# Patient Record
Sex: FEMALE | Race: BLACK OR AFRICAN AMERICAN | NOT HISPANIC OR LATINO | Employment: OTHER | URBAN - METROPOLITAN AREA
[De-identification: names, ages, dates, MRNs, and addresses within clinical notes are randomized per-mention and may not be internally consistent; named-entity substitution may affect disease eponyms.]

---

## 2017-01-10 ENCOUNTER — OFFICE VISIT (OUTPATIENT)
Dept: PODIATRY | Facility: CLINIC | Age: 72
End: 2017-01-10
Payer: MEDICARE

## 2017-01-10 VITALS
HEIGHT: 66 IN | DIASTOLIC BLOOD PRESSURE: 77 MMHG | HEART RATE: 64 BPM | SYSTOLIC BLOOD PRESSURE: 154 MMHG | WEIGHT: 162.13 LBS | BODY MASS INDEX: 26.06 KG/M2

## 2017-01-10 DIAGNOSIS — L60.1 ONYCHOLYSIS: ICD-10-CM

## 2017-01-10 DIAGNOSIS — B35.1 ONYCHOMYCOSIS DUE TO DERMATOPHYTE: Primary | ICD-10-CM

## 2017-01-10 DIAGNOSIS — M62.469 GASTROCNEMIUS EQUINUS, UNSPECIFIED LATERALITY: ICD-10-CM

## 2017-01-10 DIAGNOSIS — M20.10 HALLUX ABDUCTO VALGUS, UNSPECIFIED LATERALITY: ICD-10-CM

## 2017-01-10 PROCEDURE — 99213 OFFICE O/P EST LOW 20 MIN: CPT | Mod: S$PBB,,, | Performed by: PODIATRIST

## 2017-01-10 PROCEDURE — 99999 PR PBB SHADOW E&M-EST. PATIENT-LVL III: CPT | Mod: PBBFAC,,, | Performed by: PODIATRIST

## 2017-01-10 PROCEDURE — 99213 OFFICE O/P EST LOW 20 MIN: CPT | Mod: PBBFAC | Performed by: PODIATRIST

## 2017-01-10 NOTE — PROGRESS NOTES
Subjective:      Patient ID: Mirna Norton is a 71 y.o. female.    Chief Complaint: Nail Problem (PCP Dr. Tyler 10/13/16) and Follow-up    Mirna is a 71 y.o. female who presents to the clinic complaining of thick and discolored toenails on both feet. Mirna is inquiring about treatment options. She tried oral lamisil and penlac with little success about 6 years ago. Currently using OtC antifungal with no improvement.     Review of Systems   Constitution: Negative for chills, fever, weakness, malaise/fatigue and night sweats.   Cardiovascular: Negative for chest pain, leg swelling, orthopnea and palpitations.   Respiratory: Negative for cough, shortness of breath and wheezing.    Skin: Positive for color change, dry skin and nail changes. Negative for itching, poor wound healing and rash.   Musculoskeletal: Negative for arthritis, gout, joint pain, joint swelling, muscle weakness and myalgias.   Gastrointestinal: Negative for abdominal pain, constipation and nausea.   Neurological: Negative for disturbances in coordination, dizziness, focal weakness, numbness and tremors.           Objective:      Physical Exam   Cardiovascular:   Dorsalis pedis and posterior tibial pulses are intact Bilaterally. Toes are cool to touch. Feet are warm proximally.There is decreased digital hair. Skin is atrophic, slightly hyperpigmented, and mildly edematous       Musculoskeletal:   Musculoskeletal:  Muscle strength is 5/5 in all groups bilaterally.  No pain with ankle, STJ or MTPJ ROM, bilat. Ankle dorsiflexion is limited with knees extended and flexed, bilat.     Neurological: No sensory deficit.          Skin:   Toenails 1-5 bilaterally are elongated by 2-3 mm, thickened by 2-3 mm, discolored/yellowed, dystrophic, brittle with subungual debris. No incurvation. Mild xerosis noted, bilat. No open wounds.                       Assessment:       Encounter Diagnoses   Name Primary?    Onychomycosis due to dermatophyte Yes     Gastrocnemius equinus, unspecified laterality     Hallux abducto valgus, unspecified laterality     Onycholysis          Plan:         I counseled the patient on her conditions, their implications and medical management.      .   Discussed all treatment options such as topical, oral, laser treatments vs surgical removal of nail permanent vs non-permanent in detail pertaining to nail fungus.    Reviewed nails unit biopsy confirming fungus.   She wants to avoid oral antifungals.    Interested in laser treatment but wants to continue topical at this time.        Prescribed prescription compound cream with urea, terbinafine and fluconazole to be applied to feet and toenails twice daily.

## 2017-01-10 NOTE — PROGRESS NOTES
Subjective:      Patient ID: Mirna Norton is a 71 y.o. female.    Chief Complaint: Nail Problem (PCP Dr. Tyler 10/13/16) and Follow-up    Mirna is a 71 y.o. female who presents to the clinic complaining of thick and discolored toenails on both feet. Mirna is inquiring about treatment options. She tried oral lamisil and penlac with little success about 6 years ago. Currently using OtC antifungal with no improvement.     1/10/17: f/u for nail fungus. Notes mild improvement with new topical antifungal. Complains of some cracking at nails. Denies pain.    Review of Systems   Constitution: Negative for chills, fever, weakness, malaise/fatigue and night sweats.   Cardiovascular: Negative for chest pain, leg swelling, orthopnea and palpitations.   Respiratory: Negative for cough, shortness of breath and wheezing.    Skin: Positive for color change, dry skin and nail changes. Negative for itching, poor wound healing and rash.   Musculoskeletal: Negative for arthritis, gout, joint pain, joint swelling, muscle weakness and myalgias.   Gastrointestinal: Negative for abdominal pain, constipation and nausea.   Neurological: Negative for disturbances in coordination, dizziness, focal weakness, numbness and tremors.           Objective:      Physical Exam   Cardiovascular:   Dorsalis pedis and posterior tibial pulses are intact Bilaterally. Toes are cool to touch. Feet are warm proximally.There is decreased digital hair. Skin is atrophic, slightly hyperpigmented, and mildly edematous       Musculoskeletal:   Musculoskeletal:  Muscle strength is 5/5 in all groups bilaterally.  No pain with ankle, STJ or MTPJ ROM, bilat. Ankle dorsiflexion is limited with knees extended and flexed, bilat.     Neurological: No sensory deficit.          Skin:   Toenails 1-5 bilaterally are elongated by 2-3 mm, thickened by 2-3 mm, discolored/yellowed, dystrophic, brittle with subungual debris. No incurvation. Mild xerosis noted, bilat. No open  wounds.                       Assessment:       Encounter Diagnoses   Name Primary?    Onychomycosis due to dermatophyte Yes    Gastrocnemius equinus, unspecified laterality     Hallux abducto valgus, unspecified laterality     Onycholysis          Plan:         I counseled the patient on her conditions, their implications and medical management.      .   Discussed all treatment options such as topical, oral, laser treatments vs surgical removal of nail permanent vs non-permanent in detail pertaining to nail fungus.    Reviewed nails unit biopsy confirming fungus.   She wants to avoid oral antifungals.    Interested in laser treatment but wants to continue topical at this time.        Continue prescription compound cream with urea, terbinafine and fluconazole to be applied to feet and toenails twice daily.  Nu vail nightly. Side effects of medication(s) were discussed in detail and patient voiced understanding. Patient will call back for any issues or complications.

## 2017-02-15 ENCOUNTER — PATIENT MESSAGE (OUTPATIENT)
Dept: INTERNAL MEDICINE | Facility: CLINIC | Age: 72
End: 2017-02-15

## 2017-04-04 RX ORDER — ALLOPURINOL 100 MG/1
200 TABLET ORAL DAILY
Qty: 180 TABLET | Refills: 0 | Status: CANCELLED | OUTPATIENT
Start: 2017-04-04

## 2017-04-04 NOTE — TELEPHONE ENCOUNTER
Cannot renew her allopurinol as we have not seen her since August 2015. Perhaps her primary care MD can refill. Otherwise will need to make an apt.

## 2017-04-05 DIAGNOSIS — I73.9 PVD (PERIPHERAL VASCULAR DISEASE): ICD-10-CM

## 2017-04-05 DIAGNOSIS — M10.9 GOUT, UNSPECIFIED: ICD-10-CM

## 2017-04-05 RX ORDER — ALLOPURINOL 100 MG/1
200 TABLET ORAL DAILY
Qty: 180 TABLET | Refills: 11 | Status: SHIPPED | OUTPATIENT
Start: 2017-04-05 | End: 2018-06-11 | Stop reason: SDUPTHER

## 2017-04-05 NOTE — TELEPHONE ENCOUNTER
----- Message from Maya Paredes sent at 4/4/2017  3:24 PM CDT -----  Contact: Self/[phone]  Pt said that she is calling in regards to needing to get an order for some compression stockings sent to [facility] pt stated that she would like to get the same stockings that the doctor ordered for her before also pt stated that she needs a refill on her allopurinol (ZYLOPRIM) 100 MG tablet. Please call and advise              Thank you

## 2017-05-09 ENCOUNTER — OFFICE VISIT (OUTPATIENT)
Dept: PODIATRY | Facility: CLINIC | Age: 72
End: 2017-05-09
Payer: MEDICARE

## 2017-05-09 VITALS
DIASTOLIC BLOOD PRESSURE: 77 MMHG | HEART RATE: 56 BPM | BODY MASS INDEX: 26.06 KG/M2 | WEIGHT: 162.13 LBS | SYSTOLIC BLOOD PRESSURE: 142 MMHG | HEIGHT: 66 IN

## 2017-05-09 DIAGNOSIS — M20.10 HALLUX ABDUCTO VALGUS, UNSPECIFIED LATERALITY: ICD-10-CM

## 2017-05-09 DIAGNOSIS — L60.1 ONYCHOLYSIS: Primary | ICD-10-CM

## 2017-05-09 PROCEDURE — 99213 OFFICE O/P EST LOW 20 MIN: CPT | Mod: PBBFAC | Performed by: PODIATRIST

## 2017-05-09 PROCEDURE — 99999 PR PBB SHADOW E&M-EST. PATIENT-LVL III: CPT | Mod: PBBFAC,,, | Performed by: PODIATRIST

## 2017-05-09 PROCEDURE — 99213 OFFICE O/P EST LOW 20 MIN: CPT | Mod: S$PBB,,, | Performed by: PODIATRIST

## 2017-05-09 NOTE — PROGRESS NOTES
Subjective:      Patient ID: Mirna Norton is a 72 y.o. female.    Chief Complaint: Nail Problem (fungus/ PCP Dr. Tyler 10/13/16) and Follow-up    Mirna is a 72 y.o. female who presents to the clinic complaining of thick and discolored toenails on both feet. Mirna is inquiring about treatment options. She tried oral lamisil and penlac with little success about 6 years ago. Currently using OtC antifungal with no improvement.     5/9/17: f/u for nail fungus/splitting. Notes improvement with topical antifungal and nuvail with daily use. Denies injury or pain.    Review of Systems   Constitution: Negative for chills, fever, weakness, malaise/fatigue and night sweats.   Cardiovascular: Negative for chest pain, leg swelling, orthopnea and palpitations.   Respiratory: Negative for cough, shortness of breath and wheezing.    Skin: Positive for color change, dry skin and nail changes. Negative for itching, poor wound healing and rash.   Musculoskeletal: Negative for arthritis, gout, joint pain, joint swelling, muscle weakness and myalgias.   Gastrointestinal: Negative for abdominal pain, constipation and nausea.   Neurological: Negative for disturbances in coordination, dizziness, focal weakness, numbness and tremors.           Objective:      Physical Exam   Cardiovascular:   Dorsalis pedis and posterior tibial pulses are intact Bilaterally. Toes are cool to touch. Feet are warm proximally.There is decreased digital hair. Skin is atrophic, slightly hyperpigmented, and mildly edematous       Musculoskeletal:   Musculoskeletal:  Muscle strength is 5/5 in all groups bilaterally.  No pain with ankle, STJ or MTPJ ROM, bilat. Ankle dorsiflexion is limited with knees extended and flexed, bilat.     Neurological: No sensory deficit.          Skin:   Toenails 1-5 bilaterally thickened by 1-2 mm, discolored/yellowed. No incurvation. Clearing noted at proximal half of nails.               Assessment:       Encounter Diagnoses    Name Primary?    Onycholysis Yes    Hallux abducto valgus, unspecified laterality          Plan:         I counseled the patient on her conditions, their implications and medical management.      .   Discussed all treatment options such as topical, oral, laser treatments vs surgical removal of nail permanent vs non-permanent in detail pertaining to nail fungus.    Reviewed nails unit biopsy confirming fungus.   She wants to avoid oral antifungals.        Continue prescription compound cream with urea, terbinafine and fluconazole to be applied to feet and daily.  Nu vail nightly. Side effects of medication(s) were discussed in detail and patient voiced understanding. Patient will call back for any issues or complications.

## 2017-05-09 NOTE — PATIENT INSTRUCTIONS
Antifungal medicine, Vicks vapor rub, tea tree oil or vinegar soaks daily    Nuvail every 3-4 days before bed

## 2017-06-13 ENCOUNTER — OFFICE VISIT (OUTPATIENT)
Dept: INTERNAL MEDICINE | Facility: CLINIC | Age: 72
End: 2017-06-13
Payer: MEDICARE

## 2017-06-13 VITALS
BODY MASS INDEX: 26.47 KG/M2 | HEIGHT: 66 IN | WEIGHT: 164.69 LBS | SYSTOLIC BLOOD PRESSURE: 140 MMHG | HEART RATE: 62 BPM | DIASTOLIC BLOOD PRESSURE: 72 MMHG | OXYGEN SATURATION: 98 %

## 2017-06-13 DIAGNOSIS — M79.671 FOOT PAIN, RIGHT: ICD-10-CM

## 2017-06-13 PROCEDURE — 99213 OFFICE O/P EST LOW 20 MIN: CPT | Mod: S$PBB,GC,, | Performed by: STUDENT IN AN ORGANIZED HEALTH CARE EDUCATION/TRAINING PROGRAM

## 2017-06-13 PROCEDURE — 99214 OFFICE O/P EST MOD 30 MIN: CPT | Mod: PBBFAC | Performed by: STUDENT IN AN ORGANIZED HEALTH CARE EDUCATION/TRAINING PROGRAM

## 2017-06-13 PROCEDURE — 99999 PR PBB SHADOW E&M-EST. PATIENT-LVL IV: CPT | Mod: PBBFAC,GC,, | Performed by: STUDENT IN AN ORGANIZED HEALTH CARE EDUCATION/TRAINING PROGRAM

## 2017-06-13 PROCEDURE — 1125F AMNT PAIN NOTED PAIN PRSNT: CPT | Mod: GC,,, | Performed by: STUDENT IN AN ORGANIZED HEALTH CARE EDUCATION/TRAINING PROGRAM

## 2017-06-13 PROCEDURE — 1159F MED LIST DOCD IN RCRD: CPT | Mod: GC,,, | Performed by: STUDENT IN AN ORGANIZED HEALTH CARE EDUCATION/TRAINING PROGRAM

## 2017-06-13 RX ORDER — MELOXICAM 15 MG/1
15 TABLET ORAL DAILY
Qty: 14 TABLET | Refills: 0 | Status: SHIPPED | OUTPATIENT
Start: 2017-06-13 | End: 2017-10-10

## 2017-06-13 NOTE — PROGRESS NOTES
Subjective:       Patient ID: Mirna Norton is a 72 y.o. female.    Chief Complaint: Pain (x week, left foot)    HPI   Mirna Norton is a 72 y.o. female with PMHx Gout HTN HLD GERD who presents today for C/C of right foot pain for 1/52.   Review of Systems   Constitutional: Negative for chills and fever.   HENT: Negative for congestion and rhinorrhea.    Eyes: Negative for redness and itching.   Respiratory: Negative for cough and shortness of breath.    Cardiovascular: Negative for chest pain and leg swelling.   Gastrointestinal: Negative for abdominal pain, constipation, diarrhea, nausea and vomiting.   Genitourinary: Negative for dysuria and hematuria.   Musculoskeletal: Positive for joint swelling. Negative for myalgias.   Skin: Positive for color change. Negative for rash and wound.   Neurological: Negative for dizziness and headaches.       Objective:      Physical Exam   Constitutional: She is oriented to person, place, and time. She appears well-developed and well-nourished. No distress.   HENT:   Head: Normocephalic and atraumatic.   Eyes: Conjunctivae and EOM are normal. Pupils are equal, round, and reactive to light.   Neck: Normal range of motion. Neck supple. No JVD present. No thyromegaly present.   Cardiovascular: Normal rate, regular rhythm, S1 normal and S2 normal.  Exam reveals no gallop and no friction rub.    No murmur heard.  Pulses:       Dorsalis pedis pulses are 2+ on the right side, and 2+ on the left side.        Posterior tibial pulses are 2+ on the right side, and 2+ on the left side.   Pulmonary/Chest: Effort normal. She has no wheezes. She has no rhonchi. She has no rales.   Abdominal: Soft. Bowel sounds are normal. She exhibits no distension. There is no tenderness.   Musculoskeletal: Normal range of motion. She exhibits no edema or tenderness.        Right foot: There is normal range of motion and no deformity.        Left foot: There is normal range of motion and no deformity.    Right foot with tenderness to deep palpation over II/III MCP. Mild erythema and minimal warmth. No obvious limitations on ROM. No evidence of skin break down.    Onchomycosis noted on LEFT foot.     Shallow arches noted bilaterally.    Feet:   Right Foot:   Protective Sensation: 3 sites tested. 3 sites sensed.   Skin Integrity: Positive for erythema and warmth. Negative for ulcer or skin breakdown.   Left Foot:   Protective Sensation: 3 sites tested. 3 sites sensed.   Skin Integrity: Negative for ulcer, skin breakdown, erythema or warmth.   Neurological: She is alert and oriented to person, place, and time. She has normal reflexes.   Skin: Skin is warm and dry.   Nursing note and vitals reviewed.      Assessment:       1. Foot pain, right        Plan:       1 -   -Recommend 2 week course of mobic  -Unlikely to be cellulitis or gout flare.   -Minimal warmth.   -Tenderness is not comparable to patient's previous gout flares and pain is not out of proportion to examination.   -Compliant with home allopurinol dosage and last urica acid WNL.  -Stress # is thought unlikely given WNL DEXA and no evidence of significant impact stress recently.  -WIll defer Abx for now and trial 15mg meloxicam daily.  -Instruct patient to RTC in one week if no improvement for labs and possible imaging.  -Instructed patient to RTC sooner if symptoms worsen.    Case d/w Dr French  RTC 1 week if not better or prn complaints/concerns.    Otoniel Cho II, MD  Internal Medicine PGY2  [phone]      I have personally seen and examined patient and agree with the A/P as noted above.    Scotty Weldon

## 2017-06-14 ENCOUNTER — PATIENT MESSAGE (OUTPATIENT)
Dept: INTERNAL MEDICINE | Facility: CLINIC | Age: 72
End: 2017-06-14

## 2017-06-14 DIAGNOSIS — E78.5 HYPERLIPIDEMIA, UNSPECIFIED HYPERLIPIDEMIA TYPE: ICD-10-CM

## 2017-06-14 DIAGNOSIS — I65.29 STENOSIS OF CAROTID ARTERY, UNSPECIFIED LATERALITY: ICD-10-CM

## 2017-06-14 DIAGNOSIS — R73.03 PRE-DIABETES: ICD-10-CM

## 2017-06-14 DIAGNOSIS — I73.9 PVD (PERIPHERAL VASCULAR DISEASE): ICD-10-CM

## 2017-06-14 DIAGNOSIS — M10.9 GOUT, UNSPECIFIED: ICD-10-CM

## 2017-06-14 DIAGNOSIS — Z00.00 ROUTINE GENERAL MEDICAL EXAMINATION AT A HEALTH CARE FACILITY: Primary | ICD-10-CM

## 2017-06-14 DIAGNOSIS — I10 ESSENTIAL HYPERTENSION: ICD-10-CM

## 2017-06-14 NOTE — TELEPHONE ENCOUNTER
Hi, please set her up for yearly blood work, orders are in.  Also please schedule a nurse visit for same day as blood work for a BP check.  Thank you, Ramsey Tyler

## 2017-06-15 ENCOUNTER — PATIENT MESSAGE (OUTPATIENT)
Dept: INTERNAL MEDICINE | Facility: CLINIC | Age: 72
End: 2017-06-15

## 2017-06-16 ENCOUNTER — CLINICAL SUPPORT (OUTPATIENT)
Dept: INTERNAL MEDICINE | Facility: CLINIC | Age: 72
End: 2017-06-16
Payer: MEDICARE

## 2017-06-16 ENCOUNTER — LAB VISIT (OUTPATIENT)
Dept: LAB | Facility: HOSPITAL | Age: 72
End: 2017-06-16
Attending: INTERNAL MEDICINE
Payer: MEDICARE

## 2017-06-16 VITALS — DIASTOLIC BLOOD PRESSURE: 80 MMHG | SYSTOLIC BLOOD PRESSURE: 160 MMHG

## 2017-06-16 DIAGNOSIS — I10 ESSENTIAL HYPERTENSION: ICD-10-CM

## 2017-06-16 DIAGNOSIS — R73.03 PRE-DIABETES: ICD-10-CM

## 2017-06-16 DIAGNOSIS — M10.9 GOUT, UNSPECIFIED: ICD-10-CM

## 2017-06-16 DIAGNOSIS — I10 ESSENTIAL HYPERTENSION: Primary | ICD-10-CM

## 2017-06-16 LAB
ALBUMIN SERPL BCP-MCNC: 3.4 G/DL
ALP SERPL-CCNC: 68 U/L
ALT SERPL W/O P-5'-P-CCNC: 12 U/L
ANION GAP SERPL CALC-SCNC: 7 MMOL/L
AST SERPL-CCNC: 17 U/L
BILIRUB SERPL-MCNC: 0.3 MG/DL
BUN SERPL-MCNC: 21 MG/DL
CALCIUM SERPL-MCNC: 9.5 MG/DL
CHLORIDE SERPL-SCNC: 105 MMOL/L
CHOLEST/HDLC SERPL: 2.7 {RATIO}
CO2 SERPL-SCNC: 28 MMOL/L
CREAT SERPL-MCNC: 0.9 MG/DL
EST. GFR  (AFRICAN AMERICAN): >60 ML/MIN/1.73 M^2
EST. GFR  (NON AFRICAN AMERICAN): >60 ML/MIN/1.73 M^2
ESTIMATED AVG GLUCOSE: 128 MG/DL
GLUCOSE SERPL-MCNC: 98 MG/DL
HBA1C MFR BLD HPLC: 6.1 %
HDL/CHOLESTEROL RATIO: 36.9 %
HDLC SERPL-MCNC: 168 MG/DL
HDLC SERPL-MCNC: 62 MG/DL
LDLC SERPL CALC-MCNC: 87.2 MG/DL
NONHDLC SERPL-MCNC: 106 MG/DL
POTASSIUM SERPL-SCNC: 4.3 MMOL/L
PROT SERPL-MCNC: 7 G/DL
SODIUM SERPL-SCNC: 140 MMOL/L
TRIGL SERPL-MCNC: 94 MG/DL
URATE SERPL-MCNC: 5.1 MG/DL

## 2017-06-16 PROCEDURE — 99999 PR PBB SHADOW E&M-EST. PATIENT-LVL I: CPT | Mod: PBBFAC,,,

## 2017-06-16 PROCEDURE — 99211 OFF/OP EST MAY X REQ PHY/QHP: CPT | Mod: PBBFAC

## 2017-06-16 RX ORDER — HYDROCHLOROTHIAZIDE 25 MG/1
25 TABLET ORAL DAILY
Qty: 30 TABLET | Refills: 11 | Status: SHIPPED | OUTPATIENT
Start: 2017-06-16 | End: 2017-11-16

## 2017-06-16 NOTE — PROGRESS NOTES
Pt is here for the b/p check. Pt doesn't have b/p machine at home and hasn't been checking b/p.Pt states that she took b/p medicine today at 7:50am. First b/p reading taken at 8:15 am, b/p at this time 176/89, p 52. Second blood pressure reading taken at 8:40 am 183/90 p 52. Dr Tyler notified and he has personally seen pt and released her form the office. Pt is to come back in 4 wks for the lab work and b/p check. Appts made.

## 2017-07-17 ENCOUNTER — PATIENT MESSAGE (OUTPATIENT)
Dept: TRANSPLANT | Facility: CLINIC | Age: 72
End: 2017-07-17

## 2017-07-18 ENCOUNTER — CLINICAL SUPPORT (OUTPATIENT)
Dept: INTERNAL MEDICINE | Facility: CLINIC | Age: 72
End: 2017-07-18
Payer: MEDICARE

## 2017-07-18 ENCOUNTER — LAB VISIT (OUTPATIENT)
Dept: LAB | Facility: HOSPITAL | Age: 72
End: 2017-07-18
Attending: INTERNAL MEDICINE
Payer: MEDICARE

## 2017-07-18 VITALS — DIASTOLIC BLOOD PRESSURE: 71 MMHG | SYSTOLIC BLOOD PRESSURE: 138 MMHG

## 2017-07-18 DIAGNOSIS — I10 ESSENTIAL HYPERTENSION: ICD-10-CM

## 2017-07-18 DIAGNOSIS — Z01.30 BP CHECK: Primary | ICD-10-CM

## 2017-07-18 LAB
ANION GAP SERPL CALC-SCNC: 10 MMOL/L
BUN SERPL-MCNC: 16 MG/DL
CALCIUM SERPL-MCNC: 9.5 MG/DL
CHLORIDE SERPL-SCNC: 99 MMOL/L
CO2 SERPL-SCNC: 31 MMOL/L
CREAT SERPL-MCNC: 0.9 MG/DL
EST. GFR  (AFRICAN AMERICAN): >60 ML/MIN/1.73 M^2
EST. GFR  (NON AFRICAN AMERICAN): >60 ML/MIN/1.73 M^2
GLUCOSE SERPL-MCNC: 137 MG/DL
POTASSIUM SERPL-SCNC: 3.4 MMOL/L
SODIUM SERPL-SCNC: 140 MMOL/L

## 2017-07-18 PROCEDURE — 99214 OFFICE O/P EST MOD 30 MIN: CPT | Mod: PBBFAC

## 2017-07-18 PROCEDURE — 99999 PR PBB SHADOW E&M-EST. PATIENT-LVL IV: CPT | Mod: PBBFAC,,,

## 2017-07-18 NOTE — PROGRESS NOTES
Pt b/p and medication were check pt reading are documented in pt vitals chart pt advised by Dr. Tyler and also had information given from O Jj.

## 2017-09-06 ENCOUNTER — OFFICE VISIT (OUTPATIENT)
Dept: INTERNAL MEDICINE | Facility: CLINIC | Age: 72
End: 2017-09-06
Payer: MEDICARE

## 2017-09-06 VITALS
SYSTOLIC BLOOD PRESSURE: 122 MMHG | TEMPERATURE: 101 F | WEIGHT: 160.69 LBS | BODY MASS INDEX: 25.83 KG/M2 | DIASTOLIC BLOOD PRESSURE: 64 MMHG | HEART RATE: 76 BPM | HEIGHT: 66 IN

## 2017-09-06 DIAGNOSIS — J06.9 UPPER RESPIRATORY TRACT INFECTION, UNSPECIFIED TYPE: ICD-10-CM

## 2017-09-06 DIAGNOSIS — R50.9 FEVER, UNSPECIFIED FEVER CAUSE: Primary | ICD-10-CM

## 2017-09-06 LAB
CTP QC/QA: YES
CTP QC/QA: YES
FLUAV AG NPH QL: NEGATIVE
FLUBV AG NPH QL: NEGATIVE
S PYO RRNA THROAT QL PROBE: NEGATIVE

## 2017-09-06 PROCEDURE — 99213 OFFICE O/P EST LOW 20 MIN: CPT | Mod: S$PBB,,, | Performed by: INTERNAL MEDICINE

## 2017-09-06 PROCEDURE — 1125F AMNT PAIN NOTED PAIN PRSNT: CPT | Mod: ,,, | Performed by: INTERNAL MEDICINE

## 2017-09-06 PROCEDURE — 99999 PR PBB SHADOW E&M-EST. PATIENT-LVL III: CPT | Mod: PBBFAC,,, | Performed by: INTERNAL MEDICINE

## 2017-09-06 PROCEDURE — 3078F DIAST BP <80 MM HG: CPT | Mod: ,,, | Performed by: INTERNAL MEDICINE

## 2017-09-06 PROCEDURE — 1159F MED LIST DOCD IN RCRD: CPT | Mod: ,,, | Performed by: INTERNAL MEDICINE

## 2017-09-06 PROCEDURE — 87804 INFLUENZA ASSAY W/OPTIC: CPT | Mod: PBBFAC | Performed by: INTERNAL MEDICINE

## 2017-09-06 PROCEDURE — 99213 OFFICE O/P EST LOW 20 MIN: CPT | Mod: PBBFAC | Performed by: INTERNAL MEDICINE

## 2017-09-06 PROCEDURE — 87880 STREP A ASSAY W/OPTIC: CPT | Mod: PBBFAC | Performed by: INTERNAL MEDICINE

## 2017-09-06 PROCEDURE — 3074F SYST BP LT 130 MM HG: CPT | Mod: ,,, | Performed by: INTERNAL MEDICINE

## 2017-09-06 RX ORDER — CODEINE PHOSPHATE AND GUAIFENESIN 10; 100 MG/5ML; MG/5ML
5 SOLUTION ORAL NIGHTLY PRN
Qty: 118 ML | Refills: 0 | Status: SHIPPED | OUTPATIENT
Start: 2017-09-06 | End: 2017-09-16

## 2017-09-06 RX ORDER — ACETAMINOPHEN 325 MG/1
650 TABLET ORAL
Status: COMPLETED | OUTPATIENT
Start: 2017-09-06 | End: 2017-09-06

## 2017-09-06 RX ORDER — AMOXICILLIN AND CLAVULANATE POTASSIUM 875; 125 MG/1; MG/1
1 TABLET, FILM COATED ORAL EVERY 12 HOURS
Qty: 14 TABLET | Refills: 0 | Status: SHIPPED | OUTPATIENT
Start: 2017-09-06 | End: 2017-09-13

## 2017-09-06 RX ADMIN — ACETAMINOPHEN 650 MG: 325 TABLET ORAL at 08:09

## 2017-09-06 NOTE — PROGRESS NOTES
Subjective:       Patient ID: Mirna Norton is a 72 y.o. female.    Chief Complaint: Cough (4Xdays); Sore Throat (3Xdays); and Headache (1Xday)    HPI     Patient is a 72 year old female here today for cough and sore throat.  Sx began 5 days ago with dry cough, sore throat, low grade fever (unknown Tmax), now progressed to increased throat pain, nasal congestion, rhinorrhea, sinus pressure, body aches. No upset stomach, diarrhea, nausea/vomiting. No known sick contacts. No change with OTC medications at home.        Review of Systems   Constitutional: Positive for appetite change, chills, fatigue and fever.   HENT: Positive for congestion, postnasal drip, rhinorrhea, sinus pain, sinus pressure, sneezing and sore throat. Negative for ear pain.    Respiratory: Positive for cough. Negative for shortness of breath and wheezing.    Cardiovascular: Negative for chest pain.   Gastrointestinal: Negative for abdominal pain, constipation, diarrhea, nausea and vomiting.   Neurological: Positive for headaches.       Objective:      Physical Exam   Constitutional: She is oriented to person, place, and time. She appears well-developed and well-nourished. No distress.   HENT:   Head: Normocephalic and atraumatic.   Right Ear: External ear normal.   Left Ear: External ear normal.   Effusion posterior to left TM  +maxillary sinus tenderness on L   Eyes: Conjunctivae and EOM are normal. Pupils are equal, round, and reactive to light.   Neck: Normal range of motion. Neck supple. No thyromegaly present.   Cardiovascular: Normal rate, regular rhythm, normal heart sounds and intact distal pulses.    No murmur heard.  Pulmonary/Chest: Effort normal and breath sounds normal. No respiratory distress. She has no wheezes. She has no rales.   Musculoskeletal: She exhibits no edema.   Lymphadenopathy:     She has no cervical adenopathy.   Neurological: She is alert and oriented to person, place, and time.   Skin: Skin is warm and dry. She is  not diaphoretic.   Nursing note and vitals reviewed.      Assessment:       1. Fever, unspecified fever cause    2. Upper respiratory tract infection, unspecified type        Plan:       Rapid POCT strep negative  Rapid Influenza test negative  She has fever in clinic today, sx worsening and not improving  Will bolus 500 cc NS IV now   Tylenol 650 mg PO X 1 dose now  Start Augmentin BID x 7 days, GI side effects reviewed  Recommend flonase nasal spray daily x 3 weeks  Recommend coricidin BP cold/flu PRN for cold symptoms  Rx for Cheratussin nightly prn cough, sedation side effects reviewed  RTC PRN

## 2017-09-21 ENCOUNTER — TELEPHONE (OUTPATIENT)
Dept: OBSTETRICS AND GYNECOLOGY | Facility: CLINIC | Age: 72
End: 2017-09-21

## 2017-09-21 DIAGNOSIS — Z12.39 BREAST CANCER SCREENING: Primary | ICD-10-CM

## 2017-09-27 ENCOUNTER — TELEPHONE (OUTPATIENT)
Dept: GASTROENTEROLOGY | Facility: CLINIC | Age: 72
End: 2017-09-27

## 2017-09-27 NOTE — TELEPHONE ENCOUNTER
Patient received faxed paperwork.    Patient is not sure if she had a reaction to her Remicade infusion. The night she had her infusion, she experienced a really bad headache. She is also experiencing nausea and diarrhea. She has had 3 episodes of diarrhea this morning.     Message sent to Dr. Luu.

## 2017-09-27 NOTE — TELEPHONE ENCOUNTER
Returned patient's call. Patient only wants to see Dr. Luu. Gastro appointment scheduled and mailed to address on file.

## 2017-09-27 NOTE — TELEPHONE ENCOUNTER
----- Message from Aretha Grant MA sent at 9/26/2017 12:28 PM CDT -----  Contact: Home: [phone]   Jus  Pt received a letter that she is due for a yearly check with Dr sultana but there is no availability.     Home: [phone]

## 2017-09-29 DIAGNOSIS — I73.9 PVD (PERIPHERAL VASCULAR DISEASE): ICD-10-CM

## 2017-09-29 DIAGNOSIS — I77.9 CAROTID ARTERY DISEASE, UNSPECIFIED LATERALITY: ICD-10-CM

## 2017-09-29 RX ORDER — PRAVASTATIN SODIUM 10 MG/1
10 TABLET ORAL DAILY
Qty: 90 TABLET | Refills: 3 | Status: SHIPPED | OUTPATIENT
Start: 2017-09-29 | End: 2017-11-13 | Stop reason: SDUPTHER

## 2017-10-10 ENCOUNTER — HOSPITAL ENCOUNTER (OUTPATIENT)
Dept: RADIOLOGY | Facility: HOSPITAL | Age: 72
Discharge: HOME | End: 2017-10-10
Attending: NURSE PRACTITIONER
Payer: MEDICARE

## 2017-10-10 ENCOUNTER — OFFICE VISIT (OUTPATIENT)
Dept: OBSTETRICS AND GYNECOLOGY | Facility: CLINIC | Age: 72
End: 2017-10-10
Payer: MEDICARE

## 2017-10-10 VITALS
WEIGHT: 162 LBS | BODY MASS INDEX: 26.03 KG/M2 | SYSTOLIC BLOOD PRESSURE: 138 MMHG | HEIGHT: 66 IN | DIASTOLIC BLOOD PRESSURE: 82 MMHG

## 2017-10-10 VITALS — WEIGHT: 160 LBS | BODY MASS INDEX: 25.71 KG/M2 | HEIGHT: 66 IN

## 2017-10-10 DIAGNOSIS — Z78.0 POSTMENOPAUSE: ICD-10-CM

## 2017-10-10 DIAGNOSIS — Z12.39 BREAST CANCER SCREENING: ICD-10-CM

## 2017-10-10 DIAGNOSIS — Z01.419 VISIT FOR GYNECOLOGIC EXAMINATION: Primary | ICD-10-CM

## 2017-10-10 PROCEDURE — 99999 PR PBB SHADOW E&M-EST. PATIENT-LVL III: CPT | Mod: PBBFAC,,, | Performed by: NURSE PRACTITIONER

## 2017-10-10 PROCEDURE — G0101 CA SCREEN;PELVIC/BREAST EXAM: HCPCS | Mod: S$PBB,,, | Performed by: NURSE PRACTITIONER

## 2017-10-10 PROCEDURE — 77067 SCR MAMMO BI INCL CAD: CPT | Mod: 26,GA,, | Performed by: RADIOLOGY

## 2017-10-10 PROCEDURE — 77067 SCR MAMMO BI INCL CAD: CPT | Mod: GA,TC

## 2017-10-10 PROCEDURE — G0101 CA SCREEN;PELVIC/BREAST EXAM: HCPCS | Mod: PBBFAC | Performed by: NURSE PRACTITIONER

## 2017-10-10 PROCEDURE — 77063 BREAST TOMOSYNTHESIS BI: CPT | Mod: 26,GA,, | Performed by: RADIOLOGY

## 2017-10-10 PROCEDURE — 99213 OFFICE O/P EST LOW 20 MIN: CPT | Mod: PBBFAC | Performed by: NURSE PRACTITIONER

## 2017-10-10 NOTE — PROGRESS NOTES
HISTORY OF PRESENT ILLNESS:    Mirna Norton is a 72 y.o. female , presents for a routine exam and has no gyn complaints.      Past Medical History:   Diagnosis Date    Abnormal cervical Papanicolaou smear     Cryo    Carotid artery stenosis     via kinked carotid artery - followed by Dr. Ye Anderson    Carotid stenosis 5/15/2014    GERD (gastroesophageal reflux disease)     Gout due to renal impairment     Gout, arthritis 2013    Gout, unspecified     Herniated lumbar intervertebral disc     Hypertension     Insomnia     Irritable bowel syndrome without diarrhea 2015    Liver lesion 3/13/2013    Mitral valve prolapse     MVP (mitral valve prolapse)     Pancreatic cyst 2013    PVD (peripheral vascular disease) 2013    Venous insufficiency        Past Surgical History:   Procedure Laterality Date    BARTHOLIN GLAND CYST EXCISION       SECTION      x3    Diagnostic laproscopic       laser surgery on vein          MEDICATIONS AND ALLERGIES:      Current Outpatient Prescriptions:     allopurinol (ZYLOPRIM) 100 MG tablet, Take 2 tablets (200 mg total) by mouth once daily., Disp: 180 tablet, Rfl: 11    aspirin (ECOTRIN) 81 MG EC tablet, Take 81 mg by mouth once daily., Disp: , Rfl:     BIFIDOBACTERIUM INFANTIS (ALIGN ORAL), Take by mouth once daily., Disp: , Rfl:     hydrochlorothiazide (HYDRODIURIL) 25 MG tablet, Take 1 tablet (25 mg total) by mouth once daily., Disp: 30 tablet, Rfl: 11    losartan (COZAAR) 100 MG tablet, Take 1 tablet (100 mg total) by mouth once daily., Disp: 90 tablet, Rfl: 11    metoprolol succinate (TOPROL-XL) 50 MG 24 hr tablet, Take 1 tablet (50 mg total) by mouth once daily., Disp: 90 tablet, Rfl: 11    multivitamin-minerals-lutein (CENTRUM SILVER) Tab, Take 1 tablet by mouth Twice daily., Disp: , Rfl:     omeprazole (PRILOSEC) 40 MG capsule, Take 1 capsule (40 mg total) by mouth every morning. 1 Capsule, Delayed Release(E.C.)  Oral Every day, Disp: 90 capsule, Rfl: 3    poly-ureaurethane 16 % NFSo, Apply 1 application topically every evening., Disp: 15 mL, Rfl: 3    pravastatin (PRAVACHOL) 10 MG tablet, Take 1 tablet (10 mg total) by mouth once daily., Disp: 90 tablet, Rfl: 3    vitamin E 400 UNIT capsule, Take 400 Units by mouth once daily. , Disp: , Rfl:     Review of patient's allergies indicates:   Allergen Reactions    No known allergies        Family History   Problem Relation Age of Onset    Diabetes Brother     Eclampsia Brother     Hypertension Brother     Diabetes Sister     Eclampsia Sister     Hypertension Sister     Heart disease Sister     Breast cancer Other     Diabetes Sister     Eclampsia Sister     Hypertension Sister     Miscarriages / Stillbirths Sister     Diabetes Sister     Eclampsia Sister     Hypertension Sister     Heart disease Mother     Colon cancer Neg Hx     Ovarian cancer Neg Hx        Social History     Social History    Marital status:      Spouse name: N/A    Number of children: N/A    Years of education: N/A     Occupational History    Retired      Social History Main Topics    Smoking status: Never Smoker    Smokeless tobacco: Never Used    Alcohol use No    Drug use: No    Sexual activity: No     Other Topics Concern    Not on file     Social History Narrative    3 kids, previous . , has eye issues. biw exercise.       OBSTETRIC HISTORY: Number of cesareans: 3    COMPREHENSIVE GYN HISTORY:  PAP History: Reports abnormal Paps. Date:1980's. Treatment: Cryo. LAST PAP 8-15-13 NORMAL.  Infection History: Denies STDs. Denies PID.  Benign History: Denies uterine fibroids. Denies ovarian cysts. Denies endometriosis. Denies other conditions.  Cancer History: Denies cervical cancer. Denies uterine cancer or hyperplasia. Denies ovarian cancer. Denies vulvar cancer or pre-cancer. Denies vaginal cancer or pre-cancer. Denies breast cancer. Denies  "colon cancer.  Sexual Activity History: Denies currently being sexually active.  Menstrual History: Denies menses. Patient is postmenopausal: Not on HRT/ERT.    ROS:  GENERAL: No weight changes. No swelling. No fatigue. No fever.  CARDIOVASCULAR: No chest pain. No shortness of breath. No leg cramps.   NEUROLOGICAL: No headaches. No vision changes.  BREASTS: No pain. No lumps. No discharge.  ABDOMEN: No pain. No nausea. No vomiting. No diarrhea. No constipation.  REPRODUCTIVE: No abnormal bleeding.   VULVA: No pain. No lesions. No itching.  VAGINA: No relaxation. No itching. No odor. No discharge. No lesions.  URINARY: No incontinence. No nocturia. No frequency. No dysuria.    /82   Ht 5' 6" (1.676 m)   Wt 73.5 kg (162 lb)   BMI 26.15 kg/m²     PE:  APPEARANCE: Well nourished, well developed, in no acute distress.  AFFECT: WNL, alert and oriented x 3.  SKIN: No hirsutism or acne.  NECK: Neck symmetric without masses or thyromegaly.  NODES: No inguinal, cervical, axillary or femoral lymph node enlargement.  CHEST: Good respiratory effort.   ABDOMEN: Soft. No tenderness or masses. No hepatosplenomegaly. No hernias.  BREASTS: Symmetrical, no skin changes or visible lesions. No palpable masses, nipple discharge bilaterally.  PELVIC: ATROPHIC EXTERNAL FEMALE GENITALIA without lesions. Normal hair distribution. Adequate perineal body, normal urethral meatus. VAGINA DRY without lesions or discharge. CERVIX STENOTIC without lesions, discharge or tenderness. No significant cystocele or rectocele. Bimanual exam shows uterus to be normal size, regular, mobile and nontender. Adnexa without masses or tenderness.  RECTAL: Rectovaginal exam confirms above with normal sphincter tone, no masses.  EXTREMITIES: No edema.    DIAGNOSIS:  1. Visit for gynecologic examination    2. Postmenopause        PLAN:    LABS AND TESTS ORDERED:  Up to date on Mammogram, BMD and Colonoscopy    MEDICATIONS " PRESCRIBED:  None    COUNSELING:  The patient was counseled today on:  -questions about fibroglandular densities on last year's mammogram and will see what this year's mammogram report shows as well as the results of the screening questionnaire she answered;  -osteoporosis prevention, calcium supplementation, regular weight bearing exercise;  -A.C.S. Pap and pelvic exam guidelines (no pap after age 65) and recommendations for yearly mammogram;  -to see her PCP for other health maintenance.    FOLLOW-UP with me in two years.

## 2017-10-17 ENCOUNTER — TELEPHONE (OUTPATIENT)
Dept: CARDIOLOGY | Facility: HOSPITAL | Age: 72
End: 2017-10-17

## 2017-10-17 ENCOUNTER — TELEPHONE (OUTPATIENT)
Dept: CARDIOLOGY | Facility: CLINIC | Age: 72
End: 2017-10-17

## 2017-10-17 DIAGNOSIS — R09.89 CAROTID BRUIT, UNSPECIFIED LATERALITY: ICD-10-CM

## 2017-10-17 DIAGNOSIS — I87.2 VENOUS INSUFFICIENCY OF BOTH LOWER EXTREMITIES: Primary | ICD-10-CM

## 2017-10-17 NOTE — TELEPHONE ENCOUNTER
----- Message from Sivan Molina sent at 10/17/2017 10:58 AM CDT -----  Contact: [phone] self  Patient would like to be seen sooner than the next available appointment. Patient advised her leg is injured and she previously blew a vain. Please advise.

## 2017-10-27 ENCOUNTER — HOSPITAL ENCOUNTER (OUTPATIENT)
Dept: RADIOLOGY | Facility: HOSPITAL | Age: 72
Discharge: HOME | End: 2017-10-27
Attending: INTERNAL MEDICINE
Payer: MEDICARE

## 2017-10-27 DIAGNOSIS — R09.89 CAROTID BRUIT, UNSPECIFIED LATERALITY: ICD-10-CM

## 2017-10-27 DIAGNOSIS — I87.2 VENOUS INSUFFICIENCY OF BOTH LOWER EXTREMITIES: ICD-10-CM

## 2017-10-27 PROCEDURE — 93970 EXTREMITY STUDY: CPT | Mod: TC

## 2017-10-27 PROCEDURE — 93880 EXTRACRANIAL BILAT STUDY: CPT | Mod: TC

## 2017-10-27 PROCEDURE — 93970 EXTREMITY STUDY: CPT | Mod: 26,,, | Performed by: RADIOLOGY

## 2017-10-27 PROCEDURE — 93880 EXTRACRANIAL BILAT STUDY: CPT | Mod: 26,,, | Performed by: RADIOLOGY

## 2017-11-13 ENCOUNTER — OFFICE VISIT (OUTPATIENT)
Dept: CARDIOLOGY | Facility: CLINIC | Age: 72
End: 2017-11-13
Payer: MEDICARE

## 2017-11-13 VITALS
DIASTOLIC BLOOD PRESSURE: 70 MMHG | HEIGHT: 66 IN | SYSTOLIC BLOOD PRESSURE: 110 MMHG | WEIGHT: 159 LBS | HEART RATE: 68 BPM | BODY MASS INDEX: 25.55 KG/M2

## 2017-11-13 DIAGNOSIS — I77.9 CAROTID ARTERY DISEASE, UNSPECIFIED LATERALITY: ICD-10-CM

## 2017-11-13 DIAGNOSIS — R09.89 BILATERAL CAROTID BRUITS: ICD-10-CM

## 2017-11-13 DIAGNOSIS — E78.2 MIXED HYPERLIPIDEMIA: ICD-10-CM

## 2017-11-13 DIAGNOSIS — I65.29 STENOSIS OF CAROTID ARTERY, UNSPECIFIED LATERALITY: Primary | ICD-10-CM

## 2017-11-13 DIAGNOSIS — I87.2 VENOUS INSUFFICIENCY OF RIGHT LOWER EXTREMITY: ICD-10-CM

## 2017-11-13 DIAGNOSIS — I73.9 PVD (PERIPHERAL VASCULAR DISEASE): ICD-10-CM

## 2017-11-13 DIAGNOSIS — I10 ESSENTIAL HYPERTENSION: ICD-10-CM

## 2017-11-13 PROCEDURE — 99999 PR PBB SHADOW E&M-EST. PATIENT-LVL III: CPT | Mod: PBBFAC,,, | Performed by: INTERNAL MEDICINE

## 2017-11-13 PROCEDURE — 99213 OFFICE O/P EST LOW 20 MIN: CPT | Mod: PBBFAC,PO | Performed by: INTERNAL MEDICINE

## 2017-11-13 PROCEDURE — 99214 OFFICE O/P EST MOD 30 MIN: CPT | Mod: S$PBB,,, | Performed by: INTERNAL MEDICINE

## 2017-11-13 RX ORDER — PRAVASTATIN SODIUM 40 MG/1
40 TABLET ORAL NIGHTLY
Qty: 90 TABLET | Refills: 3 | Status: SHIPPED | OUTPATIENT
Start: 2017-11-13 | End: 2018-11-05 | Stop reason: SDUPTHER

## 2017-11-13 NOTE — PROGRESS NOTES
Subjective:   Patient ID:  Mirna Norton is a 72 y.o. female who presents for follow up of Carotid Artery Disease; Hyperlipidemia; Hypertension; and s/p R GSV EVLT      HPI:     Mirna Norton 72 y.o. female is here follow up and feeling well without any new complaints. She is s/p R GSVL EVLT  In 2013 with subsequent injections in  without recurrent issues. She has asymptomatic mild bilateral carotid disease. She takes aspirin and losartan. Her BP ans lipid profile are well controlled. LDL 87 on pravastatin 10 mg nightly. Last venous ultrasound-no DVT or reflux disease. Carotid ultrasound-PSV < 130 cm/sec bilaterally      R ICA 93 cm/sec  L  cm/sec              Patient Active Problem List    Diagnosis Date Noted    Venous insufficiency of right lower extremity 2017         R GSV EVLT 2013 with subsequent injections in           Bilateral carotid bruits 2017    Foot pain, right 2017    Hyperlipidemia 2015    Irritable bowel syndrome without diarrhea 2015    Carotid stenosis 05/15/2014         Asymptomatic        R ICA 93 cm/sec   L  cm/sec            Pancreatic cyst 2013    PVD (peripheral vascular disease) 2013    Change in bowel habits 2013    Liver lesion 2013    Gout, arthritis 2013    Lumbosacral spondylosis 2013    Gout, unspecified 2012     Gout w. hx of podagra and right wrist pain and swelling associated w. mild hyperuricemia.  Some diarrhea w. colcrys, especially 2 daily.           GERD (gastroesophageal reflux disease)      Gastroesophageal reflux with nonsteroidal antiinflammatory drugs, but can tolerate short term;   and fear of Celebrex        Hypertension            Right Arm BP - Sittin/70  Left Arm BP - Sittin/70        LABS    LAST HbA1c  Lab Results   Component Value Date    HGBA1C 6.1 (H) 2017       Lipid panel  Lab Results   Component Value Date    CHOL 168  06/16/2017    CHOL 143 06/07/2016    CHOL 152 09/28/2015     Lab Results   Component Value Date    HDL 62 06/16/2017    HDL 59 06/07/2016    HDL 61 09/28/2015     Lab Results   Component Value Date    LDLCALC 87.2 06/16/2017    LDLCALC 59.6 (L) 06/07/2016    LDLCALC 70.8 09/28/2015     Lab Results   Component Value Date    TRIG 94 06/16/2017    TRIG 122 06/07/2016    TRIG 101 09/28/2015     Lab Results   Component Value Date    CHOLHDL 36.9 06/16/2017    CHOLHDL 41.3 06/07/2016    CHOLHDL 40.1 09/28/2015            Review of Systems   Constitution: Negative for diaphoresis, weakness, night sweats, weight gain and weight loss.   HENT: Negative for congestion.    Eyes: Negative for blurred vision, discharge and double vision.   Cardiovascular: Negative for chest pain, claudication, cyanosis, dyspnea on exertion, irregular heartbeat, leg swelling, near-syncope, orthopnea, palpitations, paroxysmal nocturnal dyspnea and syncope.   Respiratory: Negative for cough, shortness of breath and wheezing.    Endocrine: Negative for cold intolerance, heat intolerance and polyphagia.   Hematologic/Lymphatic: Negative for adenopathy and bleeding problem. Does not bruise/bleed easily.   Skin: Negative for dry skin and nail changes.   Musculoskeletal: Negative for arthritis, back pain, falls, joint pain, myalgias and neck pain.   Gastrointestinal: Negative for bloating, abdominal pain, change in bowel habit and constipation.   Genitourinary: Negative for bladder incontinence, dysuria, flank pain, genital sores and missed menses.   Neurological: Negative for aphonia, brief paralysis, difficulty with concentration and dizziness.   Psychiatric/Behavioral: Negative for altered mental status and memory loss. The patient does not have insomnia.    Allergic/Immunologic: Negative for environmental allergies.       Objective:   Physical Exam   Constitutional: She is oriented to person, place, and time. She appears well-developed and  well-nourished. She is not intubated.   HENT:   Head: Normocephalic and atraumatic.   Right Ear: External ear normal.   Left Ear: External ear normal.   Mouth/Throat: Oropharynx is clear and moist.   Eyes: Conjunctivae and EOM are normal. Pupils are equal, round, and reactive to light. Right eye exhibits no discharge. Left eye exhibits no discharge. No scleral icterus.   Neck: Normal range of motion. Neck supple. Normal carotid pulses, no hepatojugular reflux and no JVD present. Carotid bruit is not present. No tracheal deviation present. No thyromegaly present.   Cardiovascular: Normal rate, regular rhythm, S1 normal and S2 normal.   No extrasystoles are present. PMI is not displaced.  Exam reveals no gallop, no S3, no distant heart sounds, no friction rub and no midsystolic click.    No murmur heard.  Pulses:       Carotid pulses are 2+ on the right side, and 2+ on the left side.       Radial pulses are 2+ on the right side, and 2+ on the left side.        Femoral pulses are 2+ on the right side, and 2+ on the left side.       Popliteal pulses are 2+ on the right side, and 2+ on the left side.        Dorsalis pedis pulses are 2+ on the right side, and 2+ on the left side.        Posterior tibial pulses are 2+ on the right side, and 2+ on the left side.   Pulmonary/Chest: Effort normal and breath sounds normal. No accessory muscle usage or stridor. No apnea, no tachypnea and no bradypnea. She is not intubated. No respiratory distress. She has no decreased breath sounds. She has no wheezes. She has no rales. She exhibits no tenderness and no bony tenderness.   Abdominal: She exhibits no distension, no pulsatile liver, no abdominal bruit, no ascites, no pulsatile midline mass and no mass. There is no tenderness. There is no rebound and no guarding.   Musculoskeletal: Normal range of motion. She exhibits no edema or tenderness.   Lymphadenopathy:     She has no cervical adenopathy.   Neurological: She is alert and  oriented to person, place, and time. She has normal reflexes. No cranial nerve deficit. Coordination normal.   Skin: Skin is warm. No rash noted. No erythema. No pallor.   Psychiatric: She has a normal mood and affect. Her behavior is normal. Judgment and thought content normal.       Assessment:     1. Stenosis of carotid artery, unspecified laterality    2. Essential hypertension    3. PVD (peripheral vascular disease)    4. Mixed hyperlipidemia    5. Venous insufficiency of right lower extremity    6. Carotid artery disease, unspecified laterality    7. Bilateral carotid bruits        Plan:         She will titrate pravastatin to 40 mg nightly    Target LDL <70        Continue with current medical plan and lifestyle changes.  Return sooner for concerns or questions. If symptoms persist go to the ED  I have reviewed all pertinent data on this patient       I have reviewed the patient's medical history in detail and updated the computerized patient record.    Orders Placed This Encounter   Procedures    US Carotid Bilateral     Standing Status:   Future     Standing Expiration Date:   11/13/2018    EKG 12-lead       Follow up as scheduled. Return sooner for concerns or questions  Follow up yearly           She expressed verbal understanding and agreed with the plan      Greater than 50% of the visit of 45 minutes was spent counseling, educating, and coordinating the care of the patient.  -In today's visit, at least 4 established conditions that pose a risk to life or bodily function have been addressed and the conditions are severe.    -In today's visit, monitoring for drug toxicity was accomplished.            Patient's Medications   New Prescriptions    No medications on file   Previous Medications    ALLOPURINOL (ZYLOPRIM) 100 MG TABLET    Take 2 tablets (200 mg total) by mouth once daily.    ASPIRIN (ECOTRIN) 81 MG EC TABLET    Take 81 mg by mouth once daily.    BIFIDOBACTERIUM INFANTIS (ALIGN ORAL)    Take  by mouth once daily.    HYDROCHLOROTHIAZIDE (HYDRODIURIL) 25 MG TABLET    Take 1 tablet (25 mg total) by mouth once daily.    LOSARTAN (COZAAR) 100 MG TABLET    Take 1 tablet (100 mg total) by mouth once daily.    METOPROLOL SUCCINATE (TOPROL-XL) 50 MG 24 HR TABLET    Take 1 tablet (50 mg total) by mouth once daily.    MULTIVITAMIN-MINERALS-LUTEIN (CENTRUM SILVER) TAB    Take 1 tablet by mouth Twice daily.    OMEPRAZOLE (PRILOSEC) 40 MG CAPSULE    Take 1 capsule (40 mg total) by mouth every morning. 1 Capsule, Delayed Release(E.C.) Oral Every day    POLY-UREAURETHANE 16 % NFSO    Apply 1 application topically every evening.    VITAMIN E 400 UNIT CAPSULE    Take 400 Units by mouth once daily.    Modified Medications    Modified Medication Previous Medication    PRAVASTATIN (PRAVACHOL) 40 MG TABLET pravastatin (PRAVACHOL) 10 MG tablet       Take 1 tablet (40 mg total) by mouth every evening.    Take 1 tablet (10 mg total) by mouth once daily.   Discontinued Medications    No medications on file

## 2017-11-13 NOTE — PATIENT INSTRUCTIONS
Heart Disease Education    The heart beats 60 to 100 times per minute, 24 hours a day. This equals almost 1000,000 times a day. It pumps blood with oxygen and nutrients to the tissues and organs of the body. But the heart is a muscle and needs its own supply of blood. Blood flow to the heart is supplied by the coronary arteries. Coronary artery disease (atherosclerosis) is a result of cholesterol, saturated fat, and calcium deposits (plaques) that build up inside the walls. This causes inflammation within the coronary arteries. These plaques narrow the artery and reduce blood flow to the heart muscle. The reduction in blood flow to the heart muscle decreases oxygen supply to the heart. If the narrowing is significant enough, the oxygen supply to one or more regions of the heart can be temporarily or permanently shut down. This can cause chest pain, and possibly death of heart tissue (heart attack).  Types of chest pain  Angina is the name for pain in the heart muscle. Angina is a warning sign of serious heart disease. When untreated it can lead to a heart attack, also known as acute myocardial infarction, or AMI. Angina occurs when there is not enough blood and oxygen flowing to the heart for the amount of work it is doing. This most often happens during physical exertion, when the heart is working hardest. It is usually relieved by rest or nitroglycerin. Angina may also occur after a large meal when extra blood is sent to the digestive organs and less goes to the heart. In the case of advanced or unstable heart disease, angina can occur at rest or awaken you from sleep. Angina usually lasts from a few minutes up to 20 minutes or more. When treated early, the effects of angina can be reversed without permanent damage to the heart. Angina is a serious condition and needs to be evaluated by a medical professional immediately.  There are two types of angina -- stable and unstable:  · Stable angina usually occurs  with a predictable level of activity. Being stable, its character, severity, and occurrence do not change much over time. It usually starts with activity, and resolves with rest or taking your medicine as instructed by your doctor. The symptoms usually do not last long.  · Unstable angina changes or gets worse over time. It is different from whatever you are used to. It may feel different or worse, begin without cause, occur with exercise or exertion, wake you up from sleep, and last longer. It may not respond in the same way as it does when you take your usual medicines for an attack. This type of angina can be a warning sign of an impending heart attack.     A heart attack is usually the result of a blood clot that suddenly forms in a coronary artery that has been narrowed with plaque. When this occurs, blood flow may be cut off to a part of the heart muscle, causing the cells to die. This weakens the pumping action of the heart, which affects the delivery of blood to all the other organs in the body including the brain. This damage is not reversible. However, early treatment can limit the amount of damage.  The pain you feel with angina and a heart attack may have a similar quality. However, it is usually different in intensity and duration. Here are some typical descriptions of a heart attack:  · It is most often experienced as a squeezing, crushing, pressure-like sensation in the center of the chest.  · It is sometimes described as something heavy sitting on my chest.  · It may feel more like a bad case of indigestion.  · The pain may spread from the chest to the arm, shoulder, throat or jaw.  · Sometimes the pain is not felt in the chest at all, but only in the arm, shoulder, throat or jaw.  · There may also be nausea, vomiting, dizziness or light-headedness, sweating and trouble breathing.  · Palpitations, or your heart beating rapidly  · A new, irregular heart beat  · Unexplained weakness  You may not be  "able to tell the difference between "bad" angina and a heart attack at home. Seek help if your symptoms are different than usual. Do not be in denial or just try to "tough it out."  Call 911  This is the fastest and safest way to get to the emergency department. The paramedics can also start treatment on the way to the hospital, saving valuable time for your heart.  · If the angina gets worse, if it continues, or if it stops and returns, call 911 immediately. Do not delay. You may be having a heart attack.  · After you call 911, take a second tablet or spray unless instructed otherwise. When repeating doses, sit down if possible, because it can make you feel lightheaded or dizzy. Wait another 5 minutes. If the angina still does not go away, take a third tablet or spray. Do not take more than 3 tablets or sprays within 15 minutes. Stay on the phone with 911 for further instruction.  · Your healthcare provider may give you slightly different instructions than those above. If so, follow them carefully.  Do not wait until symptoms become severe to call 911.  Other reasons to call 911 include:  · Trouble breathing  · Feeling lightheaded, faint, or dizzy  · Rapid heart beat  · Slower than usual heart rate compared to your normal  · Angina with weakness, dizziness, fainting, heavy sweating, nausea, or vomiting  · Extreme drowsiness, confusion  · Weakness of an arm or leg or one side of the face  · Difficulty with speech or vision  When to seek medical care  Remember, the signs and symptoms of a heart attack are not always like they are on TV. Sometimes they are not so obvious. You may only feel weak, or just not right. If it is not clear or if you have any doubt, call for advice.  · Seek help if there is a change in the type of pain, if it feels different, or if your symptoms are mild.  · Do not drive yourself. Have someone else drive you. If no one can drive, call 911.  · Do not delay. Fast diagnosis and treatment can " "prevent or limit the amount of heart damage during a heart attack.  · Do not go to your doctor's office or a clinic as they may not be able to provide all the testing and treatment required for this condition.  · If your doctor has given you medicine to take when symptoms occur, take them but don't delay getting help trying to locate medicines.  What happens in the emergency department  The emergency department is connected to your local emergency medical system (EMS) through 911. That's why during a cardiac emergency, calling 911 is the fastest way to get help. The goal of the emergency department is to rapidly screen, evaluate, and treat people.  Once you are there, an electrocardiogram (ECG or heart tracing) will be done. Blood samples may be taken to look for the presence of heart enzymes that leak from damaged heart cells and show if a heart attack is occurring. You will often be evaluated by a heart specialist (cardiologist) who decides the best course of action. In the case of severe angina or early heart attack, and depending on the circumstances, powerful "clot busting" medicines can be used to dissolve blood clots in the coronary artery. In other cases, you may be taken to a cardiac catheterization lab. Here, a tiny balloon-tipped catheter is advanced through blood vessels to the heart. There the balloon is inflated pushing open the blood vessel restoring blood flow.  Risk factors for heart disease  Risk factors for heart disease are a combination of genetic and lifestyle. Many risk factors work by either directly or indirectly damaging the blood vessels of the heart, or by increasing the risk of forming blood or cholesterol clots, which then clog up and block the arteries.     Examples of physical lifestyle risk factors:  · Cigarette smoking  · High blood pressure  · High blood cholesterol  · Use of stimulant drugs such as cocaine, crack, and amphetamines  · Eating a high-fat, high-cholesterol " meal  · Diabetes   · Obesity which increases risk for diabetes and high blood pressure  · Lack of regular physical activity     Examples of emotional lifestyle factors:  · Chronic high stress levels release stress hormones. These raise blood pressure and cholesterol level and makes blood clot more easily.  · Held-in anger, hostile or cynical attitude  · Social and emotional isolation, lack of intimacy  · Loss of relationship  · Depression  Other factors that increase the risk of heart attack that you cannot control :  · Age. The older you get beyond 40, the greater is your risk of significant coronary artery disease.  · Gender. More men than women get heart disease; but once past menopause, women who are not taking estrogen replacement have the same risk as men for a heart attack.  · Family history. If your mother, father, brother or sister has coronary artery disease, your risk of having it is higher than a person your age without this family history.  What can you do to decrease your risk  To reduce your risk of heart disease:  · Get regular checkups with your doctor.  · Take your medicines for blood pressure, cholesterol or diabetes as directed.  · Watch your diet. Eat a heart healthy diet choosing fresh foods, less salt, cholesterol, and fat  · Stop smoking. Get help if needed.  · Get regular exercise.  · Manage stress.  · Carry a list of medicines and doses in your wallet.  Date Last Reviewed: 12/30/2015  © 0385-4330 [employer]. [address] [place] [address], [place] [address]. All rights reserved. This information is not intended as a substitute for professional medical care. Always follow your healthcare professional's instructions.

## 2017-11-16 ENCOUNTER — OFFICE VISIT (OUTPATIENT)
Dept: INTERNAL MEDICINE | Facility: CLINIC | Age: 72
End: 2017-11-16
Payer: MEDICARE

## 2017-11-16 VITALS
HEIGHT: 66 IN | DIASTOLIC BLOOD PRESSURE: 82 MMHG | HEART RATE: 56 BPM | WEIGHT: 158.75 LBS | BODY MASS INDEX: 25.51 KG/M2 | OXYGEN SATURATION: 98 % | SYSTOLIC BLOOD PRESSURE: 128 MMHG | TEMPERATURE: 98 F

## 2017-11-16 DIAGNOSIS — M75.81 ROTATOR CUFF TENDONITIS, RIGHT: ICD-10-CM

## 2017-11-16 DIAGNOSIS — I10 ESSENTIAL HYPERTENSION: ICD-10-CM

## 2017-11-16 DIAGNOSIS — I65.29 STENOSIS OF CAROTID ARTERY, UNSPECIFIED LATERALITY: ICD-10-CM

## 2017-11-16 DIAGNOSIS — Z00.00 ROUTINE GENERAL MEDICAL EXAMINATION AT A HEALTH CARE FACILITY: Primary | ICD-10-CM

## 2017-11-16 DIAGNOSIS — I87.2 VENOUS INSUFFICIENCY OF RIGHT LOWER EXTREMITY: ICD-10-CM

## 2017-11-16 DIAGNOSIS — M10.9 GOUT, ARTHRITIS: ICD-10-CM

## 2017-11-16 DIAGNOSIS — Z23 NEED FOR VACCINATION FOR STREP PNEUMONIAE: ICD-10-CM

## 2017-11-16 DIAGNOSIS — E78.2 MIXED HYPERLIPIDEMIA: ICD-10-CM

## 2017-11-16 DIAGNOSIS — R00.2 PALPITATIONS: ICD-10-CM

## 2017-11-16 PROCEDURE — 99213 OFFICE O/P EST LOW 20 MIN: CPT | Mod: PBBFAC | Performed by: INTERNAL MEDICINE

## 2017-11-16 PROCEDURE — 99397 PER PM REEVAL EST PAT 65+ YR: CPT | Mod: S$PBB,,, | Performed by: INTERNAL MEDICINE

## 2017-11-16 PROCEDURE — 99999 PR PBB SHADOW E&M-EST. PATIENT-LVL III: CPT | Mod: PBBFAC,,, | Performed by: INTERNAL MEDICINE

## 2017-11-16 RX ORDER — LOSARTAN POTASSIUM AND HYDROCHLOROTHIAZIDE 25; 100 MG/1; MG/1
1 TABLET ORAL DAILY
Qty: 90 TABLET | Refills: 11 | Status: SHIPPED | OUTPATIENT
Start: 2017-11-16 | End: 2018-02-15

## 2017-11-16 RX ORDER — METOPROLOL SUCCINATE 50 MG/1
50 TABLET, EXTENDED RELEASE ORAL DAILY
Qty: 90 TABLET | Refills: 11 | Status: SHIPPED | OUTPATIENT
Start: 2017-11-16 | End: 2019-02-07 | Stop reason: SDUPTHER

## 2017-11-16 NOTE — PROGRESS NOTES
Subjective:       Patient ID: Mirna Norton is a 72 y.o. female.    Chief Complaint: Annual Exam    R shoulder pains, like a body slam. Some recent repetitive motions. No clear neck pains. Even combing hair bothers her.    Mild trauma to medial RLE 4 weeks ago, caused a bruise/spot which is resolving.    In July had symptoms close to 1minute, felt like heart was not beating right no recurrence.     Wonders about whether US makes sense of FCD of breasts.      Review of Systems   Constitutional: Negative for activity change and unexpected weight change.   HENT: Negative for hearing loss, rhinorrhea and trouble swallowing.    Eyes: Negative for discharge and visual disturbance.   Respiratory: Negative for chest tightness and wheezing.    Cardiovascular: Negative for chest pain and palpitations.   Gastrointestinal: Negative for blood in stool, constipation, diarrhea and vomiting.   Endocrine: Negative for polydipsia and polyuria.   Genitourinary: Negative for difficulty urinating, dysuria, hematuria and menstrual problem.        No breast lumps/masses/pain/nipple d/c in either breast     Musculoskeletal: Positive for arthralgias (R shoulder). Negative for joint swelling and neck pain.   Neurological: Negative for weakness and headaches.   Psychiatric/Behavioral: Negative for confusion and dysphoric mood.       Objective:      Physical Exam   Constitutional: She is oriented to person, place, and time. She appears well-developed and well-nourished. No distress.   HENT:   Head: Normocephalic and atraumatic.   Eyes: EOM are normal. Pupils are equal, round, and reactive to light. No scleral icterus.   Neck: Normal range of motion. No thyromegaly present.   Cardiovascular: Regular rhythm and normal heart sounds.  Exam reveals no gallop and no friction rub.    No murmur heard.  Mild ede   Pulmonary/Chest: Effort normal and breath sounds normal. No respiratory distress. She has no wheezes. She has no rales.   Abdominal:  Soft. Bowel sounds are normal. She exhibits no distension and no mass. There is no tenderness. There is no rebound and no guarding.   Musculoskeletal: Normal range of motion. She exhibits no edema or tenderness.   R shoulder normal strength.  + impingement signs and tenderness.  Neck normal rom    Small cyst medial R lower pretibial area at site of recent blunt trauma, non tender   Lymphadenopathy:     She has no cervical adenopathy.   Neurological: She is alert and oriented to person, place, and time.   Skin: She is not diaphoretic.   Psychiatric: She has a normal mood and affect. Her speech is normal and behavior is normal. Cognition and memory are normal.       Assessment:       1. Rotator cuff tendonitis, right    2. Essential hypertension    3. Gout, arthritis    4. Stenosis of carotid artery, unspecified laterality    5. Mixed hyperlipidemia    6. Venous insufficiency of right lower extremity    7. Need for vaccination for Strep pneumoniae    8. Palpitations        Plan:       Mirna was seen today for annual exam.    Diagnoses and all orders for this visit:    Rotator cuff tendonitis, right  Pt given handouts.  Explained that if not better in 1-2 weeks, pt should rtc/call PCP then consider PT    Essential hypertension  -     metoprolol succinate (TOPROL-XL) 50 MG 24 hr tablet; Take 1 tablet (50 mg total) by mouth once daily.  controlled    Gout, arthritis  Not recent    Stenosis of carotid artery, unspecified laterality  Has good BP control, on statin.    Mixed hyperlipidemia    Venous insufficiency of right lower extremity  Controlled with comp stockings    Need for vaccination for Strep pneumoniae    Palpitations  -     metoprolol succinate (TOPROL-XL) 50 MG 24 hr tablet; Take 1 tablet (50 mg total) by mouth once daily.  She will call if any recurrent symptoms like she had in July 4 mos ago.  Other orders  -     losartan-hydrochlorothiazide 100-25 mg (HYZAAR) 100-25 mg per tablet; Take 1 tablet by mouth  once daily.        Health Maintenance       Date Due Completion Date    TETANUS VACCINE [birth year]/1964 ---    Pneumococcal (65+) (2 of 2 - PCV13) 08/12/2014 8/12/2013    Influenza Vaccine 08/01/2017 ---    DEXA SCAN 08/05/2018 8/5/2013    Override on 8/2/2008: Done    Mammogram 10/10/2019 10/10/2017    Lipid Panel 06/16/2022 6/16/2017    Colonoscopy 11/11/2023 11/11/2013    Override on 3/6/2006: Done      Consider repeat BMD    I think mammo is sufficient screening, I offered breast ctr referral to discuss whether US necessary, she will think about it.    Return in about 1 year (around 11/16/2018).

## 2017-11-16 NOTE — PATIENT INSTRUCTIONS
You are due for the Prevnar and flu vaccines      Exercises for Shoulder Flexibility: Internal Rotation    This stretch can help restore shoulder flexibility and relieve pain over time. When stretching, be sure to breathe deeply. And follow any special instructions from your doctor or physical therapist.  · While seated, move the arm on the side you want to stretch toward the middle of your back. The palm of your hand should face out.  · Cup your other hand under the hand thats behind your back. Gently push your cupped hand upward until you feel the stretch in the shoulder. Try to hold the stretch for 5 seconds.  · Work up to doing 3 sets of this stretch, 3 times a day. Work up to holding the stretch for 30-60 seconds.  Note: Keep your back straight. Its okay if your hand cant reach the middle of your back. Instead, start the stretch with your hand as close as you can get it to the middle of your back.     Frozen Shoulder  Frozen shoulder is another name for adhesive capsulitis, which causes restricted movement in the shoulder. If you have frozen shoulder, this stretch may cause discomfort, especially when you first get started. A few months may pass before you achieve the results you want. But once your shoulder heals, it almost never becomes frozen again. So stick to your stretching program. If you have any questions, be sure to ask your doctor.   © 7345-3922 [employer]. [address] [place] [address], [place] [address]. All rights reserved. This information is not intended as a substitute for professional medical care. Always follow your healthcare professional's instructions.        Exercises for Shoulder Flexibility: Wall Walk  Improving your flexibility can reduce pain. Stretching exercises also can help increase your range of pain-free motion. Breathe normally when you exercise. And try to use smooth, fluid movements.  Note: Follow any special instructions you are given. If you feel pain, stop the  exercise. If the pain continues after stopping, call your healthcare provider.  · Stand with your shoulder about 2 feet from the wall.  · Raise your arm to shoulder level and gently walk your fingers up the wall as high as you can.  · Hold for a few seconds. Then walk your fingers back down.  · Repeat 3 times. Move closer to the wall as you repeat.  · Build up to holding each stretch for 30 seconds.  CAUTION: Do this stretch only if your healthcare provider recommends it. Dont do it when you are first injured.          © 6164-9263 [employer]. [address] [place] [address]. All rights reserved. This information is not intended as a substitute for professional medical care. Always follow your healthcare professional's instructions.        Exercises for Shoulder Flexibility: Pendulum Exercise   Improving your flexibility can reduce pain. Stretching exercises also can help increase your range of pain-free motion. Breathe normally when you exercise. And try to use smooth, fluid movements.  Follow any special instructions you are given. If you feel pain, stop the exercise. If the pain continues after stopping, call your health care provider.  · Lean over with your good arm supported on a table or chair.  · Relax the arm on the painful side, letting it hang straight down.  · Slowly begin to swing the relaxed arm. Move it in a small [place], gradually making it bigger if you can. Then reverse the direction. Next, move it backward and forward. Finally, move it side to side.     Note: Spend about 5 minutes doing the exercise, 3 times a day. Change direction after 1 minute of motion.   © 3242-4122 [employer]. [address] [place] [address]. All rights reserved. This information is not intended as a substitute for professional medical care. Always follow your healthcare professional's instructions.        Exercises for Shoulder Flexibility: Broom Stretch    Improving your  flexibility can reduce pain. Stretching exercises also can help increase your range of pain-free motion. Breathe normally when you exercise. Try to use smooth, fluid movements. Never force a stretch.  · Stand up or lie on the floor. Place the palm of your hand over the end of a broomstick or cane. Grasp the stick farther down with the other hand, palm facing down.  · Push the end of the stick up on the side of your injured shoulder as high as is comfortable.  · Hold for a few seconds. Return to the starting position.  · Repeat 3-5 times. Build up to holding each stretch for 10-30  seconds.  Note: Follow any special instructions you are given. If you feel pain, stop the exercise. If the pain continues after stopping, call your healthcare provider.   © 9967-2014 [employer]. [address] [place] [address], [place] [address]. All rights reserved. This information is not intended as a substitute for professional medical care. Always follow your healthcare professional's instructions.

## 2017-11-17 ENCOUNTER — PATIENT MESSAGE (OUTPATIENT)
Dept: INTERNAL MEDICINE | Facility: CLINIC | Age: 72
End: 2017-11-17

## 2017-11-22 ENCOUNTER — PATIENT MESSAGE (OUTPATIENT)
Dept: INTERNAL MEDICINE | Facility: CLINIC | Age: 72
End: 2017-11-22

## 2017-11-22 DIAGNOSIS — Z78.0 MENOPAUSE: Primary | ICD-10-CM

## 2017-11-24 ENCOUNTER — PATIENT MESSAGE (OUTPATIENT)
Dept: INTERNAL MEDICINE | Facility: CLINIC | Age: 72
End: 2017-11-24

## 2017-12-01 ENCOUNTER — HOSPITAL ENCOUNTER (OUTPATIENT)
Dept: RADIOLOGY | Facility: CLINIC | Age: 72
Discharge: HOME | End: 2017-12-01
Attending: INTERNAL MEDICINE
Payer: MEDICARE

## 2017-12-01 DIAGNOSIS — Z78.0 MENOPAUSE: ICD-10-CM

## 2017-12-01 PROCEDURE — 77080 DXA BONE DENSITY AXIAL: CPT | Mod: 26,,, | Performed by: INTERNAL MEDICINE

## 2017-12-01 PROCEDURE — 77080 DXA BONE DENSITY AXIAL: CPT | Mod: TC

## 2017-12-14 ENCOUNTER — OFFICE VISIT (OUTPATIENT)
Dept: GASTROENTEROLOGY | Facility: CLINIC | Age: 72
End: 2017-12-14
Payer: MEDICARE

## 2017-12-14 VITALS
HEART RATE: 63 BPM | SYSTOLIC BLOOD PRESSURE: 146 MMHG | WEIGHT: 162.5 LBS | DIASTOLIC BLOOD PRESSURE: 75 MMHG | HEIGHT: 66 IN | BODY MASS INDEX: 26.12 KG/M2

## 2017-12-14 DIAGNOSIS — Z86.010 HISTORY OF COLON POLYPS: ICD-10-CM

## 2017-12-14 DIAGNOSIS — K58.1 IRRITABLE BOWEL SYNDROME WITH CONSTIPATION: Primary | ICD-10-CM

## 2017-12-14 DIAGNOSIS — K21.9 GASTROESOPHAGEAL REFLUX DISEASE WITHOUT ESOPHAGITIS: ICD-10-CM

## 2017-12-14 PROCEDURE — 99213 OFFICE O/P EST LOW 20 MIN: CPT | Mod: S$PBB,,, | Performed by: INTERNAL MEDICINE

## 2017-12-14 PROCEDURE — 99213 OFFICE O/P EST LOW 20 MIN: CPT | Mod: PBBFAC | Performed by: INTERNAL MEDICINE

## 2017-12-14 PROCEDURE — 99999 PR PBB SHADOW E&M-EST. PATIENT-LVL III: CPT | Mod: PBBFAC,,, | Performed by: INTERNAL MEDICINE

## 2017-12-14 NOTE — PROGRESS NOTES
[facility] Established Patient Visit    Reason for Visit:    Chief Complaint   Patient presents with    Follow-up     1 yr. f/u       PCP: Ramsey Tyler    HPI:  Mirna Norton is a 72 y.o. female here for follow-up of IBS.  I last saw her in clinic in October of last year for the same.  She is still on align and feels it works; however, she is curious if there is a less costly option.  She has constipation about once a week with small, pellet-like stools.  She still has occasional diarrhea, but she has noticed an improvement with changes to her diet.  Diarrhea only occurs about once every other month.  Her bowel movements and stools are normal the rest of the time.  She is not on fiber supplements.  She denies significant abdominal pain.  She has a history of GERD, but has very few symptoms.  She uses omeprazole about once monthly for heartburn.  She uses Zantac for indigestion about 4 times per month.            ROS:  Constitutional: No fevers, chills, No weight loss, normal appetite  GI: see HPI        PMHX:  has a past medical history of Abnormal cervical Papanicolaou smear (); Carotid artery stenosis; Carotid stenosis (5/15/2014); GERD (gastroesophageal reflux disease); Gout due to renal impairment; Gout, arthritis (2013); Gout, unspecified; Herniated lumbar intervertebral disc; Hypertension; Insomnia; Irritable bowel syndrome without diarrhea (2015); Liver lesion (3/13/2013); Mitral valve prolapse; MVP (mitral valve prolapse); Pancreatic cyst (2013); PVD (peripheral vascular disease) (2013); and Venous insufficiency.    PSHX:  has a past surgical history that includes Bartholin gland cyst excision; Diagnostic laproscopic ;  section; and laser surgery on vein.    The patient's social and family histories were reviewed by me and updated in the appropriate section of the electronic medical record.    Review of patient's allergies indicates:   Allergen  "Reactions    No known allergies        Prior to Admission medications    Medication Sig Start Date End Date Taking? Authorizing Provider   allopurinol (ZYLOPRIM) 100 MG tablet Take 2 tablets (200 mg total) by mouth once daily. 4/5/17  Yes Ramsey Tyler MD   aspirin (ECOTRIN) 81 MG EC tablet Take 81 mg by mouth once daily.   Yes Historical Provider, MD   BIFIDOBACTERIUM INFANTIS (ALIGN ORAL) Take by mouth once daily.   Yes Historical Provider, MD   losartan-hydrochlorothiazide 100-25 mg (HYZAAR) 100-25 mg per tablet Take 1 tablet by mouth once daily. 11/16/17  Yes Ramsey Tyler MD   metoprolol succinate (TOPROL-XL) 50 MG 24 hr tablet Take 1 tablet (50 mg total) by mouth once daily. 11/16/17  Yes Ramsey Tyler MD   multivitamin-minerals-lutein (CENTRUM SILVER) Tab Take 1 tablet by mouth Twice daily. 6/15/12  Yes Historical Provider, MD   omeprazole (PRILOSEC) 40 MG capsule Take 1 capsule (40 mg total) by mouth every morning. 1 Capsule, Delayed Release(E.C.) Oral Every day 9/28/15  Yes Annmarie Rodríguez MD   pravastatin (PRAVACHOL) 40 MG tablet Take 1 tablet (40 mg total) by mouth every evening. 11/13/17 11/13/18 Yes Karlos Gonzalez MD   vitamin E 400 UNIT capsule Take 400 Units by mouth once daily.    Yes Historical Provider, MD   poly-ureaurethane 16 % NFSo Apply 1 application topically every evening. 5/9/17   Deshaun Nuñez DPM         Objective Findings:  Vital Signs:  BP (!) 146/75 Comment: pt. did take b/p  Pulse 63   Ht 5' 6" (1.676 m)   Wt 73.7 kg (162 lb 7.7 oz)   BMI 26.22 kg/m²  Body mass index is 26.22 kg/m².    Physical Exam:  General Appearance:  Well appearing in no acute distress, appears stated age  Head:  Normocephalic, atraumatic  Eyes:  No scleral icterus or pallor, EOMI        Labs:  Lab Results   Component Value Date    WBC 7.84 06/07/2016    HGB 12.8 06/07/2016    HCT 39.4 06/07/2016    MCV 89 06/07/2016    RDW 13.3 06/07/2016     06/07/2016    GRAN 4.0 06/07/2016    GRAN " 51.0 06/07/2016    LYMPH 2.7 06/07/2016    LYMPH 33.8 06/07/2016    MONO 0.7 06/07/2016    MONO 9.4 06/07/2016    EOS 0.4 06/07/2016    BASO 0.01 06/07/2016     Lab Results   Component Value Date     07/18/2017    K 3.4 (L) 07/18/2017    CL 99 07/18/2017    CO2 31 (H) 07/18/2017     (H) 07/18/2017    BUN 16 07/18/2017    CREATININE 0.9 07/18/2017    CALCIUM 9.5 07/18/2017    PROT 7.0 06/16/2017    ALBUMIN 3.4 (L) 06/16/2017    BILITOT 0.3 06/16/2017    ALKPHOS 68 06/16/2017    AST 17 06/16/2017    ALT 12 06/16/2017                 Assessment:  Mirna Norton is a 72 y.o. female here with:  1. IBS with constipation - symptoms controlled with diet and probiotic  2. GERD - resolved  3. History of colon polyps - colonoscopy due in November 2018.      Recommendations:  1. Can try Novogy as an alternative to the Align  2. Add fiber to the diet    Follow-up in 1 year        Haider Luu MD

## 2017-12-27 ENCOUNTER — PATIENT MESSAGE (OUTPATIENT)
Dept: INTERNAL MEDICINE | Facility: CLINIC | Age: 72
End: 2017-12-27

## 2017-12-27 DIAGNOSIS — I73.9 PVD (PERIPHERAL VASCULAR DISEASE): ICD-10-CM

## 2017-12-27 DIAGNOSIS — I87.2 VENOUS INSUFFICIENCY OF RIGHT LOWER EXTREMITY: ICD-10-CM

## 2017-12-29 NOTE — TELEPHONE ENCOUNTER
pt is requesting pressure stockings 20-30 knee high and  I informed her you were out and we will contact her when it is done.

## 2018-01-03 NOTE — TELEPHONE ENCOUNTER
Hi, here is the order, please fax to [facility] and let the patient know.  Thank you, Ramsey Tyler

## 2018-01-20 ENCOUNTER — PATIENT MESSAGE (OUTPATIENT)
Dept: INTERNAL MEDICINE | Facility: CLINIC | Age: 73
End: 2018-01-20

## 2018-02-15 ENCOUNTER — PATIENT MESSAGE (OUTPATIENT)
Dept: INTERNAL MEDICINE | Facility: CLINIC | Age: 73
End: 2018-02-15

## 2018-02-15 DIAGNOSIS — I10 ESSENTIAL HYPERTENSION: Primary | ICD-10-CM

## 2018-02-15 RX ORDER — LOSARTAN POTASSIUM 100 MG/1
100 TABLET ORAL DAILY
Qty: 90 TABLET | Refills: 11 | Status: SHIPPED | OUTPATIENT
Start: 2018-02-15 | End: 2019-05-07 | Stop reason: SDUPTHER

## 2018-02-15 RX ORDER — HYDROCHLOROTHIAZIDE 25 MG/1
25 TABLET ORAL DAILY
Qty: 90 TABLET | Refills: 11 | Status: SHIPPED | OUTPATIENT
Start: 2018-02-15 | End: 2019-05-07 | Stop reason: SDUPTHER

## 2018-04-16 ENCOUNTER — PATIENT MESSAGE (OUTPATIENT)
Dept: INTERNAL MEDICINE | Facility: CLINIC | Age: 73
End: 2018-04-16

## 2018-04-16 RX ORDER — SCOLOPAMINE TRANSDERMAL SYSTEM 1 MG/1
1 PATCH, EXTENDED RELEASE TRANSDERMAL
Qty: 3 PATCH | Refills: 1 | Status: SHIPPED | OUTPATIENT
Start: 2018-04-16 | End: 2018-10-11

## 2018-06-11 ENCOUNTER — PATIENT MESSAGE (OUTPATIENT)
Dept: INTERNAL MEDICINE | Facility: CLINIC | Age: 73
End: 2018-06-11

## 2018-06-11 DIAGNOSIS — M10.9 GOUT, UNSPECIFIED CAUSE, UNSPECIFIED CHRONICITY, UNSPECIFIED SITE: ICD-10-CM

## 2018-06-11 RX ORDER — ALLOPURINOL 100 MG/1
100 TABLET ORAL DAILY
Qty: 30 TABLET | Refills: 0 | Status: SHIPPED | OUTPATIENT
Start: 2018-06-11 | End: 2018-07-25 | Stop reason: SDUPTHER

## 2018-07-25 DIAGNOSIS — M10.9 GOUT, UNSPECIFIED CAUSE, UNSPECIFIED CHRONICITY, UNSPECIFIED SITE: ICD-10-CM

## 2018-07-26 RX ORDER — ALLOPURINOL 100 MG/1
TABLET ORAL
Qty: 30 TABLET | Refills: 11 | Status: SHIPPED | OUTPATIENT
Start: 2018-07-26 | End: 2019-06-20 | Stop reason: SDUPTHER

## 2018-09-14 ENCOUNTER — TELEPHONE (OUTPATIENT)
Dept: OBSTETRICS AND GYNECOLOGY | Facility: CLINIC | Age: 73
End: 2018-09-14

## 2018-09-14 NOTE — TELEPHONE ENCOUNTER
----- Message from Luisa Andrade sent at 9/14/2018  9:06 AM CDT -----  Contact: self   Pt is requesting mmg orders. The pt can be reached at [phone].

## 2018-09-17 ENCOUNTER — TELEPHONE (OUTPATIENT)
Dept: OBSTETRICS AND GYNECOLOGY | Facility: CLINIC | Age: 73
End: 2018-09-17

## 2018-09-17 DIAGNOSIS — Z12.39 BREAST CANCER SCREENING: Primary | ICD-10-CM

## 2018-09-17 NOTE — TELEPHONE ENCOUNTER
----- Message from Marco Antonio Mcknight MA sent at 9/14/2018  9:27 AM CDT -----  Contact: self       ----- Message -----  From: Luisa Andrade  Sent: 9/14/2018   9:06 AM  To: Jeffery Corral Staff    Pt is requesting mmg orders. The pt can be reached at [phone].    Done. GH

## 2018-09-27 ENCOUNTER — TELEPHONE (OUTPATIENT)
Dept: INTERNAL MEDICINE | Facility: CLINIC | Age: 73
End: 2018-09-27

## 2018-09-27 DIAGNOSIS — I10 ESSENTIAL HYPERTENSION: Primary | ICD-10-CM

## 2018-09-27 DIAGNOSIS — M10.9 GOUT, ARTHRITIS: ICD-10-CM

## 2018-09-27 DIAGNOSIS — R73.09 ELEVATED HEMOGLOBIN A1C: ICD-10-CM

## 2018-09-27 NOTE — TELEPHONE ENCOUNTER
----- Message from Dee Dee Welch sent at 9/27/2018  3:30 PM CDT -----  Contact: [phone]  Patient is requesting lab orders for annual appt. 11-.  Please call patient when lab order are in the computer for her to schedule.    Thank you

## 2018-10-04 ENCOUNTER — OFFICE VISIT (OUTPATIENT)
Dept: URGENT CARE | Facility: CLINIC | Age: 73
End: 2018-10-04
Payer: MEDICARE

## 2018-10-04 ENCOUNTER — HOSPITAL ENCOUNTER (OUTPATIENT)
Dept: RADIOLOGY | Facility: HOSPITAL | Age: 73
Discharge: HOME | End: 2018-10-04
Attending: NURSE PRACTITIONER
Payer: MEDICARE

## 2018-10-04 VITALS
OXYGEN SATURATION: 99 % | RESPIRATION RATE: 16 BRPM | TEMPERATURE: 98 F | SYSTOLIC BLOOD PRESSURE: 130 MMHG | BODY MASS INDEX: 26.28 KG/M2 | HEART RATE: 70 BPM | DIASTOLIC BLOOD PRESSURE: 66 MMHG | WEIGHT: 163.5 LBS | HEIGHT: 66 IN

## 2018-10-04 DIAGNOSIS — I10 HYPERTENSION, UNSPECIFIED TYPE: ICD-10-CM

## 2018-10-04 DIAGNOSIS — M25.562 ACUTE PAIN OF LEFT KNEE: ICD-10-CM

## 2018-10-04 DIAGNOSIS — M25.562 ACUTE PAIN OF LEFT KNEE: Primary | ICD-10-CM

## 2018-10-04 PROCEDURE — 99215 OFFICE O/P EST HI 40 MIN: CPT | Mod: PBBFAC,25,PO | Performed by: NURSE PRACTITIONER

## 2018-10-04 PROCEDURE — 73560 X-RAY EXAM OF KNEE 1 OR 2: CPT | Mod: 26,59,RT, | Performed by: RADIOLOGY

## 2018-10-04 PROCEDURE — 99214 OFFICE O/P EST MOD 30 MIN: CPT | Mod: S$PBB,,, | Performed by: NURSE PRACTITIONER

## 2018-10-04 PROCEDURE — 99999 PR PBB SHADOW E&M-EST. PATIENT-LVL V: CPT | Mod: PBBFAC,,, | Performed by: NURSE PRACTITIONER

## 2018-10-04 PROCEDURE — 73562 X-RAY EXAM OF KNEE 3: CPT | Mod: TC,PO,LT

## 2018-10-04 PROCEDURE — 73562 X-RAY EXAM OF KNEE 3: CPT | Mod: 26,LT,, | Performed by: RADIOLOGY

## 2018-10-04 PROCEDURE — 73560 X-RAY EXAM OF KNEE 1 OR 2: CPT | Mod: TC,PO,RT

## 2018-10-04 NOTE — PROGRESS NOTES
CHIEF COMPLAINT/REASON FOR VISIT:  Left knee pain    HISTORY OF PRESENT ILLNESS:  73-year-old female complains of left knee pain onset 3 weeks ago. Complains of  left knee weakness at intervals.  Patient denies any specific injury or trauma.  Denies seek emergency room treatment.  Patient admits tried over-the-counter knee immobilizer with slight relief.  Discussed further evaluation with x-ray.  Patient agrees with plan of therapy. Patient denies shortness of breath, congestion, fever, cough, back pain and urinary discomfort.  Patient discuss having gout history, does not feel like gout.  Patient states has not had a gout symptoms in several years.      Past Medical History:   Diagnosis Date    Abnormal cervical Papanicolaou smear 1980's    Cryo    Carotid artery stenosis     via kinked carotid artery - followed by Dr. Ye Anderson    Carotid stenosis 5/15/2014    GERD (gastroesophageal reflux disease)     Gout due to renal impairment     Gout, arthritis 2/20/2013    Gout, unspecified     Herniated lumbar intervertebral disc     Hypertension     Insomnia     Irritable bowel syndrome without diarrhea 9/20/2015    Liver lesion 3/13/2013    Mitral valve prolapse     MVP (mitral valve prolapse)     Pancreatic cyst 12/30/2013    PVD (peripheral vascular disease) 12/23/2013    Venous insufficiency           Social History     Socioeconomic History    Marital status:      Spouse name: Not on file    Number of children: Not on file    Years of education: Not on file    Highest education level: Not on file   Social Needs    Financial resource strain: Not on file    Food insecurity - worry: Not on file    Food insecurity - inability: Not on file    Transportation needs - medical: Not on file    Transportation needs - non-medical: Not on file   Occupational History    Occupation: Retired   Tobacco Use    Smoking status: Never Smoker    Smokeless tobacco: Never Used   Substance and Sexual  Activity    Alcohol use: No    Drug use: No    Sexual activity: No     Birth control/protection: Post-menopausal   Other Topics Concern    Are you pregnant or think you may be? Not Asked    Breast-feeding Not Asked   Social History Narrative    3 kids, previous . , has eye issues. biw exercise.          Family History   Problem Relation Age of Onset    Diabetes Brother     Eclampsia Brother     Hypertension Brother     Diabetes Sister     Eclampsia Sister     Hypertension Sister     Breast cancer Other     Diabetes Sister     Eclampsia Sister     Hypertension Sister     Miscarriages / Stillbirths Sister     Heart disease Sister         mi, cad    Diabetes Sister     Eclampsia Sister     Hypertension Sister     Heart disease Mother     Colon cancer Neg Hx     Ovarian cancer Neg Hx        ROS:  GENERAL: No fever, chills, fatigability or weight loss.  SKIN: No rashes, itching or changes in color or texture of skin.  HEENT: No headaches or recent head trauma.  Denies ear pain, discharge or vertigo. No loss of smell, no epistaxis or postnasal drip. No hoarseness or change in voice.  CHEST: Denies cyanosis, wheezing, cough and sputum production.  CARDIOVASCULAR: Denies chest pain, PND, orthopnea or reduced exercise tolerance.  ABDOMEN: Appetite fine. No weight loss. Denies diarrhea, abdominal pain. .  MUSCULOSKELETAL:  Left knee pain and weakness with  Swelling.   NEUROLOGIC: No history of seizures, paralysis, alteration of gait or coordination.  PSYCHIATRIC: Denies mood swings, depression or suicidal thoughts.    PE:   APPEARANCE: Well nourished, well developed, in mild distress.   V/S: Reviewed.  SKIN: Normal skin turgor, no lesions.  CHEST:  No respiratory symptoms  CARDIOVASCULAR: Regular rate and rhythm   MUSCULOSKELETAL:  Bilateral lower extremities with full range of motion, crepitus palpated on movement of bilateral knees, right knee with a increased crepitus, left  knee with minimal swelling and tenderness  NEUROLOGIC: No sensory deficits. Gait & Posture:  Ambulates slowly. No cerebellar signs.  MENTAL STATUS: Patient alert, oriented x 3 & conversant.    PLAN:  Consult Orthopedics/ patient requesting [place]  Advise increase p.o. fluids- water/juice & rest  Advise use of splint  Tylenol or Ibuprofen for fever, headache and body aches.  Advised no heavy lifting  Advise follow up with PCP  Advise go to ER if nausea, vomiting, fever, increased pain, or fail to improve with treatment.  AVS provided and reviewed with patient including supportive care, follow up, and red flag symptoms.   Patient verbalizes understanding and agrees with treatment plan. Discharged from Urgent Care in stable condition.      DIAGNOSIS:   Left knee pain  Hypertension

## 2018-10-10 ENCOUNTER — PATIENT MESSAGE (OUTPATIENT)
Dept: INTERNAL MEDICINE | Facility: CLINIC | Age: 73
End: 2018-10-10

## 2018-10-11 ENCOUNTER — OFFICE VISIT (OUTPATIENT)
Dept: ORTHOPEDICS | Facility: CLINIC | Age: 73
End: 2018-10-11
Payer: MEDICARE

## 2018-10-11 ENCOUNTER — TELEPHONE (OUTPATIENT)
Dept: OBSTETRICS AND GYNECOLOGY | Facility: CLINIC | Age: 73
End: 2018-10-11

## 2018-10-11 ENCOUNTER — OFFICE VISIT (OUTPATIENT)
Dept: OBSTETRICS AND GYNECOLOGY | Facility: CLINIC | Age: 73
End: 2018-10-11
Payer: MEDICARE

## 2018-10-11 ENCOUNTER — HOSPITAL ENCOUNTER (OUTPATIENT)
Dept: RADIOLOGY | Facility: HOSPITAL | Age: 73
Discharge: HOME | End: 2018-10-11
Attending: NURSE PRACTITIONER
Payer: MEDICARE

## 2018-10-11 VITALS
DIASTOLIC BLOOD PRESSURE: 86 MMHG | WEIGHT: 160.69 LBS | SYSTOLIC BLOOD PRESSURE: 132 MMHG | HEIGHT: 66 IN | BODY MASS INDEX: 25.83 KG/M2

## 2018-10-11 VITALS
WEIGHT: 160.69 LBS | BODY MASS INDEX: 25.83 KG/M2 | HEART RATE: 68 BPM | SYSTOLIC BLOOD PRESSURE: 138 MMHG | HEIGHT: 66 IN | DIASTOLIC BLOOD PRESSURE: 87 MMHG

## 2018-10-11 DIAGNOSIS — Z78.0 POSTMENOPAUSE: ICD-10-CM

## 2018-10-11 DIAGNOSIS — Z01.419 WELL WOMAN EXAM WITH ROUTINE GYNECOLOGICAL EXAM: Primary | ICD-10-CM

## 2018-10-11 DIAGNOSIS — M25.562 ACUTE PAIN OF LEFT KNEE: Primary | ICD-10-CM

## 2018-10-11 DIAGNOSIS — Z12.39 BREAST CANCER SCREENING: ICD-10-CM

## 2018-10-11 PROCEDURE — 20610 DRAIN/INJ JOINT/BURSA W/O US: CPT | Mod: S$PBB,LT,, | Performed by: PHYSICIAN ASSISTANT

## 2018-10-11 PROCEDURE — 77063 BREAST TOMOSYNTHESIS BI: CPT | Mod: 26,,, | Performed by: RADIOLOGY

## 2018-10-11 PROCEDURE — 99213 OFFICE O/P EST LOW 20 MIN: CPT | Mod: 25,S$PBB,, | Performed by: PHYSICIAN ASSISTANT

## 2018-10-11 PROCEDURE — 20610 DRAIN/INJ JOINT/BURSA W/O US: CPT | Mod: PBBFAC | Performed by: PHYSICIAN ASSISTANT

## 2018-10-11 PROCEDURE — 77063 BREAST TOMOSYNTHESIS BI: CPT | Mod: TC

## 2018-10-11 PROCEDURE — G0101 CA SCREEN;PELVIC/BREAST EXAM: HCPCS | Mod: PBBFAC | Performed by: NURSE PRACTITIONER

## 2018-10-11 PROCEDURE — 99214 OFFICE O/P EST MOD 30 MIN: CPT | Mod: PBBFAC,27,25 | Performed by: PHYSICIAN ASSISTANT

## 2018-10-11 PROCEDURE — 99999 PR PBB SHADOW E&M-EST. PATIENT-LVL III: CPT | Mod: PBBFAC,,, | Performed by: NURSE PRACTITIONER

## 2018-10-11 PROCEDURE — 99999 PR PBB SHADOW E&M-EST. PATIENT-LVL IV: CPT | Mod: PBBFAC,,, | Performed by: PHYSICIAN ASSISTANT

## 2018-10-11 PROCEDURE — 99213 OFFICE O/P EST LOW 20 MIN: CPT | Mod: PBBFAC | Performed by: NURSE PRACTITIONER

## 2018-10-11 PROCEDURE — 77067 SCR MAMMO BI INCL CAD: CPT | Mod: 26,,, | Performed by: RADIOLOGY

## 2018-10-11 PROCEDURE — G0101 CA SCREEN;PELVIC/BREAST EXAM: HCPCS | Mod: S$PBB,,, | Performed by: NURSE PRACTITIONER

## 2018-10-11 RX ORDER — TRIAMCINOLONE ACETONIDE 40 MG/ML
60 INJECTION, SUSPENSION INTRA-ARTICULAR; INTRAMUSCULAR
Status: COMPLETED | OUTPATIENT
Start: 2018-10-11 | End: 2018-10-11

## 2018-10-11 RX ADMIN — TRIAMCINOLONE ACETONIDE 60 MG: 40 INJECTION, SUSPENSION INTRA-ARTICULAR; INTRAMUSCULAR at 05:10

## 2018-10-11 NOTE — PROGRESS NOTES
HISTORY OF PRESENT ILLNESS:    Mirna Norton is a 73 y.o. female , presents for a routine exam and has no gyn complaints.    -Main c/o today is left knee pain and has an appointment.     Past Medical History:   Diagnosis Date    Abnormal cervical Papanicolaou smear     Cryo    Carotid artery stenosis     via kinked carotid artery - followed by Dr. Ye Anderson    Carotid stenosis 5/15/2014    GERD (gastroesophageal reflux disease)     Gout due to renal impairment     Gout, arthritis 2013    Gout, unspecified     Herniated lumbar intervertebral disc     Hypertension     Insomnia     Irritable bowel syndrome without diarrhea 2015    Liver lesion 3/13/2013    Mitral valve prolapse     MVP (mitral valve prolapse)     Pancreatic cyst 2013    PVD (peripheral vascular disease) 2013    Venous insufficiency        Past Surgical History:   Procedure Laterality Date    BARTHOLIN GLAND CYST EXCISION       SECTION      x3    COLONOSCOPY N/A 2013    Performed by [facility] at [facility] ([facility])    Diagnostic laproscopic       EGD (ESOPHAGOGASTRODUODENOSCOPY) N/A 2013    [facility] ([facility]    laser surgery on vein      ULTRASOUND, UPPER GI TRACT, ENDOSCOPIC N/A 2014    Performed by Troy Hoyos MD at [facility] (2ND FLR)        MEDICATIONS AND ALLERGIES:      Current Outpatient Medications:     allopurinol (ZYLOPRIM) 100 MG tablet, TAKE 1 TABLET BY MOUTH EVERY DAY, Disp: 30 tablet, Rfl: 11    aspirin (ECOTRIN) 81 MG EC tablet, Take 81 mg by mouth once daily., Disp: , Rfl:     BIFIDOBACTERIUM INFANTIS (ALIGN ORAL), Take by mouth once daily., Disp: , Rfl:     hydroCHLOROthiazide (HYDRODIURIL) 25 MG tablet, Take 1 tablet (25 mg total) by mouth once daily., Disp: 90 tablet, Rfl: 11    losartan (COZAAR) 100 MG tablet, Take 1 tablet (100 mg total) by mouth once daily., Disp: 90 tablet, Rfl: 11     metoprolol succinate (TOPROL-XL) 50 MG 24 hr tablet, Take 1 tablet (50 mg total) by mouth once daily., Disp: 90 tablet, Rfl: 11    multivitamin-minerals-lutein (CENTRUM SILVER) Tab, Take 1 tablet by mouth Twice daily., Disp: , Rfl:     omeprazole (PRILOSEC) 40 MG capsule, Take 1 capsule (40 mg total) by mouth every morning. 1 Capsule, Delayed Release(E.C.) Oral Every day, Disp: 90 capsule, Rfl: 3    poly-ureaurethane 16 % NFSo, Apply 1 application topically every evening., Disp: 15 mL, Rfl: 3    pravastatin (PRAVACHOL) 40 MG tablet, Take 1 tablet (40 mg total) by mouth every evening., Disp: 90 tablet, Rfl: 3    vitamin E 400 UNIT capsule, Take 400 Units by mouth once daily. , Disp: , Rfl:     Review of patient's allergies indicates:   Allergen Reactions    No known allergies        Family History   Problem Relation Age of Onset    Diabetes Brother     Eclampsia Brother     Hypertension Brother     Diabetes Sister     Eclampsia Sister     Hypertension Sister     Breast cancer Other     Diabetes Sister     Eclampsia Sister     Hypertension Sister     Miscarriages / Stillbirths Sister     Heart disease Sister         mi, cad    Diabetes Sister     Eclampsia Sister     Hypertension Sister     Heart disease Mother     Colon cancer Neg Hx     Ovarian cancer Neg Hx        Social History     Socioeconomic History    Marital status:      Spouse name: Not on file    Number of children: Not on file    Years of education: Not on file    Highest education level: Not on file   Social Needs    Financial resource strain: Not on file    Food insecurity - worry: Not on file    Food insecurity - inability: Not on file    Transportation needs - medical: Not on file    Transportation needs - non-medical: Not on file   Occupational History    Occupation: Retired   Tobacco Use    Smoking status: Never Smoker    Smokeless tobacco: Never Used   Substance and Sexual Activity    Alcohol use: No     "Drug use: No    Sexual activity: No     Birth control/protection: Post-menopausal   Other Topics Concern    Are you pregnant or think you may be? Not Asked    Breast-feeding Not Asked   Social History Narrative    3 kids, previous . , has eye issues. biw exercise.       OBSTETRIC HISTORY: Number of cesareans: 3    COMPREHENSIVE GYN HISTORY:  PAP History: Reports abnormal Paps. Date:1980's. Treatment: Cryo. LAST PAP 8-15-13 NORMAL.  Infection History: Denies STDs. Denies PID.  Benign History: Denies uterine fibroids. Denies ovarian cysts. Denies endometriosis. Denies other conditions.  Cancer History: Denies cervical cancer. Denies uterine cancer or hyperplasia. Denies ovarian cancer. Denies vulvar cancer or pre-cancer. Denies vaginal cancer or pre-cancer. Denies breast cancer. Denies colon cancer.  Sexual Activity History: Denies currently being sexually active.  Menstrual History: Denies menses. Patient is postmenopausal: Not on HRT/ERT.     ROS:  GENERAL: No weight changes. No swelling. No fatigue. No fever.  CARDIOVASCULAR: No chest pain. No shortness of breath. No leg cramps.   NEUROLOGICAL: No headaches. No vision changes.  MSK: + LEFT KNEE PAIN.  BREASTS: No pain. No lumps. No discharge.  ABDOMEN: No pain. No nausea. No vomiting. No diarrhea. + CONSTIPATION.  REPRODUCTIVE: No abnormal bleeding.   VULVA: No pain. No lesions. No itching.  VAGINA: No relaxation. No itching. No odor. No discharge. No lesions.  URINARY: No incontinence. No nocturia. No frequency. No dysuria.    /86   Ht 5' 6" (1.676 m)   Wt 72.9 kg (160 lb 11.5 oz)   BMI 25.94 kg/m²     PE:  APPEARANCE: Well nourished, well developed, in no acute distress.  AFFECT: WNL, alert and oriented x 3.  SKIN: No hirsutism or acne.  NECK: Neck symmetric without masses or thyromegaly.  NODES: No inguinal, cervical, axillary or femoral lymph node enlargement.  CHEST: Good respiratory effort.   ABDOMEN: Soft. No tenderness " or masses.   BREASTS: Symmetrical, no skin changes or visible lesions. No palpable masses, nipple discharge bilaterally.  PELVIC: ATROPHIC EXTERNAL FEMALE GENITALIA without lesions. Normal hair distribution. Adequate perineal body, normal urethral meatus. VAGINA DRY / ATROPHIC without lesions or discharge. CERVIX STENOTIC without lesions, discharge or tenderness. No significant cystocele or rectocele. Bimanual exam shows uterus to be normal size, regular, mobile and nontender. Adnexa without masses or tenderness.  RECTAL: Rectovaginal exam confirms above with normal sphincter tone, no masses.  EXTREMITIES: No edema.    DIAGNOSIS:  1. Well woman exam with routine gynecological exam    2. Postmenopause        PLAN:    LABS AND TESTS ORDERED:  None  Has mammogram scheduled  Up to date on BMD and Colonoscopy    MEDICATIONS PRESCRIBED:  None    COUNSELING:  The patient was counseled today on:  -osteoporosis prevention, calcium supplementation, regular weight bearing exercise;  -A.C.S. Pap and pelvic exam guidelines (pap every 3 years, no pap after age 65) and recommendations for yearly mammogram;  -to see her PCP for other health maintenance.    FOLLOW-UP in two years with KATARINA Lima NP

## 2018-10-11 NOTE — PROGRESS NOTES
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 Symptoms were initially intermittent but have been more constant for the past week. Pain is medial. She denies injury, change in activity, swelling, catching, locking. No previous knee pain.     Past Medical History:   Diagnosis Date    Abnormal cervical Papanicolaou smear 1980's    Cryo    Carotid artery stenosis     via kinked carotid artery - followed by Dr. Ye Anderson    Carotid stenosis 5/15/2014    GERD (gastroesophageal reflux disease)     Gout due to renal impairment     Gout, arthritis 2/20/2013    Gout, unspecified     Herniated lumbar intervertebral disc     Hypertension     Insomnia     Irritable bowel syndrome without diarrhea 9/20/2015    Liver lesion 3/13/2013    Mitral valve prolapse     MVP (mitral valve prolapse)     Pancreatic cyst 12/30/2013    PVD (peripheral vascular disease) 12/23/2013    Venous insufficiency        Review of Systems:  Constitutional: no fever or chills  ENT: no nasal congestion or sore throat  Respiratory: no cough or shortness of breath  Cardiovascular: no chest pain or palpitations  Gastrointestinal: no nausea or vomiting, tolerating diet  Genitourinary: no hematuria or dysuria  Integument/Breast: no rash or pruritis  Hematologic/Lymphatic: no easy bruising or lymphadenopathy  Musculoskeletal: see HPI  Neurological: no seizures or tremors  Behavioral/Psych: no auditory or visual hallucinations    OBJECTIVE:     PHYSICAL EXAM:  Blood pressure 138/87, pulse 68, height 5' 6" (1.676 m), weight 72.9 kg (160 lb 11.5 oz).   General Appearance: WDWN, NAD  Gait: Normal  Neuro/Psych: Mood & affect appropriate  Lungs: Respirations equal and unlabored.   CV: 2+ bilateral upper and lower extremity pulses.   Skin: Intact throughout LE  Extremities: No LE " edema    Right Knee Exam  Range of Motion:0-130 active   Effusion:none  Condition of skin:intact  Location of tenderness:None   Strength:4 of 5 quadriceps strength and 5 of 5 hamstring strength  Stability:stable to testing    Left Knee Exam  Range of Motion:0-130   Effusion: none   Condition of skin:intact  Location of tenderness:medial joint line   Strength:4/5  Stability: stable to testing     Left Hip Examination: No pain with PROM     RADIOGRAPHS: AP, lateral and merchant left knee x-rays ordered and images reviewed today by me reveal mild degenerative changes    ASSESSMENT/PLAN:   Left knee pain  OA left knee  - CSI today  - F/u if pain does not improve over the next few weeks    Knee Injection Procedure Note  Diagnosis:  Left  knee degenerative arthritis  Indications: Left knee pain  Procedure Details: Verbal consent was obtained for the procedure. The injection site was identified and the skin was prepared with alcohol. The left knee was injected from an anterolateral approach with 1.5 ml of Kenalog and 2 ml Lidocaine under sterile technique using a 22 gauge needle. The needle was removed and the area cleansed and dressed.  Complications:  Patient tolerated the procedure well.    she was advised to rest the knee today, using ice and elevation as needed for comfort and swelling.Immediate relief of the knee pain may be short lived and secondary to the lidocaine. she may have an increase in discomfort tonight followed by steady improvement over the next several days. It may take 1-2 weeks following the injection to get the full benefit of the medication.

## 2018-10-11 NOTE — LETTER
October 11, 2018      Sosa Mohan, NP  [address] [place] [address]           [facility]  [address]  [facility] [address]  Phone: [phone]          Patient: Mirna Norton   MR Number: [id number]   YOB: 1945   Date of Visit: 10/11/2018       Dear Sosa Mohan:    Thank you for referring Mirna Norton to me for evaluation. Attached you will find relevant portions of my assessment and plan of care.    If you have questions, please do not hesitate to call me. I look forward to following Mirna Norton along with you.    Sincerely,    Nena Tejada PA-C    Enclosure  CC:  No Recipients    If you would like to receive this communication electronically, please contact [email or web address] or [phone] to request more information on [facility] access.    For providers and/or their staff who would like to refer a patient to [facility], please contact us through our one-stop-shop provider referral line, Juanjo Melissa, at [phone].    If you feel you have received this communication in error or would no longer like to receive these types of communications, please e-mail [email or web address]

## 2018-10-18 ENCOUNTER — PATIENT MESSAGE (OUTPATIENT)
Dept: GASTROENTEROLOGY | Facility: CLINIC | Age: 73
End: 2018-10-18

## 2018-10-18 ENCOUNTER — PATIENT MESSAGE (OUTPATIENT)
Dept: CARDIOLOGY | Facility: CLINIC | Age: 73
End: 2018-10-18

## 2018-10-18 DIAGNOSIS — Z86.010 HISTORY OF COLON POLYPS: Primary | ICD-10-CM

## 2018-10-24 NOTE — TELEPHONE ENCOUNTER
Patient called to schedule follow-up colonoscopy.  She is due in November for her 5-yr follow-up.  Last colonoscopy was done with Dr. Hernandez.  I had seen her in clinic last year.  Order placed.

## 2018-11-05 DIAGNOSIS — I77.9 CAROTID ARTERY DISEASE, UNSPECIFIED LATERALITY: ICD-10-CM

## 2018-11-05 DIAGNOSIS — I73.9 PVD (PERIPHERAL VASCULAR DISEASE): ICD-10-CM

## 2018-11-05 RX ORDER — PRAVASTATIN SODIUM 40 MG/1
TABLET ORAL
Qty: 90 TABLET | Refills: 3 | Status: SHIPPED | OUTPATIENT
Start: 2018-11-05 | End: 2019-10-31 | Stop reason: SDUPTHER

## 2018-11-07 ENCOUNTER — HOSPITAL ENCOUNTER (OUTPATIENT)
Dept: RADIOLOGY | Facility: HOSPITAL | Age: 73
Discharge: HOME | End: 2018-11-07
Attending: INTERNAL MEDICINE
Payer: MEDICARE

## 2018-11-07 ENCOUNTER — CLINICAL SUPPORT (OUTPATIENT)
Dept: LAB | Facility: HOSPITAL | Age: 73
End: 2018-11-07
Attending: INTERNAL MEDICINE
Payer: MEDICARE

## 2018-11-07 DIAGNOSIS — I65.29 STENOSIS OF CAROTID ARTERY, UNSPECIFIED LATERALITY: ICD-10-CM

## 2018-11-07 DIAGNOSIS — I10 ESSENTIAL HYPERTENSION: Primary | ICD-10-CM

## 2018-11-07 DIAGNOSIS — R09.89 BILATERAL CAROTID BRUITS: ICD-10-CM

## 2018-11-07 PROCEDURE — 93010 ELECTROCARDIOGRAM REPORT: CPT | Mod: ,,, | Performed by: INTERNAL MEDICINE

## 2018-11-07 PROCEDURE — 93880 EXTRACRANIAL BILAT STUDY: CPT | Mod: 26,,, | Performed by: RADIOLOGY

## 2018-11-07 PROCEDURE — 93010 EKG 12-LEAD: ICD-10-PCS | Mod: ,,, | Performed by: INTERNAL MEDICINE

## 2018-11-07 PROCEDURE — 93005 ELECTROCARDIOGRAM TRACING: CPT

## 2018-11-07 PROCEDURE — 93880 EXTRACRANIAL BILAT STUDY: CPT | Mod: TC

## 2018-11-13 ENCOUNTER — TELEPHONE (OUTPATIENT)
Dept: OBSTETRICS AND GYNECOLOGY | Facility: CLINIC | Age: 73
End: 2018-11-13

## 2018-11-13 ENCOUNTER — PATIENT MESSAGE (OUTPATIENT)
Dept: GASTROENTEROLOGY | Facility: CLINIC | Age: 73
End: 2018-11-13

## 2018-11-13 ENCOUNTER — PATIENT MESSAGE (OUTPATIENT)
Dept: OBSTETRICS AND GYNECOLOGY | Facility: CLINIC | Age: 73
End: 2018-11-13

## 2018-11-13 ENCOUNTER — PATIENT MESSAGE (OUTPATIENT)
Dept: ORTHOPEDICS | Facility: CLINIC | Age: 73
End: 2018-11-13

## 2018-11-13 DIAGNOSIS — N95.0 POST-MENOPAUSAL BLEEDING: Primary | ICD-10-CM

## 2018-11-17 ENCOUNTER — OFFICE VISIT (OUTPATIENT)
Dept: INTERNAL MEDICINE | Facility: CLINIC | Age: 73
End: 2018-11-17
Payer: MEDICARE

## 2018-11-17 VITALS
BODY MASS INDEX: 25.66 KG/M2 | SYSTOLIC BLOOD PRESSURE: 130 MMHG | OXYGEN SATURATION: 98 % | WEIGHT: 159.63 LBS | HEART RATE: 72 BPM | DIASTOLIC BLOOD PRESSURE: 80 MMHG | HEIGHT: 66 IN

## 2018-11-17 DIAGNOSIS — I65.23 BILATERAL CAROTID ARTERY STENOSIS: ICD-10-CM

## 2018-11-17 DIAGNOSIS — Z12.11 COLON CANCER SCREENING: Primary | ICD-10-CM

## 2018-11-17 PROCEDURE — 99214 OFFICE O/P EST MOD 30 MIN: CPT | Mod: PBBFAC | Performed by: INTERNAL MEDICINE

## 2018-11-17 PROCEDURE — 99214 OFFICE O/P EST MOD 30 MIN: CPT | Mod: S$PBB,,, | Performed by: INTERNAL MEDICINE

## 2018-11-17 PROCEDURE — 99999 PR PBB SHADOW E&M-EST. PATIENT-LVL IV: CPT | Mod: PBBFAC,,, | Performed by: INTERNAL MEDICINE

## 2018-11-17 NOTE — PROGRESS NOTES
Subjective:       Patient ID: Mirna Norton is a 73 y.o. female.    Chief Complaint: Annual Exam    Patient is here for followup for chronic conditions.    10 days of spotting, will be seeing gyn and have US.    L knee pains, has been a few mos, has seen ortho and xray with mild OA. No inj and pains mostly posterior. Inj helped a few days.    Achy L arm as well, no recall inj. Not an actual shoulder pain, deltoid instead.      Review of Systems   Constitutional: Negative for activity change and unexpected weight change.   HENT: Negative for hearing loss, rhinorrhea and trouble swallowing.    Eyes: Negative for discharge and visual disturbance.   Respiratory: Negative for chest tightness and wheezing.    Cardiovascular: Negative for chest pain and palpitations.   Gastrointestinal: Negative for blood in stool, constipation, diarrhea and vomiting.   Endocrine: Negative for polydipsia and polyuria.   Genitourinary: Positive for vaginal bleeding. Negative for difficulty urinating, dysuria, hematuria and menstrual problem.   Musculoskeletal: Positive for arthralgias. Negative for joint swelling and neck pain.   Neurological: Negative for weakness and headaches.   Psychiatric/Behavioral: Negative for confusion and dysphoric mood.       Objective:      Physical Exam   Constitutional: She is oriented to person, place, and time. She appears well-developed and well-nourished. No distress.   HENT:   Head: Normocephalic and atraumatic.   Eyes: EOM are normal. Pupils are equal, round, and reactive to light. No scleral icterus.   Neck: Normal range of motion. No thyromegaly present.   Cardiovascular: Normal rate, regular rhythm and normal heart sounds. Exam reveals no gallop and no friction rub.   No murmur heard.  Pulmonary/Chest: Effort normal and breath sounds normal. No respiratory distress. She has no wheezes. She has no rales.   Abdominal: Soft. Bowel sounds are normal. She exhibits no distension and no mass. There is no  tenderness. There is no rebound and no guarding.   Musculoskeletal: Normal range of motion. She exhibits no edema or tenderness.   L shoulder normal rom and no impingement signs.  Normal L elbow, no focal abnormality or weakness or jt swelling L upper arm/arm    L knee normal rom, but tender with full flexion, not warm, neg cem testing and no JLT   Lymphadenopathy:     She has no cervical adenopathy.   Neurological: She is alert and oriented to person, place, and time.   Skin: She is not diaphoretic.   Psychiatric: She has a normal mood and affect. Her speech is normal and behavior is normal. Cognition and memory are normal.       Assessment:       1. Colon cancer screening        Plan:       Mirna was seen today for annual exam.    Diagnoses and all orders for this visit:    HTN btr on recheck.  Patient Instructions   Check your pressure 2 times per week and please notify us if it is over 140/90    You are due for the Prevnar 13 pneumonia vaccine        Colon cancer screening  -     Case request GI: COLONOSCOPY    Other orders  -     Hypertension Digital Medicine (HDMP) Enrollment Order      L knee pains, will try PT and if not improved then see ortho back.    I am not sure re L upper arm pains, normal exam. She will call if worsened symptoms.    Vag spotting will see GYN and have US    Carotid blockage -- Lengthy discussion re importance of good lipid control (which she has, BP control as well)      Health Maintenance       Date Due Completion Date    TETANUS VACCINE 01/12/1964 ---    Pneumococcal (65+) (2 of 2 - PCV13) 08/12/2014 8/12/2013    High Dose Statin 11/17/2019 11/17/2018    Mammogram 10/11/2020 10/11/2018    DEXA SCAN 12/01/2022 12/1/2017    Override on 8/2/2008: Done    Lipid Panel 10/11/2023 10/11/2018    Colonoscopy 11/11/2023 11/11/2013    Override on 3/6/2006: Done          Follow-up in about 1 year (around 11/17/2019).    Future Appointments   Date Time Provider Department Center    11/27/2018  1:15 PM [facility] OIC-US1 MASTER [facility] ULTR [facility]   11/27/2018  2:20 PM CHAD Gil NP [facility]   1/7/2019 10:20 AM Karlos Gonzalez MD [facility] CARDIO [facility]

## 2018-11-17 NOTE — PATIENT INSTRUCTIONS
Check your pressure 2 times per week and please notify us if it is over 140/90    You are due for the Prevnar 13 pneumonia vaccine

## 2018-11-27 ENCOUNTER — HOSPITAL ENCOUNTER (OUTPATIENT)
Dept: RADIOLOGY | Facility: HOSPITAL | Age: 73
Discharge: HOME | End: 2018-11-27
Attending: NURSE PRACTITIONER
Payer: MEDICARE

## 2018-11-27 ENCOUNTER — OFFICE VISIT (OUTPATIENT)
Dept: OBSTETRICS AND GYNECOLOGY | Facility: CLINIC | Age: 73
End: 2018-11-27
Payer: MEDICARE

## 2018-11-27 VITALS
HEIGHT: 66 IN | WEIGHT: 158.06 LBS | SYSTOLIC BLOOD PRESSURE: 124 MMHG | DIASTOLIC BLOOD PRESSURE: 90 MMHG | BODY MASS INDEX: 25.4 KG/M2

## 2018-11-27 DIAGNOSIS — D25.9 UTERINE LEIOMYOMA, UNSPECIFIED LOCATION: ICD-10-CM

## 2018-11-27 DIAGNOSIS — N95.0 POST-MENOPAUSAL BLEEDING: ICD-10-CM

## 2018-11-27 DIAGNOSIS — N95.0 POSTMENOPAUSAL BLEEDING: Primary | ICD-10-CM

## 2018-11-27 PROCEDURE — 58100 BIOPSY OF UTERUS LINING: CPT | Mod: PBBFAC | Performed by: NURSE PRACTITIONER

## 2018-11-27 PROCEDURE — 76856 US EXAM PELVIC COMPLETE: CPT | Mod: 26,,, | Performed by: RADIOLOGY

## 2018-11-27 PROCEDURE — 99213 OFFICE O/P EST LOW 20 MIN: CPT | Mod: PBBFAC,25 | Performed by: NURSE PRACTITIONER

## 2018-11-27 PROCEDURE — 88305 TISSUE EXAM BY PATHOLOGIST: CPT | Mod: 26,,, | Performed by: PATHOLOGY

## 2018-11-27 PROCEDURE — 76830 TRANSVAGINAL US NON-OB: CPT | Mod: 26,,, | Performed by: RADIOLOGY

## 2018-11-27 PROCEDURE — 58100 BIOPSY OF UTERUS LINING: CPT | Mod: S$PBB,,, | Performed by: NURSE PRACTITIONER

## 2018-11-27 PROCEDURE — 99499 UNLISTED E&M SERVICE: CPT | Mod: S$PBB,,, | Performed by: NURSE PRACTITIONER

## 2018-11-27 PROCEDURE — 76856 US EXAM PELVIC COMPLETE: CPT | Mod: TC

## 2018-11-27 PROCEDURE — 88305 TISSUE EXAM BY PATHOLOGIST: CPT | Performed by: PATHOLOGY

## 2018-11-27 PROCEDURE — 99999 PR PBB SHADOW E&M-EST. PATIENT-LVL III: CPT | Mod: PBBFAC,,, | Performed by: NURSE PRACTITIONER

## 2018-11-27 NOTE — PROGRESS NOTES
HISTORY OF PRESENT ILLNESS:    Mirna Norton is a 73 y.o. female  No LMP recorded. Patient is postmenopausal. presents today complaining of  spotting.   -States spotting has stopped.     PHONE CALL: 18:  I have been spotting for about ten days. It has subsided now. Do I need to come in?   I will have Ms Keyes (our ) call you and make an appointment for a pelvic ultrasound and then to see me afterwards. Venus Gil NP     DATA REVIEWED:  PELVIC US TODAY:  Uterus: Size: 6.7 x 3.1 x 3.9 cm  Masses: Heterogeneous intramural mass identified in the uterus near the fundus measuring 1.8 x 1.7 x 2.1 cm, likely a uterine leiomyoma.  Endometrium: In this postmenopausal patient, endometrium appears thickened and heterogeneous measuring 9 mm.  Right ovary: Not definitively seen.  Left ovary: Not definitively seen.  There is prominent left adnexal vasculature.  Free Fluid: None.  Impression:  Heterogeneous and thickened endometrium.  Findings are nonspecific with endometrial hyperplasia and carcinoma not excluded.  Recommend further evaluation.  Probable uterine fibroid.  Bilateral ovaries are not definitively seen.  This is likely secondary to their small size and/or bowel gas artifact.  This report was flagged in Epic as abnormal.      Past Medical History:   Diagnosis Date    Abnormal cervical Papanicolaou smear     Cryo    Carotid artery stenosis     via kinked carotid artery - followed by Dr. Ye Anderson    Carotid stenosis 5/15/2014    GERD (gastroesophageal reflux disease)     Gout due to renal impairment     Gout, arthritis 2013    Gout, unspecified     Herniated lumbar intervertebral disc     Hypertension     Insomnia     Irritable bowel syndrome without diarrhea 2015    Liver lesion 3/13/2013    Mitral valve prolapse     MVP (mitral valve prolapse)     Pancreatic cyst 2013    PVD (peripheral vascular disease) 2013    PVD (peripheral vascular  disease) 2013    Venous insufficiency        Past Surgical History:   Procedure Laterality Date    BARTHOLIN GLAND CYST EXCISION       SECTION      x3    COLONOSCOPY N/A 2013    [facility]    Diagnostic laproscopic       EGD (ESOPHAGOGASTRODUODENOSCOPY) N/A 2013    [facility]    laser surgery on vein      ULTRASOUND, UPPER GI TRACT, ENDOSCOPIC N/A 2014    [facility] at [facility] ([facility] F[facility])       MEDICATIONS AND ALLERGIES:      Current Outpatient Medications:     allopurinol (ZYLOPRIM) 100 MG tablet, TAKE 1 TABLET BY MOUTH EVERY DAY, Disp: 30 tablet, Rfl: 11    aspirin (ECOTRIN) 81 MG EC tablet, Take 81 mg by mouth once daily., Disp: , Rfl:     BIFIDOBACTERIUM INFANTIS (ALIGN ORAL), Take by mouth once daily., Disp: , Rfl:     hydroCHLOROthiazide (HYDRODIURIL) 25 MG tablet, Take 1 tablet (25 mg total) by mouth once daily., Disp: 90 tablet, Rfl: 11    losartan (COZAAR) 100 MG tablet, Take 1 tablet (100 mg total) by mouth once daily., Disp: 90 tablet, Rfl: 11    metoprolol succinate (TOPROL-XL) 50 MG 24 hr tablet, Take 1 tablet (50 mg total) by mouth once daily., Disp: 90 tablet, Rfl: 11    multivitamin-minerals-lutein (CENTRUM SILVER) Tab, Take 1 tablet by mouth Twice daily., Disp: , Rfl:     omeprazole (PRILOSEC) 40 MG capsule, Take 1 capsule (40 mg total) by mouth every morning. 1 Capsule, Delayed Release(E.C.) Oral Every day, Disp: 90 capsule, Rfl: 3    pravastatin (PRAVACHOL) 40 MG tablet, TAKE 1 TABLET BY MOUTH IN THE EVENING, Disp: 90 tablet, Rfl: 3    vitamin E 400 UNIT capsule, Take 400 Units by mouth once daily. , Disp: , Rfl:     poly-ureaurethane 16 % NFSo, Apply 1 application topically every evening., Disp: 15 mL, Rfl: 3    Review of patient's allergies indicates:   Allergen Reactions    No known allergies        OBSTETRIC HISTORY: Number of  cesareans: 3    COMPREHENSIVE GYN HISTORY:  PAP History: Reports abnormal Paps. Date:1980's. Treatment: Cryo. LAST PAP 8-15-13 NORMAL.  Infection History: Denies STDs. Denies PID.  Benign History: Reports uterine fibroids. Denies ovarian cysts. Denies endometriosis. Denies other conditions.  Cancer History: Denies cervical cancer. Denies uterine cancer or hyperplasia. Denies ovarian cancer. Denies vulvar cancer or pre-cancer. Denies vaginal cancer or pre-cancer. Denies breast cancer. Denies colon cancer.  Sexual Activity History: Denies currently being sexually active.  Menstrual History: Denies menses. Patient is postmenopausal: Not on HRT/ERT.       ROS:  GENERAL: No fever or chills.  BREASTS: No pain. No lumps. No discharge.  ABDOMEN: No pain. No nausea. No vomiting. No diarrhea. No constipation.  REPRODUCTIVE: + AUB.   VULVA: No pain. No lesions. No itching.  VAGINA: No relaxation. No itching. No odor. No discharge. No lesions.  URINARY: No incontinence. No nocturia. No frequency. No dysuria.    PE:  APPEARANCE: Well nourished, well developed, in no acute distress.  AFFECT: WNL, alert and oriented x 3.  PELVIC: ATROPHIC external female genitalia without lesions. Normal hair distribution. Adequate perineal body, normal urethral meatus. VAGINA ATROPHIC  without lesions or discharge. NO BLOOD SEEN. CERVIX STENOTIC without lesions, discharge or tenderness. No significant cystocele or rectocele. Bimanual exam shows uterus to be 6 weeks size, regular, mobile and nontender. Adnexa without masses or tenderness.    PROCEDURES:  See Procedure Note for Embx.    DIAGNOSIS:  1. Postmenopausal bleeding    2. Uterine leiomyoma, unspecified location        PLAN:    Orders Placed This Encounter    Endometrial biopsy    Tissue Specimen To Pathology, Obstetrics/Gynecology       COUNSELING:  The patient was counseled today on:  -met Dr Rodríguez in the hallway and will await biopsy results;  -if no further bleeding will follow  yearly, but if biopsy abn or bleeding recurs, will see Dr Rodríguez for D&C Hysteroscope.    FOLLOW-UP with me pending test results.

## 2018-11-27 NOTE — PROCEDURES
Procedures     ENDOMETRIAL BIOPSY:     Mirna Norton is a 73 y.o. female  No LMP recorded. Patient is postmenopausal. presents for an endometrial biopsy due to  bleeding and thickened endometrial stripe on ultrasound.    PRE ENDOMETRIAL BIOPSY COUNSELING:  The patient was informed of the risk of bleeding, infection, uterine perforation and pain and that the test will rule-out endometrial cancer with accuracy greater than 95%. She was counseled on the alternatives to endometrial biopsy and agrees to proceed.    PROCEDURE:  UPT negative  A time out was performed  The cervix was visualized with a speculum.  No additional instrumentation was required.  A sterile endometrial pipelle was passed without difficulty to a depth of 6 cm.  Scant endometrial tissue was obtained.  The specimen was placed in formalyn and sent to Pathology for histology evaluation.    The patient tolerated the procedure well.    ASSESSMENT:    bleeding.  Thickened endometrial stripe on ultrasound.    POST ENDOMETRIAL BIOPSY COUNSELING:  Manage post biopsy cramping with NSAIDs or Tylenol.  Expect spotting or light bleeding for a few days.  Report bleeding heavier than a period, fever > 101.0 F, worsening pain or a foul smelling vaginal discharge.    FOLLOW-UP: Pending biopsy results.

## 2018-11-30 ENCOUNTER — PES CALL (OUTPATIENT)
Dept: ADMINISTRATIVE | Facility: CLINIC | Age: 73
End: 2018-11-30

## 2018-12-03 ENCOUNTER — TELEPHONE (OUTPATIENT)
Dept: OBSTETRICS AND GYNECOLOGY | Facility: CLINIC | Age: 73
End: 2018-12-03

## 2018-12-03 NOTE — TELEPHONE ENCOUNTER
PHONE CALL: LM at Home# and Cell# re: to call for test results. Venus Gil NP    MY CHART MESSAGE: I was unable to reach you by phone. Your endometrial biopsy found a small benign polyp. I discussed this with Dr Rodríguez and if you start bleeding again, she wants to see you to discuss a D&C to scrape it out under anesthesia. However, if you do not have any further bleeding, nothing needs to be done  Please contact me at #[phone] to discuss this further.  Venus Gil NP

## 2018-12-04 ENCOUNTER — TELEPHONE (OUTPATIENT)
Dept: OBSTETRICS AND GYNECOLOGY | Facility: CLINIC | Age: 73
End: 2018-12-04

## 2018-12-04 ENCOUNTER — PATIENT MESSAGE (OUTPATIENT)
Dept: INTERNAL MEDICINE | Facility: CLINIC | Age: 73
End: 2018-12-04

## 2018-12-04 ENCOUNTER — PATIENT MESSAGE (OUTPATIENT)
Dept: ORTHOPEDICS | Facility: CLINIC | Age: 73
End: 2018-12-04

## 2018-12-04 DIAGNOSIS — M25.562 ACUTE PAIN OF LEFT KNEE: Primary | ICD-10-CM

## 2018-12-04 DIAGNOSIS — I87.2 VENOUS INSUFFICIENCY: Primary | ICD-10-CM

## 2018-12-04 NOTE — TELEPHONE ENCOUNTER
Hi, I just printed out a prescription. She can try at [facility] or a [facility] medical supply store. Please offer to fax to [facility] or it to her.  Let me know if patient has any questions.  Thank you, Ramsey Tyler

## 2018-12-04 NOTE — TELEPHONE ENCOUNTER
----- Message from Wali Sagastume sent at 12/4/2018  3:46 PM CST -----  Pt returning your call [phone]    PHONE CALL: Discussed finding of benign endometrial polyp and that Dr Rodríguez recommended that if she bleeds again to call and she will see her for a D&C , Hysteroscope. Not bleeding at this time. Venus Gil NP

## 2018-12-05 ENCOUNTER — TELEPHONE (OUTPATIENT)
Dept: ORTHOPEDICS | Facility: CLINIC | Age: 73
End: 2018-12-05

## 2018-12-05 ENCOUNTER — PATIENT MESSAGE (OUTPATIENT)
Dept: ORTHOPEDICS | Facility: CLINIC | Age: 73
End: 2018-12-05

## 2018-12-05 NOTE — TELEPHONE ENCOUNTER
Patient with continued knee pain despite conservative treatment including activity modification, medication and steroid injection. An MRI is indicated.

## 2018-12-05 NOTE — TELEPHONE ENCOUNTER
----- Message from Nena Tejada PA-C sent at 12/5/2018  7:54 AM CST -----  Please call her to schedule MRI of knee.

## 2018-12-06 ENCOUNTER — TELEPHONE (OUTPATIENT)
Dept: INTERNAL MEDICINE | Facility: CLINIC | Age: 73
End: 2018-12-06

## 2018-12-10 ENCOUNTER — PATIENT MESSAGE (OUTPATIENT)
Dept: INTERNAL MEDICINE | Facility: CLINIC | Age: 73
End: 2018-12-10

## 2018-12-10 DIAGNOSIS — I87.2 VENOUS INSUFFICIENCY OF RIGHT LOWER EXTREMITY: Primary | ICD-10-CM

## 2018-12-10 NOTE — TELEPHONE ENCOUNTER
"Please see previous note, thank you. I already faxed the referral on 12/6. Not sure if you stated in the first order that they are to be "thigh high stockings".  "

## 2018-12-13 ENCOUNTER — PATIENT MESSAGE (OUTPATIENT)
Dept: ORTHOPEDICS | Facility: CLINIC | Age: 73
End: 2018-12-13

## 2018-12-13 ENCOUNTER — HOSPITAL ENCOUNTER (OUTPATIENT)
Dept: RADIOLOGY | Facility: HOSPITAL | Age: 73
Discharge: HOME | End: 2018-12-13
Attending: PHYSICIAN ASSISTANT
Payer: MEDICARE

## 2018-12-13 DIAGNOSIS — M25.562 ACUTE PAIN OF LEFT KNEE: ICD-10-CM

## 2018-12-13 PROCEDURE — 73721 MRI JNT OF LWR EXTRE W/O DYE: CPT | Mod: 26,LT,, | Performed by: RADIOLOGY

## 2018-12-13 PROCEDURE — 73721 MRI JNT OF LWR EXTRE W/O DYE: CPT | Mod: TC,LT

## 2018-12-15 ENCOUNTER — PATIENT MESSAGE (OUTPATIENT)
Dept: ORTHOPEDICS | Facility: CLINIC | Age: 73
End: 2018-12-15

## 2018-12-19 ENCOUNTER — PES CALL (OUTPATIENT)
Dept: ADMINISTRATIVE | Facility: CLINIC | Age: 73
End: 2018-12-19

## 2019-01-07 ENCOUNTER — OFFICE VISIT (OUTPATIENT)
Dept: CARDIOLOGY | Facility: CLINIC | Age: 74
End: 2019-01-07
Payer: MEDICARE

## 2019-01-07 VITALS
SYSTOLIC BLOOD PRESSURE: 140 MMHG | DIASTOLIC BLOOD PRESSURE: 80 MMHG | HEART RATE: 70 BPM | WEIGHT: 159 LBS | BODY MASS INDEX: 25.55 KG/M2 | HEIGHT: 66 IN

## 2019-01-07 DIAGNOSIS — R09.89 BILATERAL CAROTID BRUITS: ICD-10-CM

## 2019-01-07 DIAGNOSIS — I65.23 BILATERAL CAROTID ARTERY STENOSIS: ICD-10-CM

## 2019-01-07 DIAGNOSIS — I87.2 VENOUS INSUFFICIENCY OF RIGHT LOWER EXTREMITY: ICD-10-CM

## 2019-01-07 DIAGNOSIS — E78.2 MIXED HYPERLIPIDEMIA: ICD-10-CM

## 2019-01-07 DIAGNOSIS — I10 ESSENTIAL HYPERTENSION: Primary | ICD-10-CM

## 2019-01-07 PROCEDURE — 99213 OFFICE O/P EST LOW 20 MIN: CPT | Mod: PBBFAC,PO | Performed by: INTERNAL MEDICINE

## 2019-01-07 PROCEDURE — 99215 OFFICE O/P EST HI 40 MIN: CPT | Mod: S$PBB,,, | Performed by: INTERNAL MEDICINE

## 2019-01-07 PROCEDURE — 99999 PR PBB SHADOW E&M-EST. PATIENT-LVL III: ICD-10-PCS | Mod: PBBFAC,,, | Performed by: INTERNAL MEDICINE

## 2019-01-07 PROCEDURE — 99215 PR OFFICE/OUTPT VISIT, EST, LEVL V, 40-54 MIN: ICD-10-PCS | Mod: S$PBB,,, | Performed by: INTERNAL MEDICINE

## 2019-01-07 PROCEDURE — 99999 PR PBB SHADOW E&M-EST. PATIENT-LVL III: CPT | Mod: PBBFAC,,, | Performed by: INTERNAL MEDICINE

## 2019-01-07 NOTE — PROGRESS NOTES
Subjective:   Patient ID:  Mirna Norton is a 73 y.o. female who presents for follow up of Hypertension; Hyperlipidemia; and Carotid Artery Disease      HPI:     Mirna Norton 73 y.o. female is here follow up and feeling well without any new complaints. She is s/p R GSVL EVLT  In 2013 with subsequent injections in  without recurrent issues. She has asymptomatic mild bilateral carotid disease. She takes aspirin and losartan. Her BP ans lipid profile are well controlled. LDL 87 on pravastatin 40 mg nightly. Last venous ultrasound-no DVT or reflux disease. Carotid ultrasound-PSV < 130 cm/sec bilaterally        US 2018      R ICA 92 cm/sec   L  cm/sec              Patient Active Problem List    Diagnosis Date Noted    Venous insufficiency of right lower extremity 2017         R GSV EVLT 2013 with subsequent injections in           Bilateral carotid bruits 2017    Foot pain, right 2017    Hyperlipidemia 2015    Irritable bowel syndrome without diarrhea 2015    Carotid stenosis 05/15/2014         Asymptomatic        R ICA 93 cm/sec   L  cm/sec      US 2018      R ICA 92 cm/sec   L  cm/sec              Pancreatic cyst 2013    Gout, arthritis 2013    Lumbosacral spondylosis 2013    Gout, unspecified 2012     Gout w. hx of podagra and right wrist pain and swelling associated w. mild hyperuricemia.  Some diarrhea w. colcrys, especially 2 daily.           GERD (gastroesophageal reflux disease)      Gastroesophageal reflux with nonsteroidal antiinflammatory drugs, but can tolerate short term;   and fear of Celebrex        Hypertension            Right Arm BP - Sittin/80  Left Arm BP - Sittin/80        LABS    LAST HbA1c  Lab Results   Component Value Date    HGBA1C 6.1 (H) 10/11/2018       Lipid panel  Lab Results   Component Value Date    CHOL 136 10/11/2018    CHOL 168 2017    CHOL 143 2016      Lab Results   Component Value Date    HDL 60 10/11/2018    HDL 62 06/16/2017    HDL 59 06/07/2016     Lab Results   Component Value Date    LDLCALC 55.6 (L) 10/11/2018    LDLCALC 87.2 06/16/2017    LDLCALC 59.6 (L) 06/07/2016     Lab Results   Component Value Date    TRIG 102 10/11/2018    TRIG 94 06/16/2017    TRIG 122 06/07/2016     Lab Results   Component Value Date    CHOLHDL 44.1 10/11/2018    CHOLHDL 36.9 06/16/2017    CHOLHDL 41.3 06/07/2016            Review of Systems   Constitution: Negative for diaphoresis, weakness, night sweats, weight gain and weight loss.   HENT: Negative for congestion.    Eyes: Negative for blurred vision, discharge and double vision.   Cardiovascular: Negative for chest pain, claudication, cyanosis, dyspnea on exertion, irregular heartbeat, leg swelling, near-syncope, orthopnea, palpitations, paroxysmal nocturnal dyspnea and syncope.   Respiratory: Negative for cough, shortness of breath and wheezing.    Endocrine: Negative for cold intolerance, heat intolerance and polyphagia.   Hematologic/Lymphatic: Negative for adenopathy and bleeding problem. Does not bruise/bleed easily.   Skin: Negative for dry skin and nail changes.   Musculoskeletal: Negative for arthritis, back pain, falls, joint pain, myalgias and neck pain.   Gastrointestinal: Negative for bloating, abdominal pain, change in bowel habit and constipation.   Genitourinary: Negative for bladder incontinence, dysuria, flank pain, genital sores and missed menses.   Neurological: Negative for aphonia, brief paralysis, difficulty with concentration and dizziness.   Psychiatric/Behavioral: Negative for altered mental status and memory loss. The patient does not have insomnia.    Allergic/Immunologic: Negative for environmental allergies.       Objective:   Physical Exam   Constitutional: She is oriented to person, place, and time. She appears well-developed and well-nourished. She is not intubated.   HENT:   Head:  Normocephalic and atraumatic.   Right Ear: External ear normal.   Left Ear: External ear normal.   Mouth/Throat: Oropharynx is clear and moist.   Eyes: Conjunctivae and EOM are normal. Pupils are equal, round, and reactive to light. Right eye exhibits no discharge. Left eye exhibits no discharge. No scleral icterus.   Neck: Normal range of motion. Neck supple. Normal carotid pulses, no hepatojugular reflux and no JVD present. Carotid bruit is not present. No tracheal deviation present. No thyromegaly present.   Cardiovascular: Normal rate, regular rhythm, S1 normal and S2 normal.  No extrasystoles are present. PMI is not displaced. Exam reveals no gallop, no S3, no distant heart sounds, no friction rub and no midsystolic click.   No murmur heard.  Pulses:       Carotid pulses are 2+ on the right side, and 2+ on the left side.       Radial pulses are 2+ on the right side, and 2+ on the left side.        Femoral pulses are 2+ on the right side, and 2+ on the left side.       Popliteal pulses are 2+ on the right side, and 2+ on the left side.        Dorsalis pedis pulses are 2+ on the right side, and 2+ on the left side.        Posterior tibial pulses are 2+ on the right side, and 2+ on the left side.   Pulmonary/Chest: Effort normal and breath sounds normal. No accessory muscle usage or stridor. No apnea, no tachypnea and no bradypnea. She is not intubated. No respiratory distress. She has no decreased breath sounds. She has no wheezes. She has no rales. She exhibits no tenderness and no bony tenderness.   Abdominal: She exhibits no distension, no pulsatile liver, no abdominal bruit, no ascites, no pulsatile midline mass and no mass. There is no tenderness. There is no rebound and no guarding.   Musculoskeletal: Normal range of motion. She exhibits no edema or tenderness.   Lymphadenopathy:     She has no cervical adenopathy.   Neurological: She is alert and oriented to person, place, and time. She has normal  reflexes. No cranial nerve deficit. Coordination normal.   Skin: Skin is warm. No rash noted. No erythema. No pallor.   Psychiatric: She has a normal mood and affect. Her behavior is normal. Judgment and thought content normal.       Assessment:     1. Essential hypertension    2. Bilateral carotid artery stenosis    3. Mixed hyperlipidemia    4. Venous insufficiency of right lower extremity    5. Bilateral carotid bruits        Plan:         Enroll in digital HTN clinic  Target BP < 130/80 mmHg  If BP is not well controlled with lifestyle changes she will add norvasc 5 mg daily then titrate      Low salt diet  Exercise          Continue with current medical plan and lifestyle changes.  Return sooner for concerns or questions. If symptoms persist go to the ED  I have reviewed all pertinent data on this patient       I have reviewed the patient's medical history in detail and updated the computerized patient record.    Orders Placed This Encounter   Procedures    US Carotid Bilateral     Standing Status:   Future     Standing Expiration Date:   1/7/2020    Hypertension Digital Medicine (HDMP) Enrollment Order     I. PURPOSE  To provide [facility] patients with innovative, specialized blood pressure monitoring and optimal dosing of antihypertensive therapy to improve health outcomes and decrease microvascular and macrovascular complications    II. GOALS  To maintain a systematic, coordinated and cost-effective process to monitor blood pressure in patients with hypertension  To attain and maintain optimal antihypertensive therapy while ensuring patient safety using the most recent evidence-based guidelines for the management of hypertension as reported by AHA/ACC in 2017.   Provide consultative services to providers, patients and caregivers regarding optimal antihypertensive therapy  To improve patient/caregiver understanding and compliance related to antihypertensive therapies by providing continuous  patient and caregiver education about their prescribed medications and associated disease state  To provide education, guidance and reinforcement regarding lifestyle modifications including weight loss, adopting and maintaining the Dietary Approaches to Stop Hypertension or DASH diet, sodium restriction, physical activity, and moderation of alcohol consumption to patients and caregivers  Allow providers increased availability of clinic time for direct patient care   To provide patient care and education within an interdisciplinary framework and enhance partnering with other members of health care team  Improve continuity of care for high blood pressure patients and improve patient engagement  Collect and utilize pharmacy related outcomes to improve quality of patient care    III. COLLABORATIVE PRACTICE AGREEMENT    A.  Under this collaborative practice agreement, an [facility] pharmacist according to and in compliance with [place] Board of Pharmacy Title 46, Part LIII, section 523 and [place] Board of Medical Examiners definition of the Collaborative Drug Therapy Management (CDTM) may initiate, implement, alter and monitor a therapeutic drug plan intended to manage antihypertensive therapy.  Services offered by the hypertension pharmacy specialist may include education on disease state and lifestyle modification, in addition to the drug therapy services listed above. Written and audio/visual educational materials and patient specific information may be provided to improve quality of care.    B. Primary Collaborating Physician:    Primary Physician: Ludwig Domínguez M.D., [facility], [place]  , Cardiology  License number: [id number]  CDTM number:  [id number]  Telephone number: [phone]   E-mail address: [email or web address]  Emergency Contact Information: [phone]    Back-Up Physician:  Dillon Vital M.D.  Cardiology  License number:  [id number]  CDTM number:  [id number]  Telephone number:  [phone]  E-mail address: [email or web address]  Emergency Contact Information: [phone]    C.  Pharmacists:   Each of the following pharmacists will serve as the primary pharmacist on CDTM and any pharmacist may serve as the secondary pharmacist for another pharmacists agreement.  Each of the pharmacists listed below has a Pharm.D degree, with completion of an accredited pharmacy practice residency and as well as experience with managing patients along with a physician.  The outpatient monitoring service will be provided under the Cardiology Department at [facility] [place] location in [place] at [address] [place] [address]. The pharmacist will contact patients via telephone from a remote location.    Pharmacist: Isabel Boss PharmD  This pharmacist has completed a pharmacy practice residency and has practiced clinical pharmacy for 7 years. She has experience in the areas of cardiology, acute care, and managed care. She started a pharmacist run hypertension clinic at [facility] during her residency and was published in The American Journal of Health-System Pharmacists.     [place] Pharmacist License Number: [id number]  CDTM number:  [id number]  Contact Number:  [phone]  Contact E-mail: [email or web address]  Emergency Contact Number:  [phone]    Pharmacist:  Roxane Hutchison PharmD  This pharmacist has completed a pharmacy practice residency and has practiced clinical pharmacy for 17 years in the areas of cardiology, geriatric medicine, anticoagulation management, retail and ambulatory care.  She served as a pharmacy practice clinical preceptor and lead pharmacist in anticoagulation clinic for 10 years.  [place] Pharmacist License # [id number]  CDTM number:  [id number]  Contact Number:  [phone]  Contact E-mail:  [email or web address]   Emergency Contact Number:  [phone]    Pharmacist: Hortencia Ledesma PharmD, BCACP, CDE  This  pharmacist has completed a pharmacy practice residency with emphasis in ambulatory care and has practiced clinical pharmacy for 8 years in the areas of dyslipidemia, hypertension, diabetes, and anticoagulation. She is also a Certified Diabetes Educator.      [place] Pharmacist License # [id number]  CDTM number: [id number]  Contact number: [phone]  Contact E-mail:  [email or web address]  Emergency Contact Number: [phone]    Pharmacist: More Reynoso PharmD  This pharmacist started practicing at [facility] in 2011 following her one year residency at [facility]. She serves as an Adjunct Clinical  in the College of Pharmacy at [facility]. She served as the lead pharmacist for the Coumadin Clinic team for 4 years.   [place] Pharmacist License: [id number]  CDTM number: [id number]  Contact number: [phone]  Contact E-mail: [email or web address]   Emergency Contact Number: [phone]    Pharmacist:  Haley Myers PharmD  This pharmacist has completed a pharmacy practice residency (PGY-1) and has practiced clinical pharmacy for 16  years in the areas of heart and abdominal transplant, retail and ambulatory care.  She was directly involved in the care of congestive heart failure, left ventricular assist device and heart transplant patients from 1120-2723.  She is currently practicing as a digital medicine clinical specialist in heart failure.    [place] Pharmacist License # [id number]  CDTM number:  [id number]  Contact Number:  [phone]  Contact E-mail:  [email or web address]   Emergency Contact Number:  [phone]    Pharmacist: More Valera PharmD  This pharmacist obtained her Pharm.D. from [employer], and has completed an Ambulatory Care Pharmacy Practice residency at [employer] [facility]. She has practiced clinical pharmacy for 9  years and has experience in the areas of Hypertension and Diabetes.       [place] Pharmacist License: [id number]  CDTM Number: [id number]  Telephone number: [phone]  E-mail: [email or web address]  Emergency Contact Number: [phone]      Pharmacist: Rachel Baez PharmD  This pharmacist has completed a pharmacy practice residency with an emphasis in ambulatory care and has practiced clinical pharmacy for one year. She has experience in the areas of diabetes, hypertension and dyslipidemia.   [place] Pharmacist License: [id number]  CDTM Number: [id number]  Contact Number: [phone]  Contact Email: [email or web address]   Emergency Contact: [phone]    Pharmacist: Martina Ward PharmD, BCPS  This pharmacist has completed a pharmacy practice residency with an emphasis in ambulatory care and is a Board Certified Pharmacotherapy Specialist (BCPS). She has practiced clinical pharmacy for  years in areas of ambulatory care, internal medicine, cardiology and specialty pharmacy.    [place] Pharmacist License Number: [id number]  CDTM number:  [id number]  Contact Number:  [phone]  Contact E-mail:  [email or web address]   Emergency Contact Number:  [phone]    D.  Eligible Patients  Patients whose antihypertensive therapy is managed under this agreement must have a diagnosis of hypertension as documented in the patient record, and have established care with a provider within [facility]. All aspects of the patients hypertension medication management will be followed in collaboration with the physician treating the patients hypertension.  The patient will be seen by his or her physician per their discretion or by the recommendation by the hypertension pharmacist.  The patients case may be reviewed with clinic medical personnel as needed, but will be reported at least every 30 days to the collaborating physician regarding the patients drug therapy management. All decisions made by the pharmacist will be recorded in [facility] and  are readily available for physician review. All issues outside of the scope of antihypertensive therapy shall be reported to the collaborating physician.     E.  Medications in CDTM  This collaborative practice proposal involves the management of patients who are receiving antihypertensive therapy, specifically, thiazide-type diuretics, angiotensin-converting enzyme inhibtors (ACE-I), angiotensin receptor blockers (ARB), calcium channel blockers (CCB), beta-blockers, loop diuretics, potassium-sparing diuretics, potassium supplementation, aldosterone antagonists, direct renin inhibitors, alpha-1 receptor blockers, central alpha-2 agonists, direct arterial vasodilators and peripheral adrenergic antoagonists, or those patients in which hypertension is controlled by lifestyle modifcation alone.      F.  Clinical Procedure  All patients will be notified that a hypertension CDTM exists.  A signed physician order will be required for each patient to be enrolled into the hypertension CDTM.  Informed consent and an electronic signature will be obtained from each patient and documented in the patients record.  This patient signature will be valid for 1 year, requiring a new physician order and patient consent yearly.  The patient must also be enrolled in the electornic communication program ([facility]) to be elegible for the monitoring program.  The following management plan will be utilized for CDTM:    Patients must submit blood pressures at a minimum of once weekly from the patient- purchased wireless blood pressure cuff. Patients who fail to comply with this requirement are subject to removal from [facility].   Evaluate the change in blood pressure, if any, from previous measurements performed on the same cuff and arm.  Determine and document contributing factors for blood pressure change.    Review initial drug therapy made by clinic physician upon program enrollment with patient to ensure compliance.    Identify target  blood pressure based on age and comorbidities. Therapeutic changes will be made in collaboration with PharmD and collaborating physician based on the AHA/ACC 2017 guidelines for the treatment of hypertension.  Guide drug optimization based on patient response at 2-4 week intervals until control is achieved.  The PharmD will continue to monitor blood pressure and make adjustments to hypertension medications in order to achieve optimal blood pressure.   Order follow up labs when changing or adding ACE inhibitors and diuretics.    Refer to hypertension specialist if  blood pressure goals cannot be achieved using the noted algorithm.  Notify physician with specific problems or request clinic visit if deemed necessary.  Document antihypertensive therapy changes, interventions, and outcomes in EMR.   Provide patient/caregiver continuous education or reinforcement regarding hypertension management,  medications and lifestyle modifications.    Laboratory Tests  Pharmacists will be authorized to order and evaluate laboratory tests directly related to the disease specific drug therapy being managed. Pharmacists will also be authorized to order additional specific labs based on patient clinical presentation or description. Pharmacists may also review other lab data available in the patient record which may be necessary for the evaluation and assessment of the impact on antihypertensive therapy (i.e. drug interaction, disease interaction, etc.).     b.  Documentation   Documentation of patient encounters and lab results will be permanently placed in the [facility].  Laboratory results will automatically populate prompting review and assessment of each patient.  Laboratory results obtained from outside laboratories will be entered into EMR manually.  This documentation will include lab values, medication changes, identification and assessment of adverse events related to therapy, antihypertensive medication dose adjustments,  therapeutic management plan and follow up as well as any other information given to the patient during the telemedicine visit.    c.     1.   will be carried out through quarterly reports tracking following statistics:   a. Percent of patients requiring a change to antihypertensive medications   b. Number of emergency visits due to hypertensive urgency or emergency, hypotension or medication side effects for participating patients  Percent of patients who are controlled (BP <130/80 mmHg)  and uncontrolled (BP>130 mmHg)     2.  Patient specific quality indicators will be identified through quarterly review of the following data:   a.  Average change in blood pressure after a dose adjument by the hypertension pharmacist    3.  A random sample of patient records shall be reviewed by the primary physician quarterly to ensure adherence by the hypertension clinical specialists to the collaborative practice agreement.     4.   measures will be reported to Pharmacy & Therapeutics Committee yearly.     References:    OCNNIE Milan, et al. 2017. 2017. Guidelines for the Prevention, Detection, Evaluation and Management of High Blood Pressure in Adults.      Order Specific Question:   BP Control Goal     Answer:   Current (2017) AHA Guidelines       Follow up as scheduled. Return sooner for concerns or questions  Follow up yearly           She expressed verbal understanding and agreed with the plan      Greater than 50% of the visit of 45 minutes was spent counseling, educating, and coordinating the care of the patient.  -In today's visit, at least 4 established conditions that pose a risk to life or bodily function have been addressed and the conditions are severe.    -In today's visit, monitoring for drug toxicity was accomplished.               Medication List           Accurate as of 1/7/19 11:11 AM. If you have any questions, ask your nurse or doctor.               CONTINUE  taking these medications    ALIGN ORAL     allopurinol 100 MG tablet  Commonly known as:  ZYLOPRIM  TAKE 1 TABLET BY MOUTH EVERY DAY     aspirin 81 MG EC tablet  Commonly known as:  ECOTRIN     CENTRUM SILVER Tab  Generic drug:  multivitamin-minerals-lutein     hydroCHLOROthiazide 25 MG tablet  Commonly known as:  HYDRODIURIL  Take 1 tablet (25 mg total) by mouth once daily.     losartan 100 MG tablet  Commonly known as:  COZAAR  Take 1 tablet (100 mg total) by mouth once daily.     metoprolol succinate 50 MG 24 hr tablet  Commonly known as:  TOPROL-XL  Take 1 tablet (50 mg total) by mouth once daily.     omeprazole 40 MG capsule  Commonly known as:  PRILOSEC  Take 1 capsule (40 mg total) by mouth every morning. 1 Capsule, Delayed Release(E.C.) Oral Every day     poly-ureaurethane 16 % Nfso  Apply 1 application topically every evening.     pravastatin 40 MG tablet  Commonly known as:  PRAVACHOL  TAKE 1 TABLET BY MOUTH IN THE EVENING     vitamin E 400 UNIT capsule

## 2019-01-07 NOTE — PATIENT INSTRUCTIONS
Carotid Artery Problems: Blockage     A healthy carotid artery lets blood flow easily to the brain.   The blood carries oxygen and nutrients throughout the body. The carotid arteries are large blood vessels that carry blood to the brain. Certain health problems can make the inside of the carotid arteries narrow and rough. Over time, this damage increases the chances of having a stroke. A stroke is a sudden loss of brain function.   Open carotid arteries  In a healthy carotid artery, the inside of the artery is open. The lining of the artery wall is also smooth. This lets blood flow freely from the heart to the brain. The brain gets all the oxygen and nutrients it needs to function well.  Narrowed carotid arteries  Health factors, such as high blood pressure, high cholesterol, smoking, and diabetes, can damage artery walls and make them rough. This allows cholesterol and other particles in the blood to stick to the artery walls and form plaque or fatty deposits. As the plaque builds up, it can narrow the artery. Blood may also collect on the plaque and form blood clots.  How a stroke happens     Emboli can enter the bloodstream and travel to the brain. Brain tissue is damaged when emboli block arteries in the brain.   A stroke can happen when plaque in the carotid artery ruptures. This can allow small pieces of plaque to break off into the bloodstream. At the same time, rupture can make more blood clots. The pieces of plaque and tiny blood clots or emboli can flow in the blood until they get stuck in a small blood vessel in the brain. This blocks blood flow to part of the brain and causes a stroke.  Date Last Reviewed: 6/8/2015  © 6461-2205 [employer]. [address] [place] [address], [place] [address]. All rights reserved. This information is not intended as a substitute for professional medical care. Always follow your healthcare professional's instructions.

## 2019-01-08 ENCOUNTER — PATIENT MESSAGE (OUTPATIENT)
Dept: ADMINISTRATIVE | Facility: OTHER | Age: 74
End: 2019-01-08

## 2019-01-09 ENCOUNTER — PATIENT OUTREACH (OUTPATIENT)
Dept: OTHER | Facility: OTHER | Age: 74
End: 2019-01-09

## 2019-01-09 NOTE — LETTER
Isabel Boss, Patrick  [address] [place] [address]     Dear Mirna Norton,    Welcome to the [facility]!           My name is Isabel Boss PharmD and I am your dedicated Digital Medicine clinician.  As an expert in medication management, I will help ensure that the medications you are taking continue to provide you with the intended benefits.        I am Saurabh Merida and I will be your health  for the duration of the program.  My  job is to help you identify lifestyle changes to improve your blood pressure control.  We will talk about nutrition, exercise, and other ways that you may be able to adjust your current habits to better your health. Together, we will work to improve your overall health and encourage you to meet your goals for a healthier lifestyle.    What we expect from YOU:    You will need to take blood pressure readings multiple times a week and no less than one reading per week.   It is important that you take your measurements at different times during the day, when possible.     What you should expect from your Digital Medicine Care Team:   We will provide you with education about high blood pressure, including lifestyle changes that could help you to control your blood pressure.   We will review your weekly readings and provide you with monthly blood pressure progress reports after you have been in the program for more than 30 days.   We will send monthly progress reports on your blood pressure control to your physician so they can follow along with your progress as well.    You will be able to reach me by phone at [phone] or through your [facility] account by clicking my name under Care Team on the right side of the home screen.    I look forward to working with you to achieve your blood pressure goals!    Sincerely,  Isabel Boss PharmD  Your personal clinician    Please visit [email or web address]  to learn more about high blood pressure and what you can do lower your blood pressure.                                                                                           Mirna Norton  [address] [facility] [address]

## 2019-01-09 NOTE — PROGRESS NOTES
Digital Medicine Enrollment Call    Introduced Mrs. Mirna Norton to Digital Medicine.     Discussed program expectations and requirements.    Introduced digital medicine care team.     Reviewed the importance of self-monitoring for digital medicine participation.     Reviewed that the Digital Medicine team is not available for emergencies and instructed the patient to call 911 or [facility] ([phone] or [phone]) if one arises.              1st attempt for enrollment call. Left voicemail.         Last 5 Patient Entered Readings                                      Current 30 Day Average: 142/86     Recent Readings 1/8/2019    SBP (mmHg) 142    DBP (mmHg) 86    Pulse 62

## 2019-01-15 ENCOUNTER — PATIENT OUTREACH (OUTPATIENT)
Dept: OTHER | Facility: OTHER | Age: 74
End: 2019-01-15

## 2019-02-07 ENCOUNTER — PATIENT OUTREACH (OUTPATIENT)
Dept: OTHER | Facility: OTHER | Age: 74
End: 2019-02-07

## 2019-02-07 DIAGNOSIS — I10 ESSENTIAL HYPERTENSION: ICD-10-CM

## 2019-02-07 DIAGNOSIS — R00.2 PALPITATIONS: ICD-10-CM

## 2019-02-07 NOTE — PROGRESS NOTES
"Last 5 Patient Entered Readings                                      Current 30 Day Average: 141/77     Recent Readings 2/7/2019 2/3/2019 1/30/2019 1/30/2019 1/29/2019    SBP (mmHg) 125 147 150 159 159    DBP (mmHg) 72 78 81 87 87    Pulse 56 67 62 60 63        Called patient to introduce her to the Hypertension Digital Medicine Program.     Ms. Norton BP average is above goal. She had a controlled BP reading today and states this "motivated me to want to do more." She states she will work on walking more and she will discuss diet with her health , Saurabh. Discussed starting low dose amlodipine, but she asked to have more time to work on lifestyle modifications. Will follow up in 4-6 weeks.      Reviewed patient's medications and verified allergies on file.   Hypertension Medications             hydroCHLOROthiazide (HYDRODIURIL) 25 MG tablet Take 1 tablet (25 mg total) by mouth once daily.    losartan (COZAAR) 100 MG tablet Take 1 tablet (100 mg total) by mouth once daily.    metoprolol succinate (TOPROL-XL) 50 MG 24 hr tablet Take 1 tablet (50 mg total) by mouth once daily.        Explained that we expect her to obtain several blood pressures/week at random times of day. Also asked that the BP be taken at least 1 hour after taking BP medications.     Explained that our goal is to get her BP to consistently below 130/80mmHg.     Patient and I agreed that the patient will take her BP daily to every other day at varying times of the day.     Emailed patient link to [facility] webpage as well as my direct phone number in case she has any questions.       "

## 2019-02-08 RX ORDER — METOPROLOL SUCCINATE 50 MG/1
TABLET, EXTENDED RELEASE ORAL
Qty: 90 TABLET | Refills: 11 | Status: SHIPPED | OUTPATIENT
Start: 2019-02-08 | End: 2020-08-12

## 2019-02-13 ENCOUNTER — TELEPHONE (OUTPATIENT)
Dept: ORTHOPEDICS | Facility: CLINIC | Age: 74
End: 2019-02-13

## 2019-02-17 ENCOUNTER — PATIENT MESSAGE (OUTPATIENT)
Dept: ADMINISTRATIVE | Facility: OTHER | Age: 74
End: 2019-02-17

## 2019-02-17 DIAGNOSIS — I10 ESSENTIAL HYPERTENSION: Primary | ICD-10-CM

## 2019-02-18 ENCOUNTER — OFFICE VISIT (OUTPATIENT)
Dept: ORTHOPEDICS | Facility: CLINIC | Age: 74
End: 2019-02-18
Payer: MEDICARE

## 2019-02-18 VITALS — WEIGHT: 159.94 LBS | HEIGHT: 66 IN | BODY MASS INDEX: 25.71 KG/M2

## 2019-02-18 DIAGNOSIS — M17.12 PRIMARY OSTEOARTHRITIS OF LEFT KNEE: Primary | ICD-10-CM

## 2019-02-18 PROCEDURE — 99213 PR OFFICE/OUTPT VISIT, EST, LEVL III, 20-29 MIN: ICD-10-PCS | Mod: S$PBB,,, | Performed by: PHYSICIAN ASSISTANT

## 2019-02-18 PROCEDURE — 99213 OFFICE O/P EST LOW 20 MIN: CPT | Mod: PBBFAC | Performed by: PHYSICIAN ASSISTANT

## 2019-02-18 PROCEDURE — 99999 PR PBB SHADOW E&M-EST. PATIENT-LVL III: ICD-10-PCS | Mod: PBBFAC,,, | Performed by: PHYSICIAN ASSISTANT

## 2019-02-18 PROCEDURE — 99213 OFFICE O/P EST LOW 20 MIN: CPT | Mod: S$PBB,,, | Performed by: PHYSICIAN ASSISTANT

## 2019-02-18 PROCEDURE — 99999 PR PBB SHADOW E&M-EST. PATIENT-LVL III: CPT | Mod: PBBFAC,,, | Performed by: PHYSICIAN ASSISTANT

## 2019-02-18 RX ORDER — AMLODIPINE BESYLATE 2.5 MG/1
2.5 TABLET ORAL DAILY
Qty: 30 TABLET | Refills: 0 | Status: SHIPPED | OUTPATIENT
Start: 2019-02-18 | End: 2019-05-12 | Stop reason: SDUPTHER

## 2019-02-18 RX ORDER — AMLODIPINE BESYLATE 2.5 MG/1
2.5 TABLET ORAL DAILY
Qty: 30 TABLET | Refills: 11 | Status: SHIPPED | OUTPATIENT
Start: 2019-02-18 | End: 2019-02-18

## 2019-02-18 NOTE — TELEPHONE ENCOUNTER
Last 5 Patient Entered Readings                                      Current 30 Day Average: 142/76     Recent Readings 2/11/2019 2/8/2019 2/8/2019 2/8/2019 2/7/2019    SBP (mmHg) 159 127 141 151 129    DBP (mmHg) 79 69 73 80 65    Pulse 62 56 55 57 70        Ms. Norton is requesting an additional medication for better BP control. Will start low dose amlodipine.     Will continue to monitor regularly. Will follow up in 2-3 weeks, sooner if BP begins to trend upward or downward.    Patient has my contact information and knows to call with any concerns or clinical changes.

## 2019-02-19 NOTE — PROGRESS NOTES
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 She states that she feels like the left knee is hyperextending occasionally when she walks. Symptoms were initially intermittent but have been more constant for the past week. Pain is medial. She admits to some slight swelling. She denies injury, change in activity, swelling, catching, locking. She had CSI 10/11/18 that helped when she came in initially with knee pain. Her pain was relieved and continues to be. She only has pain now when kneeling on a hard surface. She denies hip/groin or back pain.     Past Medical History:   Diagnosis Date    Abnormal cervical Papanicolaou smear 1980's    [facility]    Carotid artery stenosis     via kinked carotid artery - followed by Dr. Ye Anderson    Carotid stenosis 5/15/2014    GERD (gastroesophageal reflux disease)     Gout due to renal impairment     Gout, arthritis 2/20/2013    Gout, unspecified     Herniated lumbar intervertebral disc     Hypertension     Insomnia     Irritable bowel syndrome without diarrhea 9/20/2015    Liver lesion 3/13/2013    Mitral valve prolapse     MVP (mitral valve prolapse)     Pancreatic cyst 12/30/2013    PVD (peripheral vascular disease) 12/23/2013    PVD (peripheral vascular disease) 12/23/2013    Venous insufficiency        Review of Systems:  Constitutional: no fever or chills  ENT: no nasal congestion or sore throat  Respiratory: no cough or shortness of breath  Cardiovascular: no chest pain or palpitations  Gastrointestinal: no nausea or vomiting, tolerating diet  Genitourinary: no hematuria or dysuria  Integument/Breast: no rash or pruritis  Hematologic/Lymphatic: no easy bruising or lymphadenopathy  Musculoskeletal: see HPI  Neurological: no seizures or tremors  Behavioral/Psych: no auditory or  "visual hallucinations    OBJECTIVE:     PHYSICAL EXAM:  Height 5' 6" (1.676 m), weight 72.6 kg (159 lb 15.1 oz).   General Appearance: WDWN, NAD  Gait: Normal  Neuro/Psych: Mood & affect appropriate  Lungs: Respirations equal and unlabored.   CV: 2+ bilateral upper and lower extremity pulses.   Skin: Intact throughout LE  Extremities: No LE edema    Right Knee Exam  Range of Motion:0-130 active   Effusion:none  Condition of skin:intact  Location of tenderness:None   Strength:4 of 5 quadriceps strength and 5 of 5 hamstring strength  Stability:stable to testing    Left Knee Exam  Range of Motion:0-130   Effusion: small effusion  Condition of skin:intact  Location of tenderness:medial joint line   Strength:4/5 quadriceps  Stability: stable to testing     Left Hip Examination: No pain with PROM     RADIOGRAPHS: AP, lateral and merchant left knee x-rays ordered and images reviewed today by me reveal mild degenerative changes  MRI reviewed revealing: Undersurface fraying of the posterior horn medial meniscus.  Partially truncated lateral meniscus body segment.  Focal, high-grade chondral loss of the posterior weight-bearing medial femoral condyle with full-thickness fissuring at the trochlear groove.    ASSESSMENT/PLAN:   Left knee pain  OA left knee  - Reviewed MRI findings with patient. Pt education about arthritis and swelling.   - Avoid squatting, kneeling  - PT referral to [facility]   - Pt will f/u prn.   "

## 2019-02-21 ENCOUNTER — TELEPHONE (OUTPATIENT)
Dept: ORTHOPEDICS | Facility: CLINIC | Age: 74
End: 2019-02-21

## 2019-02-21 NOTE — TELEPHONE ENCOUNTER
----- Message from Nena Tejada PA-C sent at 2/21/2019  9:43 AM CST -----  Contact: [facility]  Please call them and tell them that PT can accept orders from PAs. I have done this with [facility] for lots of patients.     ----- Message -----  From: Juan Alberto Otto MA  Sent: 2/21/2019   8:39 AM  To: Nena Tejaad PA-C        ----- Message -----  From: eRbeka Rudolph  Sent: 2/21/2019   8:33 AM  To: Mallory Barrett Staff    Requesting a call with the providing doctor. Says they can't accept PA's signatures.  [phone]

## 2019-02-21 NOTE — TELEPHONE ENCOUNTER
[facility]. Suki also got on phone with Jennifer and explained this also. Suki gave NPI# for EL but this was already on PT order.

## 2019-02-22 ENCOUNTER — PATIENT OUTREACH (OUTPATIENT)
Dept: OTHER | Facility: OTHER | Age: 74
End: 2019-02-22

## 2019-02-22 NOTE — PROGRESS NOTES
"Last 5 Patient Entered Readings                                      Current 30 Day Average: 137/75     Recent Readings 2/22/2019 2/22/2019 2/21/2019 2/21/2019 2/21/2019    SBP (mmHg) 116 139 127 134 122    DBP (mmHg) 75 74 68 70 72    Pulse 60 60 65 65 70          Digital Medicine: Health  Follow Up    Lifestyle Modifications:    1.Dietary Modifications (Sodium intake <2,000mg/day, food labels, dining out): Patient state she is using less zatarans.  States she is avoiding fried foods and salt.  Patient inquired about rice.  Encouraged patient to eat more brown rice and whole grain options.    2.Physical Activity: States she went to PT yesterday for an assessment.  States she walked 30min the day before and both of her knees were hurting.  Encouraged patient to start walking for 15-20min then increase time to prevent injuries.    3.Medication Therapy: Patient has been compliant with the medication regimen.    4.Patient has the following medication side effects/concerns: States she could not describe how she was feeling yesterday but felt "tireder."  Encouraged patient to contact care team if she feels hypotensive symptoms.  (Frequency/Alleviating factors/Precipitating factors, etc.)     Follow up with Mrs. Mirna BROWN Errol completed. No further questions or concerns. Will continue to follow up to achieve health goals.    "

## 2019-03-06 ENCOUNTER — PATIENT MESSAGE (OUTPATIENT)
Dept: ADMINISTRATIVE | Facility: OTHER | Age: 74
End: 2019-03-06

## 2019-03-19 ENCOUNTER — NURSE TRIAGE (OUTPATIENT)
Dept: ADMINISTRATIVE | Facility: CLINIC | Age: 74
End: 2019-03-19

## 2019-03-20 NOTE — TELEPHONE ENCOUNTER
Spoke with pt, she's no longer having pain in her thigh. Pt would like to be seen by dr Tyler, appt made for tomorrow.

## 2019-03-20 NOTE — TELEPHONE ENCOUNTER
Hi,   I can add her on for tomorrow at 1120am, please offer an appt with me or urgent care with someone else for today.  Thank you, Ramsey Tyler

## 2019-03-20 NOTE — TELEPHONE ENCOUNTER
Patient called to report the following:     -spasm in left upper thigh for the past hour  -tightening up and aching  -can walk, not excruciating  -moving around helps the pain   -both thighs had the pain earlier   -denies chest pain, difficulty breathing, fever,   -advised on home care, f/u with provider within 4 hours and when to call back    Reason for Disposition   [1] Thigh or calf pain AND [2] only 1 side AND [3] present > 1 hour    Protocols used: LEG PAIN-A-AH

## 2019-03-21 ENCOUNTER — LAB VISIT (OUTPATIENT)
Dept: LAB | Facility: HOSPITAL | Age: 74
End: 2019-03-21
Attending: INTERNAL MEDICINE
Payer: MEDICARE

## 2019-03-21 ENCOUNTER — OFFICE VISIT (OUTPATIENT)
Dept: INTERNAL MEDICINE | Facility: CLINIC | Age: 74
End: 2019-03-21
Payer: MEDICARE

## 2019-03-21 VITALS
BODY MASS INDEX: 25.47 KG/M2 | WEIGHT: 158.5 LBS | DIASTOLIC BLOOD PRESSURE: 62 MMHG | OXYGEN SATURATION: 84 % | SYSTOLIC BLOOD PRESSURE: 122 MMHG | HEIGHT: 66 IN | HEART RATE: 54 BPM

## 2019-03-21 DIAGNOSIS — M79.10 MYALGIA: Primary | ICD-10-CM

## 2019-03-21 DIAGNOSIS — M79.10 MYALGIA: ICD-10-CM

## 2019-03-21 DIAGNOSIS — M65.30 TRIGGER FINGER OF RIGHT HAND, UNSPECIFIED FINGER: ICD-10-CM

## 2019-03-21 LAB
ANION GAP SERPL CALC-SCNC: 11 MMOL/L
BUN SERPL-MCNC: 13 MG/DL
CALCIUM SERPL-MCNC: 10.4 MG/DL
CHLORIDE SERPL-SCNC: 97 MMOL/L
CK SERPL-CCNC: 202 U/L
CO2 SERPL-SCNC: 29 MMOL/L
CREAT SERPL-MCNC: 0.8 MG/DL
EST. GFR  (AFRICAN AMERICAN): >60 ML/MIN/1.73 M^2
EST. GFR  (NON AFRICAN AMERICAN): >60 ML/MIN/1.73 M^2
GLUCOSE SERPL-MCNC: 107 MG/DL
POTASSIUM SERPL-SCNC: 3.4 MMOL/L
SODIUM SERPL-SCNC: 137 MMOL/L

## 2019-03-21 PROCEDURE — 99999 PR PBB SHADOW E&M-EST. PATIENT-LVL IV: ICD-10-PCS | Mod: PBBFAC,,, | Performed by: INTERNAL MEDICINE

## 2019-03-21 PROCEDURE — 80048 BASIC METABOLIC PNL TOTAL CA: CPT

## 2019-03-21 PROCEDURE — 99214 OFFICE O/P EST MOD 30 MIN: CPT | Mod: S$PBB,,, | Performed by: INTERNAL MEDICINE

## 2019-03-21 PROCEDURE — 99214 PR OFFICE/OUTPT VISIT, EST, LEVL IV, 30-39 MIN: ICD-10-PCS | Mod: S$PBB,,, | Performed by: INTERNAL MEDICINE

## 2019-03-21 PROCEDURE — 82550 ASSAY OF CK (CPK): CPT

## 2019-03-21 PROCEDURE — 99999 PR PBB SHADOW E&M-EST. PATIENT-LVL IV: CPT | Mod: PBBFAC,,, | Performed by: INTERNAL MEDICINE

## 2019-03-21 PROCEDURE — 36415 COLL VENOUS BLD VENIPUNCTURE: CPT

## 2019-03-21 PROCEDURE — 99214 OFFICE O/P EST MOD 30 MIN: CPT | Mod: PBBFAC | Performed by: INTERNAL MEDICINE

## 2019-03-21 RX ORDER — HYDROCODONE BITARTRATE AND ACETAMINOPHEN 7.5; 325 MG/1; MG/1
1 TABLET ORAL EVERY 6 HOURS PRN
Refills: 0 | COMMUNITY
Start: 2019-01-16 | End: 2019-07-05

## 2019-03-21 NOTE — PROGRESS NOTES
Subjective:       Patient ID: Mirna Norton is a 74 y.o. female.    Chief Complaint: Leg Pain (thigh pain in both legs and lower right back pain which started 2 days ago )    developed acute thigh pain 2 days ago, muscle constricting on both sides. Subsided with hot tab/warm bath.  Had not eaten that day and not much water as well.    Also low back pains for 1 month. Not radicular, no inj. May have had back probs prior as well since she has used a back brace intermittently.          Review of Systems   Constitutional: Negative for activity change, fatigue and fever.   HENT: Negative for congestion.    Respiratory: Negative for choking and shortness of breath.    Cardiovascular: Negative for chest pain, palpitations and leg swelling.   Gastrointestinal: Negative for abdominal distention and abdominal pain.   Genitourinary: Negative for decreased urine volume, dysuria, flank pain and hematuria.   Musculoskeletal: Positive for back pain and myalgias. Negative for gait problem, joint swelling, neck pain and neck stiffness.   Skin: Negative for rash.       Objective:      Physical Exam   Constitutional: She appears well-developed and well-nourished. No distress.   Cardiovascular: Normal rate, regular rhythm and normal heart sounds.   Pulmonary/Chest: Effort normal and breath sounds normal.   Abdominal: Soft. Bowel sounds are normal. She exhibits no distension. There is no tenderness. There is no guarding.   No CVAT     Musculoskeletal: She exhibits no edema.   No midline spine tenderness to deep palpation    nl lower extrem strength/sense/DTRs    R hand no obvious tendon nodule or triggering at time of exam   Neurological: She displays normal reflexes. No sensory deficit. She exhibits normal muscle tone.   Skin: No rash noted. She is not diaphoretic.   Psychiatric: She has a normal mood and affect. Her behavior is normal.       Assessment:       1. Myalgia    2. Trigger finger of right hand, unspecified finger         Plan:       Mirna was seen today for leg pain.    Diagnoses and all orders for this visit:    Myalgia  -     Basic metabolic panel; Future  -     CK; Future  Could be statin or diuretic related and also dehydration.  Discussed more hydration and potassium in diet.  Call if symptoms recur in which case d/c statin    Back pains, not sure cause. No red flafs exam or hx.  Pt given handouts with HEP.  Explained that if not better in 1-2 mos, pt should rtc/call PCP          Trigger finger of right hand, unspecified finger  -     Ambulatory referral to Hand Surgery        Health Maintenance       Date Due Completion Date    TETANUS VACCINE 01/12/1964 ---    Pneumococcal Vaccine (65+ Low/Medium Risk) (2 of 2 - PCV13) 08/12/2014 8/12/2013    Aspirin/Antiplatelet Therapy 03/21/2020 3/21/2019    Mammogram 10/11/2020 10/11/2018    DEXA SCAN 12/01/2022 12/1/2017    Override on 8/2/2008: Done    Lipid Panel 10/11/2023 10/11/2018    Colonoscopy 11/11/2023 11/11/2013    Override on 3/6/2006: Done          Return to clinic previously agreed (at last visit) next due visit      Future Appointments   Date Time Provider Department Center   4/4/2019 10:45 AM Belkis Johns MD [facility]

## 2019-03-21 NOTE — PATIENT INSTRUCTIONS
Back Exercises: Leg Reach        Do this exercise on your hands and knees. Keep your knees under your hips and your hands under your shoulders. Keep your spine in a neutral position (not arched or sagging). Be sure to maintain your necks natural curve.  · Extend one leg straight back. Dont arch your back or let your head or body sag.  · Hold for 5 seconds. Return to starting position.  · Repeat 5 times.  · Switch legs.   © 2907-5421 [employer]. [address] [place] [address]. All rights reserved. This information is not intended as a substitute for professional medical care. Always follow your healthcare professional's instructions.        Back Exercises: Lower Back Stretch                        To start, sit in a chair with your feet flat on the floor. Shift your weight slightly forward to avoid rounding your back. Relax, and keep your ears, shoulders, and hips aligned.  · Sit with your feet well apart.  · Bend forward and touch the floor with the backs of your hands. Relax and let your body drop.  · Hold for 20 seconds. Return to starting position.  · Repeat 2 times.   © 3818-1495 [employer]. [address] [place] [address]. All rights reserved. This information is not intended as a substitute for professional medical care. Always follow your healthcare professional's instructions.        Leg and Knee Exercises: Leg Raise    This exercise is designed to stretch and strengthen your knee. Before beginning, read through all the instructions. While exercising, breathe normally and use smooth movements. If you feel any pain, stop the exercise. If pain persists, call your health care provider.  CAUTION  · Dont arch your back.  · Dont hunch your shoulders.   · Sit on the floor with your _________ leg straight, the other bent.  · Tighten the thigh muscles on the top of your straight leg. You should feel the muscles contract. Raise that leg 6-8 inches. Then lower it  slowly and steadily to the floor. Relax.  · Repeat ______ times.  Do ______ sets a day.  © 0791-6284 [employer]. [address] [place] [address], [place] [address]. All rights reserved. This information is not intended as a substitute for professional medical care. Always follow your healthcare professional's instructions.

## 2019-03-22 ENCOUNTER — TELEPHONE (OUTPATIENT)
Dept: INTERNAL MEDICINE | Facility: CLINIC | Age: 74
End: 2019-03-22

## 2019-03-22 DIAGNOSIS — I10 ESSENTIAL HYPERTENSION: Primary | ICD-10-CM

## 2019-03-22 NOTE — TELEPHONE ENCOUNTER
Hi, please call her -- the blood work shows that her potassium is a bit low. This may have caused the legs to cramp.  I recommend either she start a daily potassium pill or she try to increase the potassium in her diet --  By increasing daily intake of potassium in food, for example with bananas, raisins, apricots, bran flakes, and tomatoes.    Let me know her preference.    Her muscle enzyme test level was close to normal.    Let me know if patient has any more questions.  Thank you, Ramsey Tyler

## 2019-03-25 RX ORDER — POTASSIUM CHLORIDE 750 MG/1
10 TABLET, EXTENDED RELEASE ORAL DAILY
Qty: 90 TABLET | Refills: 11 | Status: SHIPPED | OUTPATIENT
Start: 2019-03-25 | End: 2019-05-07

## 2019-03-25 NOTE — TELEPHONE ENCOUNTER
Hi,  Orders Placed This Encounter    potassium chloride (KLOR-CON) 10 MEQ TbSR     Sent in,.  Thank you, Ramsey Tyler

## 2019-03-25 NOTE — TELEPHONE ENCOUNTER
Spoke with pt, notified. Pt verbalized understanding. Pt would like to start taking Potassium pills.

## 2019-03-26 ENCOUNTER — PATIENT MESSAGE (OUTPATIENT)
Dept: ADMINISTRATIVE | Facility: OTHER | Age: 74
End: 2019-03-26

## 2019-03-26 ENCOUNTER — PATIENT MESSAGE (OUTPATIENT)
Dept: INTERNAL MEDICINE | Facility: CLINIC | Age: 74
End: 2019-03-26

## 2019-03-29 ENCOUNTER — PATIENT MESSAGE (OUTPATIENT)
Dept: INTERNAL MEDICINE | Facility: CLINIC | Age: 74
End: 2019-03-29

## 2019-03-29 DIAGNOSIS — I10 ESSENTIAL HYPERTENSION: Primary | ICD-10-CM

## 2019-03-29 NOTE — TELEPHONE ENCOUNTER
Hi, please contact the patient to assist in scheduling for   2 weeks from now --    Orders Placed This Encounter    Basic metabolic panel       Thank you, Ramsey Tyler

## 2019-04-01 ENCOUNTER — PATIENT OUTREACH (OUTPATIENT)
Dept: OTHER | Facility: OTHER | Age: 74
End: 2019-04-01

## 2019-04-01 DIAGNOSIS — M79.641 RIGHT HAND PAIN: Primary | ICD-10-CM

## 2019-04-01 NOTE — PROGRESS NOTES
Last 5 Patient Entered Readings                                      Current 30 Day Average: 134/76     Recent Readings 3/29/2019 3/24/2019 3/24/2019 3/21/2019 3/15/2019    SBP (mmHg) 128 149 149 124 134    DBP (mmHg) 68 82 75 70 74    Pulse 65 63 64 62 65          Digital Medicine: Health  Follow Up    Lifestyle Modifications:    1.Dietary Modifications (Sodium intake <2,000mg/day, food labels, dining out): Patient states she always eats less salt.    2.Physical Activity: Patient reports she is still going to PT for her knee, so she has not been able to do much walking.  Encouraged patient to continue with PT exercises.    3.Medication Therapy: Patient has been compliant with the medication regimen.    4.Patient has the following medication side effects/concerns: none  (Frequency/Alleviating factors/Precipitating factors, etc.)     Follow up with Munir Mirna BROWN Errol completed. No further questions or concerns. Will continue to follow up to achieve health goals.

## 2019-04-01 NOTE — PROGRESS NOTES
Last 5 Patient Entered Readings                                      Current 30 Day Average: 134/76     Recent Readings 3/29/2019 3/24/2019 3/24/2019 3/21/2019 3/15/2019    SBP (mmHg) 128 149 149 124 134    DBP (mmHg) 68 82 75 70 74    Pulse 65 63 64 62 65        4/1: LVM.  Will call in 2 weeks.

## 2019-04-02 ENCOUNTER — TELEPHONE (OUTPATIENT)
Dept: ORTHOPEDICS | Facility: CLINIC | Age: 74
End: 2019-04-02

## 2019-04-03 NOTE — TELEPHONE ENCOUNTER
Mirna Norton reminded of appointment on 4/4/19 with Dr. ROVERTO Johns w/time and location. Notified of need for xray before OV w/date, time, and location of appts.

## 2019-04-04 ENCOUNTER — HOSPITAL ENCOUNTER (OUTPATIENT)
Dept: RADIOLOGY | Facility: OTHER | Age: 74
Discharge: HOME | End: 2019-04-04
Attending: ORTHOPAEDIC SURGERY
Payer: MEDICARE

## 2019-04-04 ENCOUNTER — OFFICE VISIT (OUTPATIENT)
Dept: ORTHOPEDICS | Facility: CLINIC | Age: 74
End: 2019-04-04
Payer: MEDICARE

## 2019-04-04 VITALS
HEIGHT: 66 IN | HEART RATE: 56 BPM | BODY MASS INDEX: 25.47 KG/M2 | DIASTOLIC BLOOD PRESSURE: 83 MMHG | SYSTOLIC BLOOD PRESSURE: 156 MMHG | WEIGHT: 158.5 LBS

## 2019-04-04 DIAGNOSIS — R25.2 CRAMPING OF HANDS: Primary | ICD-10-CM

## 2019-04-04 DIAGNOSIS — M79.641 RIGHT HAND PAIN: ICD-10-CM

## 2019-04-04 PROCEDURE — 99999 PR PBB SHADOW E&M-EST. PATIENT-LVL IV: ICD-10-PCS | Mod: PBBFAC,,, | Performed by: ORTHOPAEDIC SURGERY

## 2019-04-04 PROCEDURE — 73130 X-RAY EXAM OF HAND: CPT | Mod: 26,RT,, | Performed by: RADIOLOGY

## 2019-04-04 PROCEDURE — 99999 PR PBB SHADOW E&M-EST. PATIENT-LVL IV: CPT | Mod: PBBFAC,,, | Performed by: ORTHOPAEDIC SURGERY

## 2019-04-04 PROCEDURE — 99204 OFFICE O/P NEW MOD 45 MIN: CPT | Mod: S$PBB,,, | Performed by: ORTHOPAEDIC SURGERY

## 2019-04-04 PROCEDURE — 73130 XR HAND COMPLETE 3 VIEW RIGHT: ICD-10-PCS | Mod: 26,RT,, | Performed by: RADIOLOGY

## 2019-04-04 PROCEDURE — 99204 PR OFFICE/OUTPT VISIT, NEW, LEVL IV, 45-59 MIN: ICD-10-PCS | Mod: S$PBB,,, | Performed by: ORTHOPAEDIC SURGERY

## 2019-04-04 PROCEDURE — 73130 X-RAY EXAM OF HAND: CPT | Mod: TC,FY,RT

## 2019-04-04 PROCEDURE — 99214 OFFICE O/P EST MOD 30 MIN: CPT | Mod: PBBFAC,25 | Performed by: ORTHOPAEDIC SURGERY

## 2019-04-04 NOTE — LETTER
April 5, 2019      Ramsey Tyler MD  [address]  [facility] [address]           [facility] [address] [facility] [address]  [facility], [address]  [facility] [address]  Phone: [phone]          Patient: Mirna Norton   MR Number: [id number]   YOB: 1945   Date of Visit: 4/4/2019       Dear Dr. Ramsey Tyler:    Thank you for referring Mirna Norton to me for evaluation. Attached you will find relevant portions of my assessment and plan of care.    If you have questions, please do not hesitate to call me. I look forward to following Mirna Norton along with you.    Sincerely,    Belkis Johns MD    Enclosure  CC:  No Recipients    If you would like to receive this communication electronically, please contact [email or web address] or [phone] to request more information on [employer] access.    For providers and/or their staff who would like to refer a patient to [facility], please contact us through our one-stop-shop provider referral line, [facility], at [phone].    If you feel you have received this communication in error or would no longer like to receive these types of communications, please e-mail [email or web address]

## 2019-04-04 NOTE — PROGRESS NOTES
"Pt developing right hand cramping/spasm for 3-4 years. Has gotten worse. Pt has had gout in wrist. Pt states wehen she is putting rollers in hair it can spasm or "lock up". She states it is the whole hand and very painful- not just one finger  "

## 2019-04-05 NOTE — PROGRESS NOTES
CHIEF COMPLAINT:  Trigger finger, right hand.    HISTORY OF PRESENT ILLNESS:  Ms. Norton is a 74-year-old female.  She is   right-handed.  She states she gets clawing and cramping in her entire right   hand.  It is like it is going into a spasm.  She states when she is using it, it   goes into a spasm.  She does not have any nighttime problems.  She states this   is mostly when she is putting rollers in her hair or she is driving, when she is   clutching the steering wheel.  She said it is very painful when it happens.    She has to kind of press her fingers on the table or other hard surface to get   it worked out.  No numbness or tingling.  Again, this is more of a spasm type   situation.  It is not necessarily locking and popping, similar to a trigger   finger, states it is not one finger, it is the whole hand just cause that.  She   states this also happened with her thigh muscle and it was so painful, she   called [facility] on-call because she was having spasms in her thigh muscles.  On   questioning about her diet and new medications, she states she is on new   hypertensive medication, but she has had this clawing sensation for three to   four years, where she sporadically had this muscle spasm and again it is not a   locking or popping and she states it is just a spasm on this and again, it has   been going on for 3-4 years.  She is taking potassium supplement because her   potassium was not adequate.    PAST MEDICAL HISTORY, SOCIAL HISTORY, SURGICAL HISTORY AND REVIEW OF SYSTEMS:    Per electronic medical record.    PHYSICAL EXAMINATION:  VITAL SIGNS:  Please see computer entry.  GENERAL:  Alert and oriented x3, well developed, well nourished, no apparent   distress, friendly individual, well groomed, appears stated age.  Gait is normal   and nonantalgic.  EXTREMITIES:  On examination of bilateral upper extremity, median, radial,   ulnar, anterior interosseous, posterior interosseous, motor and sensation to    light touch is intact.  Brisk capillary refill.  I assessed all the A1 pulleys   of her right hand.  There is no Notta's nodule.  No tenderness to palpation   anywhere in her palm.  We cannot make any of the fingers trigger or lock or pop.    PLAN:  For this patient, I do think she is just getting what I considered   charley horses of the upper extremity.  We discussed treatment options.  At this   time, we will do therapy.  She will continue her potassium supplements.  We   will do an EMG/nerve conduction study for further evaluation just to confirm   that it is not nerve compression that is causing this.      LES/HN  dd: 04/04/2019 15:02:32 (CDT)  td: 04/04/2019 23:00:12 (CDT)  Doc ID   #[id number]  Job ID #[id number]    CC:

## 2019-04-14 ENCOUNTER — PATIENT MESSAGE (OUTPATIENT)
Dept: INTERNAL MEDICINE | Facility: CLINIC | Age: 74
End: 2019-04-14

## 2019-04-14 DIAGNOSIS — I10 ESSENTIAL HYPERTENSION: Primary | ICD-10-CM

## 2019-04-16 ENCOUNTER — PATIENT OUTREACH (OUTPATIENT)
Dept: OTHER | Facility: OTHER | Age: 74
End: 2019-04-16

## 2019-04-16 LAB
BUN SERPL-MCNC: 14 MG/DL (ref 7–25)
BUN/CREAT SERPL: ABNORMAL (CALC) (ref 6–22)
CALCIUM SERPL-MCNC: 9.5 MG/DL (ref 8.6–10.4)
CHLORIDE SERPL-SCNC: 101 MMOL/L (ref 98–110)
CO2 SERPL-SCNC: 32 MMOL/L (ref 20–32)
CREAT SERPL-MCNC: 0.71 MG/DL (ref 0.6–0.93)
GFRSERPLBLD MDRD-ARVRAT: 84 ML/MIN/1.73M2
GLUCOSE SERPL-MCNC: 106 MG/DL (ref 65–99)
POTASSIUM SERPL-SCNC: 3.9 MMOL/L (ref 3.5–5.3)
SODIUM SERPL-SCNC: 143 MMOL/L (ref 135–146)

## 2019-04-16 NOTE — PROGRESS NOTES
"Last 5 Patient Entered Readings                                      Current 30 Day Average: 141/77     Recent Readings 4/24/2019 4/18/2019 4/18/2019 4/12/2019 4/8/2019    SBP (mmHg) 154 147 155 127 129    DBP (mmHg) 77 77 83 75 79    Pulse 60 66 62 62 66        Per 30 day average, 141/77 mmHg, patient's BP is not at goal.     Ms. Chen BP has been higher recently. She informed me that she checked her BP on another monitor and it was 130 so she then decided "it's a good time to check for [facility] and it was higher." Asked that she only uses the HDMP monitor to ensure we are getting all BP readings. Explained that if her BP is elevated at times, we need to be able to address it. She verbalized understanding. She will start sending in readings only using the HDMP monitor.     Will continue to monitor regularly. Will follow up in 2-3 weeks, sooner if BP begins to trend upward or downward.    Asked patient to call or message with questions or concerns.     Current HTN regimen:  Hypertension Medications             amLODIPine (NORVASC) 2.5 MG tablet Take 1 tablet (2.5 mg total) by mouth once daily.    hydroCHLOROthiazide (HYDRODIURIL) 25 MG tablet Take 1 tablet (25 mg total) by mouth once daily.    losartan (COZAAR) 100 MG tablet Take 1 tablet (100 mg total) by mouth once daily.    metoprolol succinate (TOPROL-XL) 50 MG 24 hr tablet TAKE 1 TABLET BY MOUTH EVERY DAY                            "

## 2019-04-22 ENCOUNTER — PATIENT MESSAGE (OUTPATIENT)
Dept: INTERNAL MEDICINE | Facility: CLINIC | Age: 74
End: 2019-04-22

## 2019-04-22 ENCOUNTER — TELEPHONE (OUTPATIENT)
Dept: ORTHOPEDICS | Facility: CLINIC | Age: 74
End: 2019-04-22

## 2019-04-22 ENCOUNTER — PATIENT MESSAGE (OUTPATIENT)
Dept: ORTHOPEDICS | Facility: CLINIC | Age: 74
End: 2019-04-22

## 2019-04-22 NOTE — TELEPHONE ENCOUNTER
Spoke with pt advised her that EL will be back in office on tomorrow . I also spoke with Yvonne from [facility] advised her the same thing. We will get back to patient and Insurance about this request on tomorrow.

## 2019-04-22 NOTE — TELEPHONE ENCOUNTER
----- Message from Margaret Tinoco sent at 4/22/2019  2:04 PM CDT -----  Contact: Yvonne from [facility]  Calling for a diagnosis. Also faxed paperwork over.     Contact Yvonne @ [phone]

## 2019-04-22 NOTE — TELEPHONE ENCOUNTER
Hi, please call her pharmacy and ask about what her insurance will cover for the potassium that she needs -- she is on 10 meq daily.  Thank you, Ramsey Tyler

## 2019-04-23 ENCOUNTER — PATIENT MESSAGE (OUTPATIENT)
Dept: INTERNAL MEDICINE | Facility: CLINIC | Age: 74
End: 2019-04-23

## 2019-04-24 DIAGNOSIS — M17.12 PRIMARY OSTEOARTHRITIS OF LEFT KNEE: Primary | ICD-10-CM

## 2019-04-24 DIAGNOSIS — M25.562 ACUTE PAIN OF LEFT KNEE: ICD-10-CM

## 2019-04-30 ENCOUNTER — PATIENT OUTREACH (OUTPATIENT)
Dept: OTHER | Facility: OTHER | Age: 74
End: 2019-04-30

## 2019-04-30 NOTE — PROGRESS NOTES
Last 5 Patient Entered Readings                                      Current 30 Day Average: 143/78     Recent Readings 4/24/2019 4/18/2019 4/18/2019 4/12/2019 4/8/2019    SBP (mmHg) 154 147 155 127 129    DBP (mmHg) 77 77 83 75 79    Pulse 60 66 62 62 66        4/30: LVM.  Will call in 1 week.

## 2019-05-06 ENCOUNTER — PATIENT MESSAGE (OUTPATIENT)
Dept: INTERNAL MEDICINE | Facility: CLINIC | Age: 74
End: 2019-05-06

## 2019-05-07 ENCOUNTER — TELEPHONE (OUTPATIENT)
Dept: INTERNAL MEDICINE | Facility: CLINIC | Age: 74
End: 2019-05-07

## 2019-05-07 DIAGNOSIS — I10 ESSENTIAL HYPERTENSION: ICD-10-CM

## 2019-05-07 RX ORDER — HYDROCHLOROTHIAZIDE 25 MG/1
TABLET ORAL
Qty: 90 TABLET | Refills: 3 | Status: SHIPPED | OUTPATIENT
Start: 2019-05-07 | End: 2020-04-27

## 2019-05-07 RX ORDER — POTASSIUM CHLORIDE 750 MG/1
10 CAPSULE, EXTENDED RELEASE ORAL DAILY
Qty: 90 CAPSULE | Refills: 11 | Status: SHIPPED | OUTPATIENT
Start: 2019-05-07 | End: 2020-05-17 | Stop reason: SDUPTHER

## 2019-05-07 RX ORDER — LOSARTAN POTASSIUM 100 MG/1
TABLET ORAL
Qty: 90 TABLET | Refills: 3 | Status: SHIPPED | OUTPATIENT
Start: 2019-05-07 | End: 2019-05-30 | Stop reason: ALTCHOICE

## 2019-05-07 NOTE — TELEPHONE ENCOUNTER
Hi, please call the patient and let lucero know that her insurance prefers that she take a potassium capsule instead of tablet. I have sent in the capsule to [facility].    It should work just as well.  Let me know if patient has any questions.  Thank you, Ramsey Tyler

## 2019-05-07 NOTE — TELEPHONE ENCOUNTER
----- Message from Zuleyka Anglin sent at 5/7/2019  2:40 PM CDT -----  Contact: [facility] Eli Lopez [phone]  Need additional information for appeal filed by patient.    Re: potassium chloride (KLOR-CON) 10 MEQ TbSR    Has the patient tried to formulate alternative  Potassium chloride ER or capsules/tablets. If so would they have adverse effects or be less effective.    Please call and advise  Thank you

## 2019-05-07 NOTE — PROGRESS NOTES
Last 5 Patient Entered Readings                                      Current 30 Day Average: 140/77     Recent Readings 5/3/2019 5/3/2019 5/3/2019 5/1/2019 4/24/2019    SBP (mmHg) 135 141 140 147 154    DBP (mmHg) 72 71 74 81 77    Pulse 60 62 66 76 60          5/7: LVM.  Pipe.  Will call in 1 week.

## 2019-05-12 DIAGNOSIS — I10 ESSENTIAL HYPERTENSION: ICD-10-CM

## 2019-05-14 RX ORDER — AMLODIPINE BESYLATE 2.5 MG/1
TABLET ORAL
Qty: 90 TABLET | Refills: 3 | Status: SHIPPED | OUTPATIENT
Start: 2019-05-14 | End: 2020-04-29

## 2019-05-14 NOTE — PROGRESS NOTES
Last 5 Patient Entered Readings                                      Current 30 Day Average: 141/75     Recent Readings 5/10/2019 5/10/2019 5/3/2019 5/3/2019 5/3/2019    SBP (mmHg) 122 141 135 141 140    DBP (mmHg) 67 76 72 71 74    Pulse 64 66 60 62 66          5/14: Left message with .  Will call back in 2 weeks.

## 2019-05-23 ENCOUNTER — PATIENT OUTREACH (OUTPATIENT)
Dept: OTHER | Facility: OTHER | Age: 74
End: 2019-05-23

## 2019-05-23 DIAGNOSIS — I10 ESSENTIAL HYPERTENSION: Primary | ICD-10-CM

## 2019-05-23 NOTE — PROGRESS NOTES
Last 5 Patient Entered Readings                                      Current 30 Day Average: 135/74     Recent Readings 5/25/2019 5/25/2019 5/23/2019 5/23/2019 5/17/2019    SBP (mmHg) 139 132 120 146 147    DBP (mmHg) 73 76 71 71 77    Pulse 64 65 57 66 67        Per 30 day average, 135/74 mmHg, patient's BP is not at goal.     Ms. Chen BP average is close to goal. Will change losartan to valsartan. If BP remains above goal on valsartan 160 mg QD, will increase to 320 mg QD.     Will continue to monitor regularly. Will follow up in 2-3 weeks, sooner if BP begins to trend upward or downward.    Asked patient to call or message with questions or concerns.     Current HTN regimen:  Hypertension Medications             amLODIPine (NORVASC) 2.5 MG tablet TAKE 1 TABLET BY MOUTH EVERY DAY    hydroCHLOROthiazide (HYDRODIURIL) 25 MG tablet TAKE 1 TABLET BY MOUTH EVERY DAY    metoprolol succinate (TOPROL-XL) 50 MG 24 hr tablet TAKE 1 TABLET BY MOUTH EVERY DAY    valsartan (DIOVAN) 160 MG tablet Take 1 tablet (160 mg total) by mouth once daily.

## 2019-05-28 DIAGNOSIS — Z86.010 ENCOUNTER FOR COLONOSCOPY DUE TO HISTORY OF COLONIC POLYP: Primary | ICD-10-CM

## 2019-05-28 DIAGNOSIS — Z12.11 ENCOUNTER FOR COLONOSCOPY DUE TO HISTORY OF COLONIC POLYP: Primary | ICD-10-CM

## 2019-05-28 RX ORDER — SODIUM, POTASSIUM,MAG SULFATES 17.5-3.13G
1 SOLUTION, RECONSTITUTED, ORAL ORAL DAILY
Qty: 1 KIT | Refills: 0 | Status: SHIPPED | OUTPATIENT
Start: 2019-05-28 | End: 2019-05-30

## 2019-05-28 NOTE — PROGRESS NOTES
Last 5 Patient Entered Readings                                      Current 30 Day Average: 135/74     Recent Readings 5/25/2019 5/25/2019 5/23/2019 5/23/2019 5/17/2019    SBP (mmHg) 139 132 120 146 147    DBP (mmHg) 73 76 71 71 77    Pulse 64 65 57 66 67          5/28: LVM.  Deferred Non compliance due to protocol.  Will call in 2 weeks.

## 2019-05-30 RX ORDER — VALSARTAN 160 MG/1
160 TABLET ORAL DAILY
Qty: 90 TABLET | Refills: 1 | Status: SHIPPED | OUTPATIENT
Start: 2019-05-30 | End: 2019-11-27 | Stop reason: SDUPTHER

## 2019-06-13 ENCOUNTER — PATIENT OUTREACH (OUTPATIENT)
Dept: OTHER | Facility: OTHER | Age: 74
End: 2019-06-13

## 2019-06-13 NOTE — PROGRESS NOTES
Last 5 Patient Entered Readings                                      Current 30 Day Average: 134/76     Recent Readings 6/10/2019 6/10/2019 6/3/2019 6/3/2019 5/25/2019    SBP (mmHg) 136 148 128 145 139    DBP (mmHg) 77 85 69 81 73    Pulse 66 65 59 61 64        Unable to LVM to follow up on recent medication change.

## 2019-06-18 NOTE — PROGRESS NOTES
Last 5 Patient Entered Readings                                      Current 30 Day Average: 130/75     Recent Readings 6/14/2019 6/10/2019 6/10/2019 6/3/2019 6/3/2019    SBP (mmHg) 128 136 148 128 145    DBP (mmHg) 69 77 85 69 81    Pulse 75 66 65 59 61          6/18: Unable to LVM.  Trying cell phone. Deferred NC protocol until further notice.

## 2019-06-19 NOTE — PROGRESS NOTES
Last 5 Patient Entered Readings                                      Current 30 Day Average: 130/75     Recent Readings 6/14/2019 6/10/2019 6/10/2019 6/3/2019 6/3/2019    SBP (mmHg) 128 136 148 128 145    DBP (mmHg) 69 77 85 69 81    Pulse 75 66 65 59 61        Asked patient if cell phone is best way to contact her.  Patient states she is switching carriers for home phone.    Digital Medicine: Health  Follow Up    Lifestyle Modifications:    1.Dietary Modifications (Sodium intake <2,000mg/day, food labels, dining out): Reports she does not use salt.  Reports she has been working on portion control by using smaller plates and bowls.    2.Physical Activity: Reports she continues to go to PT and works in her garden.  Encouraged patient to continue with PT and staying hydrated.     3.Medication Therapy: Patient has been compliant with the medication regimen.    4.Patient has the following medication side effects/concerns: States she would like to get off some of her medications.  Will defer to pharmacist.  (Frequency/Alleviating factors/Precipitating factors, etc.)     Follow up with Munir Mirna BROWN Errol completed. No further questions or concerns. Will continue to follow up to achieve health goals.

## 2019-06-20 DIAGNOSIS — M10.9 GOUT, UNSPECIFIED CAUSE, UNSPECIFIED CHRONICITY, UNSPECIFIED SITE: ICD-10-CM

## 2019-06-20 RX ORDER — ALLOPURINOL 100 MG/1
TABLET ORAL
Qty: 30 TABLET | Refills: 11 | Status: SHIPPED | OUTPATIENT
Start: 2019-06-20 | End: 2020-06-26

## 2019-06-20 NOTE — PROGRESS NOTES
Last 5 Patient Entered Readings                                      Current 30 Day Average: 130/75     Recent Readings 6/14/2019 6/10/2019 6/10/2019 6/3/2019 6/3/2019    SBP (mmHg) 128 136 148 128 145    DBP (mmHg) 69 77 85 69 81    Pulse 75 66 65 59 61        Unable to LVM to follow up on recent medication change.

## 2019-06-27 NOTE — PROGRESS NOTES
Last 5 Patient Entered Readings                                      Current 30 Day Average: 130/77     Recent Readings 6/22/2019 6/14/2019 6/10/2019 6/10/2019 6/3/2019    SBP (mmHg) 126 128 136 148 128    DBP (mmHg) 78 69 77 85 69    Pulse 66 75 66 65 59        Ms. Chen BP is trending down since starting valsartan in place of losartan. She is feeling more tired on valsartan. Instructed her to take half her dose in the morning and the other half in the evening to see if this helps with her fatigue. She states that it is affecting how much she feels like doing day to day.     Per 30 day average, 130/77 mmHg, patient's BP is approaching goal.    Will continue to monitor regularly. Will follow up in 2-3 weeks, sooner if BP begins to trend upward or downward.    Asked patient to call or message with questions or concerns.     Current HTN regimen:  Hypertension Medications             amLODIPine (NORVASC) 2.5 MG tablet TAKE 1 TABLET BY MOUTH EVERY DAY    hydroCHLOROthiazide (HYDRODIURIL) 25 MG tablet TAKE 1 TABLET BY MOUTH EVERY DAY    metoprolol succinate (TOPROL-XL) 50 MG 24 hr tablet TAKE 1 TABLET BY MOUTH EVERY DAY    valsartan (DIOVAN) 160 MG tablet Take 1 tablet (160 mg total) by mouth once daily.

## 2019-07-05 ENCOUNTER — OFFICE VISIT (OUTPATIENT)
Dept: INTERNAL MEDICINE | Facility: CLINIC | Age: 74
End: 2019-07-05
Payer: MEDICARE

## 2019-07-05 VITALS
HEIGHT: 66 IN | OXYGEN SATURATION: 98 % | DIASTOLIC BLOOD PRESSURE: 70 MMHG | SYSTOLIC BLOOD PRESSURE: 120 MMHG | WEIGHT: 158.06 LBS | BODY MASS INDEX: 25.4 KG/M2 | HEART RATE: 55 BPM

## 2019-07-05 DIAGNOSIS — I65.23 BILATERAL CAROTID ARTERY STENOSIS: ICD-10-CM

## 2019-07-05 DIAGNOSIS — K86.2 PANCREATIC CYST: ICD-10-CM

## 2019-07-05 DIAGNOSIS — R09.89 BILATERAL CAROTID BRUITS: ICD-10-CM

## 2019-07-05 DIAGNOSIS — I87.2 VENOUS INSUFFICIENCY OF RIGHT LOWER EXTREMITY: ICD-10-CM

## 2019-07-05 DIAGNOSIS — E78.2 MIXED HYPERLIPIDEMIA: ICD-10-CM

## 2019-07-05 DIAGNOSIS — Z00.00 ENCOUNTER FOR PREVENTIVE HEALTH EXAMINATION: Primary | ICD-10-CM

## 2019-07-05 DIAGNOSIS — K58.9 IRRITABLE BOWEL SYNDROME WITHOUT DIARRHEA: ICD-10-CM

## 2019-07-05 DIAGNOSIS — M10.9 GOUT, ARTHRITIS: ICD-10-CM

## 2019-07-05 DIAGNOSIS — I10 ESSENTIAL HYPERTENSION: ICD-10-CM

## 2019-07-05 DIAGNOSIS — K21.9 GASTROESOPHAGEAL REFLUX DISEASE WITHOUT ESOPHAGITIS: ICD-10-CM

## 2019-07-05 PROCEDURE — G0439 PR MEDICARE ANNUAL WELLNESS SUBSEQUENT VISIT: ICD-10-PCS | Mod: S$GLB,,, | Performed by: NURSE PRACTITIONER

## 2019-07-05 PROCEDURE — 99999 PR PBB SHADOW E&M-EST. PATIENT-LVL IV: CPT | Mod: PBBFAC,,, | Performed by: NURSE PRACTITIONER

## 2019-07-05 PROCEDURE — 99214 OFFICE O/P EST MOD 30 MIN: CPT | Mod: PBBFAC | Performed by: NURSE PRACTITIONER

## 2019-07-05 PROCEDURE — 99999 PR PBB SHADOW E&M-EST. PATIENT-LVL IV: ICD-10-PCS | Mod: PBBFAC,,, | Performed by: NURSE PRACTITIONER

## 2019-07-05 PROCEDURE — G0439 PPPS, SUBSEQ VISIT: HCPCS | Mod: S$GLB,,, | Performed by: NURSE PRACTITIONER

## 2019-07-05 NOTE — PROGRESS NOTES
"Mirna Norton presented for a  Medicare AWV and comprehensive Health Risk Assessment today. The following components were reviewed and updated:    · Medical history  · Family History  · Social history  · Allergies and Current Medications  · Health Risk Assessment  · Health Maintenance  · Care Team     ** See Completed Assessments for Annual Wellness Visit within the encounter summary.**       The following assessments were completed:  · Living Situation  · CAGE  · Depression Screening  · Timed Get Up and Go  · Whisper Test  · Cognitive Function Screening  ·   ·   · Nutrition Screening  · ADL Screening  · PAQ Screening    Vitals:    07/05/19 0838   BP: 120/70   Pulse: (!) 55   SpO2: 98%   Weight: 71.7 kg (158 lb 1.1 oz)   Height: 5' 6" (1.676 m)     Body mass index is 25.51 kg/m².  Physical Exam   Constitutional: She is oriented to person, place, and time. She appears well-developed and well-nourished. No distress.   HENT:   Head: Normocephalic and atraumatic.   Eyes: Pupils are equal, round, and reactive to light. Conjunctivae and EOM are normal. No scleral icterus.   Cardiovascular: Normal rate, regular rhythm, normal heart sounds and intact distal pulses.   Pulmonary/Chest: Effort normal and breath sounds normal.   Musculoskeletal: Normal range of motion.   Neurological: She is alert and oriented to person, place, and time.   Skin: Skin is warm and dry. She is not diaphoretic.   Psychiatric: She has a normal mood and affect. Her behavior is normal. Judgment and thought content normal.   Nursing note and vitals reviewed.        Diagnoses and health risks identified today and associated recommendations/orders:    1. Encounter for preventive health examination  Assessment performed. Health maintenance updated. Chart review completed.  Discussed vaccines due. Patient will price these at pharmacy.    2. Bilateral carotid bruits  Chronic. Continue regimen as instructed. Followed by Cardiology.    3. Bilateral carotid " artery stenosis  Chronic. Continue regimen as instructed. Followed by Cardiology.    4. Gastroesophageal reflux disease without esophagitis  Chronic. Stable. Followed by Gastroenterology.    5. Irritable bowel syndrome without diarrhea  Chronic. Stable. Followed by PCP.    6. Mixed hyperlipidemia  Chronic. Stable. Followed by PCP.    7. Essential hypertension  Chronic. Stable. Followed by PCP.    8. Venous insufficiency of right lower extremity  Chronic. Continue regimen as instructed. Followed by Cardiology.    9. Gout, arthritis  Chronic. Stable. Followed by PCP.    10. Pancreatic cyst  Noted.   Followed by PCP.      Provided Mirna with a 5-10 year written screening schedule and personal prevention plan. Recommendations were developed using the USPSTF age appropriate recommendations. Education, counseling, and referrals were provided as needed. After Visit Summary printed and given to patient which includes a list of additional screenings\tests needed.    Follow up for Annual Wellness Visit in 1 year, follow up with PCP as instructed, ;sooner if problems.    JEFFREY Morales       I offered to discuss end of life issues, including information on how to make advance directives that the patient could use to name someone who would make medical decisions on their behalf if they became too ill to make themselves.    ___Patient declined  _X_Patient is interested, I provided paper work and offered to discuss.

## 2019-07-08 ENCOUNTER — PROCEDURE VISIT (OUTPATIENT)
Dept: NEUROLOGY | Facility: CLINIC | Age: 74
End: 2019-07-08
Payer: MEDICARE

## 2019-07-08 DIAGNOSIS — R25.2 CRAMPING OF HANDS: ICD-10-CM

## 2019-07-08 PROCEDURE — 95886 MUSC TEST DONE W/N TEST COMP: CPT | Mod: PBBFAC | Performed by: PSYCHIATRY & NEUROLOGY

## 2019-07-08 PROCEDURE — 95886 MUSC TEST DONE W/N TEST COMP: CPT | Mod: 26,S$PBB,, | Performed by: PSYCHIATRY & NEUROLOGY

## 2019-07-08 PROCEDURE — 95913 NRV CNDJ TEST 13/> STUDIES: CPT | Mod: PBBFAC | Performed by: PSYCHIATRY & NEUROLOGY

## 2019-07-08 PROCEDURE — 95912 NRV CNDJ TEST 11-12 STUDIES: CPT | Mod: 26,S$PBB,, | Performed by: PSYCHIATRY & NEUROLOGY

## 2019-07-08 PROCEDURE — 95886 PR EMG COMPLETE, W/ NERVE CONDUCTION STUDIES, 5+ MUSCLES: ICD-10-PCS | Mod: 26,S$PBB,, | Performed by: PSYCHIATRY & NEUROLOGY

## 2019-07-08 PROCEDURE — 95912 NRV CNDJ TEST 11-12 STUDIES: CPT | Mod: PBBFAC | Performed by: PSYCHIATRY & NEUROLOGY

## 2019-07-08 PROCEDURE — 95912 PR NERVE CONDUCTION STUDY; 11 -12 STUDIES: ICD-10-PCS | Mod: 26,S$PBB,, | Performed by: PSYCHIATRY & NEUROLOGY

## 2019-07-10 ENCOUNTER — PATIENT OUTREACH (OUTPATIENT)
Dept: ADMINISTRATIVE | Facility: OTHER | Age: 74
End: 2019-07-10

## 2019-07-11 ENCOUNTER — OFFICE VISIT (OUTPATIENT)
Dept: ORTHOPEDICS | Facility: CLINIC | Age: 74
End: 2019-07-11
Payer: MEDICARE

## 2019-07-11 ENCOUNTER — PATIENT OUTREACH (OUTPATIENT)
Dept: OTHER | Facility: OTHER | Age: 74
End: 2019-07-11

## 2019-07-11 VITALS
HEART RATE: 62 BPM | HEIGHT: 66 IN | SYSTOLIC BLOOD PRESSURE: 146 MMHG | WEIGHT: 158 LBS | BODY MASS INDEX: 25.39 KG/M2 | DIASTOLIC BLOOD PRESSURE: 74 MMHG

## 2019-07-11 DIAGNOSIS — M79.641 PAIN OF RIGHT HAND: Primary | ICD-10-CM

## 2019-07-11 PROCEDURE — 99214 PR OFFICE/OUTPT VISIT, EST, LEVL IV, 30-39 MIN: ICD-10-PCS | Mod: S$PBB,,, | Performed by: ORTHOPAEDIC SURGERY

## 2019-07-11 PROCEDURE — 99214 OFFICE O/P EST MOD 30 MIN: CPT | Mod: S$PBB,,, | Performed by: ORTHOPAEDIC SURGERY

## 2019-07-11 PROCEDURE — 99999 PR PBB SHADOW E&M-EST. PATIENT-LVL IV: ICD-10-PCS | Mod: PBBFAC,,, | Performed by: ORTHOPAEDIC SURGERY

## 2019-07-11 PROCEDURE — 99999 PR PBB SHADOW E&M-EST. PATIENT-LVL IV: CPT | Mod: PBBFAC,,, | Performed by: ORTHOPAEDIC SURGERY

## 2019-07-11 PROCEDURE — 99214 OFFICE O/P EST MOD 30 MIN: CPT | Mod: PBBFAC | Performed by: ORTHOPAEDIC SURGERY

## 2019-07-11 NOTE — PROGRESS NOTES
Last 5 Patient Entered Readings                                      Current 30 Day Average: 140/76     Recent Readings 7/10/2019 7/3/2019 7/3/2019 7/1/2019 6/29/2019    SBP (mmHg) 141 143 138 154 147    DBP (mmHg) 76 73 72 83 83    Pulse 69 63 69 61 58        Ms. Chen BP has been higher recently. She denies any changes to her medications. She continues to take valsartan 160 mg QD and has not tried 80mg BID. She denies taking any OTC medications. She has never charged her BP monitor, so she will charge it fully prior to checking BP again.     Per 30 day average, 140/76 mmHg, patient's BP is not at goal.     Will continue to monitor regularly. Will follow up in 2-3 weeks, sooner if BP begins to trend upward or downward.    Asked patient to call or message with questions or concerns.     Current HTN regimen:  Hypertension Medications             amLODIPine (NORVASC) 2.5 MG tablet TAKE 1 TABLET BY MOUTH EVERY DAY    hydroCHLOROthiazide (HYDRODIURIL) 25 MG tablet TAKE 1 TABLET BY MOUTH EVERY DAY    metoprolol succinate (TOPROL-XL) 50 MG 24 hr tablet TAKE 1 TABLET BY MOUTH EVERY DAY    valsartan (DIOVAN) 160 MG tablet Take 1 tablet (160 mg total) by mouth once daily.

## 2019-07-11 NOTE — PROGRESS NOTES
CHIEF COMPLAINT:  Trigger finger, right hand.    HISTORY OF PRESENT ILLNESS:  Ms. Norton is a 74-year-old female.  She is   right-handed.  She states she gets clawing and cramping in her entire right   hand.  It is like it is going into a spasm.  She states when she is using it, it   goes into a spasm.  She does not have any nighttime problems.  She states this   is mostly when she is putting rollers in her hair or she is driving, when she is   clutching the steering wheel.  She said it is very painful when it happens.    She has to kind of press her fingers on the table or other hard surface to get   it worked out.  No numbness or tingling.  Again, this is more of a spasm type   situation.  It is not necessarily locking and popping, similar to a trigger   finger, states it is not one finger, it is the whole hand just cause that.  She   states this also happened with her thigh muscle and it was so painful, she   called [facility] on-call because she was having spasms in her thigh muscles.  On   questioning about her diet and new medications, she states she is on new   hypertensive medication, but she has had this clawing sensation for three to   four years, where she sporadically had this muscle spasm and again it is not a   locking or popping and she states it is just a spasm on this and again, it has   been going on for 3-4 years.  She is taking potassium supplement because her   potassium was not adequate.      Interval update July 11, 2019:  Patient returns today for follow-up of EMG results.  Patient still states that she gets some spasms in her hand occasionally mainly when she is doing her hair.  Her EMG was normal.  No new symptoms and she does state that her symptoms have lessened since she has began her potassium supplementation.      PAST MEDICAL HISTORY, SOCIAL HISTORY, SURGICAL HISTORY AND REVIEW OF SYSTEMS:    Per electronic medical record.    PHYSICAL EXAMINATION:  VITAL SIGNS:  Please see computer  entry.  GENERAL:  Alert and oriented x3, well developed, well nourished, no apparent   distress, friendly individual, well groomed, appears stated age.  Gait is normal   and nonantalgic.  EXTREMITIES:  On examination of bilateral upper extremity, median, radial,   ulnar, anterior interosseous, posterior interosseous, motor and sensation to   light touch is intact.  Brisk capillary refill.  I assessed all the A1 pulleys   of her right hand.  There is no Notta's nodule.  No tenderness to palpation   anywhere in her palm.  We cannot make any of the fingers trigger or lock or pop.    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  We will have her begin occupational therapy and we did provide her with a wrist brace today.  Will see her back in 6 weeks.

## 2019-07-11 NOTE — PROGRESS NOTES
I have personally taken the history and examined this patient. I agree with the resident's note as stated above. Pt's nerve testing was normal. PLan for OT for her hand cramping and fatigue. Pt also given brace for nighttime symptoms of probable early CTS

## 2019-07-29 ENCOUNTER — ANESTHESIA EVENT (OUTPATIENT)
Dept: ENDOSCOPY | Facility: HOSPITAL | Age: 74
End: 2019-07-29
Payer: MEDICARE

## 2019-07-29 ENCOUNTER — ANESTHESIA (OUTPATIENT)
Dept: ENDOSCOPY | Facility: HOSPITAL | Age: 74
End: 2019-07-29
Payer: MEDICARE

## 2019-07-29 ENCOUNTER — HOSPITAL ENCOUNTER (OUTPATIENT)
Facility: HOSPITAL | Age: 74
Discharge: HOME | End: 2019-07-29
Attending: INTERNAL MEDICINE | Admitting: INTERNAL MEDICINE
Payer: MEDICARE

## 2019-07-29 VITALS
HEIGHT: 66 IN | SYSTOLIC BLOOD PRESSURE: 134 MMHG | BODY MASS INDEX: 25.39 KG/M2 | TEMPERATURE: 98 F | DIASTOLIC BLOOD PRESSURE: 67 MMHG | OXYGEN SATURATION: 100 % | RESPIRATION RATE: 17 BRPM | WEIGHT: 158 LBS | HEART RATE: 65 BPM

## 2019-07-29 DIAGNOSIS — Z86.010 HISTORY OF COLON POLYPS: Primary | ICD-10-CM

## 2019-07-29 PROBLEM — Z86.0100 HISTORY OF COLON POLYPS: Status: ACTIVE | Noted: 2019-07-29

## 2019-07-29 PROCEDURE — 45385 PR COLONOSCOPY,REMV LESN,SNARE: ICD-10-PCS | Mod: PT,,, | Performed by: INTERNAL MEDICINE

## 2019-07-29 PROCEDURE — 88305 TISSUE EXAM BY PATHOLOGIST: CPT | Mod: 26,,, | Performed by: PATHOLOGY

## 2019-07-29 PROCEDURE — 27201089 HC SNARE, DISP (ANY): Performed by: INTERNAL MEDICINE

## 2019-07-29 PROCEDURE — 45385 COLONOSCOPY W/LESION REMOVAL: CPT | Performed by: INTERNAL MEDICINE

## 2019-07-29 PROCEDURE — 45385 COLONOSCOPY W/LESION REMOVAL: CPT | Mod: PT,,, | Performed by: INTERNAL MEDICINE

## 2019-07-29 PROCEDURE — 63600175 PHARM REV CODE 636 W HCPCS: Performed by: INTERNAL MEDICINE

## 2019-07-29 PROCEDURE — E9220 PRA ENDO ANESTHESIA: HCPCS | Mod: ,,, | Performed by: NURSE ANESTHETIST, CERTIFIED REGISTERED

## 2019-07-29 PROCEDURE — E9220 PRA ENDO ANESTHESIA: ICD-10-PCS | Mod: ,,, | Performed by: NURSE ANESTHETIST, CERTIFIED REGISTERED

## 2019-07-29 PROCEDURE — 37000009 HC ANESTHESIA EA ADD 15 MINS: Performed by: INTERNAL MEDICINE

## 2019-07-29 PROCEDURE — 88305 TISSUE EXAM BY PATHOLOGIST: CPT | Performed by: PATHOLOGY

## 2019-07-29 PROCEDURE — 37000008 HC ANESTHESIA 1ST 15 MINUTES: Performed by: INTERNAL MEDICINE

## 2019-07-29 PROCEDURE — 88305 TISSUE SPECIMEN TO PATHOLOGY - SURGERY: ICD-10-PCS | Mod: 26,,, | Performed by: PATHOLOGY

## 2019-07-29 PROCEDURE — 63600175 PHARM REV CODE 636 W HCPCS: Performed by: NURSE ANESTHETIST, CERTIFIED REGISTERED

## 2019-07-29 RX ORDER — PROPOFOL 10 MG/ML
VIAL (ML) INTRAVENOUS CONTINUOUS PRN
Status: DISCONTINUED | OUTPATIENT
Start: 2019-07-29 | End: 2019-07-29

## 2019-07-29 RX ORDER — SODIUM CHLORIDE 9 MG/ML
INJECTION, SOLUTION INTRAVENOUS CONTINUOUS
Status: DISCONTINUED | OUTPATIENT
Start: 2019-07-29 | End: 2019-07-29 | Stop reason: HOSPADM

## 2019-07-29 RX ORDER — LIDOCAINE HCL/PF 100 MG/5ML
SYRINGE (ML) INTRAVENOUS
Status: DISCONTINUED | OUTPATIENT
Start: 2019-07-29 | End: 2019-07-29

## 2019-07-29 RX ORDER — PROPOFOL 10 MG/ML
VIAL (ML) INTRAVENOUS
Status: DISCONTINUED | OUTPATIENT
Start: 2019-07-29 | End: 2019-07-29

## 2019-07-29 RX ADMIN — PROPOFOL 50 MCG/KG/MIN: 10 INJECTION, EMULSION INTRAVENOUS at 08:07

## 2019-07-29 RX ADMIN — SODIUM CHLORIDE: 0.9 INJECTION, SOLUTION INTRAVENOUS at 08:07

## 2019-07-29 RX ADMIN — PROPOFOL 50 MG: 10 INJECTION, EMULSION INTRAVENOUS at 08:07

## 2019-07-29 RX ADMIN — LIDOCAINE HYDROCHLORIDE 100 MG: 20 INJECTION, SOLUTION INTRAVENOUS at 08:07

## 2019-07-29 RX ADMIN — PROPOFOL 80 MG: 10 INJECTION, EMULSION INTRAVENOUS at 08:07

## 2019-07-29 RX ADMIN — SODIUM CHLORIDE: 0.9 INJECTION, SOLUTION INTRAVENOUS at 07:07

## 2019-07-29 NOTE — ANESTHESIA POSTPROCEDURE EVALUATION
Anesthesia Post Evaluation    Patient: Mirna Norton    Procedure(s) Performed: Procedure(s) (LRB):  COLONOSCOPY (N/A)    Final Anesthesia Type: general  Patient location during evaluation: GI PACU  Patient participation: Yes- Able to Participate  Level of consciousness: awake and alert  Post-procedure vital signs: reviewed and stable  Pain management: adequate  Airway patency: patent  PONV status at discharge: No PONV  Anesthetic complications: no      Cardiovascular status: blood pressure returned to baseline and hemodynamically stable  Respiratory status: spontaneous ventilation, unassisted and room air  Follow-up not needed.          Vitals Value Taken Time   /67 7/29/2019  9:25 AM   Temp 36.9 °C (98.4 °F) 7/29/2019  9:05 AM   Pulse 65 7/29/2019  9:25 AM   Resp 17 7/29/2019  9:25 AM   SpO2 100 % 7/29/2019  9:25 AM         Event Time     Out of Recovery 09:40:37          Pain/Fiona Score: Fiona Score: 10 (7/29/2019  9:25 AM)

## 2019-07-29 NOTE — ANESTHESIA PREPROCEDURE EVALUATION
2019  Mirna Norton is a 74 y.o., female.    Past Medical History:   Diagnosis Date    Abnormal cervical Papanicolaou smear     Cryo    Carotid artery stenosis     via kinked carotid artery - followed by Dr. Ye Anderson    Carotid stenosis 5/15/2014    GERD (gastroesophageal reflux disease)     Gout due to renal impairment     Gout, arthritis 2013    Gout, unspecified     Herniated lumbar intervertebral disc     Hypertension     Insomnia     Irritable bowel syndrome without diarrhea 2015    Liver lesion 3/13/2013    Mitral valve prolapse     MVP (mitral valve prolapse)     Pancreatic cyst 2013    PVD (peripheral vascular disease) 2013    PVD (peripheral vascular disease) 2013    Venous insufficiency      Past Surgical History:   Procedure Laterality Date    BARTHOLIN GLAND CYST EXCISION       SECTION      x3    COLONOSCOPY N/A 2013    Performed by Kyrie [facility] at [facility] (4TH FLR)    Diagnostic laproscopic       EGD (ESOPHAGOGASTRODUODENOSCOPY) N/A 2013    [facility] by [facility] at [facility] ([facility]    laser surgery on vein      ULTRASOUND, UPPER GI TRACT, ENDOSCOPIC N/A 2014    Performed by Troy Hoyos MD at [facility] ENDO (2ND FLR)       Anesthesia Evaluation    I have reviewed the Patient Summary Reports.     I have reviewed the Medications.     Review of Systems  Anesthesia Hx:  No problems with previous Anesthesia    Social:  Non-Smoker, No Alcohol Use    Hematology/Oncology:  Hematology Normal   Oncology Normal     EENT/Dental:EENT/Dental Normal   Cardiovascular:   Exercise tolerance: good Hypertension    Pulmonary:  Pulmonary Normal    Renal/:  Renal/ Normal     Hepatic/GI:   GERD, well controlled    Musculoskeletal:   Arthritis     Neurological:  Neurology Normal     Endocrine:  Endocrine Normal    Dermatological:  Skin Normal    Psych:  Psychiatric Normal           Physical Exam  General:  Well nourished    Airway/Jaw/Neck:  Airway Findings: Mouth Opening: Normal Tongue: Normal  General Airway Assessment: Adult  Mallampati: II  Improves to II with phonation.  TM Distance: Normal, at least 6 cm      Dental:  Dental Findings: In tact   Chest/Lungs:  Chest/Lungs Findings: Clear to auscultation, Normal Respiratory Rate     Heart/Vascular:  Heart Findings: Rate: Normal  Rhythm: Regular Rhythm        Mental Status:  Mental Status Findings:  Cooperative, Alert and Oriented         Anesthesia Plan  Type of Anesthesia, risks & benefits discussed:  Anesthesia Type:  general  Patient's Preference: general  Intra-op Monitoring Plan: standard ASA monitors  Intra-op Monitoring Plan Comments:   Post Op Pain Control Plan: IV/PO Opioids PRN  Post Op Pain Control Plan Comments:   Induction:   IV  Beta Blocker:  Patient is on a Beta-Blocker and has received one dose within the past 24 hours (No further documentation required).       Informed Consent: Patient understands risks and agrees with Anesthesia plan.  Questions answered. Anesthesia consent signed with patient.  ASA Score: 2     Day of Surgery Review of History & Physical:  There are no significant changes.  H&P update referred to the surgeon.         Ready For Surgery From Anesthesia Perspective.

## 2019-07-29 NOTE — PLAN OF CARE
POC reviewed with pt. Pt verbalized understanding of discharge instructions including diet, s/s to notify md,  and follow up. pt refused wheelchair and will ambulate with family

## 2019-07-29 NOTE — DISCHARGE INSTRUCTIONS
Colonoscopy     A camera attached to a flexible tube with a viewing lens is used to take video pictures.     Colonoscopy is a test to view the inside of your lower digestive tract (colon and rectum). Sometimes it can show the last part of the small intestine (ileum). During the test, small pieces of tissue may be removed for testing. This is called a biopsy. Small growths, such as polyps, may also be removed.   Why is colonoscopy done?  The test is done to help look for colon cancer. And it can help find the source of abdominal pain, bleeding, and changes in bowel habits. It may be needed once a year, depending on factors such as your:  · Age  · Health history  · Family health history  · Symptoms  · Results from any prior colonoscopy  Risks and possible complications  These include:  · Bleeding               · A puncture or tear in the colon   · Risks of anesthesia  · A cancer lesion not being seen  Getting ready   To prepare for the test:  · Talk with your healthcare provider about the risks of the test (see below). Also ask your healthcare provider about alternatives to the test.  · Tell your healthcare provider about any medicines you take. Also tell him or her about any health conditions you may have.  · Make sure your rectum and colon are empty for the test. Follow the diet and bowel prep instructions exactly. If you dont, the test may need to be rescheduled.  · Plan for a friend or family member to drive you home after the test.     Colonoscopy provides an inside view of the entire colon.     You may discuss the results with your doctor right away or at a future visit.  During the test   The test is usually done in the hospital on an outpatient basis. This means you go home the same day. The procedure takes about 30 minutes. During that time:  · You are given relaxing (sedating) medicine through an IV line. You may be drowsy, or fully asleep.  · The healthcare provider will first give you a physical exam to  check for anal and rectal problems.  · Then the anus is lubricated and the scope inserted.  · If you are awake, you may have a feeling similar to needing to have a bowel movement. You may also feel pressure as air is pumped into the colon. Its OK to pass gas during the procedure.  · Biopsy, polyp removal, or other treatments may be done during the test.  After the test   You may have gas right after the test. It can help to try to pass it to help prevent later bloating. Your healthcare provider may discuss the results with you right away. Or you may need to schedule a follow-up visit to talk about the results. After the test, you can go back to your normal eating and other activities. You may be tired from the sedation and need to rest for a few hours.  Date Last Reviewed: 11/1/2016 © 2000-2017 [employer]. [address] [place] [address], [place] [address]. All rights reserved. This information is not intended as a substitute for professional medical care. Always follow your healthcare professional's instructions.

## 2019-07-29 NOTE — TRANSFER OF CARE
"Anesthesia Transfer of Care Note    Patient: Mirna Norton    Procedure(s) Performed: Procedure(s) (LRB):  COLONOSCOPY (N/A)    Patient location: PACU    Anesthesia Type: general    Transport from OR: Transported from OR on room air with adequate spontaneous ventilation    Post pain: adequate analgesia    Post assessment: no apparent anesthetic complications    Post vital signs: stable    Level of consciousness: awake    Nausea/Vomiting: no nausea/vomiting    Complications: none    Transfer of care protocol was followed      Last vitals:   Visit Vitals  BP (!) 106/59 (BP Location: Left arm, Patient Position: Lying)   Pulse 64   Temp 36.9 °C (98.4 °F) (Temporal)   Resp 16   Ht 5' 6" (1.676 m)   Wt 71.7 kg (158 lb)   SpO2 99%   Breastfeeding? No   BMI 25.50 kg/m²     "

## 2019-07-29 NOTE — [employer] PATIENT INSTRUCTIONS
Discharge Summary/Instructions after an Endoscopic Procedure  Patient Name: Mirna Norton  Patient MRN: [id number]  Patient YOB: 1945 Monday, July 29, 2019  Haider Luu MD  RESTRICTIONS:  During your procedure today, you received medications for sedation.  These   medications may affect your judgment, balance and coordination.  Therefore,   for 24 hours, you have the following restrictions:   - DO NOT drive a car, operate machinery, make legal/financial decisions,   sign important papers or drink alcohol.    ACTIVITY:  Today: no heavy lifting, straining or running due to procedural   sedation/anesthesia.  The following day: return to full activity including work.  DIET:  Eat and drink normally unless instructed otherwise.     TREATMENT FOR COMMON SIDE EFFECTS:  - Mild abdominal pain, nausea, belching, bloating or excessive gas:  rest,   eat lightly and use a heating pad.  - Sore Throat: treat with throat lozenges and/or gargle with warm salt   water.  - Because air was used during the procedure, expelling large amounts of air   from your rectum or belching is normal.  - If a bowel prep was taken, you may not have a bowel movement for 1-3 days.    This is normal.  SYMPTOMS TO WATCH FOR AND REPORT TO YOUR PHYSICIAN:  1. Abdominal pain or bloating, other than gas cramps.  2. Chest pain.  3. Back pain.  4. Signs of infection such as: chills or fever occurring within 24 hours   after the procedure.  5. Rectal bleeding, which would show as bright red, maroon, or black stools.   (A tablespoon of blood from the rectum is not serious, especially if   hemorrhoids are present.)  6. Vomiting.  7. Weakness or dizziness.  GO DIRECTLY TO THE NEAREST EMERGENCY ROOM IF YOU HAVE ANY OF THE FOLLOWING:      Difficulty breathing              Chills and/or fever over 101 F   Persistent vomiting and/or vomiting blood   Severe abdominal pain   Severe chest pain   Black, tarry stools   Bleeding- more than one  tablespoon   Any other symptom or condition that you feel may need urgent attention  Your doctor recommends these additional instructions:  If any biopsies were taken, your doctors clinic will contact you in 1 to 2   weeks with any results.  - Discharge patient to home.   - Patient has a contact number available for emergencies.  The signs and   symptoms of potential delayed complications were discussed with the   patient.  Return to normal activities tomorrow.  Written discharge   instructions were provided to the patient.   - Resume previous diet.   - Continue present medications.   - Await pathology results.   - Telephone GI clinic for pathology results in 1 week.   - Repeat colonoscopy in 5 years for surveillance.  For questions, problems or results please call your physician - Haider Luu MD at Work:  [phone].  [facility] [place], EMERGENCY ROOM PHONE NUMBER: [phone]  IF A COMPLICATION OR EMERGENCY SITUATION ARISES AND YOU ARE UNABLE TO REACH   YOUR PHYSICIAN - GO DIRECTLY TO THE EMERGENCY ROOM.  Haider Luu MD  7/29/2019 9:01:54 AM  This report has been verified and signed electronically.  [employer]

## 2019-07-29 NOTE — H&P
Short Stay Endoscopy History and Physical      Procedure - Colonoscopy  ASA - per anesthesia  Mallampati - per anesthesia  History of Anesthesia problems - no  Family history Anesthesia problems - no   Plan of anesthesia - MAC    HPI:  This is a 74 y.o. female here for surveillance of colon polyps.      ROS:  Constitutional: No fevers, chills, No weight loss  CV: No chest pain  Pulm: No cough, No shortness of breath  GI: see HPI    Medical History:  has a past medical history of Abnormal cervical Papanicolaou smear (), Carotid artery stenosis, Carotid stenosis (5/15/2014), GERD (gastroesophageal reflux disease), Gout due to renal impairment, Gout, arthritis (2013), Gout, unspecified, Herniated lumbar intervertebral disc, Hypertension, Insomnia, Irritable bowel syndrome without diarrhea (2015), Liver lesion (3/13/2013), Mitral valve prolapse, MVP (mitral valve prolapse), Pancreatic cyst (2013), PVD (peripheral vascular disease) (2013), PVD (peripheral vascular disease) (2013), and Venous insufficiency.    Surgical History:  has a past surgical history that includes Bartholin gland cyst excision; Diagnostic laproscopic ;  section; and laser surgery on vein.    Family History: family history includes Breast cancer in her other; Diabetes in her brother, sister, sister, sister, and sister; Eclampsia in her sister, sister, and sister; Endometriosis in her daughter; Heart disease in her mother and sister; Hypertension in her brother, sister, sister, and sister; Miscarriages / Stillbirths in her sister; No Known Problems in her daughter and daughter.    Social History:  reports that she has never smoked. She has never used smokeless tobacco. She reports that she does not drink alcohol or use drugs.    Review of patient's allergies indicates:   Allergen Reactions    No known allergies        Medications:   Medications Prior to Admission   Medication Sig Dispense Refill Last Dose     allopurinol (ZYLOPRIM) 100 MG tablet TAKE 1 TABLET BY MOUTH EVERY DAY 30 tablet 11 7/29/2019 at Unknown time    amLODIPine (NORVASC) 2.5 MG tablet TAKE 1 TABLET BY MOUTH EVERY DAY 90 tablet 3 7/29/2019 at Unknown time    aspirin (ECOTRIN) 81 MG EC tablet Take 81 mg by mouth once daily.   Past Week at Unknown time    hydroCHLOROthiazide (HYDRODIURIL) 25 MG tablet TAKE 1 TABLET BY MOUTH EVERY DAY 90 tablet 3 Past Week at Unknown time    metoprolol succinate (TOPROL-XL) 50 MG 24 hr tablet TAKE 1 TABLET BY MOUTH EVERY DAY 90 tablet 11 7/29/2019 at Unknown time    potassium chloride (MICRO-K) 10 MEQ CpSR Take 1 capsule (10 mEq total) by mouth once daily. 90 capsule 11 Past Week at Unknown time    pravastatin (PRAVACHOL) 40 MG tablet TAKE 1 TABLET BY MOUTH IN THE EVENING 90 tablet 3 Past Week at Unknown time    valsartan (DIOVAN) 160 MG tablet Take 1 tablet (160 mg total) by mouth once daily. 90 tablet 1 7/29/2019 at Unknown time    BIFIDOBACTERIUM INFANTIS (ALIGN ORAL) Take by mouth once daily.   Taking    multivitamin-minerals-lutein (CENTRUM SILVER) Tab Take 1 tablet by mouth once daily.    Taking    omeprazole (PRILOSEC) 40 MG capsule Take 1 capsule (40 mg total) by mouth every morning. 1 Capsule, Delayed Release(E.C.) Oral Every day 90 capsule 3 More than a month at Unknown time    poly-ureaurethane 16 % NFSo Apply 1 application topically every evening. 15 mL 3 Taking         Physical Exam:    Vital Signs:   Vitals:    07/29/19 0725   BP: (!) 143/83   Pulse: 80   Resp: 16   Temp: 97.9 °F (36.6 °C)       General Appearance: Well appearing in no acute distress  Eyes:    No scleral icterus  ENT: Neck supple, Lips, mucosa, and tongue normal; teeth and gums normal  Lungs: CTA bilaterally  Heart:  S1, S2 normal, no murmurs heard  Abdomen: Soft, non tender, non distended with positive bowel sounds. No hepatosplenomegaly, ascites, or mass.      Labs:  Lab Results   Component Value Date    WBC 7.50 10/11/2018     HGB 12.6 10/11/2018    HCT 38.3 10/11/2018     10/11/2018    CHOL 136 10/11/2018    TRIG 102 10/11/2018    HDL 60 10/11/2018    ALT 13 10/11/2018    AST 18 10/11/2018     04/15/2019    K 3.9 04/15/2019     04/15/2019    CREATININE 0.71 04/15/2019    BUN 14 04/15/2019    CO2 32 04/15/2019    TSH 1.184 10/13/2016    INR 1.0 04/08/2011    GLUF 103 02/09/2007    HGBA1C 6.1 (H) 10/11/2018         Assessment:  74 y.o. female with history of colon polyps.    Plan:  Proceed with colonoscopy today.  I have explained the risks and benefits of endoscopy procedures to the patient including but not limited to bleeding, perforation, infection, and death.  All questions and answered.        Haider Luu MD

## 2019-08-01 ENCOUNTER — PATIENT OUTREACH (OUTPATIENT)
Dept: OTHER | Facility: OTHER | Age: 74
End: 2019-08-01

## 2019-08-01 NOTE — PROGRESS NOTES
"Last 5 Patient Entered Readings                                      Current 30 Day Average: 133/72     Recent Readings 7/31/2019 7/31/2019 7/22/2019 7/22/2019 7/22/2019    SBP (mmHg) 164 157 114 118 118    DBP (mmHg) 81 83 68 71 68    Pulse 57 61 67 67 67        Following up about higher readings.  States she did not feel bad, but she had a procedure on Monday and was waiting on test results.  Encouraged patient to take BP again later this week.    Digital Medicine: Health  Follow Up    Lifestyle Modifications:    1.Dietary Modifications (Sodium intake <2,000mg/day, food labels, dining out): Patient reports she is eating more fruits, like apples, oranges, and bananas.    2.Physical Activity: Reports she goes to PT "in the neighborhood" 2x/week.  States she would like to start walking again and work in her yard.  Encouraged patient to wake up early on days she does not have PT in the morning.    3.Medication Therapy: Patient has been compliant with the medication regimen.    4.Patient has the following medication side effects/concerns: none  (Frequency/Alleviating factors/Precipitating factors, etc.)     Follow up with Mrs. Mirna BROWN Errol completed. No further questions or concerns. Will continue to follow up to achieve health goals.    "

## 2019-08-02 ENCOUNTER — TELEPHONE (OUTPATIENT)
Dept: GASTROENTEROLOGY | Facility: CLINIC | Age: 74
End: 2019-08-02

## 2019-08-02 NOTE — TELEPHONE ENCOUNTER
----- Message from Haider Luu MD sent at 7/31/2019  3:05 PM CDT -----  The polyps removed from the colon were benign (no cancerous changes), but considered a risk factor for cancer.  Follow-up as previously recommended with colonoscopy in 5 years.    MA to call patient with results.

## 2019-08-02 NOTE — TELEPHONE ENCOUNTER
Ma spoke to pt informed pt of Dr. Luu message - The polyps removed from the colon were benign (no cancerous changes), but considered a risk factor for cancer.  Follow-up as previously recommended with colonoscopy in 5 years.     Pt verbalize understanding and thank Ma

## 2019-08-05 ENCOUNTER — TELEPHONE (OUTPATIENT)
Dept: ENDOSCOPY | Facility: HOSPITAL | Age: 74
End: 2019-08-05

## 2019-08-20 ENCOUNTER — PATIENT OUTREACH (OUTPATIENT)
Dept: OTHER | Facility: OTHER | Age: 74
End: 2019-08-20

## 2019-08-20 NOTE — PROGRESS NOTES
Last 5 Patient Entered Readings                                      Current 30 Day Average: 132/73     Recent Readings 8/20/2019 8/19/2019 8/9/2019 8/9/2019 8/9/2019    SBP (mmHg) 120 120 145 129 130    DBP (mmHg) 80 80 75 67 66    Pulse 80 80 56 59 62          Patient called and LVM stating her BP cuff is not working.  Returned call and tried to determine if bluetooth on phone is connected to BP cuff.  Patient unable to find it.  Transferred call to tech team.

## 2019-08-26 ENCOUNTER — PATIENT MESSAGE (OUTPATIENT)
Dept: INTERNAL MEDICINE | Facility: CLINIC | Age: 74
End: 2019-08-26

## 2019-08-26 DIAGNOSIS — M10.9 GOUT, ARTHRITIS: Primary | ICD-10-CM

## 2019-08-26 DIAGNOSIS — R73.09 ELEVATED HEMOGLOBIN A1C: ICD-10-CM

## 2019-08-26 DIAGNOSIS — I10 ESSENTIAL HYPERTENSION: ICD-10-CM

## 2019-08-26 DIAGNOSIS — E78.2 MIXED HYPERLIPIDEMIA: ICD-10-CM

## 2019-08-26 NOTE — TELEPHONE ENCOUNTER
Hi, please contact the patient to assist in scheduling    Orders Placed This Encounter    CBC auto differential    Comprehensive metabolic panel    Lipid panel    Hemoglobin A1c    Uric acid   for 1 week prior to her upcoming appt with me please.    Thank you, Ramsey Tyler

## 2019-08-26 NOTE — TELEPHONE ENCOUNTER
Patient would like to do labs that will check her liver and kidney functions.  She would like to do these soon.  Please advise.

## 2019-08-26 NOTE — TELEPHONE ENCOUNTER
Hi, please schedule her for the labs that I have ordered which include liver and kidney function tests. We can do them when she wants them instead of 3 months from now.  Let me know if patient has any questions.  Thank you, Ramsey Tyler

## 2019-08-29 ENCOUNTER — PATIENT OUTREACH (OUTPATIENT)
Dept: OTHER | Facility: OTHER | Age: 74
End: 2019-08-29

## 2019-08-29 NOTE — PROGRESS NOTES
Last 5 Patient Entered Readings                                      Current 30 Day Average: 139/76     Recent Readings 8/27/2019 8/21/2019 8/21/2019 8/21/2019 8/20/2019    SBP (mmHg) 120 128 118 134 154    DBP (mmHg) 80 72 67 76 86    Pulse 80 62 61 62 62        LVM to discuss BP readings and medications.

## 2019-08-30 ENCOUNTER — PATIENT MESSAGE (OUTPATIENT)
Dept: INTERNAL MEDICINE | Facility: CLINIC | Age: 74
End: 2019-08-30

## 2019-08-30 DIAGNOSIS — I10 ESSENTIAL HYPERTENSION: ICD-10-CM

## 2019-08-30 DIAGNOSIS — R73.09 ELEVATED HEMOGLOBIN A1C: ICD-10-CM

## 2019-08-30 DIAGNOSIS — M10.9 GOUT, ARTHRITIS: Primary | ICD-10-CM

## 2019-08-30 DIAGNOSIS — E78.2 MIXED HYPERLIPIDEMIA: ICD-10-CM

## 2019-09-03 NOTE — PROGRESS NOTES
Last 5 Patient Entered Readings                                      Current 30 Day Average: 136/75     Recent Readings 9/2/2019 9/1/2019 9/1/2019 8/29/2019 8/27/2019    SBP (mmHg) 139 137 135 143 120    DBP (mmHg) 77 74 76 72 80    Pulse 52 55 56 68 80        Ms. Norton' BP average is above goal. Her BP was lower and closer to goal in July, but has been higher over the past month. She cannot identify a reason for higher BP recently. Encouraged her to continue to check her BP 3-4 times/week so we can determine if her current medication regimen is effective. She verbalized understanding.     Per 30 day average, 136/75 mmHg, patient's BP is not at goal.     Will continue to monitor regularly. Will follow up in 4-6 weeks, sooner if BP begins to trend upward or downward.    Asked patient to call or message with questions or concerns.     Current HTN regimen:  Hypertension Medications             amLODIPine (NORVASC) 2.5 MG tablet TAKE 1 TABLET BY MOUTH EVERY DAY    hydroCHLOROthiazide (HYDRODIURIL) 25 MG tablet TAKE 1 TABLET BY MOUTH EVERY DAY    metoprolol succinate (TOPROL-XL) 50 MG 24 hr tablet TAKE 1 TABLET BY MOUTH EVERY DAY    valsartan (DIOVAN) 160 MG tablet Take 1 tablet (160 mg total) by mouth once daily.

## 2019-09-05 LAB
ALBUMIN SERPL-MCNC: 4.2 G/DL (ref 3.6–5.1)
ALBUMIN/GLOB SERPL: 1.3 (CALC) (ref 1–2.5)
ALP SERPL-CCNC: 69 U/L (ref 33–130)
ALT SERPL-CCNC: 11 U/L (ref 6–29)
AST SERPL-CCNC: 17 U/L (ref 10–35)
BASOPHILS # BLD AUTO: 37 CELLS/UL (ref 0–200)
BASOPHILS NFR BLD AUTO: 0.5 %
BILIRUB SERPL-MCNC: 0.4 MG/DL (ref 0.2–1.2)
BUN SERPL-MCNC: 16 MG/DL (ref 7–25)
BUN/CREAT SERPL: ABNORMAL (CALC) (ref 6–22)
CALCIUM SERPL-MCNC: 9.8 MG/DL (ref 8.6–10.4)
CHLORIDE SERPL-SCNC: 99 MMOL/L (ref 98–110)
CHOLEST SERPL-MCNC: 148 MG/DL
CHOLEST/HDLC SERPL: 2.3 (CALC)
CO2 SERPL-SCNC: 30 MMOL/L (ref 20–32)
CREAT SERPL-MCNC: 0.87 MG/DL (ref 0.6–0.93)
EOSINOPHIL # BLD AUTO: 237 CELLS/UL (ref 15–500)
EOSINOPHIL NFR BLD AUTO: 3.2 %
ERYTHROCYTE [DISTWIDTH] IN BLOOD BY AUTOMATED COUNT: 13.6 % (ref 11–15)
GFRSERPLBLD MDRD-ARVRAT: 66 ML/MIN/1.73M2
GLOBULIN SER CALC-MCNC: 3.2 G/DL (CALC) (ref 1.9–3.7)
GLUCOSE SERPL-MCNC: 101 MG/DL (ref 65–99)
HBA1C MFR BLD: 6.2 % OF TOTAL HGB
HCT VFR BLD AUTO: 40.1 % (ref 35–45)
HDLC SERPL-MCNC: 63 MG/DL
HGB BLD-MCNC: 13.2 G/DL (ref 11.7–15.5)
LDLC SERPL CALC-MCNC: 64 MG/DL (CALC)
LYMPHOCYTES # BLD AUTO: 2457 CELLS/UL (ref 850–3900)
LYMPHOCYTES NFR BLD AUTO: 33.2 %
MCH RBC QN AUTO: 29 PG (ref 27–33)
MCHC RBC AUTO-ENTMCNC: 32.9 G/DL (ref 32–36)
MCV RBC AUTO: 88.1 FL (ref 80–100)
MONOCYTES # BLD AUTO: 607 CELLS/UL (ref 200–950)
MONOCYTES NFR BLD AUTO: 8.2 %
NEUTROPHILS # BLD AUTO: 4063 CELLS/UL (ref 1500–7800)
NEUTROPHILS NFR BLD AUTO: 54.9 %
NONHDLC SERPL-MCNC: 85 MG/DL (CALC)
PLATELET # BLD AUTO: 324 THOUSAND/UL (ref 140–400)
PMV BLD REES-ECKER: 9.3 FL (ref 7.5–12.5)
POTASSIUM SERPL-SCNC: 4.2 MMOL/L (ref 3.5–5.3)
PROT SERPL-MCNC: 7.4 G/DL (ref 6.1–8.1)
RBC # BLD AUTO: 4.55 MILLION/UL (ref 3.8–5.1)
SODIUM SERPL-SCNC: 139 MMOL/L (ref 135–146)
TRIGL SERPL-MCNC: 128 MG/DL
URATE SERPL-MCNC: 6.2 MG/DL (ref 2.5–7)
WBC # BLD AUTO: 7.4 THOUSAND/UL (ref 3.8–10.8)

## 2019-10-01 ENCOUNTER — PATIENT OUTREACH (OUTPATIENT)
Dept: OTHER | Facility: OTHER | Age: 74
End: 2019-10-01

## 2019-10-01 NOTE — PROGRESS NOTES
Digital Medicine: Health  Follow-Up    Patient thought if she was moving around her BP would be lower.  Reviewed technique and resting 5 min before taking BP readings.    The history is provided by the patient.     Follow Up  Follow-up reason(s): routine education      Routine Education Topics: physical activity            Physical Activity:   When asked if exercising, patient responded: yes2 day(s) a week.      Patient participates in the following activities: group classes    States she started back with senior exercise classes last week.      SDOH    INTERVENTION(S)  recommend physical activity, reviewed monitoring technique and encouragement/support    PLAN  patient verbalizes understanding    States she will work on resting for 5min before taking BP reading.      There are no preventive care reminders to display for this patient.    Last 5 Patient Entered Readings                                      Current 30 Day Average: 131/73     Recent Readings 9/30/2019 9/30/2019 9/30/2019 9/30/2019 9/29/2019    SBP (mmHg) 127 113 117 143 123    DBP (mmHg) 72 67 71 76 71    Pulse 62 57 60 59 61

## 2019-10-10 ENCOUNTER — TELEPHONE (OUTPATIENT)
Dept: OBSTETRICS AND GYNECOLOGY | Facility: CLINIC | Age: 74
End: 2019-10-10

## 2019-10-10 DIAGNOSIS — Z12.31 VISIT FOR SCREENING MAMMOGRAM: Primary | ICD-10-CM

## 2019-10-10 NOTE — TELEPHONE ENCOUNTER
----- Message from Kathy Michael sent at 10/7/2019  4:46 PM CDT -----  Contact: MAYLIN VEE [[id number]]  Name of Who is Calling: MAYLIN VEE [[id number]]       What is the request in detail: Pt states she needs an order for a mammogram placed in the system. Please contact to further discuss and advise.        Can the clinic reply by [facility]: N       [facility]: [phone] or [phone]

## 2019-10-13 ENCOUNTER — PATIENT OUTREACH (OUTPATIENT)
Dept: ADMINISTRATIVE | Facility: OTHER | Age: 74
End: 2019-10-13

## 2019-10-15 ENCOUNTER — HOSPITAL ENCOUNTER (OUTPATIENT)
Dept: RADIOLOGY | Facility: OTHER | Age: 74
Discharge: HOME | End: 2019-10-15
Attending: NURSE PRACTITIONER
Payer: MEDICARE

## 2019-10-15 ENCOUNTER — OFFICE VISIT (OUTPATIENT)
Dept: OBSTETRICS AND GYNECOLOGY | Facility: CLINIC | Age: 74
End: 2019-10-15
Attending: OBSTETRICS & GYNECOLOGY
Payer: MEDICARE

## 2019-10-15 VITALS
SYSTOLIC BLOOD PRESSURE: 116 MMHG | HEIGHT: 66 IN | BODY MASS INDEX: 24.94 KG/M2 | DIASTOLIC BLOOD PRESSURE: 74 MMHG | WEIGHT: 155.19 LBS

## 2019-10-15 DIAGNOSIS — Z01.419 WELL WOMAN EXAM WITH ROUTINE GYNECOLOGICAL EXAM: Primary | ICD-10-CM

## 2019-10-15 DIAGNOSIS — Z12.31 VISIT FOR SCREENING MAMMOGRAM: ICD-10-CM

## 2019-10-15 DIAGNOSIS — N95.0 POSTMENOPAUSAL BLEEDING: ICD-10-CM

## 2019-10-15 PROCEDURE — 77063 MAMMO DIGITAL SCREENING BILAT WITH TOMOSYNTHESIS_CAD: ICD-10-PCS | Mod: 26,,, | Performed by: RADIOLOGY

## 2019-10-15 PROCEDURE — 99213 OFFICE O/P EST LOW 20 MIN: CPT | Mod: PBBFAC | Performed by: OBSTETRICS & GYNECOLOGY

## 2019-10-15 PROCEDURE — 77063 BREAST TOMOSYNTHESIS BI: CPT | Mod: 26,,, | Performed by: RADIOLOGY

## 2019-10-15 PROCEDURE — 77067 MAMMO DIGITAL SCREENING BILAT WITH TOMOSYNTHESIS_CAD: ICD-10-PCS | Mod: 26,,, | Performed by: RADIOLOGY

## 2019-10-15 PROCEDURE — 77067 SCR MAMMO BI INCL CAD: CPT | Mod: 26,,, | Performed by: RADIOLOGY

## 2019-10-15 PROCEDURE — 99999 PR PBB SHADOW E&M-EST. PATIENT-LVL III: CPT | Mod: PBBFAC,,, | Performed by: OBSTETRICS & GYNECOLOGY

## 2019-10-15 PROCEDURE — G0101 CA SCREEN;PELVIC/BREAST EXAM: HCPCS | Mod: PBBFAC | Performed by: OBSTETRICS & GYNECOLOGY

## 2019-10-15 PROCEDURE — G0101 PR CA SCREEN;PELVIC/BREAST EXAM: ICD-10-PCS | Mod: S$PBB,,, | Performed by: OBSTETRICS & GYNECOLOGY

## 2019-10-15 PROCEDURE — 77063 BREAST TOMOSYNTHESIS BI: CPT | Mod: TC

## 2019-10-15 PROCEDURE — 99999 PR PBB SHADOW E&M-EST. PATIENT-LVL III: ICD-10-PCS | Mod: PBBFAC,,, | Performed by: OBSTETRICS & GYNECOLOGY

## 2019-10-15 PROCEDURE — G0101 CA SCREEN;PELVIC/BREAST EXAM: HCPCS | Mod: S$PBB,,, | Performed by: OBSTETRICS & GYNECOLOGY

## 2019-10-15 RX ORDER — HYDROCODONE BITARTRATE AND ACETAMINOPHEN 7.5; 325 MG/1; MG/1
1 TABLET ORAL
Refills: 0 | COMMUNITY
Start: 2019-08-20 | End: 2019-11-21

## 2019-10-15 RX ORDER — AMOXICILLIN 500 MG/1
CAPSULE ORAL
Refills: 0 | COMMUNITY
Start: 2019-09-03 | End: 2020-01-27

## 2019-10-15 RX ORDER — CHLORHEXIDINE GLUCONATE ORAL RINSE 1.2 MG/ML
SOLUTION DENTAL
Refills: 0 | COMMUNITY
Start: 2019-08-20 | End: 2020-01-27

## 2019-10-15 RX ORDER — AMOXICILLIN AND CLAVULANATE POTASSIUM 875; 125 MG/1; MG/1
TABLET, FILM COATED ORAL
Refills: 0 | COMMUNITY
Start: 2019-08-20 | End: 2019-11-21

## 2019-10-15 NOTE — PROGRESS NOTES
Mirna Norton is a 74 y.o. female  who presents for annual exam.  She Is postmenopausal, not on HRT.  Last year, she saw Venus Gil for an episode of postmenopausal bleeding.  Evaluation included a pelvic ultrasound the showed a thickened endometrium.  Endometrial biopsy had benign findings including a polyp.  She has had no additional bleeding since then.  Denies recent changes in her medical or surgical history.  No GYN complaints.  She is scheduled for mammogram today.  No LMP recorded. Patient is postmenopausal.     BMD 17:  Normal (Spine T+2.6, Hip T+1.9, T+1.1)    EMBX 18:  FINAL PATHOLOGIC DIAGNOSIS  ENDOMETRIAL BIOPSY SHOWING A SOFT TISSUE FRAGMENT HAVING CHANGES CONSISTENT WITH A  BENIGN POLYP.    Pap 13: Negative      Past Medical History:   Diagnosis Date    Abnormal cervical Papanicolaou smear     Cryo    Carotid artery stenosis     via kinked carotid artery - followed by Dr. Ye Anderson    Carotid stenosis 5/15/2014    GERD (gastroesophageal reflux disease)     Gout due to renal impairment     Gout, arthritis 2013    Gout, unspecified     Herniated lumbar intervertebral disc     Hypertension     Insomnia     Irritable bowel syndrome without diarrhea 2015    Liver lesion 3/13/2013    Mitral valve prolapse     MVP (mitral valve prolapse)     Pancreatic cyst 2013    PVD (peripheral vascular disease) 2013    PVD (peripheral vascular disease) 2013    Venous insufficiency        Past Surgical History:   Procedure Laterality Date    BARTHOLIN GLAND CYST EXCISION       SECTION      x3    COLONOSCOPY N/A 2019    Procedure: COLONOSCOPY;  Surgeon: Haider Luu MD;  Location: [facility] ([address] [place] [address]);  Service: Endoscopy;  Laterality: N/A;    Diagnostic laproscopic       laser surgery on vein         OB History        3    Para   3    Term   3            AB        Living   3       SAB        TAB         Ectopic        Multiple        Live Births   3                 ROS:  GENERAL: Feeling well overall.   SKIN: Denies rash or lesions.   HEAD: Denies head injury or headache.   NODES: Denies enlarged lymph nodes.   CHEST: Denies chest pain or shortness of breath.   CARDIOVASCULAR: Denies palpitations or left sided chest pain.   ABDOMEN: No abdominal pain, nausea, vomiting or rectal bleeding.   URINARY: No dysuria or hematuria.  REPRODUCTIVE: See HPI.   BREASTS: Denies pain, lumps, or nipple discharge.   HEMATOLOGIC: No easy bruisability or excessive bleeding.   MUSCULOSKELETAL: Denies joint pain or swelling.   NEUROLOGIC: Denies syncope or weakness.   PSYCHIATRIC: Denies depression.    PE:   (chaperone present during entire exam)  APPEARANCE: Well nourished, well developed, in no acute distress.  BREASTS: Symmetrical, no skin changes or visible lesions. No palpable masses, nipple discharge or adenopathy bilaterally.  ABDOMEN: Soft. No tenderness or masses.  No CVA tenderness.  VULVA: Atrophic. No lesions. Normal female genitalia.  URETHRAL MEATUS: Normal size and location, no lesions, no prolapse.  URETHRA: No masses, tenderness, prolapse or scarring.  VAGINA: Atrophic, no abnormal discharge, no significant cystocele or rectocele.  CERVIX: No lesions and discharge.   UTERUS: Normal size, regular shape, mobile, non-tender, bladder base nontender.  ADNEXA: No masses, tenderness or CDS nodularity.  ANUS PERINEUM: Normal.      Diagnosis:  1. Well woman exam with routine gynecological exam    2. Postmenopausal bleeding    3. Visit for screening mammogram          PLAN:         Patient was counseled today on the need for gyn exams.  We reviewed her prior ultrasound and endometrial biopsy report.  She understands the need to contact us for any additional bleeding. If so, she may need evaluation with D&C, hysteroscopy.    Follow-up in 1 year.

## 2019-10-31 DIAGNOSIS — I73.9 PVD (PERIPHERAL VASCULAR DISEASE): ICD-10-CM

## 2019-10-31 DIAGNOSIS — I77.9 CAROTID ARTERY DISEASE, UNSPECIFIED LATERALITY: ICD-10-CM

## 2019-10-31 RX ORDER — PRAVASTATIN SODIUM 40 MG/1
TABLET ORAL
Qty: 90 TABLET | Refills: 3 | Status: SHIPPED | OUTPATIENT
Start: 2019-10-31 | End: 2020-12-17

## 2019-11-21 ENCOUNTER — OFFICE VISIT (OUTPATIENT)
Dept: INTERNAL MEDICINE | Facility: CLINIC | Age: 74
End: 2019-11-21
Payer: MEDICARE

## 2019-11-21 ENCOUNTER — PATIENT OUTREACH (OUTPATIENT)
Dept: OTHER | Facility: OTHER | Age: 74
End: 2019-11-21

## 2019-11-21 VITALS
OXYGEN SATURATION: 98 % | SYSTOLIC BLOOD PRESSURE: 122 MMHG | DIASTOLIC BLOOD PRESSURE: 60 MMHG | BODY MASS INDEX: 25.61 KG/M2 | HEART RATE: 60 BPM | HEIGHT: 66 IN | WEIGHT: 159.38 LBS

## 2019-11-21 DIAGNOSIS — I65.23 BILATERAL CAROTID ARTERY STENOSIS: Primary | ICD-10-CM

## 2019-11-21 DIAGNOSIS — I10 ESSENTIAL HYPERTENSION: ICD-10-CM

## 2019-11-21 PROCEDURE — 1126F PR PAIN SEVERITY QUANTIFIED, NO PAIN PRESENT: ICD-10-PCS | Mod: ,,, | Performed by: INTERNAL MEDICINE

## 2019-11-21 PROCEDURE — 99999 PR PBB SHADOW E&M-EST. PATIENT-LVL IV: CPT | Mod: PBBFAC,,, | Performed by: INTERNAL MEDICINE

## 2019-11-21 PROCEDURE — 1159F PR MEDICATION LIST DOCUMENTED IN MEDICAL RECORD: ICD-10-PCS | Mod: ,,, | Performed by: INTERNAL MEDICINE

## 2019-11-21 PROCEDURE — 1159F MED LIST DOCD IN RCRD: CPT | Mod: ,,, | Performed by: INTERNAL MEDICINE

## 2019-11-21 PROCEDURE — 99214 OFFICE O/P EST MOD 30 MIN: CPT | Mod: PBBFAC | Performed by: INTERNAL MEDICINE

## 2019-11-21 PROCEDURE — 99999 PR PBB SHADOW E&M-EST. PATIENT-LVL IV: ICD-10-PCS | Mod: PBBFAC,,, | Performed by: INTERNAL MEDICINE

## 2019-11-21 PROCEDURE — 1126F AMNT PAIN NOTED NONE PRSNT: CPT | Mod: ,,, | Performed by: INTERNAL MEDICINE

## 2019-11-21 PROCEDURE — 99214 OFFICE O/P EST MOD 30 MIN: CPT | Mod: S$PBB,,, | Performed by: INTERNAL MEDICINE

## 2019-11-21 PROCEDURE — 99214 PR OFFICE/OUTPT VISIT, EST, LEVL IV, 30-39 MIN: ICD-10-PCS | Mod: S$PBB,,, | Performed by: INTERNAL MEDICINE

## 2019-11-21 RX ORDER — ASPIRIN 81 MG/1
81 TABLET ORAL DAILY
Start: 2019-11-21

## 2019-11-21 NOTE — PROGRESS NOTES
11/21: Patient has office visit today.  Pushing call back 3 weeks.  Patient close to goal at 133/78

## 2019-11-21 NOTE — PATIENT INSTRUCTIONS
Benign Paroxysmal Positional Vertigo     Your health care provider may move your head in certain ways to treat your BPPV.     Benign paroxysmal positional vertigo (BPPV) is a problem with the inner ear. The inner ear contains the vestibular system. This system is what helps you keep your balance. BPPV causes a feeling of spinning. It is a common problem of the vestibular system.  Understanding the vestibular system  The vestibular system of the ear is made up of very tiny parts. They include the utricle, saccule, and semicircular canals. The utricle is a tiny organ that contains calcium crystals. In some people, the crystals can move into the semicircular canals. When this happens, the system no longer works as it should. This causes BPPV. Benign means it is not life-threatening. Paroxysmal means it happens suddenly. Positional means that it happens when you move your head. Vertigo is a feeling of spinning.  What causes BPPV?  Causes include injury to your head or neck. Other problems with the vestibular system may cause BPPV. In many people, the cause of BPPV is not known.  Symptoms of BPPV  You many have repeated feelings of spinning (vertigo). The vertigo usually lasts less than 1 minute. Some movements, suchas rolling over in bed, can bring on vertigo.  Diagnosing BPPV  Your primary health care provider may diagnose and treat your BPPV. Or you may see an ear, nose, and throat doctor (otolaryngologist). In some cases, you may see a nervous system doctor (neurologist).  The health care provider will ask about your symptoms and your medical history. He or she will examine you. You may have hearing and balance tests. As part of the exam, your health care provider may have you move your head and body in certain ways. If you have BPPV, the movements can bring on vertigo. Your provider will also look for abnormal movements of your eyes. You may have other tests to check your vestibular or nervous systems.  Treatment  for BPPV  Your health care provider may try to move the calcium crystals. This is done by having you move your head and neck in certain ways. This treatment is safe and often works well. You may also be told to do these movements at home. You may still have vertigo for a few weeks. Your health care provider will recheck your symptoms, usually in about a month. Special physical therapy may also be part of treatment. In rare cases surgery may be needed for BPPV that does not go away.     When to call the health care provider  Call your health care provider right away if you have any of these:  · Symptoms that do not go away with treatment  · Symptoms that get worse  · New symptoms   Date Last Reviewed: 3/19/2015  © 3966-1652 [employer]. [address] [place] [address], [place] [address]. All rights reserved. This information is not intended as a substitute for professional medical care. Always follow your healthcare professional's instructions.        Benign Paroxysmal Positional Vertigo    Benign paroxysmal positional vertigo is a common condition. You feel as if the room is spinning after changing position, moving your head quickly, or even just rolling over in bed.  Vertigo is a false feeling of motion plus disorientation that makes it seem as though the room is spinning. A vertigo attack may cause sudden nausea, vomiting, and heavy sweating. Severe vertigo causes a loss of balance. You may even fall down.  Vertigo is caused by a problem with the inner ear. The inner ear is located behind the middle ear. It is a part of the balance center of the body. It contains small calcium particles within fluid-filled canals (semi-circular canals). These particles can move out of position as a result of aging, head trauma, or disease of the inner ear. Once that happens, moving your head in certain ways may cause the particles to stimulate the inner ear. This creates the feeling of vertigo.  An episode of vertigo may last  seconds, minutes, or hours. Once you are over the first episode of vertigo, it may never return. Sometimes symptoms return off and on over several weeks or longer.  Home care  Follow these guidelines when caring for yourself at home:  · Rest quietly in bed if your symptoms are severe. Change position slowly. There is usually one position that will feel best. This might be lying on one side or lying on your back with your head slightly raised on pillows.  · Do not drive or work with dangerous machinery for one week after symptoms disappear. This is in case symptoms return suddenly.  · Take medicine as prescribed to relieve your symptoms. Unless another medicine was prescribed for nausea, vomiting, and vertigo, you may use over-the-counter motion sickness medicine, such as meclizine or dimenhydrinate.  Follow-up care  Follow up with your healthcare provider, or as directed. Tell your provider about any ringing in the ear or hearing loss.  If you had a CT or MRI scan, a specialist will review it. You will be told of any new findings that may affect your care.  When to seek medical advice  Call your healthcare provider right away if any of these occur:  · Vertigo gets worse even after taking prescribed medicine  · Repeated vomiting even after taking prescribed medicine  · Increased weakness or fainting  · Severe headache or unusual drowsiness or confusion  · Weakness of an arm or leg or one side of the face  · Difficulty walking  · Difficulty with speech or vision  · Seizure  · Trouble hearing  · Fever of 100.4ºF (38ºC) or higher, or as directed by your healthcare provider  · Fast heart rate  · Chest pain   Date Last Reviewed: 7/10/2015  © 0040-7743 [employer]. [address] [place] [address], [place] [address]. All rights reserved. This information is not intended as a substitute for professional medical care. Always follow your healthcare professional's instructions.        General Vestibular exercises  Perform  these exercises at least twice per day until your dizziness symptoms resolve.  Eye exercises  1. Look up and down 20 times, start slowly at first, then speed up.  2. Look from one side to the other 20 times, start slowly at first, then speed up.  3. Hold one finger at arms length and focus on it. Move it slowly in towards you and then out again 20 times.    Head exercises  1. With your eyes open, bend your head forwards, then backwards 20 times. Start slowly at first, then speed up.  2. With your eyes open, turn your head from side to side 20 times. Start slowly at first, then speed up.  As your dizziness improves, repeat these head exercises with your eyes closed.    If after 2 weeks you are continuing to have dizziness problems, call communicate with our clinic by calling [phone], or emailing on [email or web address].

## 2019-11-21 NOTE — PROGRESS NOTES
Subjective:       Patient ID: Mirna Norton is a 74 y.o. female.    Chief Complaint: Annual Exam    Patient is here for followup for chronic conditions.    Neck and shoulder pains a few mos ago, resolved.    Had acute imbalance/vertigo 1 month ago, came on just after having dental implant. Symptoms lasted 1 week. Had imbalance with the vertigo.  Denied having other neurologic deficit -- no weakness, no change in vision, no slurred speech or probs swallowing          Review of Systems   Constitutional: Positive for activity change. Negative for unexpected weight change.   HENT: Positive for rhinorrhea. Negative for hearing loss and trouble swallowing.    Eyes: Negative for discharge and visual disturbance.   Respiratory: Negative for chest tightness and wheezing.    Cardiovascular: Negative for chest pain and palpitations.   Gastrointestinal: Negative for blood in stool, constipation, diarrhea and vomiting.   Endocrine: Negative for polydipsia and polyuria.   Genitourinary: Negative for difficulty urinating, dysuria, hematuria and menstrual problem.   Musculoskeletal: Positive for joint swelling and neck pain. Negative for arthralgias.   Neurological: Negative for dizziness (resolved), weakness and headaches.   Psychiatric/Behavioral: Negative for confusion and dysphoric mood.       Objective:      Physical Exam   Constitutional: She is oriented to person, place, and time. She appears well-developed and well-nourished. No distress.   HENT:   Head: Normocephalic and atraumatic.   Eyes: Pupils are equal, round, and reactive to light. EOM are normal. No scleral icterus.   Neck: Normal range of motion. No thyromegaly present.   Cardiovascular: Normal rate, regular rhythm and normal heart sounds. Exam reveals no gallop and no friction rub.   No murmur heard.  Pulmonary/Chest: Effort normal and breath sounds normal. No respiratory distress. She has no wheezes. She has no rales.   Abdominal: Soft. Bowel sounds are normal.  She exhibits no distension and no mass. There is no tenderness. There is no rebound and no guarding.   Musculoskeletal: Normal range of motion. She exhibits no edema or tenderness.   bilat shoulders normal rom.  L knee normal rom.     Lymphadenopathy:     She has no cervical adenopathy.   Neurological: She is alert and oriented to person, place, and time.   Neg romberg.  Normal LE DTRs/strength/sensation grossly   Skin: She is not diaphoretic.   Psychiatric: She has a normal mood and affect. Her speech is normal and behavior is normal. Cognition and memory are normal.       Assessment:       1. Bilateral carotid artery stenosis    2. Essential hypertension        Plan:       Mirna was seen today for annual exam.    Diagnoses and all orders for this visit:    Bilateral carotid artery stenosis  -     aspirin (ECOTRIN) 81 MG EC tablet; Take 1 tablet (81 mg total) by mouth once daily.  Stay on.  Next time consider repeat US    Dizzy episode -- sounds like she was having BPPV. Pt given handouts.    HTN well controlled.    Doing with with statin, 1-2 episodes of myalgias per week at most      Health Maintenance       Date Due Completion Date    Shingles Vaccine (1 of 2) 01/12/1996 ---    TETANUS VACCINE 01/16/2018 1/16/2008    Aspirin/Antiplatelet Therapy 11/21/2020 11/21/2019    Mammogram 10/15/2021 10/15/2019    DEXA SCAN 12/01/2022 12/1/2017    Override on 8/2/2008: Done    Colonoscopy 07/29/2024 7/29/2019    Override on 3/6/2006: Done    Lipid Panel 09/04/2024 9/4/2019      She thinks that she had first shingles vacc    Follow up in about 6 months (around 5/21/2020).    Future Appointments   Date Time Provider Department Center   5/19/2020  8:00 AM Ramsey Tyler MD [facility] [place]

## 2019-11-22 ENCOUNTER — PATIENT MESSAGE (OUTPATIENT)
Dept: INTERNAL MEDICINE | Facility: CLINIC | Age: 74
End: 2019-11-22

## 2019-11-25 ENCOUNTER — PATIENT MESSAGE (OUTPATIENT)
Dept: INTERNAL MEDICINE | Facility: CLINIC | Age: 74
End: 2019-11-25

## 2019-11-27 DIAGNOSIS — I10 ESSENTIAL HYPERTENSION: ICD-10-CM

## 2019-11-27 RX ORDER — VALSARTAN 160 MG/1
160 TABLET ORAL DAILY
Qty: 90 TABLET | Refills: 3 | Status: SHIPPED | OUTPATIENT
Start: 2019-11-27 | End: 2020-12-22

## 2019-12-03 NOTE — PROGRESS NOTES
Patient's BP is at goal. Will continue to monitor.     Last 5 Patient Entered Readings                                      Current 30 Day Average: 129/79     Recent Readings 11/27/2019 11/27/2019 11/26/2019 11/26/2019 11/12/2019    SBP (mmHg) 124 143 137 139 126    DBP (mmHg) 81 77 79 82 81    Pulse 53 54 60 66 68          Hypertension Medications             amLODIPine (NORVASC) 2.5 MG tablet TAKE 1 TABLET BY MOUTH EVERY DAY    hydroCHLOROthiazide (HYDRODIURIL) 25 MG tablet TAKE 1 TABLET BY MOUTH EVERY DAY    metoprolol succinate (TOPROL-XL) 50 MG 24 hr tablet TAKE 1 TABLET BY MOUTH EVERY DAY    valsartan (DIOVAN) 160 MG tablet TAKE 1 TABLET (160 MG TOTAL) BY MOUTH ONCE DAILY.

## 2019-12-13 ENCOUNTER — HOSPITAL ENCOUNTER (OUTPATIENT)
Dept: RADIOLOGY | Facility: HOSPITAL | Age: 74
Discharge: HOME | End: 2019-12-13
Attending: INTERNAL MEDICINE
Payer: MEDICARE

## 2019-12-13 DIAGNOSIS — R09.89 BILATERAL CAROTID BRUITS: ICD-10-CM

## 2019-12-13 PROCEDURE — 93880 EXTRACRANIAL BILAT STUDY: CPT | Mod: TC

## 2019-12-13 PROCEDURE — 93880 EXTRACRANIAL BILAT STUDY: CPT | Mod: 26,,, | Performed by: RADIOLOGY

## 2019-12-13 PROCEDURE — 93880 US CAROTID BILATERAL: ICD-10-PCS | Mod: 26,,, | Performed by: RADIOLOGY

## 2020-01-02 ENCOUNTER — PATIENT MESSAGE (OUTPATIENT)
Dept: ADMINISTRATIVE | Facility: OTHER | Age: 75
End: 2020-01-02

## 2020-01-02 ENCOUNTER — PATIENT MESSAGE (OUTPATIENT)
Dept: CARDIOLOGY | Facility: CLINIC | Age: 75
End: 2020-01-02

## 2020-01-22 ENCOUNTER — PATIENT OUTREACH (OUTPATIENT)
Dept: ADMINISTRATIVE | Facility: OTHER | Age: 75
End: 2020-01-22

## 2020-01-27 ENCOUNTER — OFFICE VISIT (OUTPATIENT)
Dept: CARDIOLOGY | Facility: CLINIC | Age: 75
End: 2020-01-27
Payer: MEDICARE

## 2020-01-27 VITALS
HEART RATE: 62 BPM | BODY MASS INDEX: 25.39 KG/M2 | WEIGHT: 158 LBS | HEIGHT: 66 IN | DIASTOLIC BLOOD PRESSURE: 69 MMHG | SYSTOLIC BLOOD PRESSURE: 125 MMHG

## 2020-01-27 DIAGNOSIS — E78.2 MIXED HYPERLIPIDEMIA: ICD-10-CM

## 2020-01-27 DIAGNOSIS — I10 ESSENTIAL HYPERTENSION: ICD-10-CM

## 2020-01-27 DIAGNOSIS — I87.2 VENOUS INSUFFICIENCY OF RIGHT LOWER EXTREMITY: ICD-10-CM

## 2020-01-27 DIAGNOSIS — R09.89 BILATERAL CAROTID BRUITS: Primary | ICD-10-CM

## 2020-01-27 PROCEDURE — 1126F PR PAIN SEVERITY QUANTIFIED, NO PAIN PRESENT: ICD-10-PCS | Mod: ,,, | Performed by: INTERNAL MEDICINE

## 2020-01-27 PROCEDURE — 93010 EKG 12-LEAD: ICD-10-PCS | Mod: S$PBB,,, | Performed by: INTERNAL MEDICINE

## 2020-01-27 PROCEDURE — 1159F MED LIST DOCD IN RCRD: CPT | Mod: ,,, | Performed by: INTERNAL MEDICINE

## 2020-01-27 PROCEDURE — 99215 PR OFFICE/OUTPT VISIT, EST, LEVL V, 40-54 MIN: ICD-10-PCS | Mod: S$PBB,,, | Performed by: INTERNAL MEDICINE

## 2020-01-27 PROCEDURE — 93010 ELECTROCARDIOGRAM REPORT: CPT | Mod: S$PBB,,, | Performed by: INTERNAL MEDICINE

## 2020-01-27 PROCEDURE — 99999 PR PBB SHADOW E&M-EST. PATIENT-LVL III: CPT | Mod: PBBFAC,,, | Performed by: INTERNAL MEDICINE

## 2020-01-27 PROCEDURE — 99213 OFFICE O/P EST LOW 20 MIN: CPT | Mod: PBBFAC,PO,25 | Performed by: INTERNAL MEDICINE

## 2020-01-27 PROCEDURE — 99215 OFFICE O/P EST HI 40 MIN: CPT | Mod: S$PBB,,, | Performed by: INTERNAL MEDICINE

## 2020-01-27 PROCEDURE — 99999 PR PBB SHADOW E&M-EST. PATIENT-LVL III: ICD-10-PCS | Mod: PBBFAC,,, | Performed by: INTERNAL MEDICINE

## 2020-01-27 PROCEDURE — 1159F PR MEDICATION LIST DOCUMENTED IN MEDICAL RECORD: ICD-10-PCS | Mod: ,,, | Performed by: INTERNAL MEDICINE

## 2020-01-27 PROCEDURE — 93005 ELECTROCARDIOGRAM TRACING: CPT | Mod: PBBFAC,PO | Performed by: INTERNAL MEDICINE

## 2020-01-27 PROCEDURE — 1126F AMNT PAIN NOTED NONE PRSNT: CPT | Mod: ,,, | Performed by: INTERNAL MEDICINE

## 2020-01-27 NOTE — PROGRESS NOTES
Subjective:   Patient ID:  Mirna Norton is a 75 y.o. female who presents for follow up of Carotid Artery Disease; Hyperlipidemia; and Hypertension      HPI:     Mirna Norton 75 y.o. female is here follow up and feeling well without any new complaints. She is s/p R GSVL EVLT  In 2013 with subsequent injections in  without recurrent issues. She has asymptomatic mild bilateral carotid disease. She takes aspirin and losartan. Her BP ans lipid profile are well controlled. LDL 87 on pravastatin 40 mg nightly. Last venous ultrasound-no DVT or reflux disease. Carotid ultrasound-PSV < 130 cm/sec bilaterally.         US 2018      R ICA 92 cm/sec   L  cm/sec      US 2019     R ICA 72 with ratio <1   L ICA 83 with ratio 108      ECG 2020     Sinus bradycardia        Patient Active Problem List    Diagnosis Date Noted    History of colon polyps 2019    Venous insufficiency of right lower extremity 2017         R GSV EVLT 2013 with subsequent injections in           Bilateral carotid bruits 2017    Foot pain, right 2017    Hyperlipidemia 2015    Irritable bowel syndrome without diarrhea 2015    Carotid stenosis 05/15/2014         Asymptomatic        R ICA 93 cm/sec   L  cm/sec      US 2018      R ICA 92 cm/sec   L  cm/sec              Pancreatic cyst 2013    Gout, arthritis 2013    Lumbosacral spondylosis 2013    GERD (gastroesophageal reflux disease)      Gastroesophageal reflux with nonsteroidal antiinflammatory drugs, but can tolerate short term;   and fear of Celebrex        Hypertension            Right Arm BP - Sittin/68  Left Arm BP - Sittin/69        LABS    LAST HbA1c  Lab Results   Component Value Date    HGBA1C 6.2 (H) 2019       Lipid panel  Lab Results   Component Value Date    CHOL 148 2019    CHOL 136 10/11/2018    CHOL 168 2017     Lab Results   Component Value Date     HDL 63 09/04/2019    HDL 60 10/11/2018    HDL 62 06/16/2017     Lab Results   Component Value Date    LDLCALC 64 09/04/2019    LDLCALC 55.6 (L) 10/11/2018    LDLCALC 87.2 06/16/2017     Lab Results   Component Value Date    TRIG 128 09/04/2019    TRIG 102 10/11/2018    TRIG 94 06/16/2017     Lab Results   Component Value Date    CHOLHDL 2.3 09/04/2019    CHOLHDL 44.1 10/11/2018    CHOLHDL 36.9 06/16/2017            Review of Systems   Constitution: Negative for diaphoresis, night sweats, weight gain and weight loss.   HENT: Negative for congestion.    Eyes: Negative for blurred vision, discharge and double vision.   Cardiovascular: Negative for chest pain, claudication, cyanosis, dyspnea on exertion, irregular heartbeat, leg swelling, near-syncope, orthopnea, palpitations, paroxysmal nocturnal dyspnea and syncope.   Respiratory: Negative for cough, shortness of breath and wheezing.    Endocrine: Negative for cold intolerance, heat intolerance and polyphagia.   Hematologic/Lymphatic: Negative for adenopathy and bleeding problem. Does not bruise/bleed easily.   Skin: Negative for dry skin and nail changes.   Musculoskeletal: Negative for arthritis, back pain, falls, joint pain, myalgias and neck pain.   Gastrointestinal: Negative for bloating, abdominal pain, change in bowel habit and constipation.   Genitourinary: Negative for bladder incontinence, dysuria, flank pain, genital sores and missed menses.   Neurological: Negative for aphonia, brief paralysis, difficulty with concentration, dizziness and weakness.   Psychiatric/Behavioral: Negative for altered mental status and memory loss. The patient does not have insomnia.    Allergic/Immunologic: Negative for environmental allergies.       Objective:   Physical Exam   Constitutional: She is oriented to person, place, and time. She appears well-developed and well-nourished. She is not intubated.   HENT:   Head: Normocephalic and atraumatic.   Right Ear: External ear  normal.   Left Ear: External ear normal.   Mouth/Throat: Oropharynx is clear and moist.   Eyes: Pupils are equal, round, and reactive to light. Conjunctivae and EOM are normal. Right eye exhibits no discharge. Left eye exhibits no discharge. No scleral icterus.   Neck: Normal range of motion. Neck supple. Normal carotid pulses, no hepatojugular reflux and no JVD present. Carotid bruit is not present. No tracheal deviation present. No thyromegaly present.   Cardiovascular: Normal rate, regular rhythm, S1 normal and S2 normal.  No extrasystoles are present. PMI is not displaced. Exam reveals no gallop, no S3, no distant heart sounds, no friction rub and no midsystolic click.   No murmur heard.  Pulses:       Carotid pulses are 2+ on the right side, and 2+ on the left side.       Radial pulses are 2+ on the right side, and 2+ on the left side.        Femoral pulses are 2+ on the right side, and 2+ on the left side.       Popliteal pulses are 2+ on the right side, and 2+ on the left side.        Dorsalis pedis pulses are 2+ on the right side, and 2+ on the left side.        Posterior tibial pulses are 2+ on the right side, and 2+ on the left side.   Pulmonary/Chest: Effort normal and breath sounds normal. No accessory muscle usage or stridor. No apnea, no tachypnea and no bradypnea. She is not intubated. No respiratory distress. She has no decreased breath sounds. She has no wheezes. She has no rales. She exhibits no tenderness and no bony tenderness.   Abdominal: She exhibits no distension, no pulsatile liver, no abdominal bruit, no ascites, no pulsatile midline mass and no mass. There is no tenderness. There is no rebound and no guarding.   Musculoskeletal: Normal range of motion. She exhibits no edema or tenderness.   Lymphadenopathy:     She has no cervical adenopathy.   Neurological: She is alert and oriented to person, place, and time. She has normal reflexes. No cranial nerve deficit. Coordination normal.   Skin:  Skin is warm. No rash noted. No erythema. No pallor.   Psychiatric: She has a normal mood and affect. Her behavior is normal. Judgment and thought content normal.       Assessment:     1. Bilateral carotid bruits    2. Essential hypertension    3. Mixed hyperlipidemia    4. Venous insufficiency of right lower extremity        Plan:           Low salt diet  Exercise  Weight loss  Follow up yearly         Continue with current medical plan and lifestyle changes.  Return sooner for concerns or questions. If symptoms persist go to the ED  I have reviewed all pertinent data on this patient       I have reviewed the patient's medical history in detail and updated the computerized patient record.    Orders Placed This Encounter   Procedures    US Carotid Bilateral     Standing Status:   Future     Standing Expiration Date:   1/27/2021    IN OFFICE EKG 12-LEAD (to Muse)     Order Specific Question:   Diagnosis     Answer:   HTN (hypertension) [014007]       Follow up as scheduled. Return sooner for concerns or questions  Follow up yearly           She expressed verbal understanding and agreed with the plan      Greater than 50% of the visit of 45 minutes was spent counseling, educating, and coordinating the care of the patient.  -In today's visit, at least 4 established conditions that pose a risk to life or bodily function have been addressed and the conditions are severe.    -In today's visit, monitoring for drug toxicity was accomplished.            Patient's Medications   New Prescriptions    No medications on file   Previous Medications    ALLOPURINOL (ZYLOPRIM) 100 MG TABLET    TAKE 1 TABLET BY MOUTH EVERY DAY    AMLODIPINE (NORVASC) 2.5 MG TABLET    TAKE 1 TABLET BY MOUTH EVERY DAY    ASPIRIN (ECOTRIN) 81 MG EC TABLET    Take 1 tablet (81 mg total) by mouth once daily.    BIFIDOBACTERIUM INFANTIS (ALIGN ORAL)    Take by mouth once daily.    HYDROCHLOROTHIAZIDE (HYDRODIURIL) 25 MG TABLET    TAKE 1 TABLET BY MOUTH  EVERY DAY    METOPROLOL SUCCINATE (TOPROL-XL) 50 MG 24 HR TABLET    TAKE 1 TABLET BY MOUTH EVERY DAY    MULTIVITAMIN-MINERALS-LUTEIN (CENTRUM SILVER) TAB    Take 1 tablet by mouth once daily.     OMEPRAZOLE (PRILOSEC) 40 MG CAPSULE    Take 1 capsule (40 mg total) by mouth every morning. 1 Capsule, Delayed Release(E.C.) Oral Every day    POTASSIUM CHLORIDE (MICRO-K) 10 MEQ CPSR    Take 1 capsule (10 mEq total) by mouth once daily.    PRAVASTATIN (PRAVACHOL) 40 MG TABLET    TAKE 1 TABLET BY MOUTH IN THE EVENING    VALSARTAN (DIOVAN) 160 MG TABLET    TAKE 1 TABLET (160 MG TOTAL) BY MOUTH ONCE DAILY.   Modified Medications    No medications on file   Discontinued Medications

## 2020-02-02 ENCOUNTER — PATIENT MESSAGE (OUTPATIENT)
Dept: ORTHOPEDICS | Facility: CLINIC | Age: 75
End: 2020-02-02

## 2020-02-03 ENCOUNTER — TELEPHONE (OUTPATIENT)
Dept: ORTHOPEDICS | Facility: CLINIC | Age: 75
End: 2020-02-03

## 2020-02-03 DIAGNOSIS — M51.36 DDD (DEGENERATIVE DISC DISEASE), LUMBAR: Primary | ICD-10-CM

## 2020-02-05 ENCOUNTER — PATIENT OUTREACH (OUTPATIENT)
Dept: ADMINISTRATIVE | Facility: OTHER | Age: 75
End: 2020-02-05

## 2020-02-05 NOTE — PROGRESS NOTES
Chart reviewed.   Requested updates from Care Everywhere.  Immunizations reconciled.   HM updated.

## 2020-02-10 ENCOUNTER — OFFICE VISIT (OUTPATIENT)
Dept: ORTHOPEDICS | Facility: CLINIC | Age: 75
End: 2020-02-10
Payer: MEDICARE

## 2020-02-10 ENCOUNTER — HOSPITAL ENCOUNTER (OUTPATIENT)
Dept: RADIOLOGY | Facility: HOSPITAL | Age: 75
Discharge: HOME | End: 2020-02-10
Attending: PHYSICIAN ASSISTANT
Payer: MEDICARE

## 2020-02-10 VITALS — WEIGHT: 158 LBS | BODY MASS INDEX: 25.39 KG/M2 | HEIGHT: 66 IN

## 2020-02-10 DIAGNOSIS — M51.36 DDD (DEGENERATIVE DISC DISEASE), LUMBAR: Primary | ICD-10-CM

## 2020-02-10 DIAGNOSIS — M51.36 DDD (DEGENERATIVE DISC DISEASE), LUMBAR: ICD-10-CM

## 2020-02-10 PROCEDURE — 72120 X-RAY BEND ONLY L-S SPINE: CPT | Mod: 26,,, | Performed by: RADIOLOGY

## 2020-02-10 PROCEDURE — 99213 PR OFFICE/OUTPT VISIT, EST, LEVL III, 20-29 MIN: ICD-10-PCS | Mod: S$PBB,,, | Performed by: PHYSICIAN ASSISTANT

## 2020-02-10 PROCEDURE — 72100 X-RAY EXAM L-S SPINE 2/3 VWS: CPT | Mod: 26,,, | Performed by: RADIOLOGY

## 2020-02-10 PROCEDURE — 72100 XR LUMBAR SPINE AP AND LAT WITH FLEX/EXT: ICD-10-PCS | Mod: 26,,, | Performed by: RADIOLOGY

## 2020-02-10 PROCEDURE — 72120 X-RAY BEND ONLY L-S SPINE: CPT | Mod: TC

## 2020-02-10 PROCEDURE — 99999 PR PBB SHADOW E&M-EST. PATIENT-LVL III: ICD-10-PCS | Mod: PBBFAC,,, | Performed by: PHYSICIAN ASSISTANT

## 2020-02-10 PROCEDURE — 72120 XR LUMBAR SPINE AP AND LAT WITH FLEX/EXT: ICD-10-PCS | Mod: 26,,, | Performed by: RADIOLOGY

## 2020-02-10 PROCEDURE — 99999 PR PBB SHADOW E&M-EST. PATIENT-LVL III: CPT | Mod: PBBFAC,,, | Performed by: PHYSICIAN ASSISTANT

## 2020-02-10 PROCEDURE — 99213 OFFICE O/P EST LOW 20 MIN: CPT | Mod: S$PBB,,, | Performed by: PHYSICIAN ASSISTANT

## 2020-02-10 PROCEDURE — 99213 OFFICE O/P EST LOW 20 MIN: CPT | Mod: PBBFAC,25 | Performed by: PHYSICIAN ASSISTANT

## 2020-02-10 NOTE — PROGRESS NOTES
DATE: 2/10/2020  PATIENT: Mirna Norton    Supervising Physician: Guillermo Cunningham M.D.    CHIEF COMPLAINT: back pain    HISTORY:  Mirna Norton is a 75 y.o. female here for initial evaluation of low back pain (Back - 2). The pain has been present for about a month.  The pain has progressively improved since onset.  The patient describes the pain as throbbing and aching.  The pain is worse with bending and improved by nothing in particular. There is no associated numbness and tingling. There is no subjective weakness. Prior treatments have included ibuprofen and heat, but no physical therapy, ESIs or surgery.    The patient denies myelopathic symptoms such as handwriting changes or difficulty with buttons/coins/keys. Denies perineal paresthesias, bowel/bladder dysfunction.    PAST MEDICAL/SURGICAL HISTORY:  Past Medical History:   Diagnosis Date    Abnormal cervical Papanicolaou smear     Cryo    Carotid artery stenosis     via kinked carotid artery - followed by Dr. Ye Anderson    Carotid stenosis 5/15/2014    GERD (gastroesophageal reflux disease)     Gout due to renal impairment     Gout, arthritis 2013    Gout, unspecified     Herniated lumbar intervertebral disc     Hypertension     Insomnia     Irritable bowel syndrome without diarrhea 2015    Liver lesion 3/13/2013    Mitral valve prolapse     MVP (mitral valve prolapse)     Pancreatic cyst 2013    PVD (peripheral vascular disease) 2013    PVD (peripheral vascular disease) 2013    Venous insufficiency      Past Surgical History:   Procedure Laterality Date    BARTHOLIN GLAND CYST EXCISION       SECTION      x3    COLONOSCOPY N/A 2019    Procedure: COLONOSCOPY;  Surgeon: Haider Luu MD;  Location: [address];  Service: Endoscopy;  Laterality: N/A;    Diagnostic laproscopic       laser surgery on vein         Current Medications:   Current Outpatient Medications:      allopurinol (ZYLOPRIM) 100 MG tablet, TAKE 1 TABLET BY MOUTH EVERY DAY, Disp: 30 tablet, Rfl: 11    amLODIPine (NORVASC) 2.5 MG tablet, TAKE 1 TABLET BY MOUTH EVERY DAY, Disp: 90 tablet, Rfl: 3    aspirin (ECOTRIN) 81 MG EC tablet, Take 1 tablet (81 mg total) by mouth once daily., Disp: , Rfl:     BIFIDOBACTERIUM INFANTIS (ALIGN ORAL), Take by mouth once daily., Disp: , Rfl:     hydroCHLOROthiazide (HYDRODIURIL) 25 MG tablet, TAKE 1 TABLET BY MOUTH EVERY DAY, Disp: 90 tablet, Rfl: 3    metoprolol succinate (TOPROL-XL) 50 MG 24 hr tablet, TAKE 1 TABLET BY MOUTH EVERY DAY, Disp: 90 tablet, Rfl: 11    multivitamin-minerals-lutein (CENTRUM SILVER) Tab, Take 1 tablet by mouth once daily. , Disp: , Rfl:     omeprazole (PRILOSEC) 40 MG capsule, Take 1 capsule (40 mg total) by mouth every morning. 1 Capsule, Delayed Release(E.C.) Oral Every day, Disp: 90 capsule, Rfl: 3    potassium chloride (MICRO-K) 10 MEQ CpSR, Take 1 capsule (10 mEq total) by mouth once daily., Disp: 90 capsule, Rfl: 11    pravastatin (PRAVACHOL) 40 MG tablet, TAKE 1 TABLET BY MOUTH IN THE EVENING, Disp: 90 tablet, Rfl: 3    valsartan (DIOVAN) 160 MG tablet, TAKE 1 TABLET (160 MG TOTAL) BY MOUTH ONCE DAILY., Disp: 90 tablet, Rfl: 3    Social History:   Social History     Socioeconomic History    Marital status:      Spouse name: Not on file    Number of children: Not on file    Years of education: Not on file    Highest education level: Not on file   Occupational History    Occupation: Retired   Social Needs    Financial resource strain: Not hard at all    Food insecurity:     Worry: Never true     Inability: Never true    Transportation needs:     Medical: No     Non-medical: No   Tobacco Use    Smoking status: Never Smoker    Smokeless tobacco: Never Used   Substance and Sexual Activity    Alcohol use: No     Frequency: Never     Drinks per session: Patient refused     Binge frequency: Never    Drug use: No    Sexual  "activity: Never     Birth control/protection: Post-menopausal   Lifestyle    Physical activity:     Days per week: 1 day     Minutes per session: 10 min    Stress: Not at all   Relationships    Social connections:     Talks on phone: More than three times a week     Gets together: Three times a week     Attends Synagogue service: Not on file     Active member of club or organization: Yes     Attends meetings of clubs or organizations: More than 4 times per year     Relationship status:    Other Topics Concern    Are you pregnant or think you may be? Not Asked    Breast-feeding Not Asked   Social History Narrative    3 kids (healthy), previous . , has eye issues. biw exercise.       REVIEW OF SYSTEMS:  Constitution: Negative. Negative for chills, fever and night sweats.   Cardiovascular: Negative for chest pain and syncope.   Respiratory: Negative for cough and shortness of breath.   Gastrointestinal: See HPI. Negative for nausea/vomiting. Negative for abdominal pain.  Genitourinary: See HPI. Negative for discoloration or dysuria.  Skin: Negative for dry skin, itching and rash.   Hematologic/Lymphatic: Negative for bleeding problem. Does not bruise/bleed easily.   Musculoskeletal: Negative for falls and muscle weakness.   Neurological: See HPI. No seizures.   Endocrine: Negative for polydipsia, polyphagia and polyuria.   Allergic/Immunologic: Negative for hives and persistent infections.    PHYSICAL EXAMINATION:    Ht 5' 6" (1.676 m)   Wt 71.7 kg (158 lb)   BMI 25.50 kg/m²     General: The patient is a very pleasant 75 y.o. female in no apparent distress, the patient is oriented to person, place and time.   Psych: Normal mood and affect  HEENT: Vision grossly intact, hearing intact to the spoken word.  Lungs: Respirations unlabored.  Gait: Normal station and gait, no difficulty with toe or heel walk.   Skin: Dorsal lumbar skin negative for rashes, lesions, hairy patches and " surgical scars.  Range of motion: Lumbar range of motion is acceptable. There is minimal lumbar tenderness to palpation.  Spinal Balance: Global saggital and coronal spinal balance acceptable, no significant for scoliosis and kyphosis.  Musculoskeletal: No pain with the range of motion of the bilateral hips. No trochanteric tenderness to palpation.  Vascular: Bilateral lower extremities warm and well perfused, Dorsalis pedis pulses 2+ bilaterally.  Neurological: Normal strength and tone in all major motor groups in the bilateral lower extremities. Normal sensation to light touch in the L2-S1 dermatomes bilaterally.  Deep tendon reflexes symmetric 2+ in the bilateral lower extremities.  Negative Babinski bilaterally.  Straight leg raise negative bilaterally.     IMAGING:   Today I personally reviewed AP, Lat and Flex/Ex upright L-spine films that demonstrate mild degenerative changes and mild degenerative scoliosis.        Body mass index is 25.5 kg/m².    Hemoglobin A1C   Date Value Ref Range Status   09/04/2019 6.2 (H) <5.7 % of total Hgb Final     Comment:     For someone without known diabetes, a hemoglobin   A1c value between 5.7% and 6.4% is consistent with  prediabetes and should be confirmed with a   follow-up test.     For someone with known diabetes, a value <7%  indicates that their diabetes is well controlled. A1c  targets should be individualized based on duration of  diabetes, age, comorbid conditions, and other  considerations.     This assay result is consistent with an increased risk  of diabetes.     Currently, no consensus exists regarding use of  hemoglobin A1c for diagnosis of diabetes for children.        10/11/2018 6.1 (H) 4.0 - 5.6 % Final     Comment:     ADA Screening Guidelines:  5.7-6.4%  Consistent with prediabetes  >or=6.5%  Consistent with diabetes  High levels of fetal hemoglobin interfere with the HbA1C  assay. Heterozygous hemoglobin variants (HbS, HgC, etc)do  not significantly  interfere with this assay.   However, presence of multiple variants may affect accuracy.     06/16/2017 6.1 (H) 4.0 - 5.6 % Final     Comment:     According to ADA guidelines, hemoglobin A1c <7.0% represents  optimal control in non-pregnant diabetic patients. Different  metrics may apply to specific patient populations.   Standards of Medical Care in Diabetes-2016.  For the purpose of screening for the presence of diabetes:  <5.7%     Consistent with the absence of diabetes  5.7-6.4%  Consistent with increasing risk for diabetes   (prediabetes)  >or=6.5%  Consistent with diabetes  Currently, no consensus exists for use of hemoglobin A1c  for diagnosis of diabetes for children.  This Hemoglobin A1c assay has significant interference with fetal   hemoglobin   (HbF). The results are invalid for patients with abnormal amounts of   HbF,   including those with known Hereditary Persistence   of Fetal Hemoglobin. Heterozygous hemoglobin variants (HbAS, HbAC,   HbAD, HbAE, HbA2) do not significantly interfere with this assay;   however, presence of multiple variants in a sample may impact the %   interference.           ASSESSMENT/PLAN:    Mirna was seen today for low-back pain.    Diagnoses and all orders for this visit:    DDD (degenerative disc disease), lumbar  -     Ambulatory referral/consult to Physical/Occupational Therapy; Future        Today we discussed at length all of the different treatment options including anti-inflammatories, acetaminophen, rest, ice, heat, physical therapy including strengthening and stretching exercises, home exercises, ROM, aerobic conditioning, aqua therapy, other modalities including ultrasound, massage, and dry needling, epidural steroid injections and finally surgical intervention.      Referral for PT placed today.  Follow up after therapy in about 6 weeks if symptoms persist.      Follow up if symptoms worsen or fail to improve.

## 2020-02-10 NOTE — LETTER
February 10, 2020      Nena Tejada PA-C  [address] [facility] [address]           [facility]  [address]  [place] [address]  Phone: [phone]          Patient: Mirna Norton   MR Number: [id number]   YOB: 1945   Date of Visit: 2/10/2020       Dear Nena Tejada:    Thank you for referring Mirna Norton to me for evaluation. Attached you will find relevant portions of my assessment and plan of care.    If you have questions, please do not hesitate to call me. I look forward to following Mirna Norton along with you.    Sincerely,    Sheeba Calabrese PA-C    Enclosure  CC:  No Recipients    If you would like to receive this communication electronically, please contact [email or web address] or [phone] to request more information on [employer] Link access.    For providers and/or their staff who would like to refer a patient to [facility], please contact us through our one-stop-shop provider referral line, [facility] , at [phone].    If you feel you have received this communication in error or would no longer like to receive these types of communications, please e-mail [email or web address]

## 2020-02-24 ENCOUNTER — PATIENT OUTREACH (OUTPATIENT)
Dept: OTHER | Facility: OTHER | Age: 75
End: 2020-02-24

## 2020-04-01 NOTE — PROGRESS NOTES
Digital Medicine: Health  Follow-Up    Patient denies hypotensive symptoms.    The history is provided by the patient.     Follow Up  Follow-up reason(s): reading review      Readings are trending down Patient states she will take more BP readings.      INTERVENTION(S)  reviewed monitoring technique, encouragement/support and denied questions    PLAN  patient verbalizes understanding and continue monitoring      There are no preventive care reminders to display for this patient.    Last 5 Patient Entered Readings                                      Current 30 Day Average: 117/77     Recent Readings 3/24/2020 3/24/2020 3/24/2020 3/24/2020 3/13/2020    SBP (mmHg) 110 103 107 134 126    DBP (mmHg) 76 76 73 78 76    Pulse 51 54 52 55 57                      Diet Screening   No change to diet.      Physical Activity Screening   No change to exercise routine.          SDOH

## 2020-04-02 ENCOUNTER — PATIENT MESSAGE (OUTPATIENT)
Dept: INTERNAL MEDICINE | Facility: CLINIC | Age: 75
End: 2020-04-02

## 2020-04-27 DIAGNOSIS — I10 ESSENTIAL HYPERTENSION: ICD-10-CM

## 2020-04-27 RX ORDER — HYDROCHLOROTHIAZIDE 25 MG/1
TABLET ORAL
Qty: 90 TABLET | Refills: 0 | Status: SHIPPED | OUTPATIENT
Start: 2020-04-27 | End: 2021-03-22 | Stop reason: SDUPTHER

## 2020-04-28 ENCOUNTER — LAB VISIT (OUTPATIENT)
Dept: LAB | Facility: HOSPITAL | Age: 75
End: 2020-04-28
Attending: INTERNAL MEDICINE
Payer: MEDICARE

## 2020-04-28 ENCOUNTER — OFFICE VISIT (OUTPATIENT)
Dept: INTERNAL MEDICINE | Facility: CLINIC | Age: 75
End: 2020-04-28
Payer: MEDICARE

## 2020-04-28 ENCOUNTER — NURSE TRIAGE (OUTPATIENT)
Dept: ADMINISTRATIVE | Facility: CLINIC | Age: 75
End: 2020-04-28

## 2020-04-28 VITALS
SYSTOLIC BLOOD PRESSURE: 138 MMHG | WEIGHT: 156 LBS | DIASTOLIC BLOOD PRESSURE: 72 MMHG | OXYGEN SATURATION: 99 % | TEMPERATURE: 98 F | BODY MASS INDEX: 25.07 KG/M2 | HEART RATE: 62 BPM | HEIGHT: 66 IN

## 2020-04-28 DIAGNOSIS — I65.23 BILATERAL CAROTID ARTERY STENOSIS: ICD-10-CM

## 2020-04-28 DIAGNOSIS — M10.9 GOUT, ARTHRITIS: ICD-10-CM

## 2020-04-28 DIAGNOSIS — R73.09 ELEVATED HEMOGLOBIN A1C: ICD-10-CM

## 2020-04-28 DIAGNOSIS — R07.9 CHEST PAIN, UNSPECIFIED TYPE: Primary | ICD-10-CM

## 2020-04-28 DIAGNOSIS — I10 ESSENTIAL HYPERTENSION: ICD-10-CM

## 2020-04-28 LAB
ANION GAP SERPL CALC-SCNC: 8 MMOL/L (ref 8–16)
BUN SERPL-MCNC: 12 MG/DL (ref 8–23)
CALCIUM SERPL-MCNC: 10 MG/DL (ref 8.7–10.5)
CHLORIDE SERPL-SCNC: 100 MMOL/L (ref 95–110)
CHOLEST SERPL-MCNC: 151 MG/DL (ref 120–199)
CHOLEST/HDLC SERPL: 2.5 {RATIO} (ref 2–5)
CO2 SERPL-SCNC: 32 MMOL/L (ref 23–29)
CREAT SERPL-MCNC: 0.8 MG/DL (ref 0.5–1.4)
EST. GFR  (AFRICAN AMERICAN): >60 ML/MIN/1.73 M^2
EST. GFR  (NON AFRICAN AMERICAN): >60 ML/MIN/1.73 M^2
ESTIMATED AVG GLUCOSE: 131 MG/DL (ref 68–131)
GLUCOSE SERPL-MCNC: 96 MG/DL (ref 70–110)
HBA1C MFR BLD HPLC: 6.2 % (ref 4–5.6)
HDLC SERPL-MCNC: 60 MG/DL (ref 40–75)
HDLC SERPL: 39.7 % (ref 20–50)
LDLC SERPL CALC-MCNC: 67.6 MG/DL (ref 63–159)
NONHDLC SERPL-MCNC: 91 MG/DL
POTASSIUM SERPL-SCNC: 3.9 MMOL/L (ref 3.5–5.1)
SODIUM SERPL-SCNC: 140 MMOL/L (ref 136–145)
TRIGL SERPL-MCNC: 117 MG/DL (ref 30–150)
URATE SERPL-MCNC: 5.6 MG/DL (ref 2.4–5.7)

## 2020-04-28 PROCEDURE — 84550 ASSAY OF BLOOD/URIC ACID: CPT

## 2020-04-28 PROCEDURE — 93010 EKG 12-LEAD: ICD-10-PCS | Mod: S$PBB,,, | Performed by: INTERNAL MEDICINE

## 2020-04-28 PROCEDURE — 99214 PR OFFICE/OUTPT VISIT, EST, LEVL IV, 30-39 MIN: ICD-10-PCS | Mod: S$PBB,,, | Performed by: INTERNAL MEDICINE

## 2020-04-28 PROCEDURE — 99999 PR PBB SHADOW E&M-EST. PATIENT-LVL IV: CPT | Mod: PBBFAC,,, | Performed by: INTERNAL MEDICINE

## 2020-04-28 PROCEDURE — 83036 HEMOGLOBIN GLYCOSYLATED A1C: CPT

## 2020-04-28 PROCEDURE — 80048 BASIC METABOLIC PNL TOTAL CA: CPT

## 2020-04-28 PROCEDURE — 93010 ELECTROCARDIOGRAM REPORT: CPT | Mod: S$PBB,,, | Performed by: INTERNAL MEDICINE

## 2020-04-28 PROCEDURE — 93005 ELECTROCARDIOGRAM TRACING: CPT | Mod: PBBFAC | Performed by: INTERNAL MEDICINE

## 2020-04-28 PROCEDURE — 80061 LIPID PANEL: CPT

## 2020-04-28 PROCEDURE — 99214 OFFICE O/P EST MOD 30 MIN: CPT | Mod: S$PBB,,, | Performed by: INTERNAL MEDICINE

## 2020-04-28 PROCEDURE — 99214 OFFICE O/P EST MOD 30 MIN: CPT | Mod: PBBFAC,25 | Performed by: INTERNAL MEDICINE

## 2020-04-28 PROCEDURE — 99999 PR PBB SHADOW E&M-EST. PATIENT-LVL IV: ICD-10-PCS | Mod: PBBFAC,,, | Performed by: INTERNAL MEDICINE

## 2020-04-28 PROCEDURE — 36415 COLL VENOUS BLD VENIPUNCTURE: CPT

## 2020-04-28 NOTE — TELEPHONE ENCOUNTER
About 2 weeks ago patient had chest pain. Pt stated she has been having restless/sleepless nights. Pt had chest tightness but denies any present time. Pt stated she also had some lightheadedness but denies now. Pt is requesting an ekg. Care advice recommend pt see Md today. Pt offered a virtual visit and declined. Pt stated she [place] [facility]   Reason for Disposition   Patient wants to be seen    Additional Information   Negative: Severe difficulty breathing (e.g., struggling for each breath, speaks in single words)   Negative: Passed out (i.e., fainted, collapsed and was not responding)   Negative: Chest pain lasting longer than 5 minutes and ANY of the following:* Over 50 years old* Over 30 years old and at least one cardiac risk factor (i.e., high blood pressure, diabetes, high cholesterol, obesity, smoker or strong family history of heart disease)* Pain is crushing, pressure-like, or heavy * Took nitroglycerin and chest pain was not relieved* History of heart disease (i.e., angina, heart attack, bypass surgery, angioplasty, CHF)   Negative: Visible sweat on face or sweat dripping down face   Negative: Sounds like a life-threatening emergency to the triager   Negative: SEVERE chest pain   Negative: Pain also present in shoulder(s) or arm(s) or jaw   Negative: Difficulty breathing   Negative: Cocaine use within last 3 days   Negative: History of prior 'blood clot' in leg or lungs (i.e., deep vein thrombosis, pulmonary embolism)   Negative: Recent illness requiring prolonged bed rest (i.e., immobilization)   Negative: Hip or leg fracture in past 2 months (e.g, or had cast on leg or ankle)   Negative: Major surgery in the past month   Negative: Recent long-distance travel with prolonged time in car, bus, plane, or train (i.e., within past 2 weeks; 6 or more hours duration)   Negative: Heart beating irregularly or very rapidly   Negative: Chest pain lasting longer than 5 minutes    Negative: Intermittent chest pain and pain has been increasing in severity or frequency   Negative: Dizziness or lightheadedness   Negative: Coughing up blood   Negative: Patient sounds very sick or weak to the triager   Negative: Fever > 100.5 F (38.1 C)   Negative: Intermittent chest pains persist > 3 days   Negative: All other patients with chest pain    Protocols used: CHEST PAIN-A-OH

## 2020-04-28 NOTE — PROGRESS NOTES
Subjective:       Patient ID: Mirna Norton is a 75 y.o. female.    Chief Complaint: Follow-up    Here for urgent care --  2 wks ago chest pain came on, felt like TARUN symptoms. Felt chest constrictions and was burping. Lasted 5-7 min and went away.  She was not sweating at time.  before symptoms was eating cream potatoes, and bbq ribs.  She has had off and off GERD for long time and stopped zantac 2/2 recall.    Since 2 wks ago she has had no recurrence.    Denies any exertional chest symptoms. No syncope, no tachy symptoms.    Review of Systems   Constitutional: Negative for activity change and unexpected weight change.   HENT: Negative for hearing loss, rhinorrhea and trouble swallowing.    Eyes: Negative for discharge and visual disturbance.   Respiratory: Negative for chest tightness and wheezing.    Cardiovascular: Negative for chest pain and palpitations.   Gastrointestinal: Negative for blood in stool, constipation, diarrhea and vomiting.        Denies warning signs for GERD -- no dysphagia, no odynophagia, no wt loss, no blood in stool or melena   Endocrine: Negative for polydipsia and polyuria.   Genitourinary: Negative for difficulty urinating, dysuria, hematuria and menstrual problem.   Musculoskeletal: Negative for arthralgias.   Neurological: Negative for dizziness, weakness and headaches.   Psychiatric/Behavioral: Negative for confusion and dysphoric mood.       Objective:      Physical Exam   Constitutional: She is oriented to person, place, and time. She appears well-developed and well-nourished. No distress.   HENT:   Head: Normocephalic and atraumatic.   Eyes: Pupils are equal, round, and reactive to light. EOM are normal. No scleral icterus.   Neck: Normal range of motion. No thyromegaly present.   No car bruits and nml car pulses   Cardiovascular: Normal rate, regular rhythm and normal heart sounds. Exam reveals no gallop and no friction rub.   No murmur heard.  Pulmonary/Chest: Effort normal  and breath sounds normal. No respiratory distress. She has no wheezes. She has no rales.   Abdominal: Soft. Bowel sounds are normal. She exhibits no distension and no mass. There is no tenderness. There is no rebound and no guarding.   Musculoskeletal: Normal range of motion. She exhibits no edema or tenderness.   bilat shoulders normal rom.  L knee normal rom.     Lymphadenopathy:     She has no cervical adenopathy.   Neurological: She is alert and oriented to person, place, and time.   Skin: She is not diaphoretic.   Psychiatric: She has a normal mood and affect. Her speech is normal and behavior is normal. Cognition and memory are normal.       Assessment:       1. Chest pain, unspecified type    2. Bilateral carotid artery stenosis    3. Essential hypertension    4. Elevated hemoglobin A1c    5. Gout, arthritis        Plan:       Mirna was seen today for follow-up.    Diagnoses and all orders for this visit:    Chest pain, unspecified type  -     IN OFFICE EKG 12-LEAD (to Muse)  Symptoms from 2 wks ago do not sound cardiac in nature and ekg is stable.  She would like further testing to evaluate for CAD. Cor calcium not likely helpful since she has known ASCVD, will check ETT when she is ready due to covid19 concerns --  Patient Instructions   Call or email when you are ready to schedule a stress test, we will try for the [place] office        Bilateral carotid artery stenosis  -     Lipid panel; Future    Essential hypertension  controlled    Elevated hemoglobin A1c  -     Hemoglobin A1c; Future    Gout, arthritis  -     Basic metabolic panel; Future  -     Uric acid; Future        Health Maintenance       Date Due Completion Date    Shingles Vaccine (1 of 2) 01/12/1996 ---    TETANUS VACCINE 01/16/2018 1/16/2008    High Dose Statin 01/27/2021 1/27/2020    Aspirin/Antiplatelet Therapy 01/27/2021 1/27/2020    Mammogram 10/15/2021 10/15/2019    DEXA SCAN 12/01/2022 12/1/2017    Override on 8/2/2008: Done     Colonoscopy 07/29/2024 7/29/2019    Override on 3/6/2006: Done    Lipid Panel 09/04/2024 9/4/2019          Follow up in about 6 months (around 10/28/2020).    Future Appointments   Date Time Provider Department Center   10/16/2020 11:40 AM Ramsey Tyler MD [facility]

## 2020-04-28 NOTE — TELEPHONE ENCOUNTER
Spoke with pt, states her only complaint is chest pain, denies temp, shortness of breath, pain to arm, jaw, shoulders or back.     Spoke with dr. Hernández, he approved her to come in, scheduled for 1100 but he stated she can come at any time.     Spoke with pt, she verbalized understanding.

## 2020-04-29 DIAGNOSIS — I10 ESSENTIAL HYPERTENSION: ICD-10-CM

## 2020-04-29 RX ORDER — AMLODIPINE BESYLATE 2.5 MG/1
TABLET ORAL
Qty: 90 TABLET | Refills: 3 | Status: SHIPPED | OUTPATIENT
Start: 2020-04-29 | End: 2020-05-13

## 2020-05-06 ENCOUNTER — PATIENT MESSAGE (OUTPATIENT)
Dept: INTERNAL MEDICINE | Facility: CLINIC | Age: 75
End: 2020-05-06

## 2020-05-06 ENCOUNTER — PATIENT MESSAGE (OUTPATIENT)
Dept: CARDIOLOGY | Facility: CLINIC | Age: 75
End: 2020-05-06

## 2020-05-11 ENCOUNTER — TELEPHONE (OUTPATIENT)
Dept: CARDIOLOGY | Facility: CLINIC | Age: 75
End: 2020-05-11

## 2020-05-11 ENCOUNTER — PATIENT OUTREACH (OUTPATIENT)
Dept: ADMINISTRATIVE | Facility: OTHER | Age: 75
End: 2020-05-11

## 2020-05-11 NOTE — TELEPHONE ENCOUNTER
I called patient back today.    Patient stated has had in the last 2 weeks Off & On     Chest. Has also had B/P  Issues.    Wants to be seen as soon as possible.        Lia (tom) Adrian

## 2020-05-13 ENCOUNTER — OFFICE VISIT (OUTPATIENT)
Dept: CARDIOLOGY | Facility: CLINIC | Age: 75
End: 2020-05-13
Payer: MEDICARE

## 2020-05-13 VITALS
WEIGHT: 157.31 LBS | HEART RATE: 72 BPM | BODY MASS INDEX: 25.39 KG/M2 | DIASTOLIC BLOOD PRESSURE: 83 MMHG | SYSTOLIC BLOOD PRESSURE: 149 MMHG

## 2020-05-13 DIAGNOSIS — R07.9 CHEST PAIN, UNSPECIFIED TYPE: Primary | ICD-10-CM

## 2020-05-13 DIAGNOSIS — E78.2 MIXED HYPERLIPIDEMIA: ICD-10-CM

## 2020-05-13 DIAGNOSIS — I87.2 VENOUS INSUFFICIENCY OF RIGHT LOWER EXTREMITY: ICD-10-CM

## 2020-05-13 DIAGNOSIS — I10 ESSENTIAL HYPERTENSION: ICD-10-CM

## 2020-05-13 DIAGNOSIS — I65.23 BILATERAL CAROTID ARTERY STENOSIS: ICD-10-CM

## 2020-05-13 PROCEDURE — 93005 ELECTROCARDIOGRAM TRACING: CPT | Mod: PBBFAC,PO | Performed by: INTERNAL MEDICINE

## 2020-05-13 PROCEDURE — 93010 EKG 12-LEAD: ICD-10-PCS | Mod: S$PBB,,, | Performed by: INTERNAL MEDICINE

## 2020-05-13 PROCEDURE — 99213 OFFICE O/P EST LOW 20 MIN: CPT | Mod: PBBFAC,PO,25 | Performed by: INTERNAL MEDICINE

## 2020-05-13 PROCEDURE — 93010 ELECTROCARDIOGRAM REPORT: CPT | Mod: S$PBB,,, | Performed by: INTERNAL MEDICINE

## 2020-05-13 PROCEDURE — 99214 OFFICE O/P EST MOD 30 MIN: CPT | Mod: S$PBB,,, | Performed by: INTERNAL MEDICINE

## 2020-05-13 PROCEDURE — 99999 PR PBB SHADOW E&M-EST. PATIENT-LVL III: ICD-10-PCS | Mod: PBBFAC,,, | Performed by: INTERNAL MEDICINE

## 2020-05-13 PROCEDURE — 99214 PR OFFICE/OUTPT VISIT, EST, LEVL IV, 30-39 MIN: ICD-10-PCS | Mod: S$PBB,,, | Performed by: INTERNAL MEDICINE

## 2020-05-13 PROCEDURE — 99999 PR PBB SHADOW E&M-EST. PATIENT-LVL III: CPT | Mod: PBBFAC,,, | Performed by: INTERNAL MEDICINE

## 2020-05-13 RX ORDER — NITROGLYCERIN 0.4 MG/1
0.4 TABLET SUBLINGUAL EVERY 5 MIN PRN
Qty: 20 TABLET | Refills: 12 | Status: SHIPPED | OUTPATIENT
Start: 2020-05-13 | End: 2022-02-07 | Stop reason: SDUPTHER

## 2020-05-13 RX ORDER — AMLODIPINE BESYLATE 2.5 MG/1
5 TABLET ORAL DAILY
Qty: 90 TABLET | Refills: 3 | Status: SHIPPED | OUTPATIENT
Start: 2020-05-13 | End: 2020-11-25

## 2020-05-13 NOTE — PATIENT INSTRUCTIONS
Aerobic Exercise for a Healthy Heart  Exercise is a lot more than an energy booster and a stress reliever. It also strengthens your heart muscle, lowers your blood pressure and cholesterol, and burns calories. It can also improve your resting muscle tone, and your mood.     Remember, some activity is better than none.    Choose an aerobic activity  Choose an activity that makes your heart and lungs work harder than they do when you rest or walk normally. This aerobic exercise can improve the way your heart and other muscles use oxygen. Make it fun by exercising with a friend and choosing an activity you enjoy. Here are some ideas:  · Walking  · Swimming  · Bicycling  · Stair climbing  · Dancing  · Jogging  · Gardening  Exercise regularly  If you havent been exercising regularly,  get your doctors OK first. Then start slowly.  Here are some tips:  · Begin exercising 3 times a week for 5 to 10 minutes at a time.  · When you feel comfortable, add a few minutes each session.  · Slowly build up to exercising 3 to 4 times each week. Each session should last for 40 minutes, on average, and involve moderate- to vigorous-intensity physical activity.  · If you have been given nitroglycerin, be sure to carry it when you exercise.  · If you get chest pain (angina) when youre exercising, stop what youre doing, take your nitroglycerin, and call your doctor.  Date Last Reviewed: 6/2/2016  © 2437-1781 [employer]. [address] [place] [id number]. All rights reserved. This information is not intended as a substitute for professional medical care. Always follow your healthcare professional's instructions.          Eating Heart-Healthy Foods  Eating has a big impact on your heart health. In fact, eating healthier can improve several of your heart risks at once. For instance, it helps you manage weight, cholesterol, and blood pressure. Here are ideas to help you make heart-healthy changes without giving up  all the foods and flavors you love.  Getting started  · Talk with your health care provider about eating plans, such as the DASH or Mediterranean diet. You may also be referred to a dietitian.  · Change a few things at a time. Give yourself time to get used to a few eating changes before adding more.  · Work to create a tasty, healthy eating plan that you can stick to for the rest of your life.    Goals for healthy eating  Below are some tips to improve your eating habits:  · Limit saturated fats and trans fats. Saturated fats raise your levels of cholesterol, so keep these fats to a minimum. They are found in foods such as fatty meats, whole milk, cheese, and palm and coconut oils. Avoid trans fats because they lower good cholesterol as well as raise bad cholesterol. Trans fats are most often found in processed foods.  · Reduce sodium (salt) intake. Eating too much salt may increase your blood pressure. Limit your sodium intake to 2,300 milligrams (mg) per day, or less if your health care provider recommends it. Dining out less often and eating fewer processed foods are two great ways to decrease the amount of salt you consume.  · Managing calories. A calorie is a unit of energy. Your body burns calories for fuel, but if you eat more calories than your body burns, the extras are stored as fat. Your health care provider can help you create a diet plan to manage your calories. This will likely include eating healthier foods as well as exercising regularly. To help you track your progress, keep a diary to record what you eat and how often you exercise.  Choose the right foods  Aim to make these foods staples of your diet. If you have diabetes, you may have different recommendations than what is listed here:  · Fruits and vegetable provide plenty of nutrients without a lot of calories. At meals, fill half your plate with these foods. Split the other half of your plate between whole grains and lean protein.  · Whole  grains are high in fiber and rich in vitamins and nutrients. Good choices include whole-wheat bread, pasta, and brown rice.  · Lean proteins give you nutrition with less fat. Good choices include fish, skinless chicken, and beans.  · Low-fat or nonfat dairy provides nutrients without a lot of fat. Try low-fat or nonfat milk, cheese, or yogurt.  · Healthy fats can be good for you in small amounts. These are unsaturated fats, such as olive oil, nuts, and fish. Try to have at least 2 servings per week of fatty fish such as salmon, sardines, mackerel, rainbow trout, and albacore tuna. These contain omega-3 fatty acids, which are good for your heart. Flaxseed is another source of a heart-healthy fat.  More on heart healthy eating    Read food labels  Healthy eating starts at the grocery store. Be sure to pay attention to food labels on packaged foods. Look for products that are high in fiber and protein, and low in saturated fat, cholesterol, and sodium. Avoid products that contain trans fat. And pay close attention to serving size. For instance, if you plan to eat two servings, double all the numbers on the label.  Prepare food right  A key part of healthy cooking is cutting down on added fat and salt. Look on the internet for lower-fat, lower-sodium recipes. Also, try these tips:  · Remove fat from meat and skin from poultry before cooking.  · Skim fat from the surface of soups and sauces.  · Broil, boil, bake, steam, grill, and microwave food without added fats.  · Choose ingredients that spice up your food without adding calories, fat, or sodium. Try these items: horseradish, hot sauce, lemon, mustard, nonfat salad dressings, and vinegar. For salt-free herbs and spices, try basil, cilantro, cinnamon, pepper, and rosemary.  Date Last Reviewed: 6/25/2015  © 8044-3801 [employer]. [address] [place] [address], [place] [address]. All rights reserved. This information is not intended as a substitute for  professional medical care. Always follow your healthcare professional's instructions.

## 2020-05-13 NOTE — PROGRESS NOTES
Subjective:   Patient ID:  Mirna Norton is a 75 y.o. female who presents for follow up of No chief complaint on file.      HPI:     Mirna Norton 75 y.o. female   She is a patient of Dr. Gonzalez. I am seeing her on his behalf.   Patient at th beginning of April has chest pain which she describes as cramping. The pain happened after she was eating.  It lasted for few minutes. No recurrent pain since  Then. But she report another chronic indigestion that gets better after burping. She used to follow up with GI.     Since Apri she has been having insomnia as well which she thinks it could be related to her heart.     EKG SR with no actue ST changes.   BP has been running a little higher since then  No prior cardiac hx    Historically:    She is s/p R GSVL EVLT  In 4/2013 with subsequent injections in 2014 without recurrent issues. She has asymptomatic mild bilateral carotid disease.       US 11/2018      R ICA 92 cm/sec   L  cm/sec      US 12/2019     R ICA 72 with ratio <1   L ICA 83 with ratio 108      ECG 1/27/2020     Sinus bradycardia        Patient Active Problem List    Diagnosis Date Noted    History of colon polyps 07/29/2019    Venous insufficiency of right lower extremity 11/13/2017         R GSV EVLT 4/2013 with subsequent injections in 2014          Bilateral carotid bruits 11/13/2017    Foot pain, right 06/13/2017    Hyperlipidemia 09/28/2015    Irritable bowel syndrome without diarrhea 09/20/2015    Carotid stenosis 05/15/2014         Asymptomatic        R ICA 93 cm/sec   L  cm/sec      US 11/2018      R ICA 92 cm/sec   L  cm/sec              Pancreatic cyst 12/30/2013    Gout, arthritis 02/20/2013    Lumbosacral spondylosis 02/20/2013    GERD (gastroesophageal reflux disease)      Gastroesophageal reflux with nonsteroidal antiinflammatory drugs, but can tolerate short term;   and fear of Celebrex        Hypertension                     LABS    LAST HbA1c  Lab  Results   Component Value Date    HGBA1C 6.2 (H) 04/28/2020       Lipid panel  Lab Results   Component Value Date    CHOL 151 04/28/2020    CHOL 148 09/04/2019    CHOL 136 10/11/2018     Lab Results   Component Value Date    HDL 60 04/28/2020    HDL 63 09/04/2019    HDL 60 10/11/2018     Lab Results   Component Value Date    LDLCALC 67.6 04/28/2020    LDLCALC 64 09/04/2019    LDLCALC 55.6 (L) 10/11/2018     Lab Results   Component Value Date    TRIG 117 04/28/2020    TRIG 128 09/04/2019    TRIG 102 10/11/2018     Lab Results   Component Value Date    CHOLHDL 39.7 04/28/2020    CHOLHDL 2.3 09/04/2019    CHOLHDL 44.1 10/11/2018            Review of Systems   Constitution: Negative for chills and fever.   HENT: Negative for hearing loss and nosebleeds.    Eyes: Negative for blurred vision.   Cardiovascular:        As in HPI    Respiratory: Negative for hemoptysis and shortness of breath.    Hematologic/Lymphatic: Negative for bleeding problem.   Skin: Negative for itching.   Musculoskeletal: Negative for falls.   Gastrointestinal: Negative for abdominal pain and hematochezia.        As in HPI    Genitourinary: Negative for hematuria.   Neurological: Negative for dizziness and loss of balance.   Psychiatric/Behavioral: Negative for altered mental status and depression. The patient has insomnia.        Objective:   Physical Exam   Constitutional: She is oriented to person, place, and time. She appears well-developed and well-nourished.   HENT:   Head: Normocephalic and atraumatic.   Eyes: Conjunctivae are normal.   Neck: Neck supple. No JVD present.   Cardiovascular: Normal rate, regular rhythm and normal heart sounds. Exam reveals no gallop and no friction rub.   No murmur heard.  Pulmonary/Chest: Effort normal and breath sounds normal. No stridor. No respiratory distress. She has no wheezes.   Neurological: She is alert and oriented to person, place, and time.   Skin: Skin is warm and dry.   Psychiatric: She has a  normal mood and affect. Her behavior is normal.       Assessment:     1. Chest pain, unspecified type    2. Essential hypertension    3. Bilateral carotid artery stenosis    4. Mixed hyperlipidemia    5. Venous insufficiency of right lower extremity        Plan:   - Atypical in nature.  DD GI in etiology. Given risk factors and age will risk stratify with stress echo  - Increase Norvasc to 5 mg daily  - Continue ASA/Statin          Continue with current medical plan and lifestyle changes.  Return sooner for concerns or questions. If symptoms persist go to the ED  I have reviewed all pertinent data on this patient       I have reviewed the patient's medical history in detail and updated the computerized patient record.    Orders Placed This Encounter   Procedures    IN OFFICE EKG 12-LEAD (to Muse)     Order Specific Question:   Diagnosis     Answer:   Chest pain [[id number]]    Stress Echo Which stress agent will be used? Treadmill Exercise; Color Flow Doppler? No     Standing Status:   Future     Standing Expiration Date:   5/13/2021     Order Specific Question:   Which stress agent will be used?     Answer:   Treadmill Exercise     Order Specific Question:   Color Flow Doppler?     Answer:   No       Follow up as scheduled. Return sooner for concerns or questions  Follow up yearly           She expressed verbal understanding and agreed with the plan      Greater than 50% of the visit of 45 minutes was spent counseling, educating, and coordinating the care of the patient.  -In today's visit, at least 4 established conditions that pose a risk to life or bodily function have been addressed and the conditions are severe.    -In today's visit, monitoring for drug toxicity was accomplished.            Patient's Medications   New Prescriptions    No medications on file   Previous Medications    ALLOPURINOL (ZYLOPRIM) 100 MG TABLET    TAKE 1 TABLET BY MOUTH EVERY DAY    AMLODIPINE (NORVASC) 2.5 MG TABLET    TAKE 1 TABLET BY  MOUTH EVERY DAY    ASPIRIN (ECOTRIN) 81 MG EC TABLET    Take 1 tablet (81 mg total) by mouth once daily.    BIFIDOBACTERIUM INFANTIS (ALIGN ORAL)    Take by mouth once daily.    HYDROCHLOROTHIAZIDE (HYDRODIURIL) 25 MG TABLET    TAKE 1 TABLET BY MOUTH EVERY DAY    METOPROLOL SUCCINATE (TOPROL-XL) 50 MG 24 HR TABLET    TAKE 1 TABLET BY MOUTH EVERY DAY    MULTIVITAMIN-MINERALS-LUTEIN (CENTRUM SILVER) TAB    Take 1 tablet by mouth once daily.     OMEPRAZOLE (PRILOSEC) 40 MG CAPSULE    Take 1 capsule (40 mg total) by mouth every morning. 1 Capsule, Delayed Release(E.C.) Oral Every day    POTASSIUM CHLORIDE (MICRO-K) 10 MEQ CPSR    Take 1 capsule (10 mEq total) by mouth once daily.    PRAVASTATIN (PRAVACHOL) 40 MG TABLET    TAKE 1 TABLET BY MOUTH IN THE EVENING    VALSARTAN (DIOVAN) 160 MG TABLET    TAKE 1 TABLET (160 MG TOTAL) BY MOUTH ONCE DAILY.   Modified Medications    No medications on file   Discontinued Medications

## 2020-05-15 ENCOUNTER — CLINICAL SUPPORT (OUTPATIENT)
Dept: CARDIOLOGY | Facility: CLINIC | Age: 75
End: 2020-05-15
Attending: INTERNAL MEDICINE
Payer: MEDICARE

## 2020-05-15 VITALS — WEIGHT: 157 LBS | BODY MASS INDEX: 25.23 KG/M2 | HEIGHT: 66 IN

## 2020-05-15 DIAGNOSIS — R07.9 CHEST PAIN, UNSPECIFIED TYPE: ICD-10-CM

## 2020-05-15 DIAGNOSIS — I10 ESSENTIAL HYPERTENSION: ICD-10-CM

## 2020-05-15 LAB
ASCENDING AORTA: 3.16 CM
BSA FOR ECHO PROCEDURE: 1.82 M2
CV ECHO LV RWT: 0.33 CM
CV STRESS BASE HR: 64 BPM
DIASTOLIC BLOOD PRESSURE: 65 MMHG
DOP CALC LVOT AREA: 3.2 CM2
DOP CALC LVOT DIAMETER: 2.02 CM
DOP CALC LVOT PEAK VEL: 1.13 M/S
DOP CALC LVOT STROKE VOLUME: 88.05 CM3
DOP CALCLVOT PEAK VEL VTI: 27.49 CM
E WAVE DECELERATION TIME: 165.71 MSEC
E/A RATIO: 1
E/E' RATIO: 10.12 M/S
ECHO LV POSTERIOR WALL: 0.72 CM (ref 0.6–1.1)
FRACTIONAL SHORTENING: 32 % (ref 28–44)
INTERVENTRICULAR SEPTUM: 0.87 CM (ref 0.6–1.1)
IVRT: 79.92 MSEC
LA MAJOR: 4.94 CM
LA MINOR: 4.83 CM
LA WIDTH: 4.35 CM
LEFT ATRIUM SIZE: 4 CM
LEFT ATRIUM VOLUME INDEX: 40 ML/M2
LEFT ATRIUM VOLUME: 72.24 CM3
LEFT INTERNAL DIMENSION IN SYSTOLE: 2.95 CM (ref 2.1–4)
LEFT VENTRICLE DIASTOLIC VOLUME INDEX: 47.63 ML/M2
LEFT VENTRICLE DIASTOLIC VOLUME: 85.96 ML
LEFT VENTRICLE MASS INDEX: 59 G/M2
LEFT VENTRICLE SYSTOLIC VOLUME INDEX: 18.6 ML/M2
LEFT VENTRICLE SYSTOLIC VOLUME: 33.63 ML
LEFT VENTRICULAR INTERNAL DIMENSION IN DIASTOLE: 4.36 CM (ref 3.5–6)
LEFT VENTRICULAR MASS: 106.9 G
LV LATERAL E/E' RATIO: 7.82 M/S
LV SEPTAL E/E' RATIO: 14.33 M/S
MV PEAK A VEL: 0.86 M/S
MV PEAK E VEL: 0.86 M/S
OHS CV CPX 1 MINUTE RECOVERY HEART RATE: 85 BPM
OHS CV CPX 85 PERCENT MAX PREDICTED HEART RATE MALE: 120
OHS CV CPX ESTIMATED METS: 9
OHS CV CPX MAX PREDICTED HEART RATE: 141
OHS CV CPX PATIENT IS FEMALE: 1
OHS CV CPX PATIENT IS MALE: 0
OHS CV CPX PEAK DIASTOLIC BLOOD PRESSURE: 76 MMHG
OHS CV CPX PEAK HEAR RATE: 125 BPM
OHS CV CPX PEAK RATE PRESSURE PRODUCT: NORMAL
OHS CV CPX PEAK SYSTOLIC BLOOD PRESSURE: 177 MMHG
OHS CV CPX PERCENT MAX PREDICTED HEART RATE ACHIEVED: 89
OHS CV CPX RATE PRESSURE PRODUCT PRESENTING: 8256
PULM VEIN S/D RATIO: 1.02
PV PEAK D VEL: 0.62 M/S
PV PEAK S VEL: 0.63 M/S
RA MAJOR: 5.08 CM
RA PRESSURE: 3 MMHG
RA WIDTH: 3.78 CM
RIGHT VENTRICULAR END-DIASTOLIC DIMENSION: 3.64 CM
SINUS: 2.78 CM
STJ: 2.24 CM
STRESS ECHO POST EXERCISE DUR MIN: 5 MINUTES
STRESS ECHO POST EXERCISE DUR SEC: 36 SECONDS
SYSTOLIC BLOOD PRESSURE: 129 MMHG
TDI LATERAL: 0.11 M/S
TDI SEPTAL: 0.06 M/S
TDI: 0.09 M/S
TRICUSPID ANNULAR PLANE SYSTOLIC EXCURSION: 1.98 CM

## 2020-05-15 PROCEDURE — 99999 PR PBB SHADOW E&M-EST. PATIENT-LVL I: CPT | Mod: PBBFAC,,,

## 2020-05-15 PROCEDURE — 93351 STRESS TTE COMPLETE: CPT | Mod: PBBFAC,PO | Performed by: INTERNAL MEDICINE

## 2020-05-15 PROCEDURE — 99211 OFF/OP EST MAY X REQ PHY/QHP: CPT | Mod: PBBFAC,PO

## 2020-05-15 PROCEDURE — 93351 STRESS ECHO (CUPID ONLY): ICD-10-PCS | Mod: 26,S$PBB,, | Performed by: INTERNAL MEDICINE

## 2020-05-15 PROCEDURE — 99999 PR PBB SHADOW E&M-EST. PATIENT-LVL I: ICD-10-PCS | Mod: PBBFAC,,,

## 2020-05-20 ENCOUNTER — TELEPHONE (OUTPATIENT)
Dept: CARDIOLOGY | Facility: CLINIC | Age: 75
End: 2020-05-20

## 2020-05-20 NOTE — TELEPHONE ENCOUNTER
----- Message from Mark Shaw MD sent at 5/19/2020 10:43 PM CDT -----  Please let her know that the stress test is good. It didn't suggest blockages. Thanks

## 2020-05-20 NOTE — TELEPHONE ENCOUNTER
Reached out to pt and notified her of stress test results per Dr Hagersef, results were good, did not suggest blockages.    Verbalized understanding.     Encouraged to reach out if she has any questions or concerns.

## 2020-06-03 ENCOUNTER — PATIENT OUTREACH (OUTPATIENT)
Dept: OTHER | Facility: OTHER | Age: 75
End: 2020-06-03

## 2020-06-03 NOTE — PROGRESS NOTES
Digital Medicine: Health  Follow-Up    Patient reports she was experiencing chest pain and has gotten this checked out by cardiology.  Reports she plans to make an appt with Gastro.    Reports she had to call for tech support last week since her BP cuff was not working.  States she needed to charge it.  Encouraged patient to charge BP weekly.    The history is provided by the patient.     Follow Up  Follow-up reason(s): reading review      Readings are trending down       INTERVENTION(S)  reviewed monitoring technique, encouragement/support and denied questions    PLAN  patient verbalizes understanding and continue monitoring      There are no preventive care reminders to display for this patient.    Last 5 Patient Entered Readings                                      Current 30 Day Average: 141/82     Recent Readings 5/29/2020 5/26/2020 5/18/2020 5/9/2020 5/8/2020    SBP (mmHg) 147 120 120 160 158    DBP (mmHg) 81 80 80 85 84    Pulse 65 80 80 61 62                  Screenings    SDOH

## 2020-06-16 NOTE — PATIENT INSTRUCTIONS
PLAN:  Consult Orthopedics/ patient requesting Yin  Advise increase p.o. fluids- water/juice & rest  Advise use of splint  Tylenol or Ibuprofen for fever, headache and body aches.  Advised no heavy lifting  Advise follow up with PCP  Advise go to ER if nausea, vomiting, fever, increased pain, or fail to improve with treatment.  AVS provided and reviewed with patient including supportive care, follow up, and red flag symptoms.   Patient verbalizes understanding and agrees with treatment plan. Discharged from Urgent Care in stable condition.    
Never smoker

## 2020-07-30 ENCOUNTER — PATIENT OUTREACH (OUTPATIENT)
Dept: OTHER | Facility: OTHER | Age: 75
End: 2020-07-30

## 2020-07-30 NOTE — PROGRESS NOTES
Digital Medicine: Health  Follow-Up    The history is provided by the patient.             Reason for review: Blood pressure at goal        Topics Covered on Call: Technique    Additional Follow-up details: Reports higher reading on 7/23 was before taking her medication.  Reviewed technique and asked her to take a reading this week.        Diet-Not assessed      Additional diet details:    Physical Activity-Not assessed    Medication Adherence-Medication Adherence not addressed.      Substance, Sleep, Stress-Not assessed    PLAN  Reviewed Device Techniques  Patient verbalizes understanding. Patient did not express questions or concerns and patient has contact information if needed.    Explained the importance of self-monitoring and medication adherence. Encouraged the patient to communicate with their health  for lifestyle modifications to help improve or maintain a healthy lifestyle.        There are no preventive care reminders to display for this patient.    Last 5 Patient Entered Readings                                      Current 30 Day Average: 126/76     Recent Readings 7/23/2020 7/20/2020 7/20/2020 7/20/2020 7/19/2020    SBP (mmHg) 155 124 119 123 118    DBP (mmHg) 83 76 72 79 74    Pulse 49 59 59 61 60

## 2020-08-10 ENCOUNTER — PATIENT OUTREACH (OUTPATIENT)
Dept: OTHER | Facility: OTHER | Age: 75
End: 2020-08-10

## 2020-08-13 ENCOUNTER — TELEPHONE (OUTPATIENT)
Dept: CARDIOLOGY | Facility: CLINIC | Age: 75
End: 2020-08-13

## 2020-08-13 ENCOUNTER — PATIENT OUTREACH (OUTPATIENT)
Dept: ADMINISTRATIVE | Facility: OTHER | Age: 75
End: 2020-08-13

## 2020-08-13 ENCOUNTER — PATIENT OUTREACH (OUTPATIENT)
Dept: ADMINISTRATIVE | Facility: HOSPITAL | Age: 75
End: 2020-08-13

## 2020-08-13 DIAGNOSIS — R07.9 CHEST PAIN: ICD-10-CM

## 2020-08-13 DIAGNOSIS — R07.9 CHEST PAIN, UNSPECIFIED TYPE: Primary | ICD-10-CM

## 2020-08-13 NOTE — PROGRESS NOTES
Health Maintenance Due   Topic Date Due    Shingles Vaccine (1 of 2) 01/12/1996    TETANUS VACCINE  01/16/2018     Updates were requested from care everywhere.  Chart was reviewed for overdue Proactive [facility] Encounters ([facility]ON[facility]) topics (CRS, Breast Cancer Screening, Eye exam)  Health Maintenance has been updated.  LINKS immunization registry triggered.  Immunizations were reconciled.

## 2020-08-13 NOTE — PROGRESS NOTES
Health Maintenance Due   Topic Date Due    Shingles Vaccine (1 of 2) 01/12/1996    TETANUS VACCINE  01/16/2018     Chart review completed.

## 2020-08-17 ENCOUNTER — OFFICE VISIT (OUTPATIENT)
Dept: CARDIOLOGY | Facility: CLINIC | Age: 75
End: 2020-08-17
Payer: MEDICARE

## 2020-08-17 VITALS
DIASTOLIC BLOOD PRESSURE: 72 MMHG | WEIGHT: 156 LBS | HEART RATE: 55 BPM | SYSTOLIC BLOOD PRESSURE: 111 MMHG | BODY MASS INDEX: 25.07 KG/M2 | HEIGHT: 66 IN

## 2020-08-17 DIAGNOSIS — I65.23 BILATERAL CAROTID ARTERY STENOSIS: ICD-10-CM

## 2020-08-17 DIAGNOSIS — R07.89 NON-CARDIAC CHEST PAIN: Primary | ICD-10-CM

## 2020-08-17 DIAGNOSIS — I87.2 VENOUS INSUFFICIENCY OF RIGHT LOWER EXTREMITY: ICD-10-CM

## 2020-08-17 DIAGNOSIS — E78.2 MIXED HYPERLIPIDEMIA: ICD-10-CM

## 2020-08-17 PROCEDURE — 99214 OFFICE O/P EST MOD 30 MIN: CPT | Mod: PBBFAC,PO | Performed by: INTERNAL MEDICINE

## 2020-08-17 PROCEDURE — 99215 OFFICE O/P EST HI 40 MIN: CPT | Mod: S$PBB,,, | Performed by: INTERNAL MEDICINE

## 2020-08-17 PROCEDURE — 99215 PR OFFICE/OUTPT VISIT, EST, LEVL V, 40-54 MIN: ICD-10-PCS | Mod: S$PBB,,, | Performed by: INTERNAL MEDICINE

## 2020-08-17 PROCEDURE — 99999 PR PBB SHADOW E&M-EST. PATIENT-LVL IV: ICD-10-PCS | Mod: PBBFAC,,, | Performed by: INTERNAL MEDICINE

## 2020-08-17 PROCEDURE — 99999 PR PBB SHADOW E&M-EST. PATIENT-LVL IV: CPT | Mod: PBBFAC,,, | Performed by: INTERNAL MEDICINE

## 2020-08-17 NOTE — PROGRESS NOTES
Digital Medicine: Clinician Follow-Up    Called patient for routine follow up regarding HDMP (Hypertension Digital Medicine Program)    HPI  Patients BP average is currently 131/77 mmHg.    Patient denies s/s of hypotension (lightheadedness, dizziness, nausea, fatigue) associated with low readings. Instructed patient to inform me if this occurs, patient confirms understanding.    Patient denies s/s of hypertension (SOB, CP, severe headaches, changes in vision) associated with high readings. Instructed patient to go to the ED if BP >180/110 and accompanied by hypertensive s/s, patient confirms understanding.    Patient states she's had chest pains in March but she is going today to get them checked out by her cardiologist.     She takes her Amlodipine 5 mg, HCTZ 25 mg, Toprol Xl 50 mg, and Valsartan 160 mg in the morning and she waits an hour to check her BP.    The history is provided by the patient.   Follow-up reason(s): routine follow up.     Hypertension    Patient readings are stable   Patient is not experiencing signs/symptoms of hypotension.  Patient is not experiencing signs/symptoms of hypertension.      Additional Follow-up details: Patient states she does not need refills on her medication.    She asked when she can get off her potassium supplement since she has been on it for a year.       Last 5 Patient Entered Readings                                      Current 30 Day Average: 131/77     Recent Readings 8/14/2020 8/14/2020 8/14/2020 8/9/2020 8/9/2020    SBP (mmHg) 126 106 123 122 122    DBP (mmHg) 70 70 82 74 83    Pulse 65 63 65 62 67               Depression Screening  Did not address depression screening.    Sleep Apnea Screening    Did not address sleep apnea screening.     Medication Affordability Screening  Did not address medication affordability screening.     Medication Adherence-Medication adherence was assessed.          ASSESSMENT(S)  Patients BP average is 131/77 mmHg, which is at  goal. Patient's BP goal is less than or equal to 130/80 per 2017 ACC/AHA Hypertension Guidelines.      Hypertension Plan  Continue current therapy.  Continue current diet/physical activity routine.  Await MD intervention. Encouraged patient to speak to pcp about her potassium supplement since he initiated it  Provided patient education. Encouraged her to increase potassium rich foods in her diet  Will call patient in a few months, sooner if needed. Patient knows to reach out at any time for any questions or concerns.        Addressed any questions or concerns and patient has my contact information if needed prior to next outreach. Patient verbalizes understanding.            There are no preventive care reminders to display for this patient.      Hypertension Medications             amLODIPine (NORVASC) 2.5 MG tablet Take 2 tablets (5 mg total) by mouth once daily.    hydroCHLOROthiazide (HYDRODIURIL) 25 MG tablet TAKE 1 TABLET BY MOUTH EVERY DAY    metoprolol succinate (TOPROL-XL) 50 MG 24 hr tablet TAKE 1 TABLET BY MOUTH EVERY DAY    nitroGLYCERIN (NITROSTAT) 0.4 MG SL tablet Place 1 tablet (0.4 mg total) under the tongue every 5 (five) minutes as needed for Chest pain.    valsartan (DIOVAN) 160 MG tablet TAKE 1 TABLET (160 MG TOTAL) BY MOUTH ONCE DAILY.

## 2020-08-17 NOTE — PROGRESS NOTES
Subjective:   Patient ID:  Mirna Norton is a 75 y.o. female who presents for follow up of non cardiac chest pain, Results, Hypertension, and Hyperlipidemia      HPI:     Mirna Norton 75 y.o. female is here follow up and feeling well without any new complaints. She is s/p R GSVL EVLT  In 4/2013 with subsequent injections in 2014 without recurrent issues. She has asymptomatic mild bilateral carotid disease. She takes aspirin and losartan. Her BP ans lipid profile are well controlled. LDL 87 on pravastatin 40 mg nightly. Last venous ultrasound-no DVT or reflux disease. Carotid ultrasound-PSV < 130 cm/sec bilaterally.  In May 2020 she had atypical chest discomfort.  A stress echocardiogram revealed a normal ejection fraction, no wall motion abnormalities, no EKG changes, or symptoms suggestive of obstructive coronary disease.         US 11/2018      R ICA 92 cm/sec   L  cm/sec      US 12/2019     R ICA 72 with ratio <1   L ICA 83 with ratio 108      ECG 1/27/2020     Sinus bradycardia      YOLANDE 5/2020     Non ischemic   Normal EF   Normal ECG   No symptoms   No WMAs        Patient Active Problem List    Diagnosis Date Noted    Non-cardiac chest pain 08/17/2020    History of colon polyps 07/29/2019    Venous insufficiency of right lower extremity 11/13/2017         R GSV EVLT 4/2013 with subsequent injections in 2014          Bilateral carotid bruits 11/13/2017    Foot pain, right 06/13/2017    Hyperlipidemia 09/28/2015    Irritable bowel syndrome without diarrhea 09/20/2015    Carotid stenosis 05/15/2014         Asymptomatic        R ICA 93 cm/sec   L  cm/sec      US 11/2018      R ICA 92 cm/sec   L  cm/sec              Pancreatic cyst 12/30/2013    Gout, arthritis 02/20/2013    Lumbosacral spondylosis 02/20/2013    GERD (gastroesophageal reflux disease)      Gastroesophageal reflux with nonsteroidal antiinflammatory drugs, but can tolerate short term;   and fear of Celebrex         Hypertension            Right Arm BP - Sittin/75  Left Arm BP - Sittin/72        LABS    LAST HbA1c  Lab Results   Component Value Date    HGBA1C 6.2 (H) 2020       Lipid panel  Lab Results   Component Value Date    CHOL 151 2020    CHOL 148 2019    CHOL 136 10/11/2018     Lab Results   Component Value Date    HDL 60 2020    HDL 63 2019    HDL 60 10/11/2018     Lab Results   Component Value Date    LDLCALC 67.6 2020    LDLCALC 64 2019    LDLCALC 55.6 (L) 10/11/2018     Lab Results   Component Value Date    TRIG 117 2020    TRIG 128 2019    TRIG 102 10/11/2018     Lab Results   Component Value Date    CHOLHDL 39.7 2020    CHOLHDL 2.3 2019    CHOLHDL 44.1 10/11/2018            Review of Systems   Constitution: Negative for diaphoresis, night sweats, weight gain and weight loss.   HENT: Negative for congestion.    Eyes: Negative for blurred vision, discharge and double vision.   Cardiovascular: Negative for chest pain, claudication, cyanosis, dyspnea on exertion, irregular heartbeat, leg swelling, near-syncope, orthopnea, palpitations, paroxysmal nocturnal dyspnea and syncope.   Respiratory: Negative for cough, shortness of breath and wheezing.    Endocrine: Negative for cold intolerance, heat intolerance and polyphagia.   Hematologic/Lymphatic: Negative for adenopathy and bleeding problem. Does not bruise/bleed easily.   Skin: Negative for dry skin and nail changes.   Musculoskeletal: Negative for arthritis, back pain, falls, joint pain, myalgias and neck pain.   Gastrointestinal: Negative for bloating, abdominal pain, change in bowel habit and constipation.   Genitourinary: Negative for bladder incontinence, dysuria, flank pain, genital sores and missed menses.   Neurological: Negative for aphonia, brief paralysis, difficulty with concentration, dizziness and weakness.   Psychiatric/Behavioral: Negative for altered mental status and memory  loss. The patient does not have insomnia.    Allergic/Immunologic: Negative for environmental allergies.       Objective:   Physical Exam   Constitutional: She is oriented to person, place, and time. She appears well-developed and well-nourished. She is not intubated.   HENT:   Head: Normocephalic and atraumatic.   Right Ear: External ear normal.   Left Ear: External ear normal.   Mouth/Throat: Oropharynx is clear and moist.   Eyes: Pupils are equal, round, and reactive to light. Conjunctivae and EOM are normal. Right eye exhibits no discharge. Left eye exhibits no discharge. No scleral icterus.   Neck: Normal range of motion. Neck supple. Normal carotid pulses, no hepatojugular reflux and no JVD present. Carotid bruit is not present. No tracheal deviation present. No thyromegaly present.   Cardiovascular: Normal rate, regular rhythm, S1 normal and S2 normal.  No extrasystoles are present. PMI is not displaced. Exam reveals no gallop, no S3, no distant heart sounds, no friction rub and no midsystolic click.   No murmur heard.  Pulses:       Carotid pulses are 2+ on the right side and 2+ on the left side.       Radial pulses are 2+ on the right side and 2+ on the left side.        Femoral pulses are 2+ on the right side and 2+ on the left side.       Popliteal pulses are 2+ on the right side and 2+ on the left side.        Dorsalis pedis pulses are 2+ on the right side and 2+ on the left side.        Posterior tibial pulses are 2+ on the right side and 2+ on the left side.   Pulmonary/Chest: Effort normal and breath sounds normal. No accessory muscle usage or stridor. No apnea, no tachypnea and no bradypnea. She is not intubated. No respiratory distress. She has no decreased breath sounds. She has no wheezes. She has no rales. She exhibits no tenderness and no bony tenderness.   Abdominal: She exhibits no distension, no pulsatile liver, no abdominal bruit, no ascites, no pulsatile midline mass and no mass. There is  no abdominal tenderness. There is no rebound and no guarding.   Musculoskeletal: Normal range of motion.         General: No tenderness or edema.   Lymphadenopathy:     She has no cervical adenopathy.   Neurological: She is alert and oriented to person, place, and time. She has normal reflexes. No cranial nerve deficit. Coordination normal.   Skin: Skin is warm. No rash noted. No erythema. No pallor.   Psychiatric: She has a normal mood and affect. Her behavior is normal. Judgment and thought content normal.       Assessment:     1. Non-cardiac chest pain    2. Bilateral carotid artery stenosis    3. Mixed hyperlipidemia    4. Venous insufficiency of right lower extremity        Plan:           Low salt diet  Exercise  Weight loss  Follow up yearly         Continue with current medical plan and lifestyle changes.  Return sooner for concerns or questions. If symptoms persist go to the ED  I have reviewed all pertinent data on this patient       I have reviewed the patient's medical history in detail and updated the computerized patient record.    Orders Placed This Encounter   Procedures    US Carotid Bilateral     Standing Status:   Future     Standing Expiration Date:   8/17/2021    Lipid Panel     Standing Status:   Future     Standing Expiration Date:   10/16/2021    Comprehensive metabolic panel     Standing Status:   Future     Standing Expiration Date:   10/16/2021       Follow up as scheduled. Return sooner for concerns or questions  Follow up yearly           She expressed verbal understanding and agreed with the plan      Greater than 50% of the visit of 45 minutes was spent counseling, educating, and coordinating the care of the patient.  -In today's visit, at least 4 established conditions that pose a risk to life or bodily function have been addressed and the conditions are severe.    -In today's visit, monitoring for drug toxicity was accomplished.            Patient's Medications   New Prescriptions     No medications on file   Previous Medications    ALLOPURINOL (ZYLOPRIM) 100 MG TABLET    TAKE 1 TABLET BY MOUTH EVERY DAY    AMLODIPINE (NORVASC) 2.5 MG TABLET    Take 2 tablets (5 mg total) by mouth once daily.    ASPIRIN (ECOTRIN) 81 MG EC TABLET    Take 1 tablet (81 mg total) by mouth once daily.    BIFIDOBACTERIUM INFANTIS (ALIGN ORAL)    Take by mouth once daily.    HYDROCHLOROTHIAZIDE (HYDRODIURIL) 25 MG TABLET    TAKE 1 TABLET BY MOUTH EVERY DAY    METOPROLOL SUCCINATE (TOPROL-XL) 50 MG 24 HR TABLET    TAKE 1 TABLET BY MOUTH EVERY DAY    MULTIVITAMIN-MINERALS-LUTEIN (CENTRUM SILVER) TAB    Take 1 tablet by mouth once daily.     NITROGLYCERIN (NITROSTAT) 0.4 MG SL TABLET    Place 1 tablet (0.4 mg total) under the tongue every 5 (five) minutes as needed for Chest pain.    POTASSIUM CHLORIDE (MICRO-K) 10 MEQ CPSR    Take 1 capsule (10 mEq total) by mouth once daily.    PRAVASTATIN (PRAVACHOL) 40 MG TABLET    TAKE 1 TABLET BY MOUTH IN THE EVENING    VALSARTAN (DIOVAN) 160 MG TABLET    TAKE 1 TABLET (160 MG TOTAL) BY MOUTH ONCE DAILY.   Modified Medications    No medications on file   Discontinued Medications    No medications on file

## 2020-08-18 ENCOUNTER — HOSPITAL ENCOUNTER (OUTPATIENT)
Dept: RADIOLOGY | Facility: HOSPITAL | Age: 75
Discharge: HOME | End: 2020-08-18
Attending: PODIATRIST
Payer: MEDICARE

## 2020-08-18 ENCOUNTER — OFFICE VISIT (OUTPATIENT)
Dept: PODIATRY | Facility: CLINIC | Age: 75
End: 2020-08-18
Payer: MEDICARE

## 2020-08-18 ENCOUNTER — OFFICE VISIT (OUTPATIENT)
Dept: INTERNAL MEDICINE | Facility: CLINIC | Age: 75
End: 2020-08-18
Payer: MEDICARE

## 2020-08-18 VITALS
DIASTOLIC BLOOD PRESSURE: 71 MMHG | SYSTOLIC BLOOD PRESSURE: 121 MMHG | HEIGHT: 66 IN | BODY MASS INDEX: 25.44 KG/M2 | HEART RATE: 55 BPM

## 2020-08-18 VITALS
WEIGHT: 157.63 LBS | SYSTOLIC BLOOD PRESSURE: 112 MMHG | OXYGEN SATURATION: 99 % | DIASTOLIC BLOOD PRESSURE: 68 MMHG | HEART RATE: 60 BPM | BODY MASS INDEX: 25.33 KG/M2 | HEIGHT: 66 IN

## 2020-08-18 DIAGNOSIS — M79.672 FOOT PAIN, LEFT: ICD-10-CM

## 2020-08-18 DIAGNOSIS — M77.52 BURSITIS OF LEFT FOOT: Primary | ICD-10-CM

## 2020-08-18 DIAGNOSIS — M79.672 FOOT PAIN, LEFT: Primary | ICD-10-CM

## 2020-08-18 PROCEDURE — 99203 PR OFFICE/OUTPT VISIT, NEW, LEVL III, 30-44 MIN: ICD-10-PCS | Mod: S$PBB,,, | Performed by: PODIATRIST

## 2020-08-18 PROCEDURE — 73630 X-RAY EXAM OF FOOT: CPT | Mod: TC,FY,PO,LT

## 2020-08-18 PROCEDURE — 73630 XR FOOT COMPLETE 3 VIEW LEFT: ICD-10-PCS | Mod: 26,LT,, | Performed by: RADIOLOGY

## 2020-08-18 PROCEDURE — 99999 PR PBB SHADOW E&M-EST. PATIENT-LVL IV: ICD-10-PCS | Mod: PBBFAC,,, | Performed by: PODIATRIST

## 2020-08-18 PROCEDURE — 99999 PR PBB SHADOW E&M-EST. PATIENT-LVL IV: CPT | Mod: PBBFAC,,, | Performed by: PODIATRIST

## 2020-08-18 PROCEDURE — 99213 PR OFFICE/OUTPT VISIT, EST, LEVL III, 20-29 MIN: ICD-10-PCS | Mod: S$PBB,,, | Performed by: INTERNAL MEDICINE

## 2020-08-18 PROCEDURE — 99214 OFFICE O/P EST MOD 30 MIN: CPT | Mod: PBBFAC,25,PO | Performed by: PODIATRIST

## 2020-08-18 PROCEDURE — 99213 OFFICE O/P EST LOW 20 MIN: CPT | Mod: S$PBB,,, | Performed by: INTERNAL MEDICINE

## 2020-08-18 PROCEDURE — 99999 PR PBB SHADOW E&M-EST. PATIENT-LVL V: CPT | Mod: PBBFAC,,, | Performed by: INTERNAL MEDICINE

## 2020-08-18 PROCEDURE — 99215 OFFICE O/P EST HI 40 MIN: CPT | Mod: PBBFAC,25,27 | Performed by: INTERNAL MEDICINE

## 2020-08-18 PROCEDURE — 99203 OFFICE O/P NEW LOW 30 MIN: CPT | Mod: S$PBB,,, | Performed by: PODIATRIST

## 2020-08-18 PROCEDURE — 99999 PR PBB SHADOW E&M-EST. PATIENT-LVL V: ICD-10-PCS | Mod: PBBFAC,,, | Performed by: INTERNAL MEDICINE

## 2020-08-18 PROCEDURE — 73630 X-RAY EXAM OF FOOT: CPT | Mod: 26,LT,, | Performed by: RADIOLOGY

## 2020-08-18 RX ORDER — MELOXICAM 7.5 MG/1
7.5 TABLET ORAL DAILY
Qty: 15 TABLET | Refills: 0 | Status: SHIPPED | OUTPATIENT
Start: 2020-08-18 | End: 2021-02-10

## 2020-08-18 RX ORDER — DICLOFENAC SODIUM 10 MG/G
2 GEL TOPICAL DAILY
Qty: 100 G | Refills: 1 | Status: SHIPPED | OUTPATIENT
Start: 2020-08-18 | End: 2022-02-07

## 2020-08-18 NOTE — PROGRESS NOTES
Subjective:       Patient ID: Mirna Norton is a 75 y.o. female.    Chief Complaint: Foot Pain    Here for urgent care --  L lateral foot pains, 5 wks now and not improved. This does not feel like gout.    Pressure has been on the low side.    Review of Systems   Constitutional: Positive for activity change. Negative for unexpected weight change.   HENT: Negative for hearing loss, rhinorrhea and trouble swallowing.    Eyes: Negative for discharge and visual disturbance.   Respiratory: Negative for chest tightness and wheezing.    Cardiovascular: Negative for chest pain and palpitations.   Gastrointestinal: Negative for blood in stool, constipation, diarrhea and vomiting.   Endocrine: Negative for polydipsia and polyuria.   Genitourinary: Negative for difficulty urinating, dysuria, hematuria and menstrual problem.   Musculoskeletal: Positive for arthralgias. Negative for joint swelling and neck pain.   Neurological: Negative for weakness and headaches.   Psychiatric/Behavioral: Negative for confusion and dysphoric mood.           Objective:      Physical Exam  Constitutional:       General: She is not in acute distress.     Appearance: She is well-developed. She is not diaphoretic.   HENT:      Head: Normocephalic and atraumatic.   Eyes:      General: No scleral icterus.     Pupils: Pupils are equal, round, and reactive to light.   Neck:      Musculoskeletal: Normal range of motion.      Thyroid: No thyromegaly.   Cardiovascular:      Rate and Rhythm: Normal rate and regular rhythm.      Heart sounds: Normal heart sounds. No murmur. No friction rub. No gallop.    Pulmonary:      Effort: Pulmonary effort is normal. No respiratory distress.      Breath sounds: Normal breath sounds. No wheezing or rales.   Abdominal:      General: Bowel sounds are normal. There is no distension.      Palpations: Abdomen is soft. There is no mass.      Tenderness: There is no abdominal tenderness. There is no guarding or rebound.    Musculoskeletal: Normal range of motion.         General: No tenderness.      Comments: bilat shoulders normal rom.  L knee normal rom.    Tenderness lateral L foot where there appears to be a bunionette, no redness or warmth or SOI   Lymphadenopathy:      Cervical: No cervical adenopathy.   Neurological:      Mental Status: She is alert and oriented to person, place, and time.   Psychiatric:         Speech: Speech normal.         Behavior: Behavior normal.         Assessment:       1. Foot pain, left        Plan:       Mirna was seen today for foot pain.    Diagnoses and all orders for this visit:    Foot pain, left  -     Ambulatory referral/consult to Podiatry; Future  -     diclofenac sodium (VOLTAREN) 1 % Gel; Apply 2 g topically once daily.  This does not appear to be gout      Health Maintenance       Date Due Completion Date    Shingles Vaccine (1 of 2) 01/12/1996 ---    TETANUS VACCINE 01/16/2018 1/16/2008    Influenza Vaccine (1) 09/01/2020 10/31/2019    High Dose Statin 08/18/2021 8/18/2020    Aspirin/Antiplatelet Therapy 08/18/2021 8/18/2020    Mammogram 10/15/2021 10/15/2019    DEXA SCAN 12/01/2022 12/1/2017    Override on 8/2/2008: Done    Colorectal Cancer Screening 07/29/2024 7/29/2019    Override on 3/6/2006: Done    Lipid Panel 04/28/2025 4/28/2020          Follow up in about 6 months (around 2/18/2021) for Cancel October appt and change to 6 month follow up please.    Future Appointments   Date Time Provider Department Center   2/9/2021  9:00 AM Ramsey Tyler MD [facility] [facility]

## 2020-08-18 NOTE — LETTER
August 18, 2020      Ramsey Tyler MD  [address]  [facility] [address]           [facility] - Podiatry  [address] [facility] [address]  [facility] [address]  Phone: [phone]  Fax: [phone]          Patient: Mirna Norton   MR Number: [id number]   YOB: 1945   Date of Visit: 8/18/2020       Dear Dr. Ramsey Tyler:    Thank you for referring Mirna Norton to me for evaluation. Attached you will find relevant portions of my assessment and plan of care.    If you have questions, please do not hesitate to call me. I look forward to following Mirna Norton along with you.    Sincerely,    Rebeka Mae DPM    Enclosure  CC:  No Recipients    If you would like to receive this communication electronically, please contact [email or web address] or [phone] to request more information on [employer] access.    For providers and/or their staff who would like to refer a patient to [facility], please contact us through our one-stop-shop provider referral line, [facility], at [phone].    If you feel you have received this communication in error or would no longer like to receive these types of communications, please e-mail [email or web address]

## 2020-08-18 NOTE — PROGRESS NOTES
Subjective:      Patient ID: Mirna Norton is a 75 y.o. female.    Chief Complaint:   Foot Problem (left ft.,right side painful, ) and Foot Pain    Mirna is a 75 y.o. female who presents to the podiatry clinic  with complaint of  left foot pain. Onset of the symptoms was several weeks ago. Precipitating event: none known. Current symptoms include: ability to bear weight, but with some pain. Aggravating factors: any weight bearing. Symptoms have waxed and waned. Patient has had no prior foot problems. Evaluation to date: primary care doctor evaluation today. Treatment to date: rest. Patients rates pain 3/10 on pain scale.    Pt relates it has been about 4-5 weeks of pain to the outside of the left foot. Pt relates it seems that it is near the side bone. It is mostly with first step... it is really a dull but sometimes sharp pain that shoots up the leg. Pt relates it did not really hurt walking up to clinic today     Pt relates she likes to garden... she wears old sas shoes and really never goes barefoot.    Pt relates no other foot issues. She did have some vein issues... she also relates she gets her carotids checked too...     She denies any hip/knee/back issues.       Past Medical History:   Diagnosis Date    Abnormal cervical Papanicolaou smear 1980's    Cryo    Carotid artery stenosis     via kinked carotid artery - followed by Dr. Ye Anderson    Carotid stenosis 5/15/2014    GERD (gastroesophageal reflux disease)     Gout due to renal impairment     Gout, arthritis 2/20/2013    Gout, unspecified     Herniated lumbar intervertebral disc     Hypertension     Insomnia     Irritable bowel syndrome without diarrhea 9/20/2015    Liver lesion 3/13/2013    Mitral valve prolapse     MVP (mitral valve prolapse)     Pancreatic cyst 12/30/2013    PVD (peripheral vascular disease) 12/23/2013    PVD (peripheral vascular disease) 12/23/2013    Venous insufficiency      Past Surgical History:   Procedure  Laterality Date    BARTHOLIN GLAND CYST EXCISION       SECTION      x3    COLONOSCOPY N/A 2019    Procedure: COLONOSCOPY;  Surgeon: Haider Luu MD;  Location: [facility] ([address] [place] [address]);  Service: Endoscopy;  Laterality: N/A;    Diagnostic laproscopic       laser surgery on vein       Current Outpatient Medications on File Prior to Visit   Medication Sig Dispense Refill    allopurinoL (ZYLOPRIM) 100 MG tablet TAKE 1 TABLET BY MOUTH EVERY DAY 90 tablet 3    amLODIPine (NORVASC) 2.5 MG tablet Take 2 tablets (5 mg total) by mouth once daily. 90 tablet 3    aspirin (ECOTRIN) 81 MG EC tablet Take 1 tablet (81 mg total) by mouth once daily.      BIFIDOBACTERIUM INFANTIS (ALIGN ORAL) Take by mouth once daily.      diclofenac sodium (VOLTAREN) 1 % Gel Apply 2 g topically once daily. 100 g 1    hydroCHLOROthiazide (HYDRODIURIL) 25 MG tablet TAKE 1 TABLET BY MOUTH EVERY DAY 90 tablet 0    metoprolol succinate (TOPROL-XL) 50 MG 24 hr tablet TAKE 1 TABLET BY MOUTH EVERY DAY 90 tablet 3    multivitamin-minerals-lutein (CENTRUM SILVER) Tab Take 1 tablet by mouth once daily.       potassium chloride (MICRO-K) 10 MEQ CpSR Take 1 capsule (10 mEq total) by mouth once daily. 90 capsule 11    pravastatin (PRAVACHOL) 40 MG tablet TAKE 1 TABLET BY MOUTH IN THE EVENING 90 tablet 3    valsartan (DIOVAN) 160 MG tablet TAKE 1 TABLET (160 MG TOTAL) BY MOUTH ONCE DAILY. 90 tablet 3    nitroGLYCERIN (NITROSTAT) 0.4 MG SL tablet Place 1 tablet (0.4 mg total) under the tongue every 5 (five) minutes as needed for Chest pain. 20 tablet 12     No current facility-administered medications on file prior to visit.      Review of patient's allergies indicates:   Allergen Reactions    No known allergies        Review of Systems   Constitution: Negative for chills, decreased appetite, fever, malaise/fatigue, night sweats, weight gain and weight loss.   Cardiovascular: Negative for chest pain, claudication, dyspnea on exertion,  "leg swelling, palpitations and syncope.   Respiratory: Negative for cough and shortness of breath.    Endocrine: Negative for cold intolerance and heat intolerance.   Hematologic/Lymphatic: Negative for bleeding problem. Does not bruise/bleed easily.   Skin: Negative for color change, dry skin, flushing, itching, nail changes, poor wound healing, rash, skin cancer, suspicious lesions and unusual hair distribution.   Musculoskeletal: Positive for arthritis. Negative for back pain, falls, gout, joint pain, joint swelling, muscle cramps, muscle weakness, myalgias, neck pain and stiffness.   Gastrointestinal: Negative for diarrhea, nausea and vomiting.   Neurological: Negative for dizziness, focal weakness, light-headedness, numbness, paresthesias, tremors, vertigo and weakness.   Psychiatric/Behavioral: Negative for altered mental status and depression. The patient does not have insomnia.    Allergic/Immunologic: Negative.            Objective:       Vitals:    08/18/20 1113   BP: 121/71   Pulse: (!) 55   Height: 5' 6" (1.676 m)   PainSc: 10-Worst pain ever   PainLoc: Foot         Physical Exam  Vitals signs reviewed.   Constitutional:       General: She is not in acute distress.     Appearance: She is well-developed. She is not ill-appearing, toxic-appearing or diaphoretic.      Comments: Proper nestor wright   Cardiovascular:      Pulses:           Dorsalis pedis pulses are 2+ on the right side and 2+ on the left side.        Posterior tibial pulses are 1+ on the right side and 1+ on the left side.   Musculoskeletal: Normal range of motion.         General: No tenderness or deformity.      Right lower leg: No edema.      Left lower leg: No edema.      Right foot: Normal range of motion. Bunion and prominent metatarsal heads present. No deformity.      Left foot: Normal range of motion. Bunion and prominent metatarsal heads present. No deformity.      Comments: No pain with rom against resistance; strength 5/5 all " directions    + left pop to 5th MT base + pop to peroneal tendon insertion.  No pain along peroneal tendon.     Negative vibratory pain    No pain with digital rom, midfoot or rearfoot. No ankle pain    No pop right    Fat pad atrophy   Feet:      Right foot:      Protective Sensation: 10 sites tested. 10 sites sensed.      Skin integrity: Dry skin present. No ulcer, blister, skin breakdown, erythema, warmth or callus.      Toenail Condition: Right toenails are abnormally thick.      Left foot:      Protective Sensation: 10 sites tested. 10 sites sensed.      Skin integrity: Dry skin present. No ulcer, blister, skin breakdown, erythema, warmth or callus.      Toenail Condition: Left toenails are abnormally thick.      Comments: No soi no open lesions  Skin:     General: Skin is warm.      Capillary Refill: Capillary refill takes 2 to 3 seconds.      Coloration: Skin is pale.      Findings: No erythema or rash.      Nails: There is no clubbing.     Neurological:      Mental Status: She is alert and oriented to person, place, and time.      Gait: Gait abnormal.   Psychiatric:         Attention and Perception: Attention normal.         Mood and Affect: Mood normal.         Speech: Speech normal.         Behavior: Behavior normal.         Thought Content: Thought content normal.         Judgment: Judgment normal.         X-Ray Foot Complete Left  Narrative: EXAMINATION:  XR FOOT COMPLETE 3 VIEW LEFT    CLINICAL HISTORY:  pain lateral side foot 5th chonic;.  Pain in left foot    TECHNIQUE:  AP, lateral and oblique views of the left foot were performed.    COMPARISON:  None    FINDINGS:  DJD hallux valgus and bunion formation.  No fracture or dislocation.  No bone destruction identified.  Slight soft tissue prominence noted adjacent to the 5th metatarsophalangeal joint.  Impression: See above    Electronically signed by: Reid Shea MD  Date:    08/18/2020  Time:    12:42        Assessment:       Encounter Diagnoses    Name Primary?    Foot pain, left     Bursitis of left foot Yes         Plan:       Mirna was seen today for foot problem and foot pain.    Diagnoses and all orders for this visit:    Bursitis of left foot    Foot pain, left  -     Ambulatory referral/consult to Podiatry  -     X-Ray Foot Complete Left; Future    Other orders  -     meloxicam (MOBIC) 7.5 MG tablet; Take 1 tablet (7.5 mg total) by mouth once daily.      I counseled the patient on her conditions, their implications and medical management.    - Personally interpreted and reviewed x-ray with patient.    - d/w pt likely djd/bursitis at base of [facility]. Recommend p.therapy. Pt would like to wait due to covid and not wanting to go to p.therapy location yet.     - applied offloading pads and demonstrated use    - rx mobic for the inflammation. Pt relates she can tolerate despite GERD/etc. Mobic prescribed. Patient was instructed on dosing information. Discontinue if adverse effects occur     - f/u in one month. If not resolved will re-consider p.therapy and/or immobilization

## 2020-08-19 ENCOUNTER — HOSPITAL ENCOUNTER (OUTPATIENT)
Dept: RADIOLOGY | Facility: HOSPITAL | Age: 75
Discharge: HOME | End: 2020-08-19
Attending: INTERNAL MEDICINE
Payer: MEDICARE

## 2020-08-19 DIAGNOSIS — I65.23 BILATERAL CAROTID ARTERY STENOSIS: ICD-10-CM

## 2020-08-19 PROCEDURE — 93880 US CAROTID BILATERAL: ICD-10-PCS | Mod: 26,,, | Performed by: RADIOLOGY

## 2020-08-19 PROCEDURE — 93880 EXTRACRANIAL BILAT STUDY: CPT | Mod: 26,,, | Performed by: RADIOLOGY

## 2020-08-19 PROCEDURE — 93880 EXTRACRANIAL BILAT STUDY: CPT | Mod: TC

## 2020-09-09 ENCOUNTER — TELEPHONE (OUTPATIENT)
Dept: CARDIOLOGY | Facility: CLINIC | Age: 75
End: 2020-09-09

## 2020-09-09 NOTE — TELEPHONE ENCOUNTER
I returned patient call, and Left her V/message.    Please if you can go to the [facility] and have     Them draw you Blood Lab.    The orders are in by   and waiting to be     schedule.    Or you can call me and I will help assist with scheduling.        Lia grant) Adrian     [phone]

## 2020-09-10 ENCOUNTER — TELEPHONE (OUTPATIENT)
Dept: PODIATRY | Facility: CLINIC | Age: 75
End: 2020-09-10

## 2020-09-10 NOTE — TELEPHONE ENCOUNTER
----- Message from DORA Mayen sent at 9/9/2020  4:58 PM CDT -----  Regarding: phone call  Please call patient as ask if she would like to come  a CAM boot to rest the area.     Other option is go to p.therapy... she was hesitant last visit with physical therapy and covid pandemic.     Thanks   Message text     You routed conversation to Rebeka Mae DPM 1 hour ago (3:12 PM)    Mirna Norton Erin, DPM 1 hour ago (3:07 PM)      Dr. Haider Ho. I quit taking meloxicam because it made my stomachache.  I stopped taking it on August 31. My foot still hurts.

## 2020-09-15 ENCOUNTER — TELEPHONE (OUTPATIENT)
Dept: OBSTETRICS AND GYNECOLOGY | Facility: CLINIC | Age: 75
End: 2020-09-15

## 2020-09-15 DIAGNOSIS — Z12.31 ENCOUNTER FOR SCREENING MAMMOGRAM FOR BREAST CANCER: Primary | ICD-10-CM

## 2020-09-15 NOTE — TELEPHONE ENCOUNTER
----- Message from Ysabel Marmolejo sent at 9/15/2020  4:19 PM CDT -----  Regarding: Call back  Name of Who is Calling: MAYLIN VEE  What is the request in detail: Pt is requesting a call back concerning getting her mammogram done the same day she come in for her appointment on 10/16 @10:00am  Can the clinic reply by [facility]: NO  [facility]

## 2020-09-16 ENCOUNTER — TELEPHONE (OUTPATIENT)
Dept: CARDIOLOGY | Facility: CLINIC | Age: 75
End: 2020-09-16

## 2020-09-16 NOTE — TELEPHONE ENCOUNTER
Reached out to pt in regards to message     Pt was requesting to know about lab orders that were placed in the system and recent labs she had done in April for PCP    Informed pt that she can have repeat labs done prior to her yearly follow up with Dr. Gonzalez next year

## 2020-09-16 NOTE — TELEPHONE ENCOUNTER
----- Message from Stacy Xiao sent at 9/16/2020  1:40 PM CDT -----  Contact: [phone]  Type:  Needs Medical Advice    Who Called: PT  Reason for Call: pt would like to speak with the nurse regarding orders for Blood work  Pharmacy name and phone #:  n/a  Would the patient rather a call back or a response via [facility]?  Call back  Best Call Back Number: [phone]

## 2020-09-17 ENCOUNTER — PATIENT OUTREACH (OUTPATIENT)
Dept: OTHER | Facility: OTHER | Age: 75
End: 2020-09-17

## 2020-09-17 NOTE — PROGRESS NOTES
Digital Medicine: Health  Follow-Up    The history is provided by the patient.             Reason for review: Blood pressure at goal        Topics Covered on Call: Diet    Additional Follow-up details: Patient reports she is doing well.  Concerned about higher reading on 9/14, but states it was due to dietary indiscretions.          Diet-Change      Dietary Indiscretions:Patient reports she had a hot dog and fries before reading on 9/14 of 144/80 mmHg.    Intervention(s): DASH diet      Physical Activity-no change to routine  No change to exercise routine.     Medication Adherence-Medication Adherence not addressed.      Substance, Sleep, Stress-Not assessed      Provided patient education. Reviewed salt intake.       Addressed patient questions and patient has my contact information if needed prior to next outreach. Patient verbalizes understanding.      Explained the importance of self-monitoring and medication adherence. Encouraged the patient to communicate with their health  for lifestyle modifications to help improve or maintain a healthy lifestyle.            There are no preventive care reminders to display for this patient.    Last 5 Patient Entered Readings                                      Current 30 Day Average: 129/72     Recent Readings 9/14/2020 9/13/2020 9/13/2020 9/13/2020 9/13/2020    SBP (mmHg) 144 121 130 127 130    DBP (mmHg) 80 74 74 74 77    Pulse 50 55 57 59 59

## 2020-10-12 ENCOUNTER — PATIENT OUTREACH (OUTPATIENT)
Dept: OTHER | Facility: OTHER | Age: 75
End: 2020-10-12

## 2020-10-12 DIAGNOSIS — I10 ESSENTIAL HYPERTENSION: ICD-10-CM

## 2020-10-12 DIAGNOSIS — R00.2 PALPITATIONS: ICD-10-CM

## 2020-10-12 NOTE — PROGRESS NOTES
Attempted to reach patient for routine follow up. Reviewed available blood pressure readings and labs. Per 2017 ACC/AHA Hypertension Guidelines, current 30-day average is 131/77 and is at goal.     Last 5 Patient Entered Readings                                      Current 30 Day Average: 131/77     Recent Readings 10/4/2020 10/4/2020 10/4/2020 10/2/2020 10/2/2020    SBP (mmHg) 117 125 130 133 127    DBP (mmHg) 77 75 79 76 71    Pulse 59 60 60 62 61          BMP  Lab Results   Component Value Date     04/28/2020    K 3.9 04/28/2020     04/28/2020    CO2 32 (H) 04/28/2020    BUN 12 04/28/2020    CREATININE 0.8 04/28/2020    CALCIUM 10.0 04/28/2020    ANIONGAP 8 04/28/2020    ESTGFRAFRICA >60.0 04/28/2020    EGFRNONAA >60.0 04/28/2020       Left a voicemail and requested patient call back.    Will continue to monitor regularly. Will follow up in 4 weeks, sooner if blood pressure begins to trend up.    Napoleon Parsons, PharmD  Digital Medicine Clinical Pharmacist  [phone]

## 2020-10-14 ENCOUNTER — PATIENT MESSAGE (OUTPATIENT)
Dept: PODIATRY | Facility: CLINIC | Age: 75
End: 2020-10-14

## 2020-10-14 ENCOUNTER — PATIENT OUTREACH (OUTPATIENT)
Dept: ADMINISTRATIVE | Facility: OTHER | Age: 75
End: 2020-10-14

## 2020-10-16 ENCOUNTER — HOSPITAL ENCOUNTER (OUTPATIENT)
Dept: RADIOLOGY | Facility: OTHER | Age: 75
Discharge: HOME | End: 2020-10-16
Attending: OBSTETRICS & GYNECOLOGY
Payer: MEDICARE

## 2020-10-16 ENCOUNTER — OFFICE VISIT (OUTPATIENT)
Dept: OBSTETRICS AND GYNECOLOGY | Facility: CLINIC | Age: 75
End: 2020-10-16
Attending: OBSTETRICS & GYNECOLOGY
Payer: MEDICARE

## 2020-10-16 VITALS
HEIGHT: 66 IN | SYSTOLIC BLOOD PRESSURE: 110 MMHG | DIASTOLIC BLOOD PRESSURE: 60 MMHG | WEIGHT: 158.75 LBS | BODY MASS INDEX: 25.51 KG/M2

## 2020-10-16 DIAGNOSIS — Z12.31 ENCOUNTER FOR SCREENING MAMMOGRAM FOR BREAST CANCER: ICD-10-CM

## 2020-10-16 DIAGNOSIS — Z01.419 WELL WOMAN EXAM WITH ROUTINE GYNECOLOGICAL EXAM: Primary | ICD-10-CM

## 2020-10-16 DIAGNOSIS — N95.0 POST-MENOPAUSAL BLEEDING: ICD-10-CM

## 2020-10-16 PROCEDURE — 77063 BREAST TOMOSYNTHESIS BI: CPT | Mod: 26,,, | Performed by: RADIOLOGY

## 2020-10-16 PROCEDURE — 99999 PR PBB SHADOW E&M-EST. PATIENT-LVL III: CPT | Mod: PBBFAC,,, | Performed by: OBSTETRICS & GYNECOLOGY

## 2020-10-16 PROCEDURE — 77067 SCR MAMMO BI INCL CAD: CPT | Mod: TC

## 2020-10-16 PROCEDURE — 99999 PR PBB SHADOW E&M-EST. PATIENT-LVL III: ICD-10-PCS | Mod: PBBFAC,,, | Performed by: OBSTETRICS & GYNECOLOGY

## 2020-10-16 PROCEDURE — 77067 MAMMO DIGITAL SCREENING BILAT WITH TOMO: ICD-10-PCS | Mod: 26,,, | Performed by: RADIOLOGY

## 2020-10-16 PROCEDURE — G0101 CA SCREEN;PELVIC/BREAST EXAM: HCPCS | Mod: PBBFAC | Performed by: OBSTETRICS & GYNECOLOGY

## 2020-10-16 PROCEDURE — 99213 OFFICE O/P EST LOW 20 MIN: CPT | Mod: PBBFAC,25 | Performed by: OBSTETRICS & GYNECOLOGY

## 2020-10-16 PROCEDURE — 77067 SCR MAMMO BI INCL CAD: CPT | Mod: 26,,, | Performed by: RADIOLOGY

## 2020-10-16 PROCEDURE — 77063 MAMMO DIGITAL SCREENING BILAT WITH TOMO: ICD-10-PCS | Mod: 26,,, | Performed by: RADIOLOGY

## 2020-10-16 PROCEDURE — G0101 PR CA SCREEN;PELVIC/BREAST EXAM: ICD-10-PCS | Mod: S$PBB,,, | Performed by: OBSTETRICS & GYNECOLOGY

## 2020-10-16 PROCEDURE — G0101 CA SCREEN;PELVIC/BREAST EXAM: HCPCS | Mod: S$PBB,,, | Performed by: OBSTETRICS & GYNECOLOGY

## 2020-10-16 NOTE — PROGRESS NOTES
Mirna Norton is a 75 y.o. year old  female who presents for routine GYN exam.  She is postmenopausal, not on hormones.  Denies bleeding, hot flashes, and sweats.  Denies recent changes in her medical surgical history.  Mammogram performed today.  No GYN complaints.    10/16/2020 mammogram:  Results pending    Pap 13: Negative      Past Medical History:   Diagnosis Date    Abnormal cervical Papanicolaou smear     Cryo    Carotid artery stenosis     via kinked carotid artery - followed by Dr. Ye Anderson    Carotid stenosis 5/15/2014    GERD (gastroesophageal reflux disease)     Gout due to renal impairment     Gout, arthritis 2013    Gout, unspecified     Herniated lumbar intervertebral disc     Hypertension     Insomnia     Irritable bowel syndrome without diarrhea 2015    Liver lesion 3/13/2013    Mitral valve prolapse     MVP (mitral valve prolapse)     Pancreatic cyst 2013    PVD (peripheral vascular disease) 2013    PVD (peripheral vascular disease) 2013    Venous insufficiency        Past Surgical History:   Procedure Laterality Date    BARTHOLIN GLAND CYST EXCISION       SECTION      x3    COLONOSCOPY N/A 2019    Procedure: COLONOSCOPY;  Surgeon: Haider Luu MD;  Location: [facility] ([address] [place] [address]);  Service: Endoscopy;  Laterality: N/A;    Diagnostic laproscopic       laser surgery on vein         OB History        3    Para   3    Term   3            AB        Living   3       SAB        TAB        Ectopic        Multiple        Live Births   3                   ROS:  GENERAL: Feeling well overall.   SKIN: Denies rash or lesions.   HEAD: Denies head injury or headache.   NODES: Denies enlarged lymph nodes.   CHEST: Denies chest pain or shortness of breath.   CARDIOVASCULAR: Denies palpitations or left sided chest pain.   ABDOMEN: No abdominal pain, nausea, vomiting or rectal bleeding.   URINARY: No dysuria or  hematuria.  REPRODUCTIVE: See HPI.   BREASTS: Denies pain, lumps, or nipple discharge.   HEMATOLOGIC: No easy bruisability or excessive bleeding.   MUSCULOSKELETAL: Reports some knee and foot pain.  NEUROLOGIC: Denies syncope or weakness.   PSYCHIATRIC: Denies depression.     PE:   (chaperone present during entire exam)  APPEARANCE: Well nourished, well developed, in no acute distress.  BREASTS: Symmetrical, no skin changes or visible lesions. No palpable masses, nipple discharge or adenopathy bilaterally.  ABDOMEN: Soft. No tenderness or masses. No CVA tenderness.  VULVA: Atrophic.  Right labia major with small sebaceous cyst.  URETHRAL MEATUS: Normal size and location, no lesions, no prolapse.  URETHRA: No masses, tenderness, prolapse or scarring.  VAGINA: Atrophic.  No lesions, no abnormal discharge, no significant cystocele or rectocele.  CERVIX: No lesions and discharge.   UTERUS: Normal size, regular shape, mobile, non-tender, bladder base nontender.  ADNEXA: No masses, tenderness or CDS nodularity.  ANUS PERINEUM: Normal.      Diagnosis:  1. Well woman exam with routine gynecological exam    2. Post-menopausal bleeding    3. Encounter for screening mammogram for breast cancer          PLAN:         Patient was counseled today on postmenopausal issues.    Follow-up in 1 year.

## 2020-10-19 ENCOUNTER — PATIENT MESSAGE (OUTPATIENT)
Dept: OBSTETRICS AND GYNECOLOGY | Facility: CLINIC | Age: 75
End: 2020-10-19

## 2020-10-19 DIAGNOSIS — M79.672 FOOT PAIN, LEFT: Primary | ICD-10-CM

## 2020-10-20 ENCOUNTER — OFFICE VISIT (OUTPATIENT)
Dept: PODIATRY | Facility: CLINIC | Age: 75
End: 2020-10-20
Payer: MEDICARE

## 2020-10-20 ENCOUNTER — HOSPITAL ENCOUNTER (OUTPATIENT)
Dept: RADIOLOGY | Facility: HOSPITAL | Age: 75
Discharge: HOME | End: 2020-10-20
Attending: PODIATRIST
Payer: MEDICARE

## 2020-10-20 VITALS
DIASTOLIC BLOOD PRESSURE: 72 MMHG | HEART RATE: 53 BPM | SYSTOLIC BLOOD PRESSURE: 137 MMHG | BODY MASS INDEX: 25.62 KG/M2 | HEIGHT: 66 IN

## 2020-10-20 DIAGNOSIS — M76.72 PERONEAL TENDONITIS OF LEFT LOWER EXTREMITY: ICD-10-CM

## 2020-10-20 DIAGNOSIS — M79.672 FOOT PAIN, LEFT: ICD-10-CM

## 2020-10-20 DIAGNOSIS — M79.672 FOOT PAIN, LEFT: Primary | ICD-10-CM

## 2020-10-20 DIAGNOSIS — I87.2 VENOUS INSUFFICIENCY OF RIGHT LOWER EXTREMITY: ICD-10-CM

## 2020-10-20 DIAGNOSIS — M77.52 BURSITIS OF LEFT FOOT: ICD-10-CM

## 2020-10-20 PROCEDURE — 99213 OFFICE O/P EST LOW 20 MIN: CPT | Mod: S$PBB,,, | Performed by: PODIATRIST

## 2020-10-20 PROCEDURE — 99999 PR PBB SHADOW E&M-EST. PATIENT-LVL IV: CPT | Mod: PBBFAC,,, | Performed by: PODIATRIST

## 2020-10-20 PROCEDURE — 73630 XR FOOT COMPLETE 3 VIEW LEFT: ICD-10-PCS | Mod: 26,LT,, | Performed by: RADIOLOGY

## 2020-10-20 PROCEDURE — 99213 PR OFFICE/OUTPT VISIT, EST, LEVL III, 20-29 MIN: ICD-10-PCS | Mod: S$PBB,,, | Performed by: PODIATRIST

## 2020-10-20 PROCEDURE — 99999 PR PBB SHADOW E&M-EST. PATIENT-LVL IV: ICD-10-PCS | Mod: PBBFAC,,, | Performed by: PODIATRIST

## 2020-10-20 PROCEDURE — 73630 X-RAY EXAM OF FOOT: CPT | Mod: 26,LT,, | Performed by: RADIOLOGY

## 2020-10-20 PROCEDURE — 99214 OFFICE O/P EST MOD 30 MIN: CPT | Mod: PBBFAC,25,PO | Performed by: PODIATRIST

## 2020-10-20 PROCEDURE — 73630 X-RAY EXAM OF FOOT: CPT | Mod: TC,FY,PO,LT

## 2020-10-20 NOTE — PROGRESS NOTES
Subjective:      Patient ID: Mirna Norton is a 75 y.o. female.    Chief Complaint:   Foot Problem (left ft.), Foot Pain (pain is not all the time, is coming and going), and Follow-up    Mirna is a 75 y.o. female who rtc for painful left foot. Pt relates it is not better.. despite resting it and taking the mobic (which she d/c when she had GI upset.) Pt relates now the pain seems to radiate to the foot near the toes and she gets a pulling pain in the side of the leg from the foot. Pt relates it is swelling mildly. She can be sitting or walking and her foot hurts. Pt relates that she would like to figure out what to do... maybe an MRI or the p.therapy we suggested. Pt has come in today for a repeat xray and eval.        Past Medical History:   Diagnosis Date    Abnormal cervical Papanicolaou smear     Cryo    Carotid artery stenosis     via kinked carotid artery - followed by Dr. Ye Anderson    Carotid stenosis 5/15/2014    GERD (gastroesophageal reflux disease)     Gout due to renal impairment     Gout, arthritis 2013    Gout, unspecified     Herniated lumbar intervertebral disc     Hypertension     Insomnia     Irritable bowel syndrome without diarrhea 2015    Liver lesion 3/13/2013    Mitral valve prolapse     MVP (mitral valve prolapse)     Pancreatic cyst 2013    PVD (peripheral vascular disease) 2013    PVD (peripheral vascular disease) 2013    Venous insufficiency      Past Surgical History:   Procedure Laterality Date    BARTHOLIN GLAND CYST EXCISION       SECTION      x3    COLONOSCOPY N/A 2019    Procedure: COLONOSCOPY;  Surgeon: Haider Luu MD;  Location: [facility] ([address] [place] [address]);  Service: Endoscopy;  Laterality: N/A;    Diagnostic laproscopic       laser surgery on vein       Current Outpatient Medications on File Prior to Visit   Medication Sig Dispense Refill    allopurinoL (ZYLOPRIM) 100 MG tablet TAKE 1 TABLET BY MOUTH EVERY  DAY 90 tablet 3    amLODIPine (NORVASC) 2.5 MG tablet Take 2 tablets (5 mg total) by mouth once daily. 90 tablet 3    aspirin (ECOTRIN) 81 MG EC tablet Take 1 tablet (81 mg total) by mouth once daily.      BIFIDOBACTERIUM INFANTIS (ALIGN ORAL) Take by mouth once daily.      diclofenac sodium (VOLTAREN) 1 % Gel Apply 2 g topically once daily. 100 g 1    hydroCHLOROthiazide (HYDRODIURIL) 25 MG tablet TAKE 1 TABLET BY MOUTH EVERY DAY 90 tablet 0    meloxicam (MOBIC) 7.5 MG tablet Take 1 tablet (7.5 mg total) by mouth once daily. 15 tablet 0    metoprolol succinate (TOPROL-XL) 50 MG 24 hr tablet TAKE 1 TABLET BY MOUTH EVERY DAY 90 tablet 3    multivitamin-minerals-lutein (CENTRUM SILVER) Tab Take 1 tablet by mouth once daily.       potassium chloride (MICRO-K) 10 MEQ CpSR Take 1 capsule (10 mEq total) by mouth once daily. 90 capsule 11    pravastatin (PRAVACHOL) 40 MG tablet TAKE 1 TABLET BY MOUTH IN THE EVENING 90 tablet 3    valsartan (DIOVAN) 160 MG tablet TAKE 1 TABLET (160 MG TOTAL) BY MOUTH ONCE DAILY. 90 tablet 3    nitroGLYCERIN (NITROSTAT) 0.4 MG SL tablet Place 1 tablet (0.4 mg total) under the tongue every 5 (five) minutes as needed for Chest pain. (Patient not taking: Reported on 10/16/2020) 20 tablet 12     No current facility-administered medications on file prior to visit.      Review of patient's allergies indicates:   Allergen Reactions    No known allergies        Review of Systems   Constitution: Negative for chills, decreased appetite, fever, malaise/fatigue, night sweats, weight gain and weight loss.   Cardiovascular: Negative for chest pain, claudication, dyspnea on exertion, leg swelling, palpitations and syncope.   Respiratory: Negative for cough and shortness of breath.    Endocrine: Negative for cold intolerance and heat intolerance.   Hematologic/Lymphatic: Negative for bleeding problem. Does not bruise/bleed easily.   Skin: Negative for color change, dry skin, flushing, itching,  "nail changes, poor wound healing, rash, skin cancer, suspicious lesions and unusual hair distribution.   Musculoskeletal: Positive for arthritis. Negative for back pain, falls, gout, joint pain, joint swelling, muscle cramps, muscle weakness, myalgias, neck pain and stiffness.   Gastrointestinal: Negative for diarrhea, nausea and vomiting.   Neurological: Negative for dizziness, focal weakness, light-headedness, numbness, paresthesias, tremors, vertigo and weakness.   Psychiatric/Behavioral: Negative for altered mental status and depression. The patient does not have insomnia.    Allergic/Immunologic: Negative.            Objective:       Vitals:    10/20/20 1056   BP: 137/72   Pulse: (!) 53   Height: 5' 6" (1.676 m)   PainSc: 10-Worst pain ever   PainLoc: Foot     afeb    Physical Exam  Vitals signs reviewed.   Constitutional:       General: She is not in acute distress.     Appearance: She is well-developed. She is not ill-appearing, toxic-appearing or diaphoretic.      Comments: Proper nestor sandels   Cardiovascular:      Pulses:           Dorsalis pedis pulses are 2+ on the right side and 2+ on the left side.        Posterior tibial pulses are 1+ on the right side and 1+ on the left side.   Musculoskeletal: Normal range of motion.         General: No tenderness or deformity.      Right lower leg: No edema.      Left lower leg: No edema.      Right foot: Normal range of motion. Bunion and prominent metatarsal heads present. No deformity.      Left foot: Normal range of motion. Bunion and prominent metatarsal heads present. No deformity.      Comments: No pain with rom against resistance; strength 5/5 all directions    + left pop to 5th MT base + pop to peroneal tendon insertion.  No pain along peroneal tendon.     Negative vibratory pain    No pain with digital rom, midfoot or rearfoot. No ankle pain    No pop right    Fat pad atrophy   Feet:      Right foot:      Protective Sensation: 10 sites tested. 10 sites " sensed.      Skin integrity: Dry skin present. No ulcer, blister, skin breakdown, erythema, warmth or callus.      Toenail Condition: Right toenails are abnormally thick.      Left foot:      Protective Sensation: 10 sites tested. 10 sites sensed.      Skin integrity: Dry skin present. No ulcer, blister, skin breakdown, erythema, warmth or callus.      Toenail Condition: Left toenails are abnormally thick.      Comments: No soi no open lesions  Skin:     General: Skin is warm.      Capillary Refill: Capillary refill takes 2 to 3 seconds.      Coloration: Skin is pale.      Findings: No erythema or rash.      Nails: There is no clubbing.     Neurological:      Mental Status: She is alert and oriented to person, place, and time.      Gait: Gait abnormal.   Psychiatric:         Attention and Perception: Attention normal.         Mood and Affect: Mood normal.         Speech: Speech normal.         Behavior: Behavior normal.         Thought Content: Thought content normal.         Judgment: Judgment normal.         X-Ray Foot Complete Left  EXAMINATION:  XR FOOT COMPLETE 3 VIEW LEFT    CLINICAL HISTORY:  continued left foot pain.;  Pain in left foot    FINDINGS:  There is a mild hallux valgus deformity.  No fracture dislocation bone destruction seen.    Electronically signed by: Nicola Russell MD  Date:    10/20/2020  Time:    10:00        Assessment:       Encounter Diagnoses   Name Primary?    Foot pain, left Yes    Peroneal tendonitis of left lower extremity     Bursitis of left foot          Plan:       Mirna was seen today for foot problem, foot pain and follow-up.    Diagnoses and all orders for this visit:    Foot pain, left  -     MRI Foot ([facility]) Left Without Contrast; Future    Peroneal tendonitis of left lower extremity  -     MRI Foot ([facility]) Left Without Contrast; Future    Bursitis of left foot  -     MRI Foot ([facility]) Left Without Contrast; Future      I counseled the patient on her  conditions, their implications and medical management.    - Personally interpreted and reviewed x-ray with patient.    - no indications this is a vascular compromise or related to her varicosities    - rx MRI to r/o stress fracture or tendon tear as pt's pain has worsened.     - will consider CAM boot immobilization vs p.therapy upon results of MRI

## 2020-10-26 ENCOUNTER — HOSPITAL ENCOUNTER (OUTPATIENT)
Dept: RADIOLOGY | Facility: HOSPITAL | Age: 75
Discharge: HOME | End: 2020-10-26
Attending: PODIATRIST
Payer: MEDICARE

## 2020-10-26 DIAGNOSIS — M76.72 PERONEAL TENDONITIS OF LEFT LOWER EXTREMITY: ICD-10-CM

## 2020-10-26 DIAGNOSIS — M79.672 FOOT PAIN, LEFT: ICD-10-CM

## 2020-10-26 DIAGNOSIS — M77.52 BURSITIS OF LEFT FOOT: ICD-10-CM

## 2020-10-26 PROCEDURE — 73718 MRI FOOT (HINDFOOT) LEFT WITHOUT CONTRAST: ICD-10-PCS | Mod: 26,LT,, | Performed by: RADIOLOGY

## 2020-10-26 PROCEDURE — 73718 MRI LOWER EXTREMITY W/O DYE: CPT | Mod: TC,LT

## 2020-10-26 PROCEDURE — 73718 MRI LOWER EXTREMITY W/O DYE: CPT | Mod: 26,LT,, | Performed by: RADIOLOGY

## 2020-10-26 NOTE — PROGRESS NOTES
Called pt to discuss results. No answer. Did not leave VM. Will message through portal. Will also place a referral to p.therapy and need f/u here in 4 weeks - 6 weeks. Will have pt call for f/u appt.

## 2020-10-27 ENCOUNTER — TELEPHONE (OUTPATIENT)
Dept: PODIATRY | Facility: CLINIC | Age: 75
End: 2020-10-27

## 2020-10-27 NOTE — TELEPHONE ENCOUNTER
----- Message from Rebeka Mae DPM sent at 10/26/2020  4:52 PM CDT -----  Called pt to discuss results. No answer. Did not leave VM. Will message through portal. Will also place a referral to p.therapy and need f/u here in 4 weeks - 6 weeks. Will have pt call for f/u appt.

## 2020-11-09 RX ORDER — METOPROLOL SUCCINATE 50 MG/1
25 TABLET, EXTENDED RELEASE ORAL DAILY
Qty: 90 TABLET | Refills: 3
Start: 2020-11-09 | End: 2020-12-18

## 2020-11-09 NOTE — PROGRESS NOTES
Digital Medicine: Clinician Follow-Up    Called patient for routine follow up regarding HDMP (Hypertension Digital Medicine Program)    HPI  Patients BP average is currently 132/79 mmHg.    Patient states this is the most medication she has been on and inquired about decreasing some of her HTN medications.    The history is provided by the patient.      Review of patient's allergies indicates:   -- No known allergies   Follow-up reason(s): routine follow up.     Hypertension    Patient's blood pressure is stable.   Patient is not experiencing signs/symptoms of hypotension.  Patient is not experiencing signs/symptoms of hypertension.            Last 5 Patient Entered Readings                                      Current 30 Day Average: 132/79     Recent Readings 11/7/2020 11/7/2020 11/7/2020 10/25/2020 10/25/2020    SBP (mmHg) 116 126 139 123 123    DBP (mmHg) 75 78 81 72 73    Pulse 54 54 56 61 60                 Depression Screening  Did not address depression screening.    Sleep Apnea Screening    Did not address sleep apnea screening.     Medication Affordability Screening  Did not address medication affordability screening.     Medication Adherence-Medication adherence was assessed.          ASSESSMENT(S)  Patients BP average is 132/79 mmHg, which is at goal. Patient's BP goal is less than or equal to 130/80.    Hypertension Plan  Hypertension Medication Change. Decrease toprol xl to 25 mg daily. If BP remains controlled in 2 weeks, will wean it off.   Additional monitoring needed.  Continue current diet/physical activity routine.  Will call patient in a few weeks, sooner if needed. Patient knows to reach out at any time for any questions or concerns.        Addressed patient questions and patient has my contact information if needed prior to next outreach. Patient verbalizes understanding.             There are no preventive care reminders to display for this patient.  There are no preventive care reminders  to display for this patient.      Hypertension Medications             amLODIPine (NORVASC) 2.5 MG tablet Take 2 tablets (5 mg total) by mouth once daily.    hydroCHLOROthiazide (HYDRODIURIL) 25 MG tablet TAKE 1 TABLET BY MOUTH EVERY DAY    metoprolol succinate (TOPROL-XL) 50 MG 24 hr tablet Take 0.5 tablets (25 mg total) by mouth once daily.    nitroGLYCERIN (NITROSTAT) 0.4 MG SL tablet Place 1 tablet (0.4 mg total) under the tongue every 5 (five) minutes as needed for Chest pain.    valsartan (DIOVAN) 160 MG tablet TAKE 1 TABLET (160 MG TOTAL) BY MOUTH ONCE DAILY.          Napoleon Parsons, PharmD  Digital Medicine Clinician  [phone]

## 2020-11-23 ENCOUNTER — PATIENT OUTREACH (OUTPATIENT)
Dept: OTHER | Facility: OTHER | Age: 75
End: 2020-11-23

## 2020-11-23 DIAGNOSIS — I10 ESSENTIAL HYPERTENSION: ICD-10-CM

## 2020-11-23 NOTE — PROGRESS NOTES
Attempted to reach patient for routine follow up and follow up on metoprolol dose decrease. Reviewed available blood pressure readings and labs. Per 2017 ACC/AHA Hypertension Guidelines, current 30-day average is 131/78 and is not at goal. Will increase valsartan to 320 mg.     Last 5 Patient Entered Readings                                      Current 30 Day Average: 131/78     Recent Readings 11/22/2020 11/22/2020 11/22/2020 11/16/2020 11/16/2020    SBP (mmHg) 136 135 147 149 145    DBP (mmHg) 79 75 83 81 73    Pulse 71 71 73 60 62          BMP  Lab Results   Component Value Date     04/28/2020    K 3.9 04/28/2020     04/28/2020    CO2 32 (H) 04/28/2020    BUN 12 04/28/2020    CREATININE 0.8 04/28/2020    CALCIUM 10.0 04/28/2020    ANIONGAP 8 04/28/2020    ESTGFRAFRICA >60.0 04/28/2020    EGFRNONAA >60.0 04/28/2020       Left a voicemail and requested patient call back.    Will continue to monitor regularly. Will follow up in 1 week, sooner if blood pressure begins to trend up.    Napoleon Parsons, PharmD  Digital Medicine Clinician  [phone]

## 2020-11-25 RX ORDER — AMLODIPINE BESYLATE 2.5 MG/1
5 TABLET ORAL 2 TIMES DAILY
Qty: 90 TABLET | Refills: 3
Start: 2020-11-25 | End: 2020-12-15

## 2020-11-25 NOTE — PROGRESS NOTES
Digital Medicine: Clinician Follow-Up    Called patient for routine follow up regarding HDMP (Hypertension Digital Medicine Program) and follow up on metoprolol dose decrease.     HPI  Patient's BP average is currently 134/78 mmHg.      The history is provided by the patient.      Review of patient's allergies indicates:   -- No known allergies   Follow-up reason(s): medication change follow-up.     Hypertension    Readings are trending up due to medication adherence.    Patient is not experiencing signs/symptoms of hypotension.  Patient is not experiencing signs/symptoms of hypertension.      Additional Follow-up details: Patient decreased metoprolol to 25 mg every morning. She has no side effects or concerns but her BP is trending up.     Patient did make medication change.    Is patient tolerating med change? yes            Last 5 Patient Entered Readings                                      Current 30 Day Average: 134/78     Recent Readings 11/22/2020 11/22/2020 11/22/2020 11/16/2020 11/16/2020    SBP (mmHg) 136 135 147 149 145    DBP (mmHg) 79 75 83 81 73    Pulse 71 71 73 60 62                 Depression Screening  Did not address depression screening.    Sleep Apnea Screening    Did not address sleep apnea screening.     Medication Affordability Screening  Did not address medication affordability screening.     Medication Adherence-Medication adherence was assessed.          ASSESSMENT(S)  Patients BP average is 134/78 mmHg, which is above goal. Patient's BP goal is less than or equal to 130/80.     Hypertension Plan  Hypertension Medication Change. Increase amlodipine to 5 mg BID  Additional monitoring needed.  Continue current diet/physical activity routine.  Will call patient in a few weeks, sooner if needed. Patient knows to reach out at any time for any questions or concerns.        Addressed patient questions and patient has my contact information if needed prior to next outreach. Patient verbalizes  understanding.             There are no preventive care reminders to display for this patient.  There are no preventive care reminders to display for this patient.      Hypertension Medications             amLODIPine (NORVASC) 2.5 MG tablet Take 2 tablets (5 mg total) by mouth once daily.    hydroCHLOROthiazide (HYDRODIURIL) 25 MG tablet TAKE 1 TABLET BY MOUTH EVERY DAY    metoprolol succinate (TOPROL-XL) 50 MG 24 hr tablet Take 0.5 tablets (25 mg total) by mouth once daily.    nitroGLYCERIN (NITROSTAT) 0.4 MG SL tablet Place 1 tablet (0.4 mg total) under the tongue every 5 (five) minutes as needed for Chest pain.    valsartan (DIOVAN) 160 MG tablet TAKE 1 TABLET (160 MG TOTAL) BY MOUTH ONCE DAILY.          Napoleon Parsons, PharmD  Digital Medicine Clinician  [phone]

## 2020-12-10 ENCOUNTER — PATIENT OUTREACH (OUTPATIENT)
Dept: OTHER | Facility: OTHER | Age: 75
End: 2020-12-10

## 2020-12-10 DIAGNOSIS — I10 ESSENTIAL HYPERTENSION: ICD-10-CM

## 2020-12-10 NOTE — PROGRESS NOTES
Attempted to reach patient for routine follow up and follow up on amlodipine dose increase. Reviewed available blood pressure readings and labs. Per 2017 ACC/AHA Hypertension Guidelines, current 30-day average is 130/77 and is at goal.     Last 5 Patient Entered Readings                                      Current 30 Day Average: 130/77     Recent Readings 12/5/2020 12/5/2020 12/5/2020 11/27/2020 11/27/2020    SBP (mmHg) 109 107 116 124 132    DBP (mmHg) 75 71 75 80 77    Pulse 66 67 70 62 61          BMP  Lab Results   Component Value Date     04/28/2020    K 3.9 04/28/2020     04/28/2020    CO2 32 (H) 04/28/2020    BUN 12 04/28/2020    CREATININE 0.8 04/28/2020    CALCIUM 10.0 04/28/2020    ANIONGAP 8 04/28/2020    ESTGFRAFRICA >60.0 04/28/2020    EGFRNONAA >60.0 04/28/2020       Left a voicemail and requested patient call back.    Will continue to monitor regularly. Will follow up in 2 weeks, sooner if blood pressure begins to trend up.    Napoleon Parsons, PharmD  Digital Medicine Clinician  [phone]

## 2020-12-15 ENCOUNTER — PATIENT MESSAGE (OUTPATIENT)
Dept: OTHER | Facility: OTHER | Age: 75
End: 2020-12-15

## 2020-12-15 RX ORDER — AMLODIPINE BESYLATE 5 MG/1
5 TABLET ORAL 2 TIMES DAILY
Qty: 60 TABLET | Refills: 5 | Status: SHIPPED | OUTPATIENT
Start: 2020-12-15 | End: 2021-02-01 | Stop reason: SDUPTHER

## 2020-12-18 NOTE — PROGRESS NOTES
Digital Medicine: Clinician Follow-Up    Called patient for routine follow up regarding HDMP (Hypertension Digital Medicine Program) and follow up on amlodipine dose increase.     HPI  Patient's BP average is currently 125/76 mmHg.    The history is provided by the patient.      Review of patient's allergies indicates:   -- No known allergies   Follow-up reason(s): medication change follow-up.     Hypertension    Readings are trending down due to medication adherence.       Patient is not experiencing signs/symptoms of hypotension.  Patient is not experiencing signs/symptoms of hypertension.      Additional Follow-up details: Patient increased amlodipine to 5 mg BID on 11/25. She reports no side effects or concerns.    Patient did make medication change.    Is patient tolerating med change? yes            Last 5 Patient Entered Readings                                      Current 30 Day Average: 125/76     Recent Readings 12/17/2020 12/17/2020 12/15/2020 12/15/2020 12/13/2020    SBP (mmHg) 129 144 117 124 113    DBP (mmHg) 75 85 77 75 69    Pulse 61 63 64 65 55                 Depression Screening  Did not address depression screening.    Sleep Apnea Screening    Did not address sleep apnea screening.     Medication Affordability Screening  Did not address medication affordability screening.     Medication Adherence-Medication adherence was assessed.          ASSESSMENT(S)  Patients BP average is 125/76 mmHg, which is at goal. Patient's BP goal is less than or equal to 130/80.    Hypertension Plan  Hypertension Medication Change. Take metoprolol 25 mg every other day for 2 weeks and then discontinue it.  Additional monitoring needed.  Continue current diet/physical activity routine.  Will call patient in a few weeks, sooner if needed. Patient knows to reach out at any time for any questions or concerns.        Addressed patient questions and patient has my contact information if needed prior to next outreach.  Patient verbalizes understanding.             There are no preventive care reminders to display for this patient.  There are no preventive care reminders to display for this patient.      Hypertension Medications             amLODIPine (NORVASC) 5 MG tablet Take 1 tablet (5 mg total) by mouth 2 (two) times daily.    hydroCHLOROthiazide (HYDRODIURIL) 25 MG tablet TAKE 1 TABLET BY MOUTH EVERY DAY    metoprolol succinate (TOPROL-XL) 50 MG 24 hr tablet Take 0.5 tablets (25 mg total) by mouth once daily.    nitroGLYCERIN (NITROSTAT) 0.4 MG SL tablet Place 1 tablet (0.4 mg total) under the tongue every 5 (five) minutes as needed for Chest pain.    valsartan (DIOVAN) 160 MG tablet TAKE 1 TABLET (160 MG TOTAL) BY MOUTH ONCE DAILY.        Napoleon Parsons, PharmD  [facility]  [phone]

## 2020-12-21 ENCOUNTER — PATIENT MESSAGE (OUTPATIENT)
Dept: ADMINISTRATIVE | Facility: OTHER | Age: 75
End: 2020-12-21

## 2021-01-05 ENCOUNTER — PATIENT MESSAGE (OUTPATIENT)
Dept: INTERNAL MEDICINE | Facility: CLINIC | Age: 76
End: 2021-01-05

## 2021-01-05 DIAGNOSIS — R73.09 ELEVATED HEMOGLOBIN A1C: Primary | ICD-10-CM

## 2021-01-05 DIAGNOSIS — E78.2 MIXED HYPERLIPIDEMIA: ICD-10-CM

## 2021-01-05 DIAGNOSIS — M10.9 GOUT, ARTHRITIS: ICD-10-CM

## 2021-01-05 DIAGNOSIS — I10 ESSENTIAL HYPERTENSION: ICD-10-CM

## 2021-01-25 ENCOUNTER — PATIENT MESSAGE (OUTPATIENT)
Dept: CARDIOLOGY | Facility: CLINIC | Age: 76
End: 2021-01-25

## 2021-01-26 ENCOUNTER — PATIENT OUTREACH (OUTPATIENT)
Dept: ADMINISTRATIVE | Facility: HOSPITAL | Age: 76
End: 2021-01-26

## 2021-01-27 ENCOUNTER — HOSPITAL ENCOUNTER (OUTPATIENT)
Dept: RADIOLOGY | Facility: HOSPITAL | Age: 76
Discharge: HOME | End: 2021-01-27
Attending: INTERNAL MEDICINE
Payer: MEDICARE

## 2021-01-27 DIAGNOSIS — R09.89 BILATERAL CAROTID BRUITS: ICD-10-CM

## 2021-01-27 PROCEDURE — 93880 US CAROTID BILATERAL: ICD-10-PCS | Mod: 26,,, | Performed by: RADIOLOGY

## 2021-01-27 PROCEDURE — 93880 EXTRACRANIAL BILAT STUDY: CPT | Mod: TC

## 2021-01-27 PROCEDURE — 93880 EXTRACRANIAL BILAT STUDY: CPT | Mod: 26,,, | Performed by: RADIOLOGY

## 2021-01-31 PROCEDURE — 99457 PR MONITORING, PHYSIOL PARAM, REMOTE, 1ST 20 MINS, PER MONTH: ICD-10-PCS | Mod: S$PBB,,, | Performed by: INTERNAL MEDICINE

## 2021-01-31 PROCEDURE — 99457 RPM TX MGMT 1ST 20 MIN: CPT | Mod: S$PBB,,, | Performed by: INTERNAL MEDICINE

## 2021-02-02 ENCOUNTER — LAB VISIT (OUTPATIENT)
Dept: LAB | Facility: HOSPITAL | Age: 76
End: 2021-02-02
Attending: INTERNAL MEDICINE
Payer: MEDICARE

## 2021-02-02 DIAGNOSIS — E78.2 MIXED HYPERLIPIDEMIA: ICD-10-CM

## 2021-02-02 DIAGNOSIS — M10.9 GOUT, ARTHRITIS: ICD-10-CM

## 2021-02-02 DIAGNOSIS — I10 ESSENTIAL HYPERTENSION: ICD-10-CM

## 2021-02-02 DIAGNOSIS — R73.09 ELEVATED HEMOGLOBIN A1C: ICD-10-CM

## 2021-02-02 LAB
ALBUMIN SERPL BCP-MCNC: 3.8 G/DL (ref 3.5–5.2)
ALP SERPL-CCNC: 66 U/L (ref 55–135)
ALT SERPL W/O P-5'-P-CCNC: 19 U/L (ref 10–44)
ANION GAP SERPL CALC-SCNC: 10 MMOL/L (ref 8–16)
ANION GAP SERPL CALC-SCNC: 10 MMOL/L (ref 8–16)
AST SERPL-CCNC: 22 U/L (ref 10–40)
BASOPHILS # BLD AUTO: 0.03 K/UL (ref 0–0.2)
BASOPHILS NFR BLD: 0.4 % (ref 0–1.9)
BILIRUB SERPL-MCNC: 0.3 MG/DL (ref 0.1–1)
BUN SERPL-MCNC: 16 MG/DL (ref 8–23)
BUN SERPL-MCNC: 16 MG/DL (ref 8–23)
CALCIUM SERPL-MCNC: 9.4 MG/DL (ref 8.7–10.5)
CALCIUM SERPL-MCNC: 9.4 MG/DL (ref 8.7–10.5)
CHLORIDE SERPL-SCNC: 101 MMOL/L (ref 95–110)
CHLORIDE SERPL-SCNC: 101 MMOL/L (ref 95–110)
CHOLEST SERPL-MCNC: 152 MG/DL (ref 120–199)
CHOLEST/HDLC SERPL: 2.1 {RATIO} (ref 2–5)
CO2 SERPL-SCNC: 30 MMOL/L (ref 23–29)
CO2 SERPL-SCNC: 30 MMOL/L (ref 23–29)
CREAT SERPL-MCNC: 0.8 MG/DL (ref 0.5–1.4)
CREAT SERPL-MCNC: 0.8 MG/DL (ref 0.5–1.4)
DIFFERENTIAL METHOD: ABNORMAL
EOSINOPHIL # BLD AUTO: 0.4 K/UL (ref 0–0.5)
EOSINOPHIL NFR BLD: 5.1 % (ref 0–8)
ERYTHROCYTE [DISTWIDTH] IN BLOOD BY AUTOMATED COUNT: 13.2 % (ref 11.5–14.5)
EST. GFR  (AFRICAN AMERICAN): >60 ML/MIN/1.73 M^2
EST. GFR  (AFRICAN AMERICAN): >60 ML/MIN/1.73 M^2
EST. GFR  (NON AFRICAN AMERICAN): >60 ML/MIN/1.73 M^2
EST. GFR  (NON AFRICAN AMERICAN): >60 ML/MIN/1.73 M^2
ESTIMATED AVG GLUCOSE: 128 MG/DL (ref 68–131)
GLUCOSE SERPL-MCNC: 97 MG/DL (ref 70–110)
GLUCOSE SERPL-MCNC: 97 MG/DL (ref 70–110)
HBA1C MFR BLD: 6.1 % (ref 4–5.6)
HCT VFR BLD AUTO: 41.7 % (ref 37–48.5)
HDLC SERPL-MCNC: 73 MG/DL (ref 40–75)
HDLC SERPL: 48 % (ref 20–50)
HGB BLD-MCNC: 12.9 G/DL (ref 12–16)
IMM GRANULOCYTES # BLD AUTO: 0.01 K/UL (ref 0–0.04)
IMM GRANULOCYTES NFR BLD AUTO: 0.1 % (ref 0–0.5)
LDLC SERPL CALC-MCNC: 65 MG/DL (ref 63–159)
LYMPHOCYTES # BLD AUTO: 2.3 K/UL (ref 1–4.8)
LYMPHOCYTES NFR BLD: 30.6 % (ref 18–48)
MCH RBC QN AUTO: 28.5 PG (ref 27–31)
MCHC RBC AUTO-ENTMCNC: 30.9 G/DL (ref 32–36)
MCV RBC AUTO: 92 FL (ref 82–98)
MONOCYTES # BLD AUTO: 0.7 K/UL (ref 0.3–1)
MONOCYTES NFR BLD: 9.9 % (ref 4–15)
NEUTROPHILS # BLD AUTO: 4 K/UL (ref 1.8–7.7)
NEUTROPHILS NFR BLD: 53.9 % (ref 38–73)
NONHDLC SERPL-MCNC: 79 MG/DL
NRBC BLD-RTO: 0 /100 WBC
PLATELET # BLD AUTO: 300 K/UL (ref 150–350)
PMV BLD AUTO: 9.4 FL (ref 9.2–12.9)
POTASSIUM SERPL-SCNC: 3.9 MMOL/L (ref 3.5–5.1)
POTASSIUM SERPL-SCNC: 3.9 MMOL/L (ref 3.5–5.1)
PROT SERPL-MCNC: 7.5 G/DL (ref 6–8.4)
RBC # BLD AUTO: 4.53 M/UL (ref 4–5.4)
SODIUM SERPL-SCNC: 141 MMOL/L (ref 136–145)
SODIUM SERPL-SCNC: 141 MMOL/L (ref 136–145)
TRIGL SERPL-MCNC: 70 MG/DL (ref 30–150)
URATE SERPL-MCNC: 6 MG/DL (ref 2.4–5.7)
WBC # BLD AUTO: 7.38 K/UL (ref 3.9–12.7)

## 2021-02-02 PROCEDURE — 84550 ASSAY OF BLOOD/URIC ACID: CPT

## 2021-02-02 PROCEDURE — 83036 HEMOGLOBIN GLYCOSYLATED A1C: CPT

## 2021-02-02 PROCEDURE — 36415 COLL VENOUS BLD VENIPUNCTURE: CPT

## 2021-02-02 PROCEDURE — 85025 COMPLETE CBC W/AUTO DIFF WBC: CPT

## 2021-02-02 PROCEDURE — 80061 LIPID PANEL: CPT

## 2021-02-02 PROCEDURE — 80053 COMPREHEN METABOLIC PANEL: CPT

## 2021-02-09 ENCOUNTER — OFFICE VISIT (OUTPATIENT)
Dept: INTERNAL MEDICINE | Facility: CLINIC | Age: 76
End: 2021-02-09
Payer: MEDICARE

## 2021-02-09 VITALS
OXYGEN SATURATION: 99 % | SYSTOLIC BLOOD PRESSURE: 118 MMHG | BODY MASS INDEX: 26.43 KG/M2 | HEIGHT: 66 IN | WEIGHT: 164.44 LBS | HEART RATE: 83 BPM | DIASTOLIC BLOOD PRESSURE: 68 MMHG

## 2021-02-09 DIAGNOSIS — I10 ESSENTIAL HYPERTENSION: Primary | ICD-10-CM

## 2021-02-09 DIAGNOSIS — I65.23 BILATERAL CAROTID ARTERY STENOSIS: ICD-10-CM

## 2021-02-09 DIAGNOSIS — E78.2 MIXED HYPERLIPIDEMIA: ICD-10-CM

## 2021-02-09 DIAGNOSIS — M79.672 FOOT PAIN, LEFT: ICD-10-CM

## 2021-02-09 PROCEDURE — 99999 PR PBB SHADOW E&M-EST. PATIENT-LVL IV: CPT | Mod: PBBFAC,,, | Performed by: INTERNAL MEDICINE

## 2021-02-09 PROCEDURE — 99214 OFFICE O/P EST MOD 30 MIN: CPT | Mod: S$PBB,,, | Performed by: INTERNAL MEDICINE

## 2021-02-09 PROCEDURE — 99214 PR OFFICE/OUTPT VISIT, EST, LEVL IV, 30-39 MIN: ICD-10-PCS | Mod: S$PBB,,, | Performed by: INTERNAL MEDICINE

## 2021-02-09 PROCEDURE — 99214 OFFICE O/P EST MOD 30 MIN: CPT | Mod: PBBFAC | Performed by: INTERNAL MEDICINE

## 2021-02-09 PROCEDURE — 99999 PR PBB SHADOW E&M-EST. PATIENT-LVL IV: ICD-10-PCS | Mod: PBBFAC,,, | Performed by: INTERNAL MEDICINE

## 2021-02-23 ENCOUNTER — RESEARCH ENCOUNTER (OUTPATIENT)
Dept: RESEARCH | Facility: HOSPITAL | Age: 76
End: 2021-02-23

## 2021-02-24 ENCOUNTER — RESEARCH ENCOUNTER (OUTPATIENT)
Dept: RESEARCH | Facility: HOSPITAL | Age: 76
End: 2021-02-24

## 2021-03-09 ENCOUNTER — TELEPHONE (OUTPATIENT)
Dept: RESEARCH | Facility: HOSPITAL | Age: 76
End: 2021-03-09

## 2021-03-15 ENCOUNTER — OFFICE VISIT (OUTPATIENT)
Dept: GASTROENTEROLOGY | Facility: CLINIC | Age: 76
End: 2021-03-15
Payer: MEDICARE

## 2021-03-15 ENCOUNTER — PES CALL (OUTPATIENT)
Dept: ADMINISTRATIVE | Facility: CLINIC | Age: 76
End: 2021-03-15

## 2021-03-15 VITALS
HEART RATE: 81 BPM | BODY MASS INDEX: 25.72 KG/M2 | DIASTOLIC BLOOD PRESSURE: 66 MMHG | SYSTOLIC BLOOD PRESSURE: 110 MMHG | WEIGHT: 160.06 LBS | HEIGHT: 66 IN

## 2021-03-15 DIAGNOSIS — Z86.010 HISTORY OF COLONIC POLYPS: ICD-10-CM

## 2021-03-15 DIAGNOSIS — R14.0 BLOATING: ICD-10-CM

## 2021-03-15 DIAGNOSIS — K57.90 DIVERTICULOSIS: ICD-10-CM

## 2021-03-15 DIAGNOSIS — R10.31 RLQ ABDOMINAL PAIN: Primary | ICD-10-CM

## 2021-03-15 DIAGNOSIS — M54.9 BACK PAIN, UNSPECIFIED BACK LOCATION, UNSPECIFIED BACK PAIN LATERALITY, UNSPECIFIED CHRONICITY: ICD-10-CM

## 2021-03-15 PROCEDURE — 99214 PR OFFICE/OUTPT VISIT, EST, LEVL IV, 30-39 MIN: ICD-10-PCS | Mod: S$PBB,,, | Performed by: NURSE PRACTITIONER

## 2021-03-15 PROCEDURE — 99214 OFFICE O/P EST MOD 30 MIN: CPT | Mod: S$PBB,,, | Performed by: NURSE PRACTITIONER

## 2021-03-15 PROCEDURE — 99999 PR PBB SHADOW E&M-EST. PATIENT-LVL IV: CPT | Mod: PBBFAC,,, | Performed by: NURSE PRACTITIONER

## 2021-03-15 PROCEDURE — 99214 OFFICE O/P EST MOD 30 MIN: CPT | Mod: PBBFAC | Performed by: NURSE PRACTITIONER

## 2021-03-15 PROCEDURE — 99999 PR PBB SHADOW E&M-EST. PATIENT-LVL IV: ICD-10-PCS | Mod: PBBFAC,,, | Performed by: NURSE PRACTITIONER

## 2021-03-18 ENCOUNTER — RESEARCH ENCOUNTER (OUTPATIENT)
Dept: CARDIOLOGY | Facility: CLINIC | Age: 76
End: 2021-03-18

## 2021-03-26 ENCOUNTER — OFFICE VISIT (OUTPATIENT)
Dept: INTERNAL MEDICINE | Facility: CLINIC | Age: 76
End: 2021-03-26
Payer: MEDICARE

## 2021-03-26 VITALS
SYSTOLIC BLOOD PRESSURE: 122 MMHG | HEART RATE: 91 BPM | BODY MASS INDEX: 25.83 KG/M2 | WEIGHT: 160.69 LBS | HEIGHT: 66 IN | DIASTOLIC BLOOD PRESSURE: 80 MMHG | OXYGEN SATURATION: 95 %

## 2021-03-26 DIAGNOSIS — M54.50 ACUTE RIGHT-SIDED LOW BACK PAIN WITHOUT SCIATICA: ICD-10-CM

## 2021-03-26 DIAGNOSIS — Z87.440 HISTORY OF UTI: Primary | ICD-10-CM

## 2021-03-26 PROCEDURE — 99999 PR PBB SHADOW E&M-EST. PATIENT-LVL IV: CPT | Mod: PBBFAC,,, | Performed by: INTERNAL MEDICINE

## 2021-03-26 PROCEDURE — 99213 OFFICE O/P EST LOW 20 MIN: CPT | Mod: S$PBB,,, | Performed by: INTERNAL MEDICINE

## 2021-03-26 PROCEDURE — 99999 PR PBB SHADOW E&M-EST. PATIENT-LVL IV: ICD-10-PCS | Mod: PBBFAC,,, | Performed by: INTERNAL MEDICINE

## 2021-03-26 PROCEDURE — 99213 PR OFFICE/OUTPT VISIT, EST, LEVL III, 20-29 MIN: ICD-10-PCS | Mod: S$PBB,,, | Performed by: INTERNAL MEDICINE

## 2021-03-26 PROCEDURE — 81003 URINALYSIS AUTO W/O SCOPE: CPT | Performed by: INTERNAL MEDICINE

## 2021-03-26 PROCEDURE — 99214 OFFICE O/P EST MOD 30 MIN: CPT | Mod: PBBFAC | Performed by: INTERNAL MEDICINE

## 2021-03-27 LAB
BILIRUB UR QL STRIP: NEGATIVE
CLARITY UR REFRACT.AUTO: CLEAR
COLOR UR AUTO: NORMAL
GLUCOSE UR QL STRIP: NEGATIVE
HGB UR QL STRIP: NEGATIVE
KETONES UR QL STRIP: NEGATIVE
LEUKOCYTE ESTERASE UR QL STRIP: NEGATIVE
NITRITE UR QL STRIP: NEGATIVE
PH UR STRIP: 7 [PH] (ref 5–8)
PROT UR QL STRIP: NEGATIVE
SP GR UR STRIP: 1 (ref 1–1.03)
URN SPEC COLLECT METH UR: NORMAL

## 2021-04-09 ENCOUNTER — TELEPHONE (OUTPATIENT)
Dept: RESEARCH | Facility: HOSPITAL | Age: 76
End: 2021-04-09

## 2021-04-27 ENCOUNTER — TELEPHONE (OUTPATIENT)
Dept: ADMINISTRATIVE | Facility: CLINIC | Age: 76
End: 2021-04-27

## 2021-05-21 RX ORDER — HYDROCHLOROTHIAZIDE 25 MG/1
TABLET ORAL
Qty: 90 TABLET | Refills: 11 | OUTPATIENT
Start: 2021-05-21

## 2021-05-31 ENCOUNTER — PES CALL (OUTPATIENT)
Dept: ADMINISTRATIVE | Facility: CLINIC | Age: 76
End: 2021-05-31

## 2021-06-14 ENCOUNTER — OFFICE VISIT (OUTPATIENT)
Dept: URGENT CARE | Facility: CLINIC | Age: 76
End: 2021-06-14
Payer: MEDICARE

## 2021-06-14 VITALS
OXYGEN SATURATION: 96 % | SYSTOLIC BLOOD PRESSURE: 131 MMHG | TEMPERATURE: 98 F | HEIGHT: 66 IN | RESPIRATION RATE: 16 BRPM | BODY MASS INDEX: 25.71 KG/M2 | HEART RATE: 84 BPM | DIASTOLIC BLOOD PRESSURE: 76 MMHG | WEIGHT: 160 LBS

## 2021-06-14 DIAGNOSIS — R42 VERTIGO: Primary | ICD-10-CM

## 2021-06-14 PROCEDURE — 99214 OFFICE O/P EST MOD 30 MIN: CPT | Mod: S$GLB,,, | Performed by: NURSE PRACTITIONER

## 2021-06-14 PROCEDURE — 99214 PR OFFICE/OUTPT VISIT, EST, LEVL IV, 30-39 MIN: ICD-10-PCS | Mod: S$GLB,,, | Performed by: NURSE PRACTITIONER

## 2021-06-21 ENCOUNTER — PES CALL (OUTPATIENT)
Dept: ADMINISTRATIVE | Facility: CLINIC | Age: 76
End: 2021-06-21

## 2021-06-28 ENCOUNTER — PATIENT MESSAGE (OUTPATIENT)
Dept: ADMINISTRATIVE | Facility: OTHER | Age: 76
End: 2021-06-28

## 2021-07-29 ENCOUNTER — PES CALL (OUTPATIENT)
Dept: ADMINISTRATIVE | Facility: CLINIC | Age: 76
End: 2021-07-29

## 2021-08-03 ENCOUNTER — TELEPHONE (OUTPATIENT)
Dept: ENDOSCOPY | Facility: HOSPITAL | Age: 76
End: 2021-08-03

## 2021-08-03 ENCOUNTER — PATIENT MESSAGE (OUTPATIENT)
Dept: ENDOSCOPY | Facility: HOSPITAL | Age: 76
End: 2021-08-03

## 2021-08-10 ENCOUNTER — OFFICE VISIT (OUTPATIENT)
Dept: INTERNAL MEDICINE | Facility: CLINIC | Age: 76
End: 2021-08-10
Payer: MEDICARE

## 2021-08-10 ENCOUNTER — PATIENT MESSAGE (OUTPATIENT)
Dept: INTERNAL MEDICINE | Facility: CLINIC | Age: 76
End: 2021-08-10

## 2021-08-10 VITALS — BODY MASS INDEX: 25.58 KG/M2 | SYSTOLIC BLOOD PRESSURE: 139 MMHG | WEIGHT: 158.5 LBS | DIASTOLIC BLOOD PRESSURE: 70 MMHG

## 2021-08-10 DIAGNOSIS — R32 URINARY INCONTINENCE, UNSPECIFIED TYPE: Primary | ICD-10-CM

## 2021-08-10 DIAGNOSIS — M54.9 DORSALGIA, UNSPECIFIED: ICD-10-CM

## 2021-08-10 PROCEDURE — 99214 PR OFFICE/OUTPT VISIT, EST, LEVL IV, 30-39 MIN: ICD-10-PCS | Mod: S$PBB,,, | Performed by: INTERNAL MEDICINE

## 2021-08-10 PROCEDURE — 99999 PR PBB SHADOW E&M-EST. PATIENT-LVL III: CPT | Mod: PBBFAC,,, | Performed by: INTERNAL MEDICINE

## 2021-08-10 PROCEDURE — 99999 PR PBB SHADOW E&M-EST. PATIENT-LVL III: ICD-10-PCS | Mod: PBBFAC,,, | Performed by: INTERNAL MEDICINE

## 2021-08-10 PROCEDURE — 99214 OFFICE O/P EST MOD 30 MIN: CPT | Mod: S$PBB,,, | Performed by: INTERNAL MEDICINE

## 2021-08-10 PROCEDURE — 99213 OFFICE O/P EST LOW 20 MIN: CPT | Mod: PBBFAC | Performed by: INTERNAL MEDICINE

## 2021-08-10 RX ORDER — TRAMADOL HYDROCHLORIDE 50 MG/1
50 TABLET ORAL EVERY 12 HOURS PRN
Qty: 20 TABLET | Refills: 0 | Status: SHIPPED | OUTPATIENT
Start: 2021-08-10 | End: 2022-02-07

## 2021-08-10 RX ORDER — LORAZEPAM 0.5 MG/1
0.5 TABLET ORAL EVERY 6 HOURS PRN
Qty: 1 TABLET | Refills: 0 | Status: SHIPPED | OUTPATIENT
Start: 2021-08-10 | End: 2021-09-20 | Stop reason: SDUPTHER

## 2021-08-11 ENCOUNTER — PATIENT MESSAGE (OUTPATIENT)
Dept: INTERNAL MEDICINE | Facility: CLINIC | Age: 76
End: 2021-08-11

## 2021-08-19 ENCOUNTER — PATIENT MESSAGE (OUTPATIENT)
Dept: INTERNAL MEDICINE | Facility: CLINIC | Age: 76
End: 2021-08-19

## 2021-08-19 DIAGNOSIS — R10.30 LOWER ABDOMINAL PAIN: ICD-10-CM

## 2021-08-23 ENCOUNTER — PATIENT MESSAGE (OUTPATIENT)
Dept: INTERNAL MEDICINE | Facility: CLINIC | Age: 76
End: 2021-08-23

## 2021-08-24 ENCOUNTER — PATIENT MESSAGE (OUTPATIENT)
Dept: INTERNAL MEDICINE | Facility: CLINIC | Age: 76
End: 2021-08-24

## 2021-08-24 ENCOUNTER — LAB VISIT (OUTPATIENT)
Dept: LAB | Facility: HOSPITAL | Age: 76
End: 2021-08-24
Attending: INTERNAL MEDICINE
Payer: MEDICARE

## 2021-08-24 DIAGNOSIS — R10.30 LOWER ABDOMINAL PAIN: ICD-10-CM

## 2021-08-24 LAB
BACTERIA #/AREA URNS AUTO: ABNORMAL /HPF
BILIRUB UR QL STRIP: NEGATIVE
CLARITY UR REFRACT.AUTO: ABNORMAL
COLOR UR AUTO: YELLOW
GLUCOSE UR QL STRIP: NEGATIVE
HGB UR QL STRIP: NEGATIVE
KETONES UR QL STRIP: NEGATIVE
LEUKOCYTE ESTERASE UR QL STRIP: ABNORMAL
MICROSCOPIC COMMENT: ABNORMAL
NITRITE UR QL STRIP: NEGATIVE
PH UR STRIP: 7 [PH] (ref 5–8)
PROT UR QL STRIP: NEGATIVE
RBC #/AREA URNS AUTO: 2 /HPF (ref 0–4)
SP GR UR STRIP: 1.01 (ref 1–1.03)
SQUAMOUS #/AREA URNS AUTO: 14 /HPF
URN SPEC COLLECT METH UR: ABNORMAL
WBC #/AREA URNS AUTO: 13 /HPF (ref 0–5)

## 2021-08-24 PROCEDURE — 81001 URINALYSIS AUTO W/SCOPE: CPT | Performed by: INTERNAL MEDICINE

## 2021-08-24 PROCEDURE — 87086 URINE CULTURE/COLONY COUNT: CPT | Performed by: INTERNAL MEDICINE

## 2021-08-25 LAB
BACTERIA UR CULT: NORMAL
BACTERIA UR CULT: NORMAL

## 2021-08-26 RX ORDER — CIPROFLOXACIN 500 MG/1
500 TABLET ORAL 2 TIMES DAILY
Qty: 14 TABLET | Refills: 0 | Status: SHIPPED | OUTPATIENT
Start: 2021-08-26 | End: 2021-09-02

## 2021-08-27 ENCOUNTER — HOSPITAL ENCOUNTER (OUTPATIENT)
Dept: RADIOLOGY | Facility: OTHER | Age: 76
Discharge: HOME | End: 2021-08-27
Attending: INTERNAL MEDICINE
Payer: MEDICARE

## 2021-08-27 ENCOUNTER — TELEPHONE (OUTPATIENT)
Dept: INTERNAL MEDICINE | Facility: CLINIC | Age: 76
End: 2021-08-27

## 2021-08-27 DIAGNOSIS — R10.30 LOWER ABDOMINAL PAIN: ICD-10-CM

## 2021-08-27 PROCEDURE — 74176 CT ABD & PELVIS W/O CONTRAST: CPT | Mod: 26,,, | Performed by: RADIOLOGY

## 2021-08-27 PROCEDURE — 74176 CT ABD & PELVIS W/O CONTRAST: CPT | Mod: TC

## 2021-08-27 PROCEDURE — 74176 CT RENAL STONE STUDY ABD PELVIS WO: ICD-10-PCS | Mod: 26,,, | Performed by: RADIOLOGY

## 2021-09-08 ENCOUNTER — PATIENT MESSAGE (OUTPATIENT)
Dept: ORTHOPEDICS | Facility: CLINIC | Age: 76
End: 2021-09-08

## 2021-09-08 ENCOUNTER — TELEPHONE (OUTPATIENT)
Dept: ORTHOPEDICS | Facility: CLINIC | Age: 76
End: 2021-09-08

## 2021-09-08 DIAGNOSIS — M51.36 DDD (DEGENERATIVE DISC DISEASE), LUMBAR: Primary | ICD-10-CM

## 2021-09-09 ENCOUNTER — OFFICE VISIT (OUTPATIENT)
Dept: GASTROENTEROLOGY | Facility: CLINIC | Age: 76
End: 2021-09-09
Payer: COMMERCIAL

## 2021-09-09 VITALS — BODY MASS INDEX: 25.19 KG/M2 | HEIGHT: 66 IN | WEIGHT: 156.75 LBS

## 2021-09-09 DIAGNOSIS — K58.9 IRRITABLE BOWEL SYNDROME WITHOUT DIARRHEA: Primary | ICD-10-CM

## 2021-09-09 DIAGNOSIS — Z86.010 HISTORY OF COLON POLYPS: ICD-10-CM

## 2021-09-09 PROCEDURE — 99212 OFFICE O/P EST SF 10 MIN: CPT | Mod: PBBFAC | Performed by: INTERNAL MEDICINE

## 2021-09-09 PROCEDURE — 99999 PR PBB SHADOW E&M-EST. PATIENT-LVL II: ICD-10-PCS | Mod: PBBFAC,,, | Performed by: INTERNAL MEDICINE

## 2021-09-09 PROCEDURE — 99999 PR PBB SHADOW E&M-EST. PATIENT-LVL II: CPT | Mod: PBBFAC,,, | Performed by: INTERNAL MEDICINE

## 2021-09-09 PROCEDURE — 99214 OFFICE O/P EST MOD 30 MIN: CPT | Mod: S$PBB,,, | Performed by: INTERNAL MEDICINE

## 2021-09-09 PROCEDURE — 99214 PR OFFICE/OUTPT VISIT, EST, LEVL IV, 30-39 MIN: ICD-10-PCS | Mod: S$PBB,,, | Performed by: INTERNAL MEDICINE

## 2021-09-20 ENCOUNTER — LAB VISIT (OUTPATIENT)
Dept: LAB | Facility: HOSPITAL | Age: 76
End: 2021-09-20
Attending: INTERNAL MEDICINE
Payer: MEDICARE

## 2021-09-20 ENCOUNTER — PATIENT MESSAGE (OUTPATIENT)
Dept: INTERNAL MEDICINE | Facility: CLINIC | Age: 76
End: 2021-09-20

## 2021-09-20 DIAGNOSIS — R32 URINARY INCONTINENCE, UNSPECIFIED TYPE: Primary | ICD-10-CM

## 2021-09-20 DIAGNOSIS — M54.9 DORSALGIA, UNSPECIFIED: ICD-10-CM

## 2021-09-20 DIAGNOSIS — R32 URINARY INCONTINENCE, UNSPECIFIED TYPE: ICD-10-CM

## 2021-09-20 PROCEDURE — 87086 URINE CULTURE/COLONY COUNT: CPT | Performed by: INTERNAL MEDICINE

## 2021-09-20 PROCEDURE — 81001 URINALYSIS AUTO W/SCOPE: CPT | Performed by: INTERNAL MEDICINE

## 2021-09-20 RX ORDER — LORAZEPAM 0.5 MG/1
0.5 TABLET ORAL EVERY 6 HOURS PRN
Qty: 1 TABLET | Refills: 0 | Status: CANCELLED | OUTPATIENT
Start: 2021-09-20

## 2021-09-20 RX ORDER — LORAZEPAM 0.5 MG/1
0.5 TABLET ORAL EVERY 6 HOURS PRN
Qty: 1 TABLET | Refills: 0 | Status: SHIPPED | OUTPATIENT
Start: 2021-09-20 | End: 2021-09-21

## 2021-09-21 LAB
BACTERIA #/AREA URNS AUTO: ABNORMAL /HPF
BILIRUB UR QL STRIP: NEGATIVE
CLARITY UR REFRACT.AUTO: CLEAR
COLOR UR AUTO: ABNORMAL
GLUCOSE UR QL STRIP: NEGATIVE
HGB UR QL STRIP: NEGATIVE
KETONES UR QL STRIP: NEGATIVE
LEUKOCYTE ESTERASE UR QL STRIP: ABNORMAL
MICROSCOPIC COMMENT: ABNORMAL
NITRITE UR QL STRIP: NEGATIVE
PH UR STRIP: 7 [PH] (ref 5–8)
PROT UR QL STRIP: NEGATIVE
RBC #/AREA URNS AUTO: 0 /HPF (ref 0–4)
SP GR UR STRIP: 1 (ref 1–1.03)
SQUAMOUS #/AREA URNS AUTO: 2 /HPF
URN SPEC COLLECT METH UR: ABNORMAL
WBC #/AREA URNS AUTO: 3 /HPF (ref 0–5)
YEAST UR QL AUTO: ABNORMAL

## 2021-09-21 RX ORDER — LORAZEPAM 0.5 MG/1
0.5 TABLET ORAL EVERY 6 HOURS PRN
Qty: 1 TABLET | Refills: 0 | Status: SHIPPED | OUTPATIENT
Start: 2021-09-21 | End: 2022-02-07

## 2021-09-22 ENCOUNTER — TELEPHONE (OUTPATIENT)
Dept: INTERNAL MEDICINE | Facility: CLINIC | Age: 76
End: 2021-09-22

## 2021-09-22 LAB — BACTERIA UR CULT: NO GROWTH

## 2021-09-23 ENCOUNTER — TELEPHONE (OUTPATIENT)
Dept: OBSTETRICS AND GYNECOLOGY | Facility: CLINIC | Age: 76
End: 2021-09-23

## 2021-09-23 ENCOUNTER — HOSPITAL ENCOUNTER (OUTPATIENT)
Dept: RADIOLOGY | Facility: OTHER | Age: 76
Discharge: HOME | End: 2021-09-23
Attending: INTERNAL MEDICINE
Payer: MEDICARE

## 2021-09-23 ENCOUNTER — HOSPITAL ENCOUNTER (OUTPATIENT)
Dept: RADIOLOGY | Facility: OTHER | Age: 76
Discharge: HOME | End: 2021-09-23
Attending: PHYSICIAN ASSISTANT
Payer: MEDICARE

## 2021-09-23 DIAGNOSIS — R32 URINARY INCONTINENCE, UNSPECIFIED TYPE: ICD-10-CM

## 2021-09-23 DIAGNOSIS — Z12.31 ENCOUNTER FOR SCREENING MAMMOGRAM FOR BREAST CANCER: Primary | ICD-10-CM

## 2021-09-23 DIAGNOSIS — M54.9 DORSALGIA, UNSPECIFIED: ICD-10-CM

## 2021-09-23 DIAGNOSIS — M51.36 DDD (DEGENERATIVE DISC DISEASE), LUMBAR: ICD-10-CM

## 2021-09-23 PROCEDURE — 72110 X-RAY EXAM L-2 SPINE 4/>VWS: CPT | Mod: TC,FY

## 2021-09-23 PROCEDURE — 72148 MRI LUMBAR SPINE WITHOUT CONTRAST: ICD-10-PCS | Mod: 26,,, | Performed by: STUDENT IN AN ORGANIZED HEALTH CARE EDUCATION/TRAINING PROGRAM

## 2021-09-23 PROCEDURE — 72110 XR LUMBAR SPINE AP AND LAT WITH FLEX/EXT: ICD-10-PCS | Mod: 26,,, | Performed by: RADIOLOGY

## 2021-09-23 PROCEDURE — 72110 X-RAY EXAM L-2 SPINE 4/>VWS: CPT | Mod: 26,,, | Performed by: RADIOLOGY

## 2021-09-23 PROCEDURE — 72148 MRI LUMBAR SPINE W/O DYE: CPT | Mod: TC

## 2021-09-23 PROCEDURE — 72148 MRI LUMBAR SPINE W/O DYE: CPT | Mod: 26,,, | Performed by: STUDENT IN AN ORGANIZED HEALTH CARE EDUCATION/TRAINING PROGRAM

## 2021-09-24 ENCOUNTER — OFFICE VISIT (OUTPATIENT)
Dept: UROLOGY | Facility: CLINIC | Age: 76
End: 2021-09-24
Payer: COMMERCIAL

## 2021-09-24 ENCOUNTER — PES CALL (OUTPATIENT)
Dept: ADMINISTRATIVE | Facility: CLINIC | Age: 76
End: 2021-09-24

## 2021-09-24 ENCOUNTER — TELEPHONE (OUTPATIENT)
Dept: INTERNAL MEDICINE | Facility: CLINIC | Age: 76
End: 2021-09-24

## 2021-09-24 DIAGNOSIS — M54.40 ACUTE RIGHT-SIDED LOW BACK PAIN WITH SCIATICA, SCIATICA LATERALITY UNSPECIFIED: Primary | ICD-10-CM

## 2021-09-24 DIAGNOSIS — R32 URINARY INCONTINENCE, UNSPECIFIED TYPE: ICD-10-CM

## 2021-09-24 PROCEDURE — 1101F PR PT FALLS ASSESS DOC 0-1 FALLS W/OUT INJ PAST YR: ICD-10-PCS | Mod: CPTII,S$GLB,, | Performed by: UROLOGY

## 2021-09-24 PROCEDURE — 99204 OFFICE O/P NEW MOD 45 MIN: CPT | Mod: S$GLB,,, | Performed by: UROLOGY

## 2021-09-24 PROCEDURE — 1159F MED LIST DOCD IN RCRD: CPT | Mod: CPTII,S$GLB,, | Performed by: UROLOGY

## 2021-09-24 PROCEDURE — 1159F PR MEDICATION LIST DOCUMENTED IN MEDICAL RECORD: ICD-10-PCS | Mod: CPTII,S$GLB,, | Performed by: UROLOGY

## 2021-09-24 PROCEDURE — 3288F PR FALLS RISK ASSESSMENT DOCUMENTED: ICD-10-PCS | Mod: CPTII,S$GLB,, | Performed by: UROLOGY

## 2021-09-24 PROCEDURE — 1160F RVW MEDS BY RX/DR IN RCRD: CPT | Mod: CPTII,S$GLB,, | Performed by: UROLOGY

## 2021-09-24 PROCEDURE — 1101F PT FALLS ASSESS-DOCD LE1/YR: CPT | Mod: CPTII,S$GLB,, | Performed by: UROLOGY

## 2021-09-24 PROCEDURE — 3044F PR MOST RECENT HEMOGLOBIN A1C LEVEL <7.0%: ICD-10-PCS | Mod: CPTII,S$GLB,, | Performed by: UROLOGY

## 2021-09-24 PROCEDURE — 3288F FALL RISK ASSESSMENT DOCD: CPT | Mod: CPTII,S$GLB,, | Performed by: UROLOGY

## 2021-09-24 PROCEDURE — 3044F HG A1C LEVEL LT 7.0%: CPT | Mod: CPTII,S$GLB,, | Performed by: UROLOGY

## 2021-09-24 PROCEDURE — 1160F PR REVIEW ALL MEDS BY PRESCRIBER/CLIN PHARMACIST DOCUMENTED: ICD-10-PCS | Mod: CPTII,S$GLB,, | Performed by: UROLOGY

## 2021-09-24 PROCEDURE — 99204 PR OFFICE/OUTPT VISIT, NEW, LEVL IV, 45-59 MIN: ICD-10-PCS | Mod: S$GLB,,, | Performed by: UROLOGY

## 2021-09-28 ENCOUNTER — TELEPHONE (OUTPATIENT)
Dept: INTERNAL MEDICINE | Facility: CLINIC | Age: 76
End: 2021-09-28

## 2021-09-30 ENCOUNTER — PES CALL (OUTPATIENT)
Dept: ADMINISTRATIVE | Facility: CLINIC | Age: 76
End: 2021-09-30

## 2021-10-01 ENCOUNTER — PATIENT MESSAGE (OUTPATIENT)
Dept: INTERNAL MEDICINE | Facility: CLINIC | Age: 76
End: 2021-10-01

## 2021-10-05 DIAGNOSIS — I10 ESSENTIAL HYPERTENSION: ICD-10-CM

## 2021-10-06 RX ORDER — VALSARTAN AND HYDROCHLOROTHIAZIDE 160; 25 MG/1; MG/1
1 TABLET ORAL DAILY
Qty: 90 TABLET | Refills: 3 | Status: SHIPPED | OUTPATIENT
Start: 2021-10-06 | End: 2022-03-16

## 2021-10-25 ENCOUNTER — PATIENT OUTREACH (OUTPATIENT)
Dept: ADMINISTRATIVE | Facility: OTHER | Age: 76
End: 2021-10-25
Payer: MEDICARE

## 2021-10-26 ENCOUNTER — OFFICE VISIT (OUTPATIENT)
Dept: OBSTETRICS AND GYNECOLOGY | Facility: CLINIC | Age: 76
End: 2021-10-26
Attending: OBSTETRICS & GYNECOLOGY
Payer: MEDICARE

## 2021-10-26 ENCOUNTER — HOSPITAL ENCOUNTER (OUTPATIENT)
Dept: RADIOLOGY | Facility: OTHER | Age: 76
Discharge: HOME | End: 2021-10-26
Attending: OBSTETRICS & GYNECOLOGY
Payer: MEDICARE

## 2021-10-26 VITALS
WEIGHT: 156.06 LBS | HEIGHT: 66 IN | DIASTOLIC BLOOD PRESSURE: 70 MMHG | SYSTOLIC BLOOD PRESSURE: 102 MMHG | BODY MASS INDEX: 25.08 KG/M2

## 2021-10-26 DIAGNOSIS — Z12.31 ENCOUNTER FOR SCREENING MAMMOGRAM FOR BREAST CANCER: ICD-10-CM

## 2021-10-26 DIAGNOSIS — Z01.419 WOMEN'S ANNUAL ROUTINE GYNECOLOGICAL EXAMINATION: Primary | ICD-10-CM

## 2021-10-26 DIAGNOSIS — Z79.890 POSTMENOPAUSAL HRT (HORMONE REPLACEMENT THERAPY): ICD-10-CM

## 2021-10-26 PROCEDURE — 99213 OFFICE O/P EST LOW 20 MIN: CPT | Mod: PBBFAC,25 | Performed by: OBSTETRICS & GYNECOLOGY

## 2021-10-26 PROCEDURE — 77063 BREAST TOMOSYNTHESIS BI: CPT | Mod: 26,,, | Performed by: RADIOLOGY

## 2021-10-26 PROCEDURE — 99999 PR PBB SHADOW E&M-EST. PATIENT-LVL III: ICD-10-PCS | Mod: PBBFAC,,, | Performed by: OBSTETRICS & GYNECOLOGY

## 2021-10-26 PROCEDURE — 77063 MAMMO DIGITAL SCREENING BILAT WITH TOMO: ICD-10-PCS | Mod: 26,,, | Performed by: RADIOLOGY

## 2021-10-26 PROCEDURE — G0101 CA SCREEN;PELVIC/BREAST EXAM: HCPCS | Mod: PBBFAC | Performed by: OBSTETRICS & GYNECOLOGY

## 2021-10-26 PROCEDURE — 99999 PR PBB SHADOW E&M-EST. PATIENT-LVL III: CPT | Mod: PBBFAC,,, | Performed by: OBSTETRICS & GYNECOLOGY

## 2021-10-26 PROCEDURE — G0101 PR CA SCREEN;PELVIC/BREAST EXAM: ICD-10-PCS | Mod: S$PBB,,, | Performed by: OBSTETRICS & GYNECOLOGY

## 2021-10-26 PROCEDURE — 77067 MAMMO DIGITAL SCREENING BILAT WITH TOMO: ICD-10-PCS | Mod: 26,,, | Performed by: RADIOLOGY

## 2021-10-26 PROCEDURE — G0101 CA SCREEN;PELVIC/BREAST EXAM: HCPCS | Mod: S$PBB,,, | Performed by: OBSTETRICS & GYNECOLOGY

## 2021-10-26 PROCEDURE — 77067 SCR MAMMO BI INCL CAD: CPT | Mod: 26,,, | Performed by: RADIOLOGY

## 2021-10-26 PROCEDURE — 77067 SCR MAMMO BI INCL CAD: CPT | Mod: TC

## 2021-10-28 ENCOUNTER — PATIENT MESSAGE (OUTPATIENT)
Dept: OBSTETRICS AND GYNECOLOGY | Facility: CLINIC | Age: 76
End: 2021-10-28
Payer: MEDICARE

## 2021-11-18 ENCOUNTER — PES CALL (OUTPATIENT)
Dept: ADMINISTRATIVE | Facility: CLINIC | Age: 76
End: 2021-11-18
Payer: MEDICARE

## 2021-11-22 ENCOUNTER — PES CALL (OUTPATIENT)
Dept: ADMINISTRATIVE | Facility: CLINIC | Age: 76
End: 2021-11-22
Payer: MEDICARE

## 2021-12-08 ENCOUNTER — PES CALL (OUTPATIENT)
Dept: ADMINISTRATIVE | Facility: CLINIC | Age: 76
End: 2021-12-08
Payer: MEDICARE

## 2021-12-25 ENCOUNTER — PATIENT MESSAGE (OUTPATIENT)
Dept: ADMINISTRATIVE | Facility: OTHER | Age: 76
End: 2021-12-25
Payer: MEDICARE

## 2022-01-11 ENCOUNTER — PATIENT MESSAGE (OUTPATIENT)
Dept: CARDIOLOGY | Facility: CLINIC | Age: 77
End: 2022-01-11
Payer: MEDICARE

## 2022-01-12 ENCOUNTER — PES CALL (OUTPATIENT)
Dept: ADMINISTRATIVE | Facility: CLINIC | Age: 77
End: 2022-01-12
Payer: MEDICARE

## 2022-01-12 ENCOUNTER — TELEPHONE (OUTPATIENT)
Dept: CARDIOLOGY | Facility: CLINIC | Age: 77
End: 2022-01-12
Payer: MEDICARE

## 2022-01-12 DIAGNOSIS — I65.23 BILATERAL CAROTID ARTERY STENOSIS: ICD-10-CM

## 2022-01-12 DIAGNOSIS — I10 PRIMARY HYPERTENSION: Primary | ICD-10-CM

## 2022-01-12 DIAGNOSIS — E78.2 MIXED HYPERLIPIDEMIA: ICD-10-CM

## 2022-01-19 ENCOUNTER — HOSPITAL ENCOUNTER (OUTPATIENT)
Dept: RADIOLOGY | Facility: OTHER | Age: 77
Discharge: HOME | End: 2022-01-19
Attending: INTERNAL MEDICINE
Payer: MEDICARE

## 2022-01-19 DIAGNOSIS — I65.23 BILATERAL CAROTID ARTERY STENOSIS: ICD-10-CM

## 2022-01-19 PROCEDURE — 93880 EXTRACRANIAL BILAT STUDY: CPT | Mod: TC

## 2022-01-19 PROCEDURE — 93880 EXTRACRANIAL BILAT STUDY: CPT | Mod: 26,,, | Performed by: RADIOLOGY

## 2022-01-19 PROCEDURE — 93880 US CAROTID BILATERAL: ICD-10-PCS | Mod: 26,,, | Performed by: RADIOLOGY

## 2022-01-21 ENCOUNTER — PATIENT MESSAGE (OUTPATIENT)
Dept: CARDIOLOGY | Facility: CLINIC | Age: 77
End: 2022-01-21
Payer: MEDICARE

## 2022-01-21 LAB
ALBUMIN SERPL-MCNC: 4 G/DL (ref 3.6–5.1)
ALBUMIN/GLOB SERPL: 1.3 (CALC) (ref 1–2.5)
ALP SERPL-CCNC: 52 U/L (ref 37–153)
ALT SERPL-CCNC: 24 U/L (ref 6–29)
AST SERPL-CCNC: 22 U/L (ref 10–35)
BILIRUB SERPL-MCNC: 0.5 MG/DL (ref 0.2–1.2)
BUN SERPL-MCNC: 15 MG/DL (ref 7–25)
BUN/CREAT SERPL: ABNORMAL (CALC) (ref 6–22)
CALCIUM SERPL-MCNC: 9.7 MG/DL (ref 8.6–10.4)
CHLORIDE SERPL-SCNC: 103 MMOL/L (ref 98–110)
CHOLEST SERPL-MCNC: 188 MG/DL
CHOLEST/HDLC SERPL: 2.6 (CALC)
CO2 SERPL-SCNC: 29 MMOL/L (ref 20–32)
CREAT SERPL-MCNC: 0.75 MG/DL (ref 0.6–0.93)
GLOBULIN SER CALC-MCNC: 3 G/DL (CALC) (ref 1.9–3.7)
GLUCOSE SERPL-MCNC: 107 MG/DL (ref 65–99)
HDLC SERPL-MCNC: 73 MG/DL
LDLC SERPL CALC-MCNC: 88 MG/DL (CALC)
NONHDLC SERPL-MCNC: 115 MG/DL (CALC)
POTASSIUM SERPL-SCNC: 3.8 MMOL/L (ref 3.5–5.3)
PROT SERPL-MCNC: 7 G/DL (ref 6.1–8.1)
SODIUM SERPL-SCNC: 141 MMOL/L (ref 135–146)
TRIGL SERPL-MCNC: 177 MG/DL

## 2022-02-01 DIAGNOSIS — Q61.02 MULTIPLE RENAL CYSTS: Primary | ICD-10-CM

## 2022-02-04 ENCOUNTER — PATIENT OUTREACH (OUTPATIENT)
Dept: ADMINISTRATIVE | Facility: OTHER | Age: 77
End: 2022-02-04
Payer: MEDICARE

## 2022-02-04 NOTE — PROGRESS NOTES
Health Maintenance Due   Topic Date Due    Shingles Vaccine (1 of 2) Never done    TETANUS VACCINE  01/16/2018     Updates were requested from care everywhere.  Chart was reviewed for overdue Proactive [facility] Encounters (DAVONTE) topics (CRS, Breast Cancer Screening, Eye exam)  Health Maintenance has been updated.  LINKS immunization registry triggered.  Immunizations were reconciled.

## 2022-02-07 ENCOUNTER — OFFICE VISIT (OUTPATIENT)
Dept: CARDIOLOGY | Facility: CLINIC | Age: 77
End: 2022-02-07
Payer: MEDICARE

## 2022-02-07 VITALS
DIASTOLIC BLOOD PRESSURE: 72 MMHG | HEIGHT: 65 IN | SYSTOLIC BLOOD PRESSURE: 124 MMHG | HEART RATE: 84 BPM | WEIGHT: 160 LBS | BODY MASS INDEX: 26.66 KG/M2

## 2022-02-07 DIAGNOSIS — E78.2 MIXED HYPERLIPIDEMIA: ICD-10-CM

## 2022-02-07 DIAGNOSIS — I10 PRIMARY HYPERTENSION: Primary | ICD-10-CM

## 2022-02-07 DIAGNOSIS — R09.89 BILATERAL CAROTID BRUITS: ICD-10-CM

## 2022-02-07 DIAGNOSIS — I65.23 BILATERAL CAROTID ARTERY STENOSIS: ICD-10-CM

## 2022-02-07 DIAGNOSIS — R07.9 CHEST PAIN, UNSPECIFIED TYPE: ICD-10-CM

## 2022-02-07 DIAGNOSIS — I87.2 VENOUS INSUFFICIENCY OF RIGHT LOWER EXTREMITY: ICD-10-CM

## 2022-02-07 PROCEDURE — 99214 OFFICE O/P EST MOD 30 MIN: CPT | Mod: PBBFAC,PO | Performed by: INTERNAL MEDICINE

## 2022-02-07 PROCEDURE — 99999 PR PBB SHADOW E&M-EST. PATIENT-LVL IV: CPT | Mod: PBBFAC,,, | Performed by: INTERNAL MEDICINE

## 2022-02-07 PROCEDURE — 99999 PR PBB SHADOW E&M-EST. PATIENT-LVL IV: ICD-10-PCS | Mod: PBBFAC,,, | Performed by: INTERNAL MEDICINE

## 2022-02-07 PROCEDURE — 99215 PR OFFICE/OUTPT VISIT, EST, LEVL V, 40-54 MIN: ICD-10-PCS | Mod: S$PBB,,, | Performed by: INTERNAL MEDICINE

## 2022-02-07 PROCEDURE — 99215 OFFICE O/P EST HI 40 MIN: CPT | Mod: S$PBB,,, | Performed by: INTERNAL MEDICINE

## 2022-02-07 RX ORDER — NITROGLYCERIN 0.4 MG/1
0.4 TABLET SUBLINGUAL EVERY 5 MIN PRN
Qty: 20 TABLET | Refills: 12 | Status: SHIPPED | OUTPATIENT
Start: 2022-02-07 | End: 2023-10-24

## 2022-02-07 NOTE — PROGRESS NOTES
Subjective:   Patient ID:  Mirna Norton is a 77 y.o. female who presents for follow up of Hypertension, Hyperlipidemia, and Carotid Artery Disease      HPI:     Mirna Norton 77 y.o. female is here follow up and feeling well without any new complaints. She is s/p R GSVL EVLT  In 4/2013 with subsequent injections in 2014 without recurrent issues. She has asymptomatic mild bilateral carotid disease. She takes aspirin and losartan. Last venous ultrasound-no DVT or reflux disease. Carotid ultrasound-PSV < 130 cm/sec bilaterally.  In May 2020 she had atypical chest discomfort.  A stress echocardiogram revealed a normal ejection fraction, no wall motion abnormalities, no EKG changes, or symptoms suggestive of obstructive coronary disease. She has been less active because of the pandemic.  Her lipid profile was under good control with combination of pravastatin and regular activities.  Her activities have decreased in the setting of the pandemic with a slight rise in both triglycerides and LDL.        US 11/2018      R ICA 92 cm/sec   L  cm/sec      US 12/2019     R ICA 72 with ratio <1   L ICA 83 with ratio 108      ECG 1/27/2020     Sinus bradycardia      YOLANDE 5/2020     Non ischemic   Normal EF   Normal ECG   No symptoms   No WMAs      Carotid US 1/2022     R ICA PSV 91   L ICA     Patent bilateral vertebral            Patient Active Problem List    Diagnosis Date Noted    Non-cardiac chest pain 08/17/2020    History of colon polyps 07/29/2019    Venous insufficiency of right lower extremity 11/13/2017         R GSV EVLT 4/2013 with subsequent injections in 2014          Bilateral carotid bruits 11/13/2017    Foot pain, right 06/13/2017    Hyperlipidemia 09/28/2015    Irritable bowel syndrome without diarrhea 09/20/2015    Carotid stenosis 05/15/2014         Asymptomatic        R ICA 93 cm/sec   L  cm/sec      US 11/2018      R ICA 92 cm/sec   L  cm/sec              Pancreatic  cyst 2013    Gout, arthritis 2013    Lumbosacral spondylosis 2013    GERD (gastroesophageal reflux disease)      Gastroesophageal reflux with nonsteroidal antiinflammatory drugs, but can tolerate short term;   and fear of Celebrex        Hypertension            Right Arm BP - Sitting: (P) 124/72  Left Arm BP - Sittin/72        LABS    LAST HbA1c  Lab Results   Component Value Date    HGBA1C 6.1 (H) 2021       Lipid panel  Lab Results   Component Value Date    CHOL 188 2022    CHOL 152 2021    CHOL 151 2020     Lab Results   Component Value Date    HDL 73 2022    HDL 73 2021    HDL 60 2020     Lab Results   Component Value Date    LDLCALC 88 2022    LDLCALC 65.0 2021    LDLCALC 67.6 2020     Lab Results   Component Value Date    TRIG 177 (H) 2022    TRIG 70 2021    TRIG 117 2020     Lab Results   Component Value Date    CHOLHDL 2.6 2022    CHOLHDL 48.0 2021    CHOLHDL 39.7 2020            Review of Systems   Constitutional: Negative for diaphoresis, night sweats, weight gain and weight loss.   HENT: Negative for congestion.    Eyes: Negative for blurred vision, discharge and double vision.   Cardiovascular: Negative for chest pain, claudication, cyanosis, dyspnea on exertion, irregular heartbeat, leg swelling, near-syncope, orthopnea, palpitations, paroxysmal nocturnal dyspnea and syncope.   Respiratory: Negative for cough, shortness of breath and wheezing.    Endocrine: Negative for cold intolerance, heat intolerance and polyphagia.   Hematologic/Lymphatic: Negative for adenopathy and bleeding problem. Does not bruise/bleed easily.   Skin: Negative for dry skin and nail changes.   Musculoskeletal: Negative for arthritis, back pain, falls, joint pain, myalgias and neck pain.   Gastrointestinal: Negative for bloating, abdominal pain, change in bowel habit and constipation.   Genitourinary:  Negative for bladder incontinence, dysuria, flank pain, genital sores and missed menses.   Neurological: Negative for aphonia, brief paralysis, difficulty with concentration, dizziness and weakness.   Psychiatric/Behavioral: Negative for altered mental status and memory loss. The patient does not have insomnia.    Allergic/Immunologic: Negative for environmental allergies.       Objective:   Physical Exam  Constitutional:       Appearance: She is well-developed.      Interventions: She is not intubated.  HENT:      Head: Normocephalic and atraumatic.      Right Ear: External ear normal.      Left Ear: External ear normal.   Eyes:      General: No scleral icterus.        Right eye: No discharge.         Left eye: No discharge.      Conjunctiva/sclera: Conjunctivae normal.      Pupils: Pupils are equal, round, and reactive to light.   Neck:      Thyroid: No thyromegaly.      Vascular: Normal carotid pulses. No carotid bruit, hepatojugular reflux or JVD.      Trachea: No tracheal deviation.   Cardiovascular:      Rate and Rhythm: Normal rate and regular rhythm.  No extrasystoles are present.     Chest Wall: PMI is not displaced.      Pulses: No midsystolic click.           Carotid pulses are 2+ on the right side and 2+ on the left side.       Radial pulses are 2+ on the right side and 2+ on the left side.        Femoral pulses are 2+ on the right side and 2+ on the left side.       Popliteal pulses are 2+ on the right side and 2+ on the left side.        Dorsalis pedis pulses are 2+ on the right side and 2+ on the left side.        Posterior tibial pulses are 2+ on the right side and 2+ on the left side.      Heart sounds: S1 normal and S2 normal. Heart sounds not distant. No murmur heard.  No friction rub. No gallop. No S3 sounds.    Pulmonary:      Effort: Pulmonary effort is normal. No tachypnea, bradypnea, accessory muscle usage or respiratory distress. She is not intubated.      Breath sounds: Normal breath  sounds. No stridor. No decreased breath sounds, wheezing or rales.   Chest:      Chest wall: No tenderness.   Abdominal:      General: There is no distension or abdominal bruit.      Palpations: There is no mass or pulsatile mass.      Tenderness: There is no abdominal tenderness. There is no guarding or rebound.   Musculoskeletal:         General: No tenderness. Normal range of motion.      Cervical back: Normal range of motion and neck supple.   Lymphadenopathy:      Cervical: No cervical adenopathy.   Skin:     General: Skin is warm.      Coloration: Skin is not pale.      Findings: No erythema or rash.   Neurological:      Mental Status: She is alert and oriented to person, place, and time.      Cranial Nerves: No cranial nerve deficit.      Coordination: Coordination normal.      Deep Tendon Reflexes: Reflexes are normal and symmetric.   Psychiatric:         Behavior: Behavior normal.         Thought Content: Thought content normal.         Judgment: Judgment normal.         Assessment:     1. Primary hypertension    2. Bilateral carotid artery stenosis    3. Mixed hyperlipidemia    4. Venous insufficiency of right lower extremity    5. Bilateral carotid bruits    6. Chest pain, unspecified type        Plan:       Although her lipid profile is meds well control is was in 2021 the only variable is less activity in the setting of pandemic.  She will resume her activities stable same medications.  If repeat lipid profile does not revealed LDL less than 70 she would then switch from pravastatin to Crestor.      Low salt diet  Exercise  Weight loss  Follow up yearly         Continue with current medical plan and lifestyle changes.  Return sooner for concerns or questions. If symptoms persist go to the ED  I have reviewed all pertinent data on this patient       I have reviewed the patient's medical history in detail and updated the computerized patient record.    Orders Placed This Encounter   Procedures    US  Carotid Bilateral     Standing Status:   Future     Standing Expiration Date:   2/7/2023    CBC Auto Differential     Standing Status:   Future     Standing Expiration Date:   8/7/2023    Comprehensive Metabolic Panel     Standing Status:   Future     Standing Expiration Date:   8/7/2023    Lipid Panel     Standing Status:   Future     Standing Expiration Date:   8/7/2023       Follow up as scheduled. Return sooner for concerns or questions  Follow up yearly           She expressed verbal understanding and agreed with the plan      Greater than 50% of the visit of 45 minutes was spent counseling, educating, and coordinating the care of the patient.  -In today's visit, at least 4 established conditions that pose a risk to life or bodily function have been addressed and the conditions are severe.    -In today's visit, monitoring for drug toxicity was accomplished.            Patient's Medications   New Prescriptions    No medications on file   Previous Medications    ALLOPURINOL (ZYLOPRIM) 100 MG TABLET    TAKE 1 TABLET BY MOUTH EVERY DAY    AMLODIPINE (NORVASC) 5 MG TABLET    Take 1 tablet (5 mg total) by mouth every evening.    ASPIRIN (ECOTRIN) 81 MG EC TABLET    Take 1 tablet (81 mg total) by mouth once daily.    BIFIDOBACTERIUM INFANTIS (ALIGN ORAL)    Take by mouth once daily.    BIOTIN ORAL    Take by mouth.    MULTIVITAMIN-MINERALS-LUTEIN TAB    Take 1 tablet by mouth once daily.     PRAVASTATIN (PRAVACHOL) 40 MG TABLET    TAKE 1 TABLET BY MOUTH IN THE EVENING    VALSARTAN-HYDROCHLOROTHIAZIDE (DIOVAN-HCT) 160-25 MG PER TABLET    Take 1 tablet by mouth once daily.   Modified Medications    Modified Medication Previous Medication    NITROGLYCERIN (NITROSTAT) 0.4 MG SL TABLET nitroGLYCERIN (NITROSTAT) 0.4 MG SL tablet       Place 1 tablet (0.4 mg total) under the tongue every 5 (five) minutes as needed for Chest pain.    Place 1 tablet (0.4 mg total) under the tongue every 5 (five) minutes as needed for Chest  pain.   Discontinued Medications

## 2022-02-07 NOTE — PATIENT INSTRUCTIONS
"Patient Education       Carotid Artery Disease   The Basics   Written by the doctors and editors at [facility]   What is carotid artery disease? -- Carotid artery disease is a condition that can increase a person's risk of having a stroke. It happens when fatty deposits called plaque buildup inside blood vessels called the "carotid arteries." These are the 2 main blood vessels that bring blood to the brain. When plaque forms in these arteries, the arteries can become narrow (figure 1).  What are the symptoms of carotid artery disease? -- Carotid artery disease can cause strokes and TIAs (described below), though it does not always cause symptoms.  · Strokes - A stroke is when a part of the brain is damaged because of a problem with blood flow. Carotid artery disease can lead to strokes, because blood clots can form inside the narrowed artery. Then the clots and other material from the fatty plaque can travel to the brain and clog smaller arteries.  Some people recover from strokes without lasting effects or with only minor problems. But many people have serious problems after a stroke. After a stroke, some people are:  ? Unable to speak or understand speech  ? Paralyzed on one side of their body  ? Unable to dress, feed, or take care of themselves  · TIAs - TIA stands for "transient ischemic attack." TIAs are basically strokes that last only a short time. But they do not cause brain damage. TIAs happen when a blood vessel in the brain gets clogged briefly and then reopens. People who have TIAs can have the symptoms of a stroke, but the symptoms go away in a short time. People who have TIAs are at very high risk of having a full-blown stroke.  Is there a test for carotid artery disease? -- Yes. Doctors can use one of the following tests, which create pictures of the arteries:  · Carotid duplex ultrasound - This test uses sound waves to create pictures.  · Magnetic resonance angiography (MRA) - This test uses a magnet " "to create pictures. It works the same way that MRI tests work. Before having MRA, most people get an injection of a dye that makes the arteries show up more clearly.  · Computed tomography angiography (CTA) - This test uses a special kind of X-ray called a CT scan. Before having the scan, people get an injection of a dye that makes the arteries show up more clearly.  There is another test that doctors sometimes use, called a "cerebral angiogram." But that is not usually necessary.  How is carotid artery disease treated? -- Treatments for carotid artery disease focus mostly on preventing stroke. Treatments can include:  · Lifestyle changes - People can reduce their risk of stroke by:  ? Quitting smoking if they smoke  ? Being active  ? Losing weight if they are overweight  ? Eating a diet low in fat and cholesterol and high in fruits, vegetables, and low-fat dairy foods  · Medicines - Different people need different medicines to reduce their chances of having a stroke. In general, the medicines that can help prevent strokes include:  ? Medicines to lower blood pressure  ? Medicines called statins, which lower cholesterol  ? Medicines to prevent blood clots, such as aspirin  · Surgery - Doctors can do surgery to remove plaque from the carotid arteries. This is called "carotid endarterectomy." This treatment is most appropriate for people who have had a recent TIA or stroke caused by plaque in one of their carotid arteries.  · Carotid stenting - Carotid stenting is when doctors insert a tiny metal tube called a "stent" into the carotid artery. The stent is designed to prop open narrowed arteries. There are 2 main ways to get the stent into the proper position. One way involves inserting it through a blood vessel in the groin (inner thigh) and moving it up through other blood vessels up to the carotid artery. The other way involves making a small cut low in the neck and putting the stent directly into the carotid " artery.  Which treatment is right for me? -- The right treatment for you will depend on:  · Whether you have already had a stroke or TIA caused by carotid artery disease  · How much of your carotid artery is blocked off by plaque  · How old you are  · Whether you are a man or a woman  · Whether you have other health problems besides carotid artery disease  If your doctor suggests surgery or stenting, ask these questions:  · What are the chances that I will have a stroke in the next 5 years if I do not have this procedure?  · How much will the procedure reduce my risk of having a stroke in the next 5 years?  · What are the risks of the procedure?  · Does the doctor who will do the procedure have a lot of experience?  All topics are updated as new evidence becomes available and our peer review process is complete.  This topic retrieved from [employer] on: Sep 21, 2021.  Topic 64985 Version 9.0  Release: 29.4.2 - C29.263  © 2021 UpToDate, Inc. and/or its affiliates. All rights reserved.  figure 1: Carotid artery disease     In people with carotid artery disease, fatty deposits called plaques build up inside the artery walls. These plaques can break open and cause blood clots to form, and that can lead to stroke.  Graphic 81799 Version 2.0    Consumer Information Use and Disclaimer   This information is not specific medical advice and does not replace information you receive from your health care provider. This is only a brief summary of general information. It does NOT include all information about conditions, illnesses, injuries, tests, procedures, treatments, therapies, discharge instructions or life-style choices that may apply to you. You must talk with your health care provider for complete information about your health and treatment options. This information should not be used to decide whether or not to accept your health care provider's advice, instructions or recommendations. Only your health care provider has  the knowledge and training to provide advice that is right for you. The use of this information is governed by the [employer] End User License Agreement, available at https://www.[employer]/en/solutions/[employer]/about/wayne.The use of [employer] content is governed by the [employer] Terms of Use. ©2021 UpToDate, Inc. All rights reserved.  Copyright   © 2021 UpToDate, Inc. and/or its affiliates. All rights reserved.

## 2022-02-10 ENCOUNTER — OFFICE VISIT (OUTPATIENT)
Dept: INTERNAL MEDICINE | Facility: CLINIC | Age: 77
End: 2022-02-10
Payer: MEDICARE

## 2022-02-10 VITALS
HEIGHT: 66 IN | DIASTOLIC BLOOD PRESSURE: 62 MMHG | OXYGEN SATURATION: 98 % | BODY MASS INDEX: 25.75 KG/M2 | RESPIRATION RATE: 18 BRPM | HEART RATE: 75 BPM | SYSTOLIC BLOOD PRESSURE: 112 MMHG | WEIGHT: 160.25 LBS

## 2022-02-10 DIAGNOSIS — M10.9 GOUT, ARTHRITIS: ICD-10-CM

## 2022-02-10 DIAGNOSIS — I10 PRIMARY HYPERTENSION: ICD-10-CM

## 2022-02-10 DIAGNOSIS — L82.1 SK (SEBORRHEIC KERATOSIS): Primary | ICD-10-CM

## 2022-02-10 PROCEDURE — 99214 PR OFFICE/OUTPT VISIT, EST, LEVL IV, 30-39 MIN: ICD-10-PCS | Mod: S$PBB,,, | Performed by: INTERNAL MEDICINE

## 2022-02-10 PROCEDURE — 99214 OFFICE O/P EST MOD 30 MIN: CPT | Mod: S$PBB,,, | Performed by: INTERNAL MEDICINE

## 2022-02-10 PROCEDURE — 99999 PR PBB SHADOW E&M-EST. PATIENT-LVL IV: CPT | Mod: PBBFAC,,, | Performed by: INTERNAL MEDICINE

## 2022-02-10 PROCEDURE — 99999 PR PBB SHADOW E&M-EST. PATIENT-LVL IV: ICD-10-PCS | Mod: PBBFAC,,, | Performed by: INTERNAL MEDICINE

## 2022-02-10 PROCEDURE — 99214 OFFICE O/P EST MOD 30 MIN: CPT | Mod: PBBFAC | Performed by: INTERNAL MEDICINE

## 2022-02-10 NOTE — PATIENT INSTRUCTIONS
"Patient Education       Seborrheic Keratosis   The Basics   Written by the doctors and editors at [facility]   What is seborrheic keratosis? -- Seborrheic keratosis (called "SK" here) is a condition that causes growths on the skin. It usually happens in people older than 50, but younger people also get it. The growths can look like something that was stuck on the skin (picture 1). They can be light tan, brown, or black. It is usually easy to see where normal skin ends and the growth starts. The growths sometimes look scaly.  A person can have one or many of these growths. If there are more than one, they are called "seborrheic keratoses."  What are the symptoms of seborrheic keratosis? -- The growths in SK often do not cause any symptoms, but they sometimes itch. They are most common on certain parts of the body. These include the:  · Trunk - Chest, belly, and back  · Arms  · Face  Should I see a doctor or nurse? -- SK is common in older people. It is not skin cancer. But some people who get SK also get skin cancer. If you notice any new growths on your skin, ask your doctor or nurse to check them. They can make sure they are not cancer or treat them if they are.  Is there a test for seborrheic keratosis? -- No, there is no one test. The doctor or nurse will do an exam and check your skin. They can usually tell if you have SK by looking at any skin changes and touching them.  If the doctor is not sure whether SK is causing the skin changes, you will have a test called a "biopsy." During a biopsy, a doctor takes a small sample of the SK or takes off the whole growth. Then another doctor looks at the tissue under a microscope to check for skin cancer.  The doctor can also do a test called "dermoscopy." In this test, they look at the skin with a small microscope that has a light on it. The doctor holds this over the area with skin changes. The microscope and light help the doctor see under the skin. It can help show if a " spot is SK or something else.  How is seborrheic keratosis treated? -- Most people with SK do not need treatment. But if the growths bother you or look bad, a doctor or nurse can usually take them off with:  · Liquid nitrogen - This is a special liquid that gets very cold. It can leave an area of lighter skin where the SK was.  · A scalpel or other small tool - The doctor or nurse can take off the SK after numbing the skin. They might then freeze the SK with liquid nitrogen to get rid of abnormal tissue under it.  · Electricity - The doctor can use electricity to burn away the SK after numbing the skin. They might take off the SK first and then use electricity to get rid of abnormal tissue under it.  All topics are updated as new evidence becomes available and our peer review process is complete.  This topic retrieved from [employer] on: Sep 21, 2021.  Topic 44810 Version 5.0  Release: 29.4.2 - C29.263  © 2021 UpToDate, Inc. and/or its affiliates. All rights reserved.  picture 1: Seborrheic keratosis     Seborrheic keratosis is a condition that causes small growths on the skin. The growths can look like something was stuck on the skin.  Graphic 47528 Version 1.0    Consumer Information Use and Disclaimer   This information is not specific medical advice and does not replace information you receive from your health care provider. This is only a brief summary of general information. It does NOT include all information about conditions, illnesses, injuries, tests, procedures, treatments, therapies, discharge instructions or life-style choices that may apply to you. You must talk with your health care provider for complete information about your health and treatment options. This information should not be used to decide whether or not to accept your health care provider's advice, instructions or recommendations. Only your health care provider has the knowledge and training to provide advice that is right for you. The use  of this information is governed by the [employer] End User License Agreement, available at https://www.[employer]/en/solutions/[employer]/about/wayne.The use of [employer] content is governed by the [employer] Terms of Use. ©2021 UpToDate, Inc. All rights reserved.  Copyright   © 2021 UpToDate, Inc. and/or its affiliates. All rights reserved.

## 2022-02-10 NOTE — PROGRESS NOTES
Subjective:       Patient ID: Mirna Norton is a 77 y.o. female.    Chief Complaint: Follow-up    Patient is here for followup for chronic conditions.    Has a skin tag by umbilicus wants examined.    Mild pulling sensation R sided abd. No f/c/ns, no n/v. No back pain at this time.    Review of Systems   Constitutional: Negative for activity change and fever.   Cardiovascular: Positive for leg swelling (mild lower legs). Negative for chest pain.   Gastrointestinal: Negative for abdominal pain.   Genitourinary: Negative for dysuria, hematuria and pelvic pain.   Musculoskeletal: Negative for back pain.   Neurological: Negative for weakness, numbness and headaches.           Objective:      Physical Exam  Vitals reviewed.   Constitutional:       General: She is not in acute distress.     Appearance: Normal appearance. She is well-developed. She is not ill-appearing, toxic-appearing or diaphoretic.   HENT:      Head: Normocephalic and atraumatic.   Eyes:      General: No scleral icterus.     Pupils: Pupils are equal, round, and reactive to light.   Neck:      Thyroid: No thyromegaly.   Cardiovascular:      Rate and Rhythm: Normal rate and regular rhythm.      Heart sounds: Normal heart sounds. No murmur heard.  No friction rub. No gallop.    Pulmonary:      Effort: Pulmonary effort is normal. No respiratory distress.      Breath sounds: Normal breath sounds. No wheezing or rales.   Abdominal:      General: Bowel sounds are normal. There is no distension.      Palpations: Abdomen is soft. There is no mass.      Tenderness: There is no abdominal tenderness. There is no guarding or rebound.      Comments: No R sided abd tenderness to deep palpation  nml SLR on the R   Musculoskeletal:         General: No tenderness. Normal range of motion.      Cervical back: Normal range of motion.   Lymphadenopathy:      Cervical: No cervical adenopathy.   Skin:     Comments: SK appearing papule near umbilicus, no SOI   Neurological:       General: No focal deficit present.      Mental Status: She is alert and oriented to person, place, and time.   Psychiatric:         Mood and Affect: Mood normal.         Speech: Speech normal.         Behavior: Behavior normal.         Assessment:       1. SK (seborrheic keratosis)    2. Gout, arthritis    3. Primary hypertension        Plan:       Mirna was seen today for follow-up.    Diagnoses and all orders for this visit:    SK (seborrheic keratosis)  Offered derm referral she declines for now, wants to focus on her nail fungus for now (sees podiatry)    Gout, arthritis  Has been w/o any issues on Allo    Primary hypertension  Controlled    R sided abd pulling sensation -- not sure cause, exam is nml, call if symptoms worsen    I reviewed recent blood work results with the patient.      Health Maintenance       Date Due Completion Date    Shingles Vaccine (1 of 2) Never done ---    TETANUS VACCINE 01/16/2018 1/16/2008    DEXA SCAN 12/01/2022 12/1/2017    Override on 8/2/2008: Done    Aspirin/Antiplatelet Therapy 02/10/2023 2/10/2022    Colonoscopy 07/29/2024 7/29/2019    Override on 3/6/2006: Done    Lipid Panel 01/20/2027 1/20/2022      Next time shingles vax    Follow up in about 6 months (around 8/10/2022).    Future Appointments   Date Time Provider Department Center   2/25/2022  9:00 AM [facility]AN LAB, [facility]   2/25/2022  9:15 AM LAB, [facility] LAB [facility]   3/2/2022  9:40 AM Agustin Houston MD [facility]

## 2022-02-15 ENCOUNTER — OFFICE VISIT (OUTPATIENT)
Dept: PODIATRY | Facility: CLINIC | Age: 77
End: 2022-02-15
Payer: MEDICARE

## 2022-02-15 VITALS
WEIGHT: 160.5 LBS | DIASTOLIC BLOOD PRESSURE: 79 MMHG | HEART RATE: 67 BPM | BODY MASS INDEX: 25.9 KG/M2 | SYSTOLIC BLOOD PRESSURE: 142 MMHG

## 2022-02-15 DIAGNOSIS — B35.1 ONYCHOMYCOSIS DUE TO DERMATOPHYTE: Primary | ICD-10-CM

## 2022-02-15 PROCEDURE — 99213 OFFICE O/P EST LOW 20 MIN: CPT | Mod: PBBFAC,PN | Performed by: PODIATRIST

## 2022-02-15 PROCEDURE — 99213 OFFICE O/P EST LOW 20 MIN: CPT | Mod: S$PBB,,, | Performed by: PODIATRIST

## 2022-02-15 PROCEDURE — 99999 PR PBB SHADOW E&M-EST. PATIENT-LVL III: ICD-10-PCS | Mod: PBBFAC,,, | Performed by: PODIATRIST

## 2022-02-15 PROCEDURE — 99999 PR PBB SHADOW E&M-EST. PATIENT-LVL III: CPT | Mod: PBBFAC,,, | Performed by: PODIATRIST

## 2022-02-15 PROCEDURE — 99213 PR OFFICE/OUTPT VISIT, EST, LEVL III, 20-29 MIN: ICD-10-PCS | Mod: S$PBB,,, | Performed by: PODIATRIST

## 2022-02-15 RX ORDER — CICLOPIROX 80 MG/ML
SOLUTION TOPICAL NIGHTLY
Qty: 1 EACH | Refills: 2 | Status: SHIPPED | OUTPATIENT
Start: 2022-02-15

## 2022-02-15 NOTE — PROGRESS NOTES
Subjective:      Patient ID: Mirna Norton is a 77 y.o. female.    Chief Complaint:   Follow-up and Nail Problem    Mirna is a 77 y.o. female who rtc left big toenail discoloration fungus.  Patient relates overall the foot pain is doing okay    She did have some prescription cream from the [facility] many years ago that seemed to improve the nail and was 90%.  She may or may not had any trauma to the nail however has noticed a thickened dark discoloration line nail on the left big toe.  Denies any pain  Patient relates many years ago she did take oral Lamisil however not interested at this time secondary to concerns it may or may not affect her liver denies any current liver problems  Looking for some treatment options       Past Medical History:   Diagnosis Date    Abnormal cervical Papanicolaou smear     Cryo    Carotid artery stenosis     via kinked carotid artery - followed by Dr. Ye Anderson    Carotid stenosis 5/15/2014    GERD (gastroesophageal reflux disease)     Gout due to renal impairment     Gout, arthritis 2013    Gout, unspecified     Herniated lumbar intervertebral disc     Hypertension     Insomnia     Irritable bowel syndrome without diarrhea 2015    Liver lesion 3/13/2013    Mitral valve prolapse     MVP (mitral valve prolapse)     Pancreatic cyst 2013    PVD (peripheral vascular disease) 2013    PVD (peripheral vascular disease) 2013    Venous insufficiency      Past Surgical History:   Procedure Laterality Date    BARTHOLIN GLAND CYST EXCISION       SECTION      x3    COLONOSCOPY N/A 2019    Procedure: COLONOSCOPY;  Surgeon: Haider Luu MD;  Location: [address]);  Service: Endoscopy;  Laterality: N/A;    Diagnostic laproscopic       laser surgery on vein       Current Outpatient Medications on File Prior to Visit   Medication Sig Dispense Refill    allopurinoL (ZYLOPRIM) 100 MG tablet TAKE 1  TABLET BY MOUTH EVERY DAY 90 tablet 3    amLODIPine (NORVASC) 5 MG tablet Take 1 tablet (5 mg total) by mouth every evening. 90 tablet 1    aspirin (ECOTRIN) 81 MG EC tablet Take 1 tablet (81 mg total) by mouth once daily.      BIFIDOBACTERIUM INFANTIS (ALIGN ORAL) Take by mouth once daily.      BIOTIN ORAL Take by mouth.      multivitamin-minerals-lutein Tab Take 1 tablet by mouth once daily.       nitroGLYCERIN (NITROSTAT) 0.4 MG SL tablet Place 1 tablet (0.4 mg total) under the tongue every 5 (five) minutes as needed for Chest pain. 20 tablet 12    pravastatin (PRAVACHOL) 40 MG tablet TAKE 1 TABLET BY MOUTH IN THE EVENING 90 tablet 3    valsartan-hydrochlorothiazide (DIOVAN-HCT) 160-25 mg per tablet Take 1 tablet by mouth once daily. 90 tablet 3     No current facility-administered medications on file prior to visit.     Review of patient's allergies indicates:   Allergen Reactions    No known allergies        Review of Systems   Constitution: Negative for chills, decreased appetite, fever, malaise/fatigue, night sweats, weight gain and weight loss.   Cardiovascular: Negative for chest pain, claudication, dyspnea on exertion, leg swelling, palpitations and syncope.   Respiratory: Negative for cough and shortness of breath.    Endocrine: Negative for cold intolerance and heat intolerance.   Hematologic/Lymphatic: Negative for bleeding problem. Does not bruise/bleed easily.   Skin: Negative for color change, dry skin, flushing, itching, positive nail changes poor wound healing, rash, skin cancer, suspicious lesions and unusual hair distribution.   Musculoskeletal: Positive for arthritis. Negative for back pain, falls, gout, joint pain, joint swelling, muscle cramps, muscle weakness, myalgias, neck pain and stiffness.   Gastrointestinal: Negative for diarrhea, nausea and vomiting.   Neurological: Negative for dizziness, focal weakness, light-headedness, numbness, paresthesias, tremors, vertigo and weakness.    Psychiatric/Behavioral: Negative for altered mental status and depression. The patient does not have insomnia.    Allergic/Immunologic: Negative.            Objective:       Vitals:    02/15/22 0755   BP: (!) 142/79   Pulse: 67   Weight: 72.8 kg (160 lb 7.9 oz)   PainSc: 0-No pain   72.8 kg (160 lb 7.9 oz) afeb    Physical Exam  Vitals signs reviewed.   Constitutional:       General: She is not in acute distress.     Appearance: She is well-developed. She is not ill-appearing, toxic-appearing or diaphoretic.      Comments: Proper nestor wright   Cardiovascular:      Pulses:           Dorsalis pedis pulses are 2+ on the right side and 2+ on the left side.        Posterior tibial pulses are 1+ on the right side and 1+ on the left side.   Musculoskeletal: Normal range of motion.         General: No tenderness or deformity.      Right lower leg: No edema.      Left lower leg: No edema.      Right foot: Normal range of motion. Bunion and prominent metatarsal heads present. No deformity.      Left foot: Normal range of motion. Bunion and prominent metatarsal heads present. No deformity.      Comments:  No pain on palpation to feet    No pain on palpation to left hallux nail with debridement  Feet:      Right foot:      Protective Sensation: 10 sites tested. 10 sites sensed.      Skin integrity: Dry skin present. No ulcer, blister, skin breakdown, erythema, warmth or callus.      Toenail Condition: Right toenails are abnormally thick.      Left foot:      Protective Sensation: 10 sites tested. 10 sites sensed.      Skin integrity: Dry skin present. No ulcer, blister, skin breakdown, erythema, warmth or callus.      Toenail Condition: Left hallux toenail has a central linear mycosis with mild lysis no acute signs of infection other nails are short and brittle     Comments: No soi no open lesions  Skin:     General: Skin is warm.      Capillary Refill: Capillary refill takes 2 to 3 seconds.      Coloration: Skin is pale.       Findings: No erythema or rash.      Nails: There is no clubbing.     Neurological:      Mental Status: She is alert and oriented to person, place, and time.      Gait: Gait abnormal.   Psychiatric:         Attention and Perception: Attention normal.         Mood and Affect: Mood normal.         Speech: Speech normal.         Behavior: Behavior normal.         Thought Content: Thought content normal.         Judgment: Judgment normal.               Assessment:       Encounter Diagnosis   Name Primary?    Onychomycosis due to dermatophyte Yes         Plan:       Mirna was seen today for follow-up and nail problem.    Diagnoses and all orders for this visit:    Onychomycosis due to dermatophyte    Other orders  -     ciclopirox (PENLAC) 8 % Soln; Apply topically nightly. Remove once a week with nail polish remover      I counseled the patient on her conditions, their implications and medical management.    Discuss treatment options for nail fungus.  I explained that fungus lives in a warm dark moist environment and therefore patient should make every attempt to keep feet clean and dry.  We discussed drying feet thoroughly after shower particularly between the toes and then applying powder between the toes and in the shoes.   For fungal toenails I prescribed Penlac to be used daily for up to a year.  We discussed oral Lamisil but I did not recommend it as a first line of treatment since it is an internal medicine that may potentially have side effects, including liver problems. Patient elects for topical treatment. Patient instructed on proper use of Penlac.    With patient's permission, the toenails mentioned above were aggressively reduced and debrided using a nail nipper, removing all offending nail and debris.     Continue proper shoes    Discussed with patient if she would like the professional arch pharmacy prescription I can refer her to a physician at [facility] to a colleague who may prescribe that and  work with the company.  Patient relates she changed insurances and not sure it would be covered but will consider    Monitor new nail regrowth and return as needed

## 2022-02-24 ENCOUNTER — PES CALL (OUTPATIENT)
Dept: ADMINISTRATIVE | Facility: CLINIC | Age: 77
End: 2022-02-24
Payer: MEDICARE

## 2022-02-25 ENCOUNTER — LAB VISIT (OUTPATIENT)
Dept: LAB | Facility: HOSPITAL | Age: 77
End: 2022-02-25
Attending: INTERNAL MEDICINE
Payer: MEDICARE

## 2022-02-25 DIAGNOSIS — Q61.02 MULTIPLE RENAL CYSTS: ICD-10-CM

## 2022-02-25 LAB
ALBUMIN SERPL BCP-MCNC: 3.5 G/DL (ref 3.5–5.2)
ALP SERPL-CCNC: 57 U/L (ref 55–135)
ALT SERPL W/O P-5'-P-CCNC: 18 U/L (ref 10–44)
ANION GAP SERPL CALC-SCNC: 12 MMOL/L (ref 8–16)
AST SERPL-CCNC: 21 U/L (ref 10–40)
BASOPHILS # BLD AUTO: 0.02 K/UL (ref 0–0.2)
BASOPHILS NFR BLD: 0.3 % (ref 0–1.9)
BILIRUB SERPL-MCNC: 0.5 MG/DL (ref 0.1–1)
BUN SERPL-MCNC: 14 MG/DL (ref 8–23)
CALCIUM SERPL-MCNC: 9.9 MG/DL (ref 8.7–10.5)
CHLORIDE SERPL-SCNC: 98 MMOL/L (ref 95–110)
CO2 SERPL-SCNC: 27 MMOL/L (ref 23–29)
CREAT SERPL-MCNC: 0.8 MG/DL (ref 0.5–1.4)
DIFFERENTIAL METHOD: ABNORMAL
EOSINOPHIL # BLD AUTO: 0.3 K/UL (ref 0–0.5)
EOSINOPHIL NFR BLD: 4.8 % (ref 0–8)
ERYTHROCYTE [DISTWIDTH] IN BLOOD BY AUTOMATED COUNT: 13.4 % (ref 11.5–14.5)
EST. GFR  (AFRICAN AMERICAN): >60 ML/MIN/1.73 M^2
EST. GFR  (NON AFRICAN AMERICAN): >60 ML/MIN/1.73 M^2
GLUCOSE SERPL-MCNC: 125 MG/DL (ref 70–110)
HCT VFR BLD AUTO: 40.4 % (ref 37–48.5)
HGB BLD-MCNC: 12.8 G/DL (ref 12–16)
IMM GRANULOCYTES # BLD AUTO: 0.02 K/UL (ref 0–0.04)
IMM GRANULOCYTES NFR BLD AUTO: 0.3 % (ref 0–0.5)
LYMPHOCYTES # BLD AUTO: 2 K/UL (ref 1–4.8)
LYMPHOCYTES NFR BLD: 28.8 % (ref 18–48)
MCH RBC QN AUTO: 29.4 PG (ref 27–31)
MCHC RBC AUTO-ENTMCNC: 31.7 G/DL (ref 32–36)
MCV RBC AUTO: 93 FL (ref 82–98)
MONOCYTES # BLD AUTO: 0.7 K/UL (ref 0.3–1)
MONOCYTES NFR BLD: 9.2 % (ref 4–15)
NEUTROPHILS # BLD AUTO: 4 K/UL (ref 1.8–7.7)
NEUTROPHILS NFR BLD: 56.6 % (ref 38–73)
NRBC BLD-RTO: 0 /100 WBC
PHOSPHATE SERPL-MCNC: 2.6 MG/DL (ref 2.7–4.5)
PLATELET # BLD AUTO: 304 K/UL (ref 150–450)
PMV BLD AUTO: 9.6 FL (ref 9.2–12.9)
POTASSIUM SERPL-SCNC: 3.7 MMOL/L (ref 3.5–5.1)
PROT SERPL-MCNC: 7.2 G/DL (ref 6–8.4)
RBC # BLD AUTO: 4.36 M/UL (ref 4–5.4)
SODIUM SERPL-SCNC: 137 MMOL/L (ref 136–145)
URATE SERPL-MCNC: 6.4 MG/DL (ref 2.4–5.7)
WBC # BLD AUTO: 7.08 K/UL (ref 3.9–12.7)

## 2022-02-25 PROCEDURE — 84100 ASSAY OF PHOSPHORUS: CPT | Performed by: INTERNAL MEDICINE

## 2022-02-25 PROCEDURE — 85025 COMPLETE CBC W/AUTO DIFF WBC: CPT | Performed by: INTERNAL MEDICINE

## 2022-02-25 PROCEDURE — 80053 COMPREHEN METABOLIC PANEL: CPT | Performed by: INTERNAL MEDICINE

## 2022-02-25 PROCEDURE — 36415 COLL VENOUS BLD VENIPUNCTURE: CPT | Mod: PN | Performed by: INTERNAL MEDICINE

## 2022-02-25 PROCEDURE — 84550 ASSAY OF BLOOD/URIC ACID: CPT | Performed by: INTERNAL MEDICINE

## 2022-03-02 ENCOUNTER — LAB VISIT (OUTPATIENT)
Dept: LAB | Facility: HOSPITAL | Age: 77
End: 2022-03-02
Attending: INTERNAL MEDICINE
Payer: MEDICARE

## 2022-03-02 ENCOUNTER — OFFICE VISIT (OUTPATIENT)
Dept: NEPHROLOGY | Facility: CLINIC | Age: 77
End: 2022-03-02
Payer: MEDICARE

## 2022-03-02 VITALS
SYSTOLIC BLOOD PRESSURE: 130 MMHG | DIASTOLIC BLOOD PRESSURE: 80 MMHG | WEIGHT: 160.69 LBS | HEART RATE: 79 BPM | BODY MASS INDEX: 25.83 KG/M2 | HEIGHT: 66 IN | OXYGEN SATURATION: 100 %

## 2022-03-02 DIAGNOSIS — N39.0 UTI (URINARY TRACT INFECTION), UNCOMPLICATED: ICD-10-CM

## 2022-03-02 DIAGNOSIS — K80.20 GALL BLADDER STONES: ICD-10-CM

## 2022-03-02 DIAGNOSIS — R60.9 EDEMA, UNSPECIFIED TYPE: Primary | ICD-10-CM

## 2022-03-02 DIAGNOSIS — R60.9 EDEMA, UNSPECIFIED TYPE: ICD-10-CM

## 2022-03-02 LAB
ALBUMIN SERPL BCP-MCNC: 3.8 G/DL (ref 3.5–5.2)
ALP SERPL-CCNC: 58 U/L (ref 55–135)
ALT SERPL W/O P-5'-P-CCNC: 19 U/L (ref 10–44)
ANION GAP SERPL CALC-SCNC: 10 MMOL/L (ref 8–16)
AST SERPL-CCNC: 20 U/L (ref 10–40)
BILIRUB SERPL-MCNC: 0.3 MG/DL (ref 0.1–1)
BUN SERPL-MCNC: 14 MG/DL (ref 8–23)
CALCIUM SERPL-MCNC: 10.5 MG/DL (ref 8.7–10.5)
CHLORIDE SERPL-SCNC: 98 MMOL/L (ref 95–110)
CO2 SERPL-SCNC: 31 MMOL/L (ref 23–29)
CREAT SERPL-MCNC: 0.8 MG/DL (ref 0.5–1.4)
EST. GFR  (AFRICAN AMERICAN): >60 ML/MIN/1.73 M^2
EST. GFR  (NON AFRICAN AMERICAN): >60 ML/MIN/1.73 M^2
GLUCOSE SERPL-MCNC: 104 MG/DL (ref 70–110)
POTASSIUM SERPL-SCNC: 4 MMOL/L (ref 3.5–5.1)
PROT SERPL-MCNC: 7.7 G/DL (ref 6–8.4)
SODIUM SERPL-SCNC: 139 MMOL/L (ref 136–145)

## 2022-03-02 PROCEDURE — 36415 COLL VENOUS BLD VENIPUNCTURE: CPT | Performed by: INTERNAL MEDICINE

## 2022-03-02 PROCEDURE — 99204 OFFICE O/P NEW MOD 45 MIN: CPT | Mod: S$PBB,,, | Performed by: INTERNAL MEDICINE

## 2022-03-02 PROCEDURE — 99213 OFFICE O/P EST LOW 20 MIN: CPT | Mod: PBBFAC | Performed by: INTERNAL MEDICINE

## 2022-03-02 PROCEDURE — 99999 PR PBB SHADOW E&M-EST. PATIENT-LVL III: CPT | Mod: PBBFAC,,, | Performed by: INTERNAL MEDICINE

## 2022-03-02 PROCEDURE — 80053 COMPREHEN METABOLIC PANEL: CPT | Performed by: INTERNAL MEDICINE

## 2022-03-02 PROCEDURE — 99999 PR PBB SHADOW E&M-EST. PATIENT-LVL III: ICD-10-PCS | Mod: PBBFAC,,, | Performed by: INTERNAL MEDICINE

## 2022-03-02 PROCEDURE — 99204 PR OFFICE/OUTPT VISIT, NEW, LEVL IV, 45-59 MIN: ICD-10-PCS | Mod: S$PBB,,, | Performed by: INTERNAL MEDICINE

## 2022-03-02 NOTE — PROGRESS NOTES
CHIEF COMPLAINT/HPI: Mirna is a 77 y.o. female for evaluation of Rt. Upper quadrant pain with incontinence.     HPI:  This pleasant lady had been complaining of urgency and incontinence for the past 8 months. She had also pain in her Rt. Upper quadrant related to meals mainly oily type of food. She was followed with urologist and Gastroenterologist. She was told that her pain might be form UTI or a cyst she had on the rt. kidney. The patient was diagnosed earlier with irritable bowel syndrome, and she was given Metamucil for that. Her pain slightly improved. She is still feeling it as discomfort. She ahd no changes in her urine color. She was bothered form her incontinent which was noticed for the last months and is aggravated with tea. She is taking HCTZ 25mg which could be cause more urgency and lead to incontinence. Will adjust her medications on follow up after reviewing her BP reading from home. She came today for further management.       Past Medical History:   Diagnosis Date    Abnormal cervical Papanicolaou smear 1980's    Cryo    Carotid artery stenosis     via kinked carotid artery - followed by Dr. Belcher Justin    Carotid stenosis 5/15/2014    GERD (gastroesophageal reflux disease)     Gout due to renal impairment     Gout, arthritis 2/20/2013    Gout, unspecified     Herniated lumbar intervertebral disc     Hypertension     Insomnia     Irritable bowel syndrome without diarrhea 9/20/2015    Liver lesion 3/13/2013    Mitral valve prolapse     MVP (mitral valve prolapse)     Pancreatic cyst 12/30/2013    PVD (peripheral vascular disease) 12/23/2013    PVD (peripheral vascular disease) 12/23/2013    Venous insufficiency        Mirna reports that she has never smoked. She has never used smokeless tobacco. She reports that she does not drink alcohol and does not use drugs.    Family History   Problem Relation Age of Onset    Diabetes Brother     Hypertension Brother     Diabetes Sister  "    Eclampsia Sister     Hypertension Sister     Colon polyps Sister     Breast cancer Other         niece    Diabetes Sister     Eclampsia Sister     Hypertension Sister     Miscarriages / Stillbirths Sister     Heart disease Sister         mi, cad    Diabetes Sister     Eclampsia Sister     Hypertension Sister     Heart disease Mother     Diabetes Sister     Endometriosis Daughter     No Known Problems Daughter     No Known Problems Daughter     Colon cancer Neg Hx     Ovarian cancer Neg Hx     Esophageal cancer Neg Hx     Liver cancer Neg Hx     Liver disease Neg Hx     Rectal cancer Neg Hx     Stomach cancer Neg Hx        ROS:    General: No fever, chills, change in appetite or weight loss  Skin: No rashes or pruritus  Head: No headaches or recent head trauma  Eyes: No changes in vision or eye pain  Nodes: No swollen glands  Pulmonary: No dyspnea, wheezes, cough or sputum production  Cardiovascular: No chest pain, edema, PND, orthopnea or reduced exercise tolerance  Abdomen: No abdominal pain, nausea, vomiting, constipation or diarrhea  Urinary: No flank pain, dysuria or hematuria  Peripheral Vascular: No claudication or cyanosis  Musculoskeletal: No joint stiffness, swelling or back pain  Neurologic: No history of seizures, tremors, forcal weakness or numbness    PE:    Vitals:    03/02/22 0942   BP: 130/80   Pulse: 79   SpO2: 100%   Weight: 72.9 kg (160 lb 11.5 oz)   Height: 5' 6" (1.676 m)       HEENT: Normocephalic, atraumatic, EOMI, PERRLA, normal conjunctive and lids, supple neck with no thyromeglay or masses, normal oropharynx, hearing intact  Cardiovascular: Regular without murmur, gallop or rub, no edema  Respiratory: Clear bilaterally without rales, rhonchi, wheezes with normal effort  Abdomen: Soft, nontender/nondistended, positive bowel sounds, no hepatospenomegaly  Extremities: No cyanosis, clubbing, normal gait  Skin: No rashes, ulcers, induration  Psych: Oriented x 3 with " normal mood and effect    Labs:  I. Ultrasound of the Abdomen r/O Gall bladder stones.   II. CMP.  III. Urinalysis and Cx.     Impression and Plan:  I. Primary HTN will monitor at home and try to remove the HCTZ because of her incontinence.   II. Rt. upper quadrant pain R/O Biliary colic. Will send for ultrasound of the abdomen.   III. Urinary incontinent. Will remove the HCTZ form the medication and consider further imaging if this did not improve the condition.   IV. Irritable bowel disease with constipation.   V. GERD.   VI. Hyperlipidemia.   VII. Venous insufficiency of the legs.   VIII. Lumbosacral disease.        KRISTINE DUVAL.Fariba. MD. LEA. FACP.  , [employer] [facility] [employer]  Nephrology Consultant. [facility].   [address]. [address]th [place] [address].    email: [email or web address].  Tel: Office: [phone]

## 2022-03-10 ENCOUNTER — HOSPITAL ENCOUNTER (OUTPATIENT)
Dept: RADIOLOGY | Facility: OTHER | Age: 77
Discharge: HOME | End: 2022-03-10
Attending: INTERNAL MEDICINE
Payer: MEDICARE

## 2022-03-10 DIAGNOSIS — R60.9 EDEMA, UNSPECIFIED TYPE: ICD-10-CM

## 2022-03-10 PROCEDURE — 76770 US EXAM ABDO BACK WALL COMP: CPT | Mod: TC

## 2022-03-10 PROCEDURE — 76770 US EXAM ABDO BACK WALL COMP: CPT | Mod: 26,,, | Performed by: RADIOLOGY

## 2022-03-10 PROCEDURE — 76770 US RETROPERITONEAL COMPLETE: ICD-10-PCS | Mod: 26,,, | Performed by: RADIOLOGY

## 2022-03-15 ENCOUNTER — PATIENT OUTREACH (OUTPATIENT)
Dept: ADMINISTRATIVE | Facility: OTHER | Age: 77
End: 2022-03-15
Payer: MEDICARE

## 2022-03-16 ENCOUNTER — PATIENT MESSAGE (OUTPATIENT)
Dept: NEPHROLOGY | Facility: CLINIC | Age: 77
End: 2022-03-16

## 2022-03-16 ENCOUNTER — OFFICE VISIT (OUTPATIENT)
Dept: NEPHROLOGY | Facility: CLINIC | Age: 77
End: 2022-03-16
Payer: MEDICARE

## 2022-03-16 VITALS
DIASTOLIC BLOOD PRESSURE: 80 MMHG | BODY MASS INDEX: 25.54 KG/M2 | WEIGHT: 158.94 LBS | HEART RATE: 73 BPM | OXYGEN SATURATION: 96 % | SYSTOLIC BLOOD PRESSURE: 130 MMHG | HEIGHT: 66 IN

## 2022-03-16 DIAGNOSIS — I10 PRIMARY HYPERTENSION: Primary | ICD-10-CM

## 2022-03-16 PROCEDURE — 99999 PR PBB SHADOW E&M-EST. PATIENT-LVL III: ICD-10-PCS | Mod: PBBFAC,,, | Performed by: INTERNAL MEDICINE

## 2022-03-16 PROCEDURE — 99213 OFFICE O/P EST LOW 20 MIN: CPT | Mod: S$PBB,,, | Performed by: INTERNAL MEDICINE

## 2022-03-16 PROCEDURE — 99999 PR PBB SHADOW E&M-EST. PATIENT-LVL III: CPT | Mod: PBBFAC,,, | Performed by: INTERNAL MEDICINE

## 2022-03-16 PROCEDURE — 99213 PR OFFICE/OUTPT VISIT, EST, LEVL III, 20-29 MIN: ICD-10-PCS | Mod: S$PBB,,, | Performed by: INTERNAL MEDICINE

## 2022-03-16 PROCEDURE — 99213 OFFICE O/P EST LOW 20 MIN: CPT | Mod: PBBFAC | Performed by: INTERNAL MEDICINE

## 2022-03-16 RX ORDER — VALSARTAN 320 MG/1
320 TABLET ORAL DAILY
Qty: 90 TABLET | Refills: 3 | Status: SHIPPED | OUTPATIENT
Start: 2022-03-16 | End: 2022-05-03

## 2022-03-21 ENCOUNTER — PATIENT MESSAGE (OUTPATIENT)
Dept: INTERNAL MEDICINE | Facility: CLINIC | Age: 77
End: 2022-03-21
Payer: MEDICARE

## 2022-03-21 NOTE — TELEPHONE ENCOUNTER
Hi, please call her --  I do not think it is from the med change.  I am not sure the cause if her symptoms continue I recommend that she come in for an urgent care appointment.  Let me know if patient has any questions.  Thank you, Ramsey Tyler

## 2022-03-21 NOTE — TELEPHONE ENCOUNTER
Spoke to patient and advised of recommendation. Patient verbalized understanding.  Patient did not have any questions

## 2022-03-29 ENCOUNTER — PATIENT MESSAGE (OUTPATIENT)
Dept: NEPHROLOGY | Facility: CLINIC | Age: 77
End: 2022-03-29
Payer: MEDICARE

## 2022-05-02 ENCOUNTER — PATIENT MESSAGE (OUTPATIENT)
Dept: ADMINISTRATIVE | Facility: OTHER | Age: 77
End: 2022-05-02
Payer: MEDICARE

## 2022-05-02 ENCOUNTER — PATIENT MESSAGE (OUTPATIENT)
Dept: INTERNAL MEDICINE | Facility: CLINIC | Age: 77
End: 2022-05-02
Payer: MEDICARE

## 2022-05-03 ENCOUNTER — OFFICE VISIT (OUTPATIENT)
Dept: INTERNAL MEDICINE | Facility: CLINIC | Age: 77
End: 2022-05-03
Payer: MEDICARE

## 2022-05-03 ENCOUNTER — PATIENT MESSAGE (OUTPATIENT)
Dept: INTERNAL MEDICINE | Facility: CLINIC | Age: 77
End: 2022-05-03

## 2022-05-03 VITALS
DIASTOLIC BLOOD PRESSURE: 70 MMHG | HEIGHT: 66 IN | SYSTOLIC BLOOD PRESSURE: 120 MMHG | WEIGHT: 158.75 LBS | RESPIRATION RATE: 18 BRPM | OXYGEN SATURATION: 99 % | HEART RATE: 76 BPM | BODY MASS INDEX: 25.51 KG/M2

## 2022-05-03 DIAGNOSIS — M54.50 ACUTE RIGHT-SIDED LOW BACK PAIN WITHOUT SCIATICA: ICD-10-CM

## 2022-05-03 DIAGNOSIS — H81.10 BENIGN PAROXYSMAL POSITIONAL VERTIGO, UNSPECIFIED LATERALITY: ICD-10-CM

## 2022-05-03 DIAGNOSIS — I10 ESSENTIAL HYPERTENSION: Primary | ICD-10-CM

## 2022-05-03 PROCEDURE — 99214 OFFICE O/P EST MOD 30 MIN: CPT | Mod: PBBFAC | Performed by: INTERNAL MEDICINE

## 2022-05-03 PROCEDURE — 99999 PR PBB SHADOW E&M-EST. PATIENT-LVL IV: CPT | Mod: PBBFAC,,, | Performed by: INTERNAL MEDICINE

## 2022-05-03 PROCEDURE — 99214 PR OFFICE/OUTPT VISIT, EST, LEVL IV, 30-39 MIN: ICD-10-PCS | Mod: S$PBB,,, | Performed by: INTERNAL MEDICINE

## 2022-05-03 PROCEDURE — 99999 PR PBB SHADOW E&M-EST. PATIENT-LVL IV: ICD-10-PCS | Mod: PBBFAC,,, | Performed by: INTERNAL MEDICINE

## 2022-05-03 PROCEDURE — 99214 OFFICE O/P EST MOD 30 MIN: CPT | Mod: S$PBB,,, | Performed by: INTERNAL MEDICINE

## 2022-05-03 RX ORDER — VALSARTAN AND HYDROCHLOROTHIAZIDE 160; 25 MG/1; MG/1
1 TABLET ORAL DAILY
Qty: 90 TABLET | Refills: 11 | Status: SHIPPED | OUTPATIENT
Start: 2022-05-03 | End: 2022-05-16 | Stop reason: ALTCHOICE

## 2022-05-03 RX ORDER — VALSARTAN AND HYDROCHLOROTHIAZIDE 160; 25 MG/1; MG/1
TABLET ORAL
COMMUNITY
Start: 2022-03-30 | End: 2022-05-03 | Stop reason: SDUPTHER

## 2022-05-03 NOTE — PATIENT INSTRUCTIONS
General Vestibular exercises  Perform these exercises at least twice per day until your dizziness symptoms resolve.  Eye exercises  Look up and down 20 times, start slowly at first, then speed up.  Look from one side to the other 20 times, start slowly at first, then speed up.  Hold one finger at arms length and focus on it. Move it slowly in towards you and then out again 20 times.    Head exercises  With your eyes open, bend your head forwards, then backwards 20 times. Start slowly at first, then speed up.  With your eyes open, turn your head from side to side 20 times. Start slowly at first, then speed up.  As your dizziness improves, repeat these head exercises with your eyes closed.    If after 2 weeks you are continuing to have dizziness problems, call communicate with our clinic by calling [phone], or emailing on [email or web address].    [email or web address]

## 2022-05-03 NOTE — PROGRESS NOTES
Subjective:       Patient ID: Mirna Norton is a 77 y.o. female.    Chief Complaint: Hypertension and Flank Pain    Here for urgent care -- for steven pressures on home monitor and a few other issues.    4/29 head spinning sensation came on out of blue, would come on with laying down especially. Each episode lasts<1 min.  denies neurologic deficit -- no weakness, no change in vision, no slurred speech or probs swallowing, no imbalance or problems walking    Also c/o RLQ abd pains, similar to what she had one yr ago. Eating OK, bowels OK. No urinary changes. N/v. No radicular symptoms.            Review of Systems   Constitutional: Negative for activity change and fever.   Cardiovascular: Negative for chest pain and leg swelling.   Gastrointestinal: Negative for abdominal pain.   Genitourinary: Negative for dysuria, hematuria and pelvic pain.   Musculoskeletal: Positive for back pain.   Neurological: Positive for dizziness (spinning). Negative for weakness, numbness and headaches.           Objective:      Physical Exam  Vitals reviewed.   Constitutional:       General: She is not in acute distress.     Appearance: Normal appearance. She is well-developed. She is not ill-appearing, toxic-appearing or diaphoretic.      Comments:      HENT:      Head: Normocephalic and atraumatic.      Left Ear: There is no impacted cerumen.   Eyes:      General: No scleral icterus.     Pupils: Pupils are equal, round, and reactive to light.   Neck:      Thyroid: No thyromegaly.   Cardiovascular:      Rate and Rhythm: Normal rate and regular rhythm.      Heart sounds: Normal heart sounds. No murmur heard.    No friction rub. No gallop.   Pulmonary:      Effort: Pulmonary effort is normal. No respiratory distress.      Breath sounds: Normal breath sounds. No wheezing or rales.   Abdominal:      General: Abdomen is flat. Bowel sounds are normal. There is no distension.      Palpations: Abdomen is soft. There is no mass.      Tenderness:  There is no abdominal tenderness. There is no guarding or rebound.      Comments: no CVAT   Musculoskeletal:         General: No tenderness. Normal range of motion.      Cervical back: Normal range of motion.      Comments: nl lower extrem strength/sense/DTRs  Neg SLR bilat  No midline spine tenderness to deep palpation  Pain clearly localized to the R mid lumbar paraspinal area   Lymphadenopathy:      Cervical: No cervical adenopathy.   Skin:     Findings: No rash.   Neurological:      General: No focal deficit present.      Mental Status: She is alert and oriented to person, place, and time.      Comments: + norma Hallpike testing which clearly reproduces the dizziness   Psychiatric:         Mood and Affect: Mood normal.         Speech: Speech normal.         Behavior: Behavior normal.         Thought Content: Thought content normal.         Judgment: Judgment normal.         Assessment:       1. Essential hypertension    2. Acute right-sided low back pain without sciatica    3. Benign paroxysmal positional vertigo, unspecified laterality        Plan:       Mirna was seen today for hypertension and flank pain.    Diagnoses and all orders for this visit:    Essential hypertension  -     valsartan-hydrochlorothiazide (DIOVAN-HCT) 160-25 mg per tablet; Take 1 tablet by mouth once daily.  Controlled with bigger cuff, she denies incd viraj intake recently    Acute right-sided low back pain without sciatica  I reviewed recent spine mri with the patient. She will try tylenol, heating pad. She did not like affect of tramadol.  Explained that if not better in 1-2 weeks, pt should rtc/call PCP          Benign paroxysmal positional vertigo, unspecified laterality  Pt given handouts with HEP  Explained that if not better in 1-2 weeks, pt should rtc/call PCP          Health Maintenance       Date Due Completion Date    Shingles Vaccine (1 of 2) Never done ---    TETANUS VACCINE 01/16/2018 1/16/2008    COVID-19 Vaccine (4 -  Booster) 03/12/2022 11/12/2021    DEXA Scan 12/01/2022 12/1/2017    Override on 8/2/2008: Done    Aspirin/Antiplatelet Therapy 05/03/2023 5/3/2022    Colonoscopy 07/29/2024 7/29/2019    Override on 3/6/2006: Done    Lipid Panel 01/20/2027 1/20/2022          Follow up in about 4 months (around 9/3/2022).    Future Appointments   Date Time Provider Department Center   5/20/2022  9:45 AM [facility] US[facility]   6/3/2022 10:00 AM LAB, [facility]   6/3/2022 10:15 AM LAB, [facility]   6/8/2022 10:20 AM Agustin Houston MD [facility]

## 2022-05-20 ENCOUNTER — HOSPITAL ENCOUNTER (OUTPATIENT)
Dept: RADIOLOGY | Facility: OTHER | Age: 77
Discharge: HOME | End: 2022-05-20
Attending: INTERNAL MEDICINE
Payer: MEDICARE

## 2022-05-20 DIAGNOSIS — K80.20 GALL BLADDER STONES: ICD-10-CM

## 2022-05-20 PROCEDURE — 76700 US EXAM ABDOM COMPLETE: CPT | Mod: 26,,, | Performed by: RADIOLOGY

## 2022-05-20 PROCEDURE — 76700 US ABDOMEN COMPLETE: ICD-10-PCS | Mod: 26,,, | Performed by: RADIOLOGY

## 2022-05-20 PROCEDURE — 76700 US EXAM ABDOM COMPLETE: CPT | Mod: TC

## 2022-06-15 ENCOUNTER — PES CALL (OUTPATIENT)
Dept: ADMINISTRATIVE | Facility: CLINIC | Age: 77
End: 2022-06-15
Payer: MEDICARE

## 2022-06-23 DIAGNOSIS — M10.9 GOUT, UNSPECIFIED CAUSE, UNSPECIFIED CHRONICITY, UNSPECIFIED SITE: ICD-10-CM

## 2022-06-23 RX ORDER — ALLOPURINOL 100 MG/1
TABLET ORAL
Qty: 90 TABLET | Refills: 2 | Status: SHIPPED | OUTPATIENT
Start: 2022-06-23 | End: 2022-10-27

## 2022-06-23 NOTE — TELEPHONE ENCOUNTER
No new care gaps identified.  [facility] Embedded Care Gaps. Reference number: [id number]. 6/23/2022   12:32:37 AM EDDAT

## 2022-06-23 NOTE — TELEPHONE ENCOUNTER
Refill Decision Note   Mirna Errol  is requesting a refill authorization.  Brief Assessment and Rationale for Refill:  Approve     Medication Therapy Plan:       Medication Reconciliation Completed: No   Comments:     No Care Gaps recommended.     Note composed:11:21 AM 06/23/2022

## 2022-06-30 ENCOUNTER — CLINICAL SUPPORT (OUTPATIENT)
Dept: PRIMARY CARE CLINIC | Facility: CLINIC | Age: 77
End: 2022-06-30
Payer: MEDICARE

## 2022-06-30 ENCOUNTER — LAB VISIT (OUTPATIENT)
Dept: PRIMARY CARE CLINIC | Facility: CLINIC | Age: 77
End: 2022-06-30
Payer: MEDICARE

## 2022-06-30 DIAGNOSIS — I10 PRIMARY HYPERTENSION: ICD-10-CM

## 2022-06-30 LAB
ALBUMIN SERPL BCP-MCNC: 3.7 G/DL (ref 3.5–5.2)
ALP SERPL-CCNC: 57 U/L (ref 55–135)
ALT SERPL W/O P-5'-P-CCNC: 19 U/L (ref 10–44)
ANION GAP SERPL CALC-SCNC: 8 MMOL/L (ref 8–16)
AST SERPL-CCNC: 23 U/L (ref 10–40)
BACTERIA #/AREA URNS AUTO: ABNORMAL /HPF
BASOPHILS # BLD AUTO: 0.03 K/UL (ref 0–0.2)
BASOPHILS NFR BLD: 0.5 % (ref 0–1.9)
BILIRUB SERPL-MCNC: 0.4 MG/DL (ref 0.1–1)
BILIRUB UR QL STRIP: NEGATIVE
BUN SERPL-MCNC: 15 MG/DL (ref 8–23)
CALCIUM SERPL-MCNC: 9.7 MG/DL (ref 8.7–10.5)
CHLORIDE SERPL-SCNC: 103 MMOL/L (ref 95–110)
CLARITY UR REFRACT.AUTO: ABNORMAL
CO2 SERPL-SCNC: 29 MMOL/L (ref 23–29)
COLOR UR AUTO: YELLOW
CREAT SERPL-MCNC: 0.8 MG/DL (ref 0.5–1.4)
CREAT UR-MCNC: 81 MG/DL (ref 15–325)
DIFFERENTIAL METHOD: ABNORMAL
EOSINOPHIL # BLD AUTO: 0.3 K/UL (ref 0–0.5)
EOSINOPHIL NFR BLD: 5.1 % (ref 0–8)
ERYTHROCYTE [DISTWIDTH] IN BLOOD BY AUTOMATED COUNT: 13.3 % (ref 11.5–14.5)
EST. GFR  (AFRICAN AMERICAN): >60 ML/MIN/1.73 M^2
EST. GFR  (NON AFRICAN AMERICAN): >60 ML/MIN/1.73 M^2
GLUCOSE SERPL-MCNC: 156 MG/DL (ref 70–110)
GLUCOSE UR QL STRIP: NEGATIVE
HCT VFR BLD AUTO: 41.7 % (ref 37–48.5)
HGB BLD-MCNC: 13.1 G/DL (ref 12–16)
HGB UR QL STRIP: ABNORMAL
IMM GRANULOCYTES # BLD AUTO: 0.02 K/UL (ref 0–0.04)
IMM GRANULOCYTES NFR BLD AUTO: 0.3 % (ref 0–0.5)
KETONES UR QL STRIP: NEGATIVE
LEUKOCYTE ESTERASE UR QL STRIP: ABNORMAL
LYMPHOCYTES # BLD AUTO: 1.9 K/UL (ref 1–4.8)
LYMPHOCYTES NFR BLD: 29.7 % (ref 18–48)
MCH RBC QN AUTO: 28.9 PG (ref 27–31)
MCHC RBC AUTO-ENTMCNC: 31.4 G/DL (ref 32–36)
MCV RBC AUTO: 92 FL (ref 82–98)
MICROSCOPIC COMMENT: ABNORMAL
MONOCYTES # BLD AUTO: 0.6 K/UL (ref 0.3–1)
MONOCYTES NFR BLD: 9.8 % (ref 4–15)
NEUTROPHILS # BLD AUTO: 3.5 K/UL (ref 1.8–7.7)
NEUTROPHILS NFR BLD: 54.6 % (ref 38–73)
NITRITE UR QL STRIP: NEGATIVE
NRBC BLD-RTO: 0 /100 WBC
PH UR STRIP: 5 [PH] (ref 5–8)
PLATELET # BLD AUTO: 288 K/UL (ref 150–450)
PMV BLD AUTO: 9.4 FL (ref 9.2–12.9)
POTASSIUM SERPL-SCNC: 4.3 MMOL/L (ref 3.5–5.1)
PROT SERPL-MCNC: 7.4 G/DL (ref 6–8.4)
PROT UR QL STRIP: NEGATIVE
PROT UR-MCNC: <7 MG/DL (ref 0–15)
PROT/CREAT UR: NORMAL MG/G{CREAT} (ref 0–0.2)
RBC # BLD AUTO: 4.54 M/UL (ref 4–5.4)
RBC #/AREA URNS AUTO: 3 /HPF (ref 0–4)
SODIUM SERPL-SCNC: 140 MMOL/L (ref 136–145)
SP GR UR STRIP: 1.01 (ref 1–1.03)
SQUAMOUS #/AREA URNS AUTO: 7 /HPF
URATE SERPL-MCNC: 4.9 MG/DL (ref 2.4–5.7)
URN SPEC COLLECT METH UR: ABNORMAL
WBC # BLD AUTO: 6.46 K/UL (ref 3.9–12.7)
WBC #/AREA URNS AUTO: 16 /HPF (ref 0–5)

## 2022-06-30 PROCEDURE — 84550 ASSAY OF BLOOD/URIC ACID: CPT | Performed by: INTERNAL MEDICINE

## 2022-06-30 PROCEDURE — 81001 URINALYSIS AUTO W/SCOPE: CPT | Performed by: INTERNAL MEDICINE

## 2022-06-30 PROCEDURE — 85025 COMPLETE CBC W/AUTO DIFF WBC: CPT | Performed by: INTERNAL MEDICINE

## 2022-06-30 PROCEDURE — 36415 COLL VENOUS BLD VENIPUNCTURE: CPT | Mod: PBBFAC,PN

## 2022-06-30 PROCEDURE — 82570 ASSAY OF URINE CREATININE: CPT | Performed by: INTERNAL MEDICINE

## 2022-06-30 PROCEDURE — 80053 COMPREHEN METABOLIC PANEL: CPT | Performed by: INTERNAL MEDICINE

## 2022-06-30 PROCEDURE — 36415 COLL VENOUS BLD VENIPUNCTURE: CPT | Performed by: INTERNAL MEDICINE

## 2022-07-06 ENCOUNTER — OFFICE VISIT (OUTPATIENT)
Dept: NEPHROLOGY | Facility: CLINIC | Age: 77
End: 2022-07-06
Payer: MEDICARE

## 2022-07-06 VITALS
OXYGEN SATURATION: 98 % | DIASTOLIC BLOOD PRESSURE: 70 MMHG | HEIGHT: 66 IN | WEIGHT: 161.38 LBS | SYSTOLIC BLOOD PRESSURE: 124 MMHG | BODY MASS INDEX: 25.94 KG/M2 | HEART RATE: 78 BPM

## 2022-07-06 DIAGNOSIS — R10.9 ABDOMINAL PAIN, UNSPECIFIED ABDOMINAL LOCATION: ICD-10-CM

## 2022-07-06 DIAGNOSIS — N39.0 UTI (URINARY TRACT INFECTION), UNCOMPLICATED: ICD-10-CM

## 2022-07-06 DIAGNOSIS — K76.0 HEPATIC STEATOSIS: ICD-10-CM

## 2022-07-06 DIAGNOSIS — I10 PRIMARY HYPERTENSION: Primary | ICD-10-CM

## 2022-07-06 DIAGNOSIS — E74.39 GLUCOSE INTOLERANCE: ICD-10-CM

## 2022-07-06 PROCEDURE — 99214 OFFICE O/P EST MOD 30 MIN: CPT | Mod: S$PBB,,, | Performed by: INTERNAL MEDICINE

## 2022-07-06 PROCEDURE — 99214 PR OFFICE/OUTPT VISIT, EST, LEVL IV, 30-39 MIN: ICD-10-PCS | Mod: S$PBB,,, | Performed by: INTERNAL MEDICINE

## 2022-07-06 PROCEDURE — 99214 OFFICE O/P EST MOD 30 MIN: CPT | Mod: PBBFAC | Performed by: INTERNAL MEDICINE

## 2022-07-06 PROCEDURE — 99999 PR PBB SHADOW E&M-EST. PATIENT-LVL IV: ICD-10-PCS | Mod: PBBFAC,,, | Performed by: INTERNAL MEDICINE

## 2022-07-06 PROCEDURE — 99999 PR PBB SHADOW E&M-EST. PATIENT-LVL IV: CPT | Mod: PBBFAC,,, | Performed by: INTERNAL MEDICINE

## 2022-07-06 NOTE — PROGRESS NOTES
CHIEF COMPLAINT/HPI: Mirna is a 77 y.o. female for evaluation of her renal function.     Clinical course:  The patient is stable. Her ultrasound did not show any gall bladder stones. She still has some abdominal discomfort and I will refer to GI for further assessment. Her BP is well controlled at home. Her sugar was 156mg/dl, but this wan non fasting. We will repeat a fasting blood sugar. She was advised because of her positive family history to start diabetic diet.  She will be followed in 3months.     Past Medical History:   Diagnosis Date    Abnormal cervical Papanicolaou smear 1980's    Cryo    Carotid artery stenosis     via kinked carotid artery - followed by Dr. Ye Anderson    Carotid stenosis 5/15/2014    GERD (gastroesophageal reflux disease)     Gout due to renal impairment     Gout, arthritis 2/20/2013    Gout, unspecified     Herniated lumbar intervertebral disc     Hypertension     Insomnia     Irritable bowel syndrome without diarrhea 9/20/2015    Liver lesion 3/13/2013    Mitral valve prolapse     MVP (mitral valve prolapse)     Pancreatic cyst 12/30/2013    PVD (peripheral vascular disease) 12/23/2013    PVD (peripheral vascular disease) 12/23/2013    Venous insufficiency        Mirna reports that she has never smoked. She has never used smokeless tobacco. She reports that she does not drink alcohol and does not use drugs.    Family History   Problem Relation Age of Onset    Diabetes Brother     Hypertension Brother     Diabetes Sister     Eclampsia Sister     Hypertension Sister     Colon polyps Sister     Breast cancer Other         niece    Diabetes Sister     Eclampsia Sister     Hypertension Sister     Miscarriages / Stillbirths Sister     Heart disease Sister         mi, cad    Diabetes Sister     Eclampsia Sister     Hypertension Sister     Heart disease Mother     Diabetes Sister     Endometriosis Daughter     No Known Problems Daughter     No Known  "Problems Daughter     Colon cancer Neg Hx     Ovarian cancer Neg Hx     Esophageal cancer Neg Hx     Liver cancer Neg Hx     Liver disease Neg Hx     Rectal cancer Neg Hx     Stomach cancer Neg Hx        ROS:    General: No fever, chills, change in appetite or weight loss  Skin: No rashes or pruritus  Head: No headaches or recent head trauma  Eyes: No changes in vision or eye pain  Nodes: No swollen glands  Pulmonary: No dyspnea, wheezes, cough or sputum production  Cardiovascular: No chest pain, edema, PND, orthopnea or reduced exercise tolerance  Abdomen: No abdominal pain, nausea, vomiting, constipation or diarrhea  Urinary: No flank pain, dysuria or hematuria  Peripheral Vascular: No claudication or cyanosis  Musculoskeletal: No joint stiffness, swelling or back pain  Neurologic: No history of seizures, tremors, forcal weakness or numbness    PE:    Vitals:    07/06/22 1003   BP: 124/70   Pulse: 78   SpO2: 98%   Weight: 73.2 kg (161 lb 6 oz)   Height: 5' 6" (1.676 m)       HEENT: Normocephalic, atraumatic, EOMI, PERRLA, normal conjunctive and lids, supple neck with no thyromeglay or masses, normal oropharynx, hearing intact  Cardiovascular: Regular without murmur, gallop or rub, no edema  Respiratory: Clear bilaterally without rales, rhonchi, wheezes with normal effort  Abdomen: Soft, nontender/nondistended, positive bowel sounds, no hepatospenomegaly  Extremities: No cyanosis, clubbing, normal gait  Skin: No rashes, ulcers, induration  Psych: Oriented x 3 with normal mood and effect      Labs:  1. CBC  2. FBS  3. CMP  4. Urinalysis      Impression and plan:  1. Primary HTN with well controlled BP. Will continue the same medications.   2. Abdominal discomfort with ultrasound finding of fatty liver. Referred to GI for further assessment.    3. UTI is a possible explanation of her frequency will request a urine culture and consider AB if the culture is positive.   4. GERD will refer to GI for assessment "   5. IBD with Fatty liver referred to GI.     KRISTINE DUVAL.Fariba. MD. LEA. FACP.  , [employer] / [employer]  Nephrology Consultant. [facility].   [facility]. 5th [place] [address].    email: [email or web address]  Tel: Office: [phone]

## 2022-07-12 ENCOUNTER — LAB VISIT (OUTPATIENT)
Dept: PRIMARY CARE CLINIC | Facility: CLINIC | Age: 77
End: 2022-07-12
Payer: MEDICARE

## 2022-07-12 DIAGNOSIS — E74.39 GLUCOSE INTOLERANCE: ICD-10-CM

## 2022-07-12 LAB — GLUCOSE SERPL-MCNC: 111 MG/DL (ref 70–110)

## 2022-07-12 PROCEDURE — 82947 ASSAY GLUCOSE BLOOD QUANT: CPT | Performed by: INTERNAL MEDICINE

## 2022-07-12 PROCEDURE — 36415 COLL VENOUS BLD VENIPUNCTURE: CPT | Mod: PBBFAC,PN

## 2022-07-13 ENCOUNTER — PATIENT MESSAGE (OUTPATIENT)
Dept: NEPHROLOGY | Facility: CLINIC | Age: 77
End: 2022-07-13
Payer: MEDICARE

## 2022-07-13 ENCOUNTER — TELEPHONE (OUTPATIENT)
Dept: NEPHROLOGY | Facility: CLINIC | Age: 77
End: 2022-07-13
Payer: MEDICARE

## 2022-07-13 ENCOUNTER — TELEPHONE (OUTPATIENT)
Dept: INTERNAL MEDICINE | Facility: CLINIC | Age: 77
End: 2022-07-13
Payer: MEDICARE

## 2022-07-13 NOTE — TELEPHONE ENCOUNTER
Good morning,     She need the GI consult for her abdominal pain.     She also ahs to discuss her elevated blood sugar with the primary physician.       Thanks.       Agustin Houston.,      Spoke to pt and informed her of information above per Dr. Houston pt verbalized understanding, stating that she will call and get appts

## 2022-07-21 ENCOUNTER — TELEPHONE (OUTPATIENT)
Dept: ENDOSCOPY | Facility: HOSPITAL | Age: 77
End: 2022-07-21
Payer: MEDICARE

## 2022-07-21 NOTE — TELEPHONE ENCOUNTER
----- Message from Yovani Garcia sent at 7/21/2022  8:13 AM CDT -----  Contact: pt  Pt requesting call back RE: schedule appt for abdominal Pain nothing available in epic, please call with updates        Confirmed contact below:  Contact Name:Mirna Norton  Phone Number: [phone]

## 2022-07-25 ENCOUNTER — OFFICE VISIT (OUTPATIENT)
Dept: GASTROENTEROLOGY | Facility: CLINIC | Age: 77
End: 2022-07-25
Payer: MEDICARE

## 2022-07-25 VITALS
WEIGHT: 159.81 LBS | DIASTOLIC BLOOD PRESSURE: 81 MMHG | HEIGHT: 66 IN | HEART RATE: 82 BPM | SYSTOLIC BLOOD PRESSURE: 141 MMHG | BODY MASS INDEX: 25.68 KG/M2

## 2022-07-25 DIAGNOSIS — R14.0 ABDOMINAL BLOATING: ICD-10-CM

## 2022-07-25 DIAGNOSIS — K58.9 IRRITABLE BOWEL SYNDROME WITHOUT DIARRHEA: Primary | ICD-10-CM

## 2022-07-25 PROCEDURE — 99214 OFFICE O/P EST MOD 30 MIN: CPT | Mod: PBBFAC | Performed by: STUDENT IN AN ORGANIZED HEALTH CARE EDUCATION/TRAINING PROGRAM

## 2022-07-25 PROCEDURE — 99999 PR PBB SHADOW E&M-EST. PATIENT-LVL IV: ICD-10-PCS | Mod: PBBFAC,GC,, | Performed by: STUDENT IN AN ORGANIZED HEALTH CARE EDUCATION/TRAINING PROGRAM

## 2022-07-25 PROCEDURE — 99999 PR PBB SHADOW E&M-EST. PATIENT-LVL IV: CPT | Mod: PBBFAC,GC,, | Performed by: STUDENT IN AN ORGANIZED HEALTH CARE EDUCATION/TRAINING PROGRAM

## 2022-07-25 PROCEDURE — 99213 PR OFFICE/OUTPT VISIT, EST, LEVL III, 20-29 MIN: ICD-10-PCS | Mod: S$PBB,GC,, | Performed by: STUDENT IN AN ORGANIZED HEALTH CARE EDUCATION/TRAINING PROGRAM

## 2022-07-25 PROCEDURE — 99213 OFFICE O/P EST LOW 20 MIN: CPT | Mod: S$PBB,GC,, | Performed by: STUDENT IN AN ORGANIZED HEALTH CARE EDUCATION/TRAINING PROGRAM

## 2022-07-25 RX ORDER — POLYETHYLENE GLYCOL 3350 17 G/17G
17 POWDER, FOR SOLUTION ORAL DAILY
Qty: 510 G | Refills: 11 | Status: SHIPPED | OUTPATIENT
Start: 2022-07-25 | End: 2023-07-25

## 2022-07-25 NOTE — PROGRESS NOTES
[facility] PROGRESS NOTE    Name: Mirna Norton  : 1945  Date of Service: 2022   PCP: Ramsey Tyler MD    Reason for visit:   Chief Complaint   Patient presents with    Follow-up       HPI: Ms. Mirna Norton is a 77 year old female presenting for a follow-up visit. She has a PMH significant for gout, HTN, and IBS-C. She was last seen in this clinic in 2021.     Patient reports approximately 5 year history of recurrent intermittent right upper and lower abdominal pain and bloating that occurs approximately 2-3 times per week. She reports symptoms aggravated by PO intake, but not consistently. She reports slight improvement in symptoms with having a bowel movement. She his currently having one small pebble like bowel movement daily with use of METAMUCIL supplement daily and denies association with straining. She denies symptom association with nausea, vomiting, heartburn, dysphagia, skin or eye yellowing, fevers, chills, pain with urination, or pain with inspiration.     On chart review, patient has been evaluated in this clinic for similar symptoms since at least  and status post work-up including EGD, EUS, abdominal US, HIDA scan, and MRI abdomen without etiology discovered. She is status post treatment with trials of LEVSIN, ALIGN, and METAMUCIL.     Most Recent Upper Endoscopy:   -EGD in 2013 for evaluation of abdominal pain that was entirely normal. Gastric biopsies negative for H.pylori.   -EUS in 2014 for evaluation of abdominal pain that showed no pancreatic abnormalities.     Most Recent Colonoscopy:   -Colonoscopy in 2019 for surveillance showing diverticulosis and two polyps (largest 8 mm with pathology showing tubular adenoma).     Review of Systems:   Review of Systems   Constitutional: Negative for chills, fever, malaise/fatigue and weight loss.   HENT: Negative for congestion and sore throat.    Eyes: Negative for blurred vision and double vision.    Respiratory: Negative for cough, sputum production and shortness of breath.    Cardiovascular: Negative for chest pain, palpitations and leg swelling.   Gastrointestinal: Positive for abdominal pain. Negative for diarrhea, heartburn, nausea and vomiting.   Genitourinary: Negative for dysuria and urgency.   Musculoskeletal: Negative for back pain, myalgias and neck pain.   Neurological: Negative for dizziness, tingling, sensory change, weakness and headaches.   Endo/Heme/Allergies: Negative for polydipsia.       Medications:   Current Outpatient Medications:     allopurinoL (ZYLOPRIM) 100 MG tablet, TAKE 1 TABLET BY MOUTH EVERY DAY, Disp: 90 tablet, Rfl: 2    amLODIPine (NORVASC) 5 MG tablet, Take 1 tablet (5 mg total) by mouth once daily., Disp: 90 tablet, Rfl: 1    aspirin (ECOTRIN) 81 MG EC tablet, Take 1 tablet (81 mg total) by mouth once daily., Disp: , Rfl:     BIOTIN ORAL, Take by mouth., Disp: , Rfl:     ciclopirox (PENLAC) 8 % Soln, Apply topically nightly. Remove once a week with nail polish remover, Disp: 1 each, Rfl: 2    multivitamin-minerals-lutein Tab, Take 1 tablet by mouth once daily. , Disp: , Rfl:     pravastatin (PRAVACHOL) 40 MG tablet, TAKE 1 TABLET BY MOUTH IN THE EVENING, Disp: 90 tablet, Rfl: 3    valsartan (DIOVAN) 160 MG tablet, Take 1 tablet (160 mg total) by mouth once daily., Disp: 90 tablet, Rfl: 1    BIFIDOBACTERIUM INFANTIS (ALIGN ORAL), Take by mouth once daily., Disp: , Rfl:     nitroGLYCERIN (NITROSTAT) 0.4 MG SL tablet, Place 1 tablet (0.4 mg total) under the tongue every 5 (five) minutes as needed for Chest pain., Disp: 20 tablet, Rfl: 12    polyethylene glycol (GLYCOLAX) 17 gram/dose powder, Take 17 g by mouth once daily., Disp: 510 g, Rfl: 11     Past medical history, past surgical history, family history, allergies, and social history reviewed and updated in EMR.     Vitals:   Vitals:    07/25/22 1605   BP: (!) 141/81   BP Location: Left arm   Patient Position:  "Sitting   BP Method: Medium (Automatic)   Pulse: 82   Weight: 72.5 kg (159 lb 13.3 oz)   Height: 5' 6" (1.676 m)     Body mass index is 25.8 kg/m².    Physical Exam:   Physical Exam  Constitutional:       Appearance: Normal appearance.   Eyes:      Conjunctiva/sclera: Conjunctivae normal.      Pupils: Pupils are equal, round, and reactive to light.   Cardiovascular:      Rate and Rhythm: Normal rate and regular rhythm.      Pulses: Normal pulses.      Heart sounds: Normal heart sounds.   Pulmonary:      Effort: Pulmonary effort is normal. No respiratory distress.      Breath sounds: Normal breath sounds.   Abdominal:      General: Bowel sounds are normal. There is no distension.      Palpations: Abdomen is soft.      Tenderness: There is no abdominal tenderness.   Skin:     General: Skin is warm and dry.      Findings: No bruising or rash.   Neurological:      Mental Status: She is alert and oriented to person, place, and time.         Labs:    Latest Reference Range & Units 06/30/22 10:21   WBC 3.90 - 12.70 K/uL 6.46   RBC 4.00 - 5.40 M/uL 4.54   Hemoglobin 12.0 - 16.0 g/dL 13.1   Hematocrit 37.0 - 48.5 % 41.7   MCV 82 - 98 fL 92   MCH 27.0 - 31.0 pg 28.9   MCHC 32.0 - 36.0 g/dL 31.4 (L)   RDW 11.5 - 14.5 % 13.3   Platelets 150 - 450 K/uL 288   (L): Data is abnormally low     Latest Reference Range & Units 06/30/22 10:21   Sodium 136 - 145 mmol/L 140   Potassium 3.5 - 5.1 mmol/L 4.3   Chloride 95 - 110 mmol/L 103   CO2 23 - 29 mmol/L 29   Anion Gap 8 - 16 mmol/L 8   BUN 8 - 23 mg/dL 15   Creatinine 0.5 - 1.4 mg/dL 0.8   eGFR if non African American >60 mL/min/1.73 m^2 >60.0   eGFR if African American >60 mL/min/1.73 m^2 >60.0   Glucose 70 - 110 mg/dL 156 (H)   Calcium 8.7 - 10.5 mg/dL 9.7   Alkaline Phosphatase 55 - 135 U/L 57   PROTEIN TOTAL 6.0 - 8.4 g/dL 7.4   Albumin 3.5 - 5.2 g/dL 3.7   Uric Acid 2.4 - 5.7 mg/dL 4.9   BILIRUBIN TOTAL 0.1 - 1.0 mg/dL 0.4   AST 10 - 40 U/L 23   ALT 10 - 44 U/L 19   (H): Data is " abnormally high    Imaging:   -Abdominal US in 05/2022: Pancreas: The visualized portions of pancreas appear normal. Aorta: No aneurysm. Liver: 14.9 cm in length, normal in size. Diffusely increased parenchymal echogenicity consistent with steatosis. No focal lesions. Gallbladder: No calculi, wall thickening, or pericholecystic fluid.  Negative sonographic Galeas's sign. Biliary system: 2.2 mm common bile duct.  No intrahepatic ductal dilatation. Inferior vena cava: Normal in appearance. Right kidney: 9.7 cm in length.  No evidence for abnormal renal masses or hydronephrosis.  Previously noted lower pole renal cyst is likely obscured by overlying bowel gas. Left kidney: 9.4 cm in length.  No evidence for abnormal renal masses or hydronephrosis. Spleen: 7.2 cm in length.  Normal in size with homogeneous echotexture. Miscellaneous: No ascites.   -MRI abdomen in 2014: The liver is normal in size and demonstrates dropout on out of phase imaging compatible with diffuse steatosis.  No hepatic lesions are seen on T1 or T2-weighted imaging.  Focal fatty infiltration is seen at the ligamentum teres, a normal variant.  No enhancing lesions are identified. No intrahepatic or extrahepatic biliary ductal dilatation.  The The portal and splenic veins are patent.  Spleen is unremarkable.  Remaining visualized intra-abdominal structures demonstrate no significant abnormality. No ascites. There are bilateral renal cysts.  -HIDA scan in 2014: EF 81%    Assessment:   This is a 77 year old  female with PMH significant for gout, HTN, and IBS-C who is presenting as a follow-up for approximately 10 year history of intermittent right sided abdominal pain and bloating witthout consistent association with PO intake or change in bowel movement frequency. She is status post work-up including EGD, EUS, abdominal US, HIDA scan, and MRI abdomen without etiology discovered. She is status post treatment with trials of LEVSIN, ALIGN,  and METAMUCIL with persistent intermittent symptoms. Etiology of symptoms is suspected to be functional, however other possibilities include SIBO and gastroparesis.     Plan:   -Hydrogen breath test and GE study ordered.   -Recommended addition of MIRALAX daily for improvement in stool caliber.   -Recommended trial of IBgard for attempted relief.   -Consideration for repeat EGD if above work-up normal and symptoms persist.     Disposition: Follow-up in 3 months.     Discussed with Dr. Fournier.         Bogdan Vallejo MD, PGY-V  Gastroenterology Fellow  [facility]

## 2022-07-25 NOTE — PATIENT INSTRUCTIONS
#1. You will have a gastric emptying study and breath test ordered to check for other causes of your bloating.     #2. Please begin taking MIRALAX 17 g daily for attempted relief of your symptoms.     #3. Please purchase IBgard from your pharmacy (comes in a green box) for attempted relief of your symptoms. Take medication per 's instructions.

## 2022-07-26 NOTE — PROGRESS NOTES
I have reviewed the notes, assessments, and/or procedures performed by Dr. Vallejo, I concur with her/his documentation of Mirna Norton.    Era Fournier MD

## 2022-07-27 ENCOUNTER — PATIENT MESSAGE (OUTPATIENT)
Dept: INTERNAL MEDICINE | Facility: CLINIC | Age: 77
End: 2022-07-27
Payer: MEDICARE

## 2022-07-29 ENCOUNTER — PES CALL (OUTPATIENT)
Dept: ADMINISTRATIVE | Facility: CLINIC | Age: 77
End: 2022-07-29
Payer: MEDICARE

## 2022-08-05 ENCOUNTER — OFFICE VISIT (OUTPATIENT)
Dept: INTERNAL MEDICINE | Facility: CLINIC | Age: 77
End: 2022-08-05
Payer: MEDICARE

## 2022-08-05 VITALS
HEIGHT: 66 IN | RESPIRATION RATE: 18 BRPM | HEART RATE: 75 BPM | SYSTOLIC BLOOD PRESSURE: 120 MMHG | WEIGHT: 159.63 LBS | DIASTOLIC BLOOD PRESSURE: 68 MMHG | OXYGEN SATURATION: 97 % | BODY MASS INDEX: 25.66 KG/M2

## 2022-08-05 DIAGNOSIS — R73.03 PRE-DIABETES: Primary | ICD-10-CM

## 2022-08-05 PROCEDURE — 99215 OFFICE O/P EST HI 40 MIN: CPT | Mod: PBBFAC | Performed by: INTERNAL MEDICINE

## 2022-08-05 PROCEDURE — 99214 OFFICE O/P EST MOD 30 MIN: CPT | Mod: S$PBB,,, | Performed by: INTERNAL MEDICINE

## 2022-08-05 PROCEDURE — 99999 PR PBB SHADOW E&M-EST. PATIENT-LVL V: CPT | Mod: PBBFAC,,, | Performed by: INTERNAL MEDICINE

## 2022-08-05 PROCEDURE — 99999 PR PBB SHADOW E&M-EST. PATIENT-LVL V: ICD-10-PCS | Mod: PBBFAC,,, | Performed by: INTERNAL MEDICINE

## 2022-08-05 PROCEDURE — 99214 PR OFFICE/OUTPT VISIT, EST, LEVL IV, 30-39 MIN: ICD-10-PCS | Mod: S$PBB,,, | Performed by: INTERNAL MEDICINE

## 2022-08-05 NOTE — PROGRESS NOTES
Subjective:       Patient ID: Mirna Norton is a 77 y.o. female.    Chief Complaint: Diabetes    Patient is here for followup for chronic conditions.    Has been worried re steven sugar levels in blood work.    We discussed diet, she does drink catalina dry and has other sugars foods/drinks at times.    Review of Systems   Constitutional: Negative for activity change, appetite change and unexpected weight change.   Eyes: Negative for visual disturbance.   Respiratory: Negative for cough, chest tightness and shortness of breath.    Cardiovascular: Negative for chest pain.   Gastrointestinal: Negative for abdominal distention and abdominal pain.   Endocrine: Negative for polydipsia and polyuria.   Genitourinary: Negative for difficulty urinating and urgency.        No breast lumps/masses/pain/nipple d/c in either breast     Musculoskeletal: Positive for back pain.   Skin: Negative for rash.   Psychiatric/Behavioral: Negative for confusion.           Objective:      Physical Exam  Vitals reviewed.   Constitutional:       General: She is not in acute distress.     Appearance: Normal appearance. She is well-developed. She is not ill-appearing, toxic-appearing or diaphoretic.   HENT:      Head: Normocephalic and atraumatic.   Eyes:      General: No scleral icterus.     Pupils: Pupils are equal, round, and reactive to light.   Neck:      Thyroid: No thyromegaly.   Cardiovascular:      Rate and Rhythm: Normal rate and regular rhythm.      Heart sounds: Normal heart sounds. No murmur heard.    No friction rub. No gallop.   Pulmonary:      Effort: Pulmonary effort is normal. No respiratory distress.      Breath sounds: Normal breath sounds. No wheezing or rales.   Abdominal:      General: Bowel sounds are normal. There is no distension.      Palpations: Abdomen is soft. There is no mass.      Tenderness: There is no abdominal tenderness. There is no guarding or rebound.      Comments: No R sided abd tenderness to deep  palpation  nml SLR on the R   Musculoskeletal:         General: No tenderness. Normal range of motion.      Cervical back: Normal range of motion.   Lymphadenopathy:      Cervical: No cervical adenopathy.   Skin:     Comments: Benign SKs on the back, none concerning appearing    intertigo behind ears where she applies mask straps   Neurological:      General: No focal deficit present.      Mental Status: She is alert and oriented to person, place, and time.   Psychiatric:         Mood and Affect: Mood normal.         Speech: Speech normal.         Behavior: Behavior normal.         Assessment:       1. Pre-diabetes        Plan:       Mirna was seen today for diabetes.    Diagnoses and all orders for this visit:    Pre-diabetes  -     Ambulatory referral/consult to Nutrition Services; Future  Reviewed food labels, stop catalina dry.  Pt given handouts on avoiding sugar in her diet    Patient Instructions   please call our central pricing office at [phone].    Try over the counter lotrimin or clotrimazole cream twice per day for about 2 weeks        She feels like it helps with bloating, consider BESOS Maintenance       Date Due Completion Date    Shingles Vaccine (1 of 2) Never done ---    TETANUS VACCINE 01/16/2018 1/16/2008    COVID-19 Vaccine (4 - Booster) 03/12/2022 11/12/2021    Influenza Vaccine (1) 09/01/2022 1/13/2022    DEXA Scan 12/01/2022 12/1/2017    Override on 8/2/2008: Done    Aspirin/Antiplatelet Therapy 08/05/2023 8/5/2022    Colonoscopy 07/29/2024 7/29/2019    Override on 3/6/2006: Done    Lipid Panel 01/20/2027 1/20/2022          Follow up in about 6 months (around 2/5/2023).    No future appointments.

## 2022-08-05 NOTE — TELEPHONE ENCOUNTER
Called patient to offer virtual appointment in place of her in person today and she wants to come in to see Queta

## 2022-08-05 NOTE — PATIENT INSTRUCTIONS
please call our central pricing office at [phone].    Try over the counter lotrimin or clotrimazole cream twice per day for about 2 weeks

## 2022-08-05 NOTE — TELEPHONE ENCOUNTER
Hi, please call her and aks whether she wants her 7440 appt to be virtual and if so please change it  Thank you, Ramsey Tyler

## 2022-08-22 ENCOUNTER — PATIENT MESSAGE (OUTPATIENT)
Dept: GASTROENTEROLOGY | Facility: CLINIC | Age: 77
End: 2022-08-22
Payer: MEDICARE

## 2022-08-23 ENCOUNTER — TELEPHONE (OUTPATIENT)
Dept: GASTROENTEROLOGY | Facility: CLINIC | Age: 77
End: 2022-08-23
Payer: MEDICARE

## 2022-08-23 NOTE — TELEPHONE ENCOUNTER
Contact and spoke with patient schedule gastric emptying on 09/01/2022 for 9 am.      Patient verbalized understanding.

## 2022-08-23 NOTE — TELEPHONE ENCOUNTER
----- Message from Bogdan Vallejo MD sent at 8/22/2022  3:28 PM CDT -----  Akin Wiley,     Can you help this lady schedule a gastric emptying study that was ordered?    Thanks,     Bogdan

## 2022-09-01 ENCOUNTER — HOSPITAL ENCOUNTER (OUTPATIENT)
Dept: RADIOLOGY | Facility: HOSPITAL | Age: 77
Discharge: HOME | End: 2022-09-01
Attending: STUDENT IN AN ORGANIZED HEALTH CARE EDUCATION/TRAINING PROGRAM
Payer: MEDICARE

## 2022-09-01 DIAGNOSIS — R14.0 ABDOMINAL BLOATING: ICD-10-CM

## 2022-09-01 PROCEDURE — 78264 GASTRIC EMPTYING IMG STUDY: CPT | Mod: 26,,, | Performed by: RADIOLOGY

## 2022-09-01 PROCEDURE — 78264 NM GASTRIC EMPTYING: ICD-10-PCS | Mod: 26,,, | Performed by: RADIOLOGY

## 2022-09-01 PROCEDURE — A9541 TC99M SULFUR COLLOID: HCPCS

## 2022-09-02 ENCOUNTER — PATIENT MESSAGE (OUTPATIENT)
Dept: GASTROENTEROLOGY | Facility: HOSPITAL | Age: 77
End: 2022-09-02
Payer: MEDICARE

## 2022-09-12 ENCOUNTER — TELEPHONE (OUTPATIENT)
Dept: OBSTETRICS AND GYNECOLOGY | Facility: CLINIC | Age: 77
End: 2022-09-12
Payer: MEDICARE

## 2022-09-12 DIAGNOSIS — Z12.31 SCREENING MAMMOGRAM, ENCOUNTER FOR: Primary | ICD-10-CM

## 2022-09-14 ENCOUNTER — TELEPHONE (OUTPATIENT)
Dept: NEPHROLOGY | Facility: CLINIC | Age: 77
End: 2022-09-14
Payer: MEDICARE

## 2022-09-14 NOTE — TELEPHONE ENCOUNTER
Spoke to pt and scheduled     ----- Message from Jenna Ward sent at 9/13/2022  2:47 PM CDT -----  Contact: pt @ [phone]  Pt is requesting a call back regarding Scheduling appt from Recall letter please call to discuss further .

## 2022-09-26 ENCOUNTER — CLINICAL SUPPORT (OUTPATIENT)
Dept: PRIMARY CARE CLINIC | Facility: CLINIC | Age: 77
End: 2022-09-26
Payer: MEDICARE

## 2022-09-26 DIAGNOSIS — I10 PRIMARY HYPERTENSION: ICD-10-CM

## 2022-09-26 LAB
BACTERIA #/AREA URNS AUTO: NORMAL /HPF
BILIRUB UR QL STRIP: NEGATIVE
CLARITY UR REFRACT.AUTO: CLEAR
COLOR UR AUTO: YELLOW
GLUCOSE UR QL STRIP: NEGATIVE
HGB UR QL STRIP: NEGATIVE
KETONES UR QL STRIP: NEGATIVE
LEUKOCYTE ESTERASE UR QL STRIP: ABNORMAL
MICROSCOPIC COMMENT: NORMAL
NITRITE UR QL STRIP: NEGATIVE
PH UR STRIP: 7 [PH] (ref 5–8)
PROT UR QL STRIP: NEGATIVE
RBC #/AREA URNS AUTO: 0 /HPF (ref 0–4)
SP GR UR STRIP: 1.01 (ref 1–1.03)
SQUAMOUS #/AREA URNS AUTO: 5 /HPF
URN SPEC COLLECT METH UR: ABNORMAL
WBC #/AREA URNS AUTO: 3 /HPF (ref 0–5)

## 2022-09-26 PROCEDURE — 81001 URINALYSIS AUTO W/SCOPE: CPT | Performed by: INTERNAL MEDICINE

## 2022-09-28 ENCOUNTER — OFFICE VISIT (OUTPATIENT)
Dept: NEPHROLOGY | Facility: CLINIC | Age: 77
End: 2022-09-28
Payer: MEDICARE

## 2022-09-28 VITALS
DIASTOLIC BLOOD PRESSURE: 75 MMHG | HEIGHT: 66 IN | WEIGHT: 156.31 LBS | BODY MASS INDEX: 25.12 KG/M2 | SYSTOLIC BLOOD PRESSURE: 128 MMHG | OXYGEN SATURATION: 98 % | HEART RATE: 71 BPM

## 2022-09-28 DIAGNOSIS — R73.09 IMPAIRED GLUCOSE METABOLISM: ICD-10-CM

## 2022-09-28 DIAGNOSIS — I10 PRIMARY HYPERTENSION: Primary | ICD-10-CM

## 2022-09-28 PROCEDURE — 99214 OFFICE O/P EST MOD 30 MIN: CPT | Mod: S$PBB,,, | Performed by: INTERNAL MEDICINE

## 2022-09-28 PROCEDURE — 99999 PR PBB SHADOW E&M-EST. PATIENT-LVL III: ICD-10-PCS | Mod: PBBFAC,,, | Performed by: INTERNAL MEDICINE

## 2022-09-28 PROCEDURE — 99999 PR PBB SHADOW E&M-EST. PATIENT-LVL III: CPT | Mod: PBBFAC,,, | Performed by: INTERNAL MEDICINE

## 2022-09-28 PROCEDURE — 99213 OFFICE O/P EST LOW 20 MIN: CPT | Mod: PBBFAC | Performed by: INTERNAL MEDICINE

## 2022-09-28 PROCEDURE — 99214 PR OFFICE/OUTPT VISIT, EST, LEVL IV, 30-39 MIN: ICD-10-PCS | Mod: S$PBB,,, | Performed by: INTERNAL MEDICINE

## 2022-09-28 NOTE — PROGRESS NOTES
CHIEF COMPLAINT/HPI: Mirna is a 77 y.o. female for evaluation of  HTN and renal function.      Clinical course:  The patient is stable. She had no complain. She is not sleeping properly. Her BP is well controlled. Labs were discussed. I answered all her question. Advice was provided to start regular exercise. Will continue same medication and follow in 6months  Past Medical History:   Diagnosis Date    Abnormal cervical Papanicolaou smear 1980's    Cryo    Carotid artery stenosis     via kinked carotid artery - followed by Dr. Ye Anderson    Carotid stenosis 5/15/2014    GERD (gastroesophageal reflux disease)     Gout due to renal impairment     Gout, arthritis 2/20/2013    Gout, unspecified     Herniated lumbar intervertebral disc     Hypertension     Insomnia     Irritable bowel syndrome without diarrhea 9/20/2015    Liver lesion 3/13/2013    Mitral valve prolapse     MVP (mitral valve prolapse)     Pancreatic cyst 12/30/2013    PVD (peripheral vascular disease) 12/23/2013    PVD (peripheral vascular disease) 12/23/2013    Venous insufficiency        Mirna reports that she has never smoked. She has never used smokeless tobacco. She reports that she does not drink alcohol and does not use drugs.    Family History   Problem Relation Age of Onset    Diabetes Brother     Hypertension Brother     Diabetes Sister     Eclampsia Sister     Hypertension Sister     Colon polyps Sister     Breast cancer Other         niece    Diabetes Sister     Eclampsia Sister     Hypertension Sister     Miscarriages / Stillbirths Sister     Heart disease Sister         mi, cad    Diabetes Sister     Eclampsia Sister     Hypertension Sister     Heart disease Mother     Diabetes Sister     Endometriosis Daughter     No Known Problems Daughter     No Known Problems Daughter     Colon cancer Neg Hx     Ovarian cancer Neg Hx     Esophageal cancer Neg Hx     Liver cancer Neg Hx     Liver disease Neg Hx     Rectal cancer Neg Hx     Stomach  "cancer Neg Hx        ROS:    General: No fever, chills, change in appetite or weight loss  Skin: No rashes or pruritus  Head: No headaches or recent head trauma  Eyes: No changes in vision or eye pain  Nodes: No swollen glands  Pulmonary: No dyspnea, wheezes, cough or sputum production  Cardiovascular: No chest pain, edema, PND, orthopnea or reduced exercise tolerance  Abdomen: No abdominal pain, nausea, vomiting, constipation or diarrhea  Urinary: No flank pain, dysuria or hematuria  Peripheral Vascular: No claudication or cyanosis  Musculoskeletal: No joint stiffness, swelling or back pain  Neurologic: No history of seizures, tremors, forcal weakness or numbness    PE:    Vitals:    09/28/22 0908   BP: 128/75   Pulse: 71   SpO2: 98%   Weight: 70.9 kg (156 lb 4.9 oz)   Height: 5' 6" (1.676 m)       HEENT: Normocephalic, atraumatic, EOMI, PERRLA, normal conjunctive and lids, supple neck with no thyromeglay or masses, normal oropharynx, hearing intact  Cardiovascular: Regular without murmur, gallop or rub, no edema  Respiratory: Clear bilaterally without rales, rhonchi, wheezes with normal effort  Abdomen: Soft, nontender/nondistended, positive bowel sounds, no hepatospenomegaly  Extremities: No cyanosis, clubbing, normal gait  Skin: No rashes, ulcers, induration  Psych: Oriented x 3 with normal mood and effect.    Labs:  CBC  CMP  Uric Acid  FBS  UA      Impression/Plan:    1. Primary hypertension    2. Impaired glucose metabolism    3.     Gout with stable Uric Acid.   4.     Lumbosacral disk disease,    "

## 2022-09-30 ENCOUNTER — TELEPHONE (OUTPATIENT)
Dept: FAMILY MEDICINE | Facility: CLINIC | Age: 77
End: 2022-09-30
Payer: MEDICARE

## 2022-09-30 NOTE — TELEPHONE ENCOUNTER
Left a voicemail message to inform the Patient about the EAWV appointment and to schedule.  Requested the Patient to call the office back to be schedule.  Sent a detailed message thru SendRRhart as well.

## 2022-10-03 ENCOUNTER — PATIENT MESSAGE (OUTPATIENT)
Dept: INTERNAL MEDICINE | Facility: CLINIC | Age: 77
End: 2022-10-03
Payer: MEDICARE

## 2022-10-03 ENCOUNTER — OFFICE VISIT (OUTPATIENT)
Dept: PODIATRY | Facility: CLINIC | Age: 77
End: 2022-10-03
Payer: MEDICARE

## 2022-10-03 ENCOUNTER — PATIENT MESSAGE (OUTPATIENT)
Dept: GASTROENTEROLOGY | Facility: CLINIC | Age: 77
End: 2022-10-03
Payer: MEDICARE

## 2022-10-03 VITALS
SYSTOLIC BLOOD PRESSURE: 123 MMHG | HEART RATE: 74 BPM | DIASTOLIC BLOOD PRESSURE: 70 MMHG | BODY MASS INDEX: 25.01 KG/M2 | WEIGHT: 155.63 LBS | HEIGHT: 66 IN

## 2022-10-03 DIAGNOSIS — Z78.0 MENOPAUSE: Primary | ICD-10-CM

## 2022-10-03 DIAGNOSIS — B35.1 ONYCHOMYCOSIS DUE TO DERMATOPHYTE: Primary | ICD-10-CM

## 2022-10-03 PROCEDURE — 99999 PR PBB SHADOW E&M-EST. PATIENT-LVL III: CPT | Mod: PBBFAC,,, | Performed by: PODIATRIST

## 2022-10-03 PROCEDURE — 99213 OFFICE O/P EST LOW 20 MIN: CPT | Mod: S$PBB,,, | Performed by: PODIATRIST

## 2022-10-03 PROCEDURE — 99999 PR PBB SHADOW E&M-EST. PATIENT-LVL III: ICD-10-PCS | Mod: PBBFAC,,, | Performed by: PODIATRIST

## 2022-10-03 PROCEDURE — 99213 PR OFFICE/OUTPT VISIT, EST, LEVL III, 20-29 MIN: ICD-10-PCS | Mod: S$PBB,,, | Performed by: PODIATRIST

## 2022-10-03 PROCEDURE — 99213 OFFICE O/P EST LOW 20 MIN: CPT | Mod: PBBFAC,PN | Performed by: PODIATRIST

## 2022-10-03 NOTE — PROGRESS NOTES
Subjective:      Patient ID: Mirna Norton is a 77 y.o. female.    Chief Complaint:   Nail Problem and Follow-up (Left foot black dot great toe)    Mirna is a 77 y.o. female who rtc  f/u toenail discoloration  Patient relates she noticed a new issue with the left big toenail dark spot.  Last seen in February. Penlac was ordered.  Patient relates she used it a few months however did not see that it was improving anything so stopped it    No pain        Past Medical History:   Diagnosis Date    Abnormal cervical Papanicolaou smear     Cryo    Carotid artery stenosis     via kinked carotid artery - followed by Dr. Ye Anderson    Carotid stenosis 5/15/2014    GERD (gastroesophageal reflux disease)     Gout due to renal impairment     Gout, arthritis 2013    Gout, unspecified     Herniated lumbar intervertebral disc     Hypertension     Insomnia     Irritable bowel syndrome without diarrhea 2015    Liver lesion 3/13/2013    Mitral valve prolapse     MVP (mitral valve prolapse)     Pancreatic cyst 2013    PVD (peripheral vascular disease) 2013    PVD (peripheral vascular disease) 2013    Venous insufficiency      Past Surgical History:   Procedure Laterality Date    BARTHOLIN GLAND CYST EXCISION       SECTION      x3    COLONOSCOPY N/A 2019    Procedure: COLONOSCOPY;  Surgeon: Haider Luu MD;  Location: [facility] ([address] [place] [address]);  Service: Endoscopy;  Laterality: N/A;    Diagnostic laproscopic       laser surgery on vein       Current Outpatient Medications on File Prior to Visit   Medication Sig Dispense Refill    allopurinoL (ZYLOPRIM) 100 MG tablet TAKE 1 TABLET BY MOUTH EVERY DAY 90 tablet 2    amLODIPine (NORVASC) 5 MG tablet Take 1 tablet (5 mg total) by mouth once daily. 90 tablet 1    aspirin (ECOTRIN) 81 MG EC tablet Take 1 tablet (81 mg total) by mouth once daily.      BIFIDOBACTERIUM INFANTIS (ALIGN ORAL) Take by mouth once daily.      BIOTIN ORAL Take by mouth.       ciclopirox (PENLAC) 8 % Soln Apply topically nightly. Remove once a week with nail polish remover 1 each 2    multivitamin-minerals-lutein Tab Take 1 tablet by mouth once daily.       polyethylene glycol (GLYCOLAX) 17 gram/dose powder Take 17 g by mouth once daily. 510 g 11    pravastatin (PRAVACHOL) 40 MG tablet TAKE 1 TABLET BY MOUTH IN THE EVENING 90 tablet 3    valsartan (DIOVAN) 160 MG tablet Take 1 tablet (160 mg total) by mouth once daily. 90 tablet 1    nitroGLYCERIN (NITROSTAT) 0.4 MG SL tablet Place 1 tablet (0.4 mg total) under the tongue every 5 (five) minutes as needed for Chest pain. 20 tablet 12     No current facility-administered medications on file prior to visit.     Review of patient's allergies indicates:   Allergen Reactions    No known allergies        Review of Systems   Constitution: Negative for chills, decreased appetite, fever, malaise/fatigue, night sweats, weight gain and weight loss.   Cardiovascular: Negative for chest pain, claudication, dyspnea on exertion, leg swelling, palpitations and syncope.   Respiratory: Negative for cough and shortness of breath.    Endocrine: Negative for cold intolerance and heat intolerance.   Hematologic/Lymphatic: Negative for bleeding problem. Does not bruise/bleed easily.   Skin: Negative for color change, dry skin, flushing, itching, positive nail changes poor wound healing, rash, skin cancer, suspicious lesions and unusual hair distribution.   Musculoskeletal: Positive for arthritis.  Positive back pain, falls, gout, joint pain, joint swelling, muscle cramps, muscle weakness, myalgias, neck pain and stiffness.   Gastrointestinal: Negative for diarrhea, nausea and vomiting.   Neurological: Negative for dizziness, focal weakness, light-headedness, numbness, paresthesias, tremors, vertigo and weakness.   Psychiatric/Behavioral: Negative for altered mental status and depression. The patient does not have insomnia.    Allergic/Immunologic: Negative.   "          Objective:       Vitals:    10/03/22 1306   BP: 123/70   Pulse: 74   Weight: 70.6 kg (155 lb 10.3 oz)   Height: 5' 6" (1.676 m)   PainSc: 0-No pain   70.6 kg (155 lb 10.3 oz) afeb    Physical Exam  Vitals signs reviewed.   Constitutional:       General: She is not in acute distress.     Appearance: She is well-developed. She is not ill-appearing, toxic-appearing or diaphoretic.      Comments: Proper nestor adriana   Cardiovascular:      Pulses:           Dorsalis pedis pulses are 2+ on the right side and 2+ on the left side.        Posterior tibial pulses are 1+ on the right side and 1+ on the left side.   Musculoskeletal: Normal range of motion.         General: No tenderness or deformity.      Right lower leg: No edema.      Left lower leg: No edema.      Right foot: Normal range of motion. Bunion and prominent metatarsal heads present. No deformity.      Left foot: Normal range of motion. Bunion and prominent metatarsal heads present. No deformity.      Comments:  No pain on palpation to feet    No pain on palpation to left hallux nail with debridement  Feet:      Right foot:      Protective Sensation: 10 sites tested. 10 sites sensed.      Skin integrity: Dry skin present. No ulcer, blister, skin breakdown, erythema, warmth or callus.      Toenail Condition: Right toenails are abnormally thick.  With linear lines brittleness     Left foot:      Protective Sensation: 10 sites tested. 10 sites sensed.      Skin integrity: Dry skin present. No ulcer, blister, skin breakdown, erythema, warmth or callus.      Toenail Condition: Left hallux toenail has a distal medial central linear mycosis with mild lysis no acute signs of infection other nails are short and brittle     Comments: No soi no open lesions  Skin:     General: Skin is warm.      Capillary Refill: Capillary refill takes 2 to 3 seconds.      Coloration: Skin is pale.      Findings: No erythema or rash.      Nails: There is no clubbing. "     Neurological:      Mental Status: She is alert and oriented to person, place, and time.      Gait: Gait abnormal.   Psychiatric:         Attention and Perception: Attention normal.         Mood and Affect: Mood normal.         Speech: Speech normal.         Behavior: Behavior normal.         Thought Content: Thought content normal.         Judgment: Judgment normal.               Assessment:       Encounter Diagnosis   Name Primary?    Onychomycosis due to dermatophyte Yes           Plan:       Mirna was seen today for nail problem and follow-up.    Diagnoses and all orders for this visit:    Onychomycosis due to dermatophyte      I counseled the patient on her conditions, their implications and medical management.     Utilizing sterile toenail clippers I aggressively debrided the offending nail border approximately 3 mm from its edge and carried the nail plate incision down at an angle in order to wedge out the offending cryptotic portion of the nail plate. The offending border was then removed in toto. The area was cleansed with alcohol. Patient tolerated the procedure well and related significant relief.      Discussed with patient Penlac appears to have worked.  Discussed that the new nail needs to have a year to year and a half of new nail growth before receiving a clear nail.  Recommend patient continue her current bottle and then no refill needed    For other nails they appear brittle recommend a Vaseline or Vicks Vaporub to the nails    No restrictions    Follow-up in 6 months if not resolved

## 2022-10-03 NOTE — TELEPHONE ENCOUNTER
Hi, please contact the patient to assist in scheduling    Orders Placed This Encounter    DXA Bone Density Spine And Hip       Thank you, Ramsey Tyler

## 2022-10-05 ENCOUNTER — TELEPHONE (OUTPATIENT)
Dept: GASTROENTEROLOGY | Facility: HOSPITAL | Age: 77
End: 2022-10-05
Payer: MEDICARE

## 2022-10-05 DIAGNOSIS — R14.0 ABDOMINAL BLOATING: Primary | ICD-10-CM

## 2022-10-05 NOTE — TELEPHONE ENCOUNTER
"Patient called and notified of inconclusive lactulose breath test results due to "multiple invalid samples".     She reports decreased frequency of right sided abdominal pain following use of MIRALAX as needed. I explained plan to order repeat testing and patient in-agreement.         Bogdan Vallejo MD, PGY-V  Gastroenterology Fellow  [facility]    "

## 2022-10-06 ENCOUNTER — PATIENT MESSAGE (OUTPATIENT)
Dept: GASTROENTEROLOGY | Facility: CLINIC | Age: 77
End: 2022-10-06
Payer: MEDICARE

## 2022-10-18 ENCOUNTER — PATIENT MESSAGE (OUTPATIENT)
Dept: GASTROENTEROLOGY | Facility: CLINIC | Age: 77
End: 2022-10-18
Payer: MEDICARE

## 2022-10-18 ENCOUNTER — PATIENT MESSAGE (OUTPATIENT)
Dept: INTERNAL MEDICINE | Facility: CLINIC | Age: 77
End: 2022-10-18
Payer: MEDICARE

## 2022-10-18 DIAGNOSIS — I83.893 VARICOSE VEINS OF LEG WITH SWELLING, BILATERAL: Primary | ICD-10-CM

## 2022-10-18 NOTE — TELEPHONE ENCOUNTER
Hi, here is the order  Orders Placed This Encounter    COMPRESSION STOCKINGS       Thank you, Ramsey Tyler

## 2022-10-27 ENCOUNTER — OFFICE VISIT (OUTPATIENT)
Dept: OBSTETRICS AND GYNECOLOGY | Facility: CLINIC | Age: 77
End: 2022-10-27
Payer: MEDICARE

## 2022-10-27 VITALS
HEIGHT: 66 IN | WEIGHT: 159.81 LBS | DIASTOLIC BLOOD PRESSURE: 58 MMHG | BODY MASS INDEX: 25.68 KG/M2 | SYSTOLIC BLOOD PRESSURE: 120 MMHG

## 2022-10-27 DIAGNOSIS — Z01.419 ENCOUNTER FOR WELL WOMAN EXAM: Primary | ICD-10-CM

## 2022-10-27 DIAGNOSIS — Z12.31 BREAST CANCER SCREENING BY MAMMOGRAM: ICD-10-CM

## 2022-10-27 PROCEDURE — 99999 PR PBB SHADOW E&M-EST. PATIENT-LVL III: CPT | Mod: PBBFAC,,, | Performed by: FAMILY MEDICINE

## 2022-10-27 PROCEDURE — 99999 PR PBB SHADOW E&M-EST. PATIENT-LVL III: ICD-10-PCS | Mod: PBBFAC,,, | Performed by: FAMILY MEDICINE

## 2022-10-27 PROCEDURE — G0101 CA SCREEN;PELVIC/BREAST EXAM: HCPCS | Mod: GZ,S$PBB,, | Performed by: FAMILY MEDICINE

## 2022-10-27 PROCEDURE — G0101 PR CA SCREEN;PELVIC/BREAST EXAM: ICD-10-PCS | Mod: GZ,S$PBB,, | Performed by: FAMILY MEDICINE

## 2022-10-27 PROCEDURE — 99213 OFFICE O/P EST LOW 20 MIN: CPT | Mod: PBBFAC | Performed by: FAMILY MEDICINE

## 2022-10-27 PROCEDURE — G0101 CA SCREEN;PELVIC/BREAST EXAM: HCPCS | Mod: PBBFAC | Performed by: FAMILY MEDICINE

## 2022-10-27 RX ORDER — AMOXICILLIN 500 MG/1
CAPSULE ORAL
COMMUNITY
Start: 2022-07-19 | End: 2023-02-22

## 2022-10-27 RX ORDER — VALSARTAN AND HYDROCHLOROTHIAZIDE 160; 25 MG/1; MG/1
1 TABLET ORAL DAILY
COMMUNITY
Start: 2022-10-14 | End: 2023-07-11 | Stop reason: SDUPTHER

## 2022-10-27 NOTE — PROGRESS NOTES
"HISTORY OF PRESENT ILLNESS:    Mirna Norton is a 77 y.o. female, , No LMP recorded. Patient is postmenopausal.,  presents for a routine annual exam . Mammogram scheduled for tomorrow, colonoscopy done 2019 5 yr recommendation. No breast pain, VD, uti sx. Has had some issues with abd pain seeing GI - no pelvic pain. Also "bump" to labia for the past few months- pt forgot which side it is on. Not changing. No drainage or fever. Not SA. No  VB. This is the extent of the patient's complaints at this time.     Past Medical History:   Diagnosis Date    Abnormal cervical Papanicolaou smear     Cryo    Carotid artery stenosis     via kinked carotid artery - followed by Dr. Ye Anderson    Carotid stenosis 5/15/2014    GERD (gastroesophageal reflux disease)     Gout due to renal impairment     Gout, arthritis 2013    Gout, unspecified     Herniated lumbar intervertebral disc     Hypertension     Insomnia     Irritable bowel syndrome without diarrhea 2015    Liver lesion 3/13/2013    Mitral valve prolapse     MVP (mitral valve prolapse)     Pancreatic cyst 2013    PVD (peripheral vascular disease) 2013    PVD (peripheral vascular disease) 2013    Venous insufficiency        Past Surgical History:   Procedure Laterality Date    BARTHOLIN GLAND CYST EXCISION       SECTION      x3    COLONOSCOPY N/A 2019    Procedure: COLONOSCOPY;  Surgeon: Haider Luu MD;  Location: [address];  Service: Endoscopy;  Laterality: N/A;    Diagnostic laproscopic       laser surgery on vein         MEDICATIONS AND ALLERGIES:      Current Outpatient Medications:     amLODIPine (NORVASC) 5 MG tablet, Take 1 tablet (5 mg total) by mouth once daily., Disp: 90 tablet, Rfl: 3    amoxicillin (AMOXIL) 500 MG capsule, SMARTSI Capsule(s) By Mouth, Disp: , Rfl:     aspirin (ECOTRIN) 81 MG EC tablet, Take 1 tablet (81 mg total) by mouth once daily., Disp: , Rfl:     BIFIDOBACTERIUM " INFANTIS (ALIGN ORAL), Take by mouth once daily., Disp: , Rfl:     BIOTIN ORAL, Take by mouth., Disp: , Rfl:     ciclopirox (PENLAC) 8 % Soln, Apply topically nightly. Remove once a week with nail polish remover, Disp: 1 each, Rfl: 2    multivitamin-minerals-lutein Tab, Take 1 tablet by mouth once daily. , Disp: , Rfl:     polyethylene glycol (GLYCOLAX) 17 gram/dose powder, Take 17 g by mouth once daily., Disp: 510 g, Rfl: 11    pravastatin (PRAVACHOL) 40 MG tablet, TAKE 1 TABLET BY MOUTH IN THE EVENING, Disp: 90 tablet, Rfl: 3    valsartan (DIOVAN) 160 MG tablet, Take 1 tablet (160 mg total) by mouth once daily., Disp: 90 tablet, Rfl: 1    valsartan-hydrochlorothiazide (DIOVAN-HCT) 160-25 mg per tablet, Take 1 tablet by mouth once daily., Disp: , Rfl:     nitroGLYCERIN (NITROSTAT) 0.4 MG SL tablet, Place 1 tablet (0.4 mg total) under the tongue every 5 (five) minutes as needed for Chest pain., Disp: 20 tablet, Rfl: 12    Review of patient's allergies indicates:   Allergen Reactions    No known allergies        Family History   Problem Relation Age of Onset    Diabetes Brother     Hypertension Brother     Diabetes Sister     Eclampsia Sister     Hypertension Sister     Colon polyps Sister     Breast cancer Other         niece    Diabetes Sister     Eclampsia Sister     Hypertension Sister     Miscarriages / Stillbirths Sister     Heart disease Sister         mi, cad    Diabetes Sister     Eclampsia Sister     Hypertension Sister     Heart disease Mother     Diabetes Sister     Endometriosis Daughter     No Known Problems Daughter     No Known Problems Daughter     Colon cancer Neg Hx     Ovarian cancer Neg Hx     Esophageal cancer Neg Hx     Liver cancer Neg Hx     Liver disease Neg Hx     Rectal cancer Neg Hx     Stomach cancer Neg Hx        Social History     Socioeconomic History    Marital status:    Occupational History    Occupation: Retired   Tobacco Use    Smoking status: Never    Smokeless tobacco:  Never   Substance and Sexual Activity    Alcohol use: No    Drug use: No    Sexual activity: Not Currently     Birth control/protection: Post-menopausal   Social History Narrative    3 kids (healthy), previous . , has eye issues. biw exercise.     Social Determinants of Health     Financial Resource Strain: Low Risk     Difficulty of Paying Living Expenses: Not hard at all   Food Insecurity: No Food Insecurity    Worried About Running Out of Food in the Last Year: Never true    Ran Out of Food in the Last Year: Never true   Transportation Needs: No Transportation Needs    Lack of Transportation (Medical): No    Lack of Transportation (Non-Medical): No   Physical Activity: Insufficiently Active    Days of Exercise per Week: 3 days    Minutes of Exercise per Session: 20 min   Stress: No Stress Concern Present    Feeling of Stress : Not at all   Social Connections: Unknown    Frequency of Communication with Friends and Family: More than three times a week    Frequency of Social Gatherings with Friends and Family: Once a week    Active Member of Clubs or Organizations: Yes    Attends Club or Organization Meetings: 1 to 4 times per year    Marital Status:    Housing Stability: Low Risk     Unable to Pay for Housing in the Last Year: No    Number of Places Lived in the Last Year: 1    Unstable Housing in the Last Year: No       OB History    Para Term  AB Living   3 3 3     3   SAB IAB Ectopic Multiple Live Births           3      # Outcome Date GA Lbr Lex/2nd Weight Sex Delivery Anes PTL Lv   3 Term      CS-LTranv  N MEGAN   2 Term      CS-LTranv  N MEGAN   1 Term      CS-LTranv  N MEGAN          COMPREHENSIVE GYN HISTORY:  PAP History: Denies abnormal Paps.  Infection History: Denies STDs. Denies PID.  Benign History: Denies uterine fibroids. Denies ovarian cysts. Denies endometriosis. Denies other conditions.  Cancer History: Denies cervical cancer. Denies uterine cancer or  "hyperplasia. Denies ovarian cancer. Denies vulvar cancer or pre-cancer. Denies vaginal cancer or pre-cancer. Denies breast cancer. Denies colon cancer.  Sexual Activity History: not currently  sexually active  Menstrual History:    Dysmenorrhea History:na  Contraception:na    ROS:  GENERAL: No weight changes. No swelling. No fatigue. No fever.  BREASTS: No pain. No lumps. No discharge.  ABDOMEN: + pain (seeing GI). No nausea. No vomiting. No diarrhea. No constipation.  REPRODUCTIVE: No abnormal bleeding.   VULVA: No pain. + lesion. No itching.  VAGINA: No relaxation. No itching. No odor. No discharge. No lesions.  URINARY: No incontinence. No nocturia. No frequency. No dysuria.    BP (!) 120/58   Ht 5' 6" (1.676 m)   Wt 72.5 kg (159 lb 13.3 oz)   BMI 25.80 kg/m²     PE:  Physical Exam:   Constitutional: She is oriented to person, place, and time. She appears well-developed and well-nourished. No distress.      Neck: No thyroid mass and no thyromegaly present.     Pulmonary/Chest: Right breast exhibits no inverted nipple, no mass, no nipple discharge, no skin change, no tenderness and no bleeding. Left breast exhibits no inverted nipple, no mass, no nipple discharge, no skin change, no tenderness and no bleeding.        Abdominal: Soft. There is no abdominal tenderness.     Genitourinary:    Vagina, uterus, right adnexa and left adnexa normal.            Pelvic exam was performed with patient supine.   The external female genitalia was normal.   Genitalia hair distrobution normal .   There is no rash or lesion on the right labia. There is no rash or lesion on the left labia. Cervix is normal. Right adnexum displays no mass, no tenderness and no fullness. Left adnexum displays no mass, no tenderness and no fullness. No  no vaginal discharge, tenderness, bleeding, rectocele, cystocele or unspecified prolapse of vaginal walls in the vagina.    No foreign body in the vagina.   Vaginal atrophy noted. Cervix exhibits " no motion tenderness, no lesion, no discharge, no friability, no lesion, no tenderness and no polyp.    pap smear not completedUterus is not enlarged and not tender. Normal urethral meatus.              Neurological: She is alert and oriented to person, place, and time.        PROCEDURES/ORDERS:  Pap not indicated anymore  Colon ca screening utd  Mammogram scheduled for tomorrow, placed order for next year      Assessment/Plan:    Encounter for well woman exam    Breast cancer screening by mammogram  -     Mammo Digital Screening Bilat w/ Oliver; Future; Expected date: 10/27/2022    -warm compress to vulva pustule - rtc if fever, enlarging in size, pain, any worsening sx or not improving    COUNSELING:  The patient was counseled today on:  -A.C.S. Pap and pelvic exam guidelines, recomendations for yearly mammogram, monthly self breast exams and to follow up with her PCP for other health maintenance.    FOLLOW-UP  annually for [employer].

## 2022-10-28 ENCOUNTER — HOSPITAL ENCOUNTER (OUTPATIENT)
Dept: RADIOLOGY | Facility: OTHER | Age: 77
Discharge: HOME | End: 2022-10-28
Attending: OBSTETRICS & GYNECOLOGY
Payer: MEDICARE

## 2022-10-28 DIAGNOSIS — Z12.31 SCREENING MAMMOGRAM, ENCOUNTER FOR: ICD-10-CM

## 2022-10-28 PROCEDURE — 77067 SCR MAMMO BI INCL CAD: CPT | Mod: 26,,, | Performed by: RADIOLOGY

## 2022-10-28 PROCEDURE — 77067 MAMMO DIGITAL SCREENING BILAT WITH TOMO: ICD-10-PCS | Mod: 26,,, | Performed by: RADIOLOGY

## 2022-10-28 PROCEDURE — 77063 BREAST TOMOSYNTHESIS BI: CPT | Mod: 26,,, | Performed by: RADIOLOGY

## 2022-10-28 PROCEDURE — 77063 BREAST TOMOSYNTHESIS BI: CPT | Mod: TC

## 2022-10-28 PROCEDURE — 77063 MAMMO DIGITAL SCREENING BILAT WITH TOMO: ICD-10-PCS | Mod: 26,,, | Performed by: RADIOLOGY

## 2022-10-30 ENCOUNTER — PATIENT MESSAGE (OUTPATIENT)
Dept: OBSTETRICS AND GYNECOLOGY | Facility: CLINIC | Age: 77
End: 2022-10-30
Payer: MEDICARE

## 2022-10-31 ENCOUNTER — PATIENT MESSAGE (OUTPATIENT)
Dept: CARDIOLOGY | Facility: CLINIC | Age: 77
End: 2022-10-31
Payer: MEDICARE

## 2022-11-01 ENCOUNTER — TELEPHONE (OUTPATIENT)
Dept: INTERNAL MEDICINE | Facility: CLINIC | Age: 77
End: 2022-11-01
Payer: MEDICARE

## 2022-11-01 ENCOUNTER — PATIENT MESSAGE (OUTPATIENT)
Dept: INTERNAL MEDICINE | Facility: CLINIC | Age: 77
End: 2022-11-01
Payer: MEDICARE

## 2022-11-01 NOTE — TELEPHONE ENCOUNTER
Called and spoke to patient about blood pressures. Went over Dr Caro recommendations of watching salt intake and to continue to monitor her blood pressures daily. Encouraged her to call if symptoms persists or if blood pressure spikes up high again with symptoms of headache. Patient verbalized understanding with read back.

## 2022-11-01 NOTE — TELEPHONE ENCOUNTER
Hi, please call her -- I looked at her home pressures and most of the numbers are in a good range. Sometimes extra salt in the diet or pain (like from her neck and headache she was having) can spike the pressure.  I recommend she watch the salt in her diet and continue checking her pressures.  Let me know if patient has any other questions.  Thank you, Ramsey Tyler

## 2022-11-03 ENCOUNTER — TELEPHONE (OUTPATIENT)
Dept: GASTROENTEROLOGY | Facility: HOSPITAL | Age: 77
End: 2022-11-03
Payer: MEDICARE

## 2022-11-03 NOTE — TELEPHONE ENCOUNTER
Contacted patient with regards to repeat breath test samples being invalid due to sampling error despite following sampling recommendations offered.     Patient reports overall improving abdominal pain and bloating since office visit. She is having one small hard bowel movement daily. She is using MIRALAX only PRN.     I recommended continuing to use MIRALAX daily to minimize constipation. I also recommend holding off on repeating breath testing with low suspicion for SIBO based on improvement in symptoms. If her bloating returns despite optimizing bowel regimen, can consider empiric treatment with Rifaximin TID for 14 days for SIBO instead of repeat breath testing. Patient was in-agreement with plan.         Bogdan Vallejo MD, PGY-V  Gastroenterology Fellow  [facility]

## 2023-01-25 ENCOUNTER — TELEPHONE (OUTPATIENT)
Dept: INTERNAL MEDICINE | Facility: CLINIC | Age: 78
End: 2023-01-25
Payer: MEDICARE

## 2023-01-25 ENCOUNTER — PATIENT MESSAGE (OUTPATIENT)
Dept: INTERNAL MEDICINE | Facility: CLINIC | Age: 78
End: 2023-01-25
Payer: MEDICARE

## 2023-01-25 NOTE — TELEPHONE ENCOUNTER
Called and spoke to patient.Patient stated she went under her stairwell but forgot there was not light and hit her head.No scrapes cuts or scratches from hitting her head. Denies any head aches, dizziness, nausea , double vision or LOC. Alert, awake, and oriented x4 during conversation. Patient started doing the exercises in case its vertigo.Did encourage patient that if these symptoms do occur, please seek an evaluation to an UC or ER.

## 2023-01-25 NOTE — TELEPHONE ENCOUNTER
Hi, please call about hitting her head yesterday --  Did she lose consciousness, does she have a bad headache? Any nausea, any double vision. Is she confused?  Please let me know,  Thank you, Ramsey Tyler

## 2023-02-22 ENCOUNTER — OFFICE VISIT (OUTPATIENT)
Dept: CARDIOLOGY | Facility: CLINIC | Age: 78
End: 2023-02-22
Payer: MEDICARE

## 2023-02-22 VITALS
WEIGHT: 158.75 LBS | BODY MASS INDEX: 26.45 KG/M2 | DIASTOLIC BLOOD PRESSURE: 75 MMHG | OXYGEN SATURATION: 98 % | HEIGHT: 65 IN | HEART RATE: 73 BPM | SYSTOLIC BLOOD PRESSURE: 119 MMHG

## 2023-02-22 DIAGNOSIS — I65.23 BILATERAL CAROTID ARTERY STENOSIS: ICD-10-CM

## 2023-02-22 DIAGNOSIS — E78.2 MIXED HYPERLIPIDEMIA: ICD-10-CM

## 2023-02-22 DIAGNOSIS — I87.2 VENOUS INSUFFICIENCY OF RIGHT LOWER EXTREMITY: ICD-10-CM

## 2023-02-22 DIAGNOSIS — I10 PRIMARY HYPERTENSION: Primary | ICD-10-CM

## 2023-02-22 PROCEDURE — 99999 PR PBB SHADOW E&M-EST. PATIENT-LVL III: CPT | Mod: PBBFAC,,, | Performed by: INTERNAL MEDICINE

## 2023-02-22 PROCEDURE — 99215 PR OFFICE/OUTPT VISIT, EST, LEVL V, 40-54 MIN: ICD-10-PCS | Mod: S$PBB,,, | Performed by: INTERNAL MEDICINE

## 2023-02-22 PROCEDURE — 99213 OFFICE O/P EST LOW 20 MIN: CPT | Mod: PBBFAC,PO | Performed by: INTERNAL MEDICINE

## 2023-02-22 PROCEDURE — 99215 OFFICE O/P EST HI 40 MIN: CPT | Mod: S$PBB,,, | Performed by: INTERNAL MEDICINE

## 2023-02-22 PROCEDURE — 99999 PR PBB SHADOW E&M-EST. PATIENT-LVL III: ICD-10-PCS | Mod: PBBFAC,,, | Performed by: INTERNAL MEDICINE

## 2023-02-22 NOTE — PROGRESS NOTES
Subjective:   Patient ID:  Mirna Norton is a 78 y.o. female who presents for follow up of Hyperlipidemia, Carotid Artery Disease, and Venous Insufficiency      HPI:     Mirna Norton 78 y.o. female is here follow up and feeling well without any new complaints. She is s/p R GSVL EVLT in 4/2013 with subsequent injections in 2014 without recurrent issues. She has asymptomatic mild bilateral carotid disease. She takes aspirin and losartan. Last venous ultrasound-no DVT or reflux disease. Carotid ultrasound-PSV < 130 cm/sec bilaterally.  In May 2020 she had atypical chest discomfort.  A stress echocardiogram revealed a normal ejection fraction, no wall motion abnormalities, no EKG changes, or symptoms suggestive of obstructive coronary disease. She has been less active because of the pandemic.       US 11/2018      R ICA 92 cm/sec   L  cm/sec      US 12/2019     R ICA 72 with ratio <1   L ICA 83 with ratio 108      ECG 1/27/2020     Sinus bradycardia      YOLANDE 5/2020     Non ischemic   Normal EF   Normal ECG   No symptoms   No WMAs      Carotid US 1/2022     R ICA PSV 91   L ICA     Patent bilateral vertebral        Carotid US 2/2023     R  cm/sec   L ICA 87 cm/sec    Patent bilateral vertebral       Lqabs 2/2023     LDL 59             Patient Active Problem List    Diagnosis Date Noted    Non-cardiac chest pain 08/17/2020    History of colon polyps 07/29/2019    Venous insufficiency of right lower extremity 11/13/2017         R GSV EVLT 4/2013 with subsequent injections in 2014          Bilateral carotid bruits 11/13/2017    Foot pain, right 06/13/2017    Hyperlipidemia 09/28/2015    Irritable bowel syndrome without diarrhea 09/20/2015    Carotid stenosis 05/15/2014         Asymptomatic        R ICA 93 cm/sec   L  cm/sec      US 11/2018      R ICA 92 cm/sec   L  cm/sec              Pancreatic cyst 12/30/2013    Gout, arthritis 02/20/2013    Lumbosacral spondylosis 02/20/2013     GERD (gastroesophageal reflux disease)      Gastroesophageal reflux with nonsteroidal antiinflammatory drugs, but can tolerate short term;   and fear of Celebrex        Hypertension            Right Arm BP - Sittin/75  Left Arm BP - Sittin/76        LABS    LAST HbA1c  Lab Results   Component Value Date    HGBA1C 6.1 (H) 2021       Lipid panel  Lab Results   Component Value Date    CHOL 188 2022    CHOL 152 2021    CHOL 151 2020     Lab Results   Component Value Date    HDL 73 2022    HDL 73 2021    HDL 60 2020     Lab Results   Component Value Date    LDLCALC 88 2022    LDLCALC 65.0 2021    LDLCALC 67.6 2020     Lab Results   Component Value Date    TRIG 177 (H) 2022    TRIG 70 2021    TRIG 117 2020     Lab Results   Component Value Date    CHOLHDL 2.6 2022    CHOLHDL 48.0 2021    CHOLHDL 39.7 2020            Review of Systems   Constitutional: Negative for diaphoresis, night sweats, weight gain and weight loss.   HENT:  Negative for congestion.    Eyes:  Negative for blurred vision, discharge and double vision.   Cardiovascular:  Negative for chest pain, claudication, cyanosis, dyspnea on exertion, irregular heartbeat, leg swelling, near-syncope, orthopnea, palpitations, paroxysmal nocturnal dyspnea and syncope.   Respiratory:  Negative for cough, shortness of breath and wheezing.    Endocrine: Negative for cold intolerance, heat intolerance and polyphagia.   Hematologic/Lymphatic: Negative for adenopathy and bleeding problem. Does not bruise/bleed easily.   Skin:  Negative for dry skin and nail changes.   Musculoskeletal:  Negative for arthritis, back pain, falls, joint pain, myalgias and neck pain.   Gastrointestinal:  Negative for bloating, abdominal pain, change in bowel habit and constipation.   Genitourinary:  Negative for bladder incontinence, dysuria, flank pain, genital sores and missed menses.    Neurological:  Negative for aphonia, brief paralysis, difficulty with concentration, dizziness and weakness.   Psychiatric/Behavioral:  Negative for altered mental status and memory loss. The patient does not have insomnia.    Allergic/Immunologic: Negative for environmental allergies.     Objective:   Physical Exam  Constitutional:       Appearance: She is well-developed.      Interventions: She is not intubated.  HENT:      Head: Normocephalic and atraumatic.      Right Ear: External ear normal.      Left Ear: External ear normal.   Eyes:      General: No scleral icterus.        Right eye: No discharge.         Left eye: No discharge.      Conjunctiva/sclera: Conjunctivae normal.      Pupils: Pupils are equal, round, and reactive to light.   Neck:      Thyroid: No thyromegaly.      Vascular: Normal carotid pulses. No carotid bruit, hepatojugular reflux or JVD.      Trachea: No tracheal deviation.   Cardiovascular:      Rate and Rhythm: Normal rate and regular rhythm. No extrasystoles are present.     Chest Wall: PMI is not displaced.      Pulses:           Carotid pulses are 2+ on the right side and 2+ on the left side.       Radial pulses are 2+ on the right side and 2+ on the left side.        Femoral pulses are 2+ on the right side and 2+ on the left side.       Popliteal pulses are 2+ on the right side and 2+ on the left side.        Dorsalis pedis pulses are 2+ on the right side and 2+ on the left side.        Posterior tibial pulses are 2+ on the right side and 2+ on the left side.      Heart sounds: S1 normal and S2 normal. Heart sounds not distant. No midsystolic click. No murmur heard.    No friction rub. No gallop. No S3 sounds.   Pulmonary:      Effort: Pulmonary effort is normal. No tachypnea, bradypnea, accessory muscle usage or respiratory distress. She is not intubated.      Breath sounds: Normal breath sounds. No stridor. No decreased breath sounds, wheezing or rales.   Chest:      Chest wall: No  tenderness.   Abdominal:      General: There is no distension or abdominal bruit.      Palpations: There is no mass or pulsatile mass.      Tenderness: There is no abdominal tenderness. There is no guarding or rebound.   Musculoskeletal:         General: No tenderness. Normal range of motion.      Cervical back: Normal range of motion and neck supple.   Lymphadenopathy:      Cervical: No cervical adenopathy.   Skin:     General: Skin is warm.      Coloration: Skin is not pale.      Findings: No erythema or rash.   Neurological:      Mental Status: She is alert and oriented to person, place, and time.      Cranial Nerves: No cranial nerve deficit.      Coordination: Coordination normal.      Deep Tendon Reflexes: Reflexes are normal and symmetric.   Psychiatric:         Behavior: Behavior normal.         Thought Content: Thought content normal.         Judgment: Judgment normal.       Assessment:     1. Primary hypertension    2. Bilateral carotid artery stenosis    3. Mixed hyperlipidemia    4. Venous insufficiency of right lower extremity          Plan:     Continue with current medical management of cardiovascular risk factors with aspirin intense statin, and angiotensin receptor blocker.  All her question were answered to her satisfaction.    Low salt diet  Exercise  Weight loss  Follow up yearly with labs + non invasive studies        Continue with current medical plan and lifestyle changes.  Return sooner for concerns or questions. If symptoms persist go to the ED  I have reviewed all pertinent data on this patient       I have reviewed the patient's medical history in detail and updated the computerized patient record.    Orders Placed This Encounter   Procedures    CBC Auto Differential     Standing Status:   Future     Standing Expiration Date:   8/22/2024    Comprehensive Metabolic Panel     Standing Status:   Future     Standing Expiration Date:   8/22/2024    Lipid Panel     Standing Status:   Future      Standing Expiration Date:   2024    CV US Lower Extremity Veins Bilateral Insufficiency     Standing Status:   Future     Standing Expiration Date:   2024     Order Specific Question:   Release to patient     Answer:   Immediate       Follow up as scheduled. Return sooner for concerns or questions            She expressed verbal understanding and agreed with the plan      Greater than 50% of the visit of 45 minutes was spent counseling, educating, and coordinating the care of the patient.  -In today's visit, at least 4 established conditions that pose a risk to life or bodily function have been addressed and the conditions are severe.    -In today's visit, monitoring for drug toxicity was accomplished.          Patient's Medications   New Prescriptions    No medications on file   Previous Medications    AMLODIPINE (NORVASC) 5 MG TABLET    Take 1 tablet (5 mg total) by mouth once daily.    ASPIRIN (ECOTRIN) 81 MG EC TABLET    Take 1 tablet (81 mg total) by mouth once daily.    BIFIDOBACTERIUM INFANTIS (ALIGN ORAL)    Take by mouth once daily.    BIOTIN ORAL    Take by mouth.    CICLOPIROX (PENLAC) 8 % SOLN    Apply topically nightly. Remove once a week with nail polish remover    MULTIVITAMIN-MINERALS-LUTEIN TAB    Take 1 tablet by mouth once daily.     NITROGLYCERIN (NITROSTAT) 0.4 MG SL TABLET    Place 1 tablet (0.4 mg total) under the tongue every 5 (five) minutes as needed for Chest pain.    POLYETHYLENE GLYCOL (GLYCOLAX) 17 GRAM/DOSE POWDER    Take 17 g by mouth once daily.    PRAVASTATIN (PRAVACHOL) 40 MG TABLET    TAKE 1 TABLET BY MOUTH IN THE EVENING    VALSARTAN-HYDROCHLOROTHIAZIDE (DIOVAN-HCT) 160-25 MG PER TABLET    Take 1 tablet by mouth once daily.   Modified Medications    No medications on file   Discontinued Medications    AMOXICILLIN (AMOXIL) 500 MG CAPSULE    SMARTSI Capsule(s) By Mouth

## 2023-03-06 ENCOUNTER — LAB VISIT (OUTPATIENT)
Dept: PRIMARY CARE CLINIC | Facility: CLINIC | Age: 78
End: 2023-03-06
Payer: MEDICARE

## 2023-03-06 DIAGNOSIS — I10 PRIMARY HYPERTENSION: ICD-10-CM

## 2023-03-06 DIAGNOSIS — R73.09 IMPAIRED GLUCOSE METABOLISM: ICD-10-CM

## 2023-03-06 LAB
ALBUMIN SERPL BCP-MCNC: 3.7 G/DL (ref 3.5–5.2)
ALP SERPL-CCNC: 58 U/L (ref 55–135)
ALT SERPL W/O P-5'-P-CCNC: 24 U/L (ref 10–44)
ANION GAP SERPL CALC-SCNC: 7 MMOL/L (ref 8–16)
AST SERPL-CCNC: 24 U/L (ref 10–40)
BASOPHILS # BLD AUTO: 0.03 K/UL (ref 0–0.2)
BASOPHILS NFR BLD: 0.4 % (ref 0–1.9)
BILIRUB SERPL-MCNC: 0.4 MG/DL (ref 0.1–1)
BUN SERPL-MCNC: 15 MG/DL (ref 8–23)
CALCIUM SERPL-MCNC: 9.9 MG/DL (ref 8.7–10.5)
CHLORIDE SERPL-SCNC: 101 MMOL/L (ref 95–110)
CO2 SERPL-SCNC: 30 MMOL/L (ref 23–29)
CREAT SERPL-MCNC: 0.8 MG/DL (ref 0.5–1.4)
DIFFERENTIAL METHOD: ABNORMAL
EOSINOPHIL # BLD AUTO: 0.2 K/UL (ref 0–0.5)
EOSINOPHIL NFR BLD: 2.9 % (ref 0–8)
ERYTHROCYTE [DISTWIDTH] IN BLOOD BY AUTOMATED COUNT: 13.4 % (ref 11.5–14.5)
EST. GFR  (NO RACE VARIABLE): >60 ML/MIN/1.73 M^2
GLUCOSE SERPL-MCNC: 87 MG/DL (ref 70–110)
HCT VFR BLD AUTO: 40.6 % (ref 37–48.5)
HGB BLD-MCNC: 12.8 G/DL (ref 12–16)
IMM GRANULOCYTES # BLD AUTO: 0.02 K/UL (ref 0–0.04)
IMM GRANULOCYTES NFR BLD AUTO: 0.3 % (ref 0–0.5)
LYMPHOCYTES # BLD AUTO: 2.1 K/UL (ref 1–4.8)
LYMPHOCYTES NFR BLD: 26.8 % (ref 18–48)
MCH RBC QN AUTO: 28.8 PG (ref 27–31)
MCHC RBC AUTO-ENTMCNC: 31.5 G/DL (ref 32–36)
MCV RBC AUTO: 91 FL (ref 82–98)
MONOCYTES # BLD AUTO: 0.6 K/UL (ref 0.3–1)
MONOCYTES NFR BLD: 8 % (ref 4–15)
NEUTROPHILS # BLD AUTO: 4.8 K/UL (ref 1.8–7.7)
NEUTROPHILS NFR BLD: 61.6 % (ref 38–73)
NRBC BLD-RTO: 0 /100 WBC
PLATELET # BLD AUTO: 292 K/UL (ref 150–450)
PMV BLD AUTO: 9 FL (ref 9.2–12.9)
POTASSIUM SERPL-SCNC: 3.9 MMOL/L (ref 3.5–5.1)
PROT SERPL-MCNC: 7.5 G/DL (ref 6–8.4)
RBC # BLD AUTO: 4.45 M/UL (ref 4–5.4)
SODIUM SERPL-SCNC: 138 MMOL/L (ref 136–145)
URATE SERPL-MCNC: 5.2 MG/DL (ref 2.4–5.7)
WBC # BLD AUTO: 7.86 K/UL (ref 3.9–12.7)

## 2023-03-06 PROCEDURE — 36415 COLL VENOUS BLD VENIPUNCTURE: CPT | Performed by: INTERNAL MEDICINE

## 2023-03-06 PROCEDURE — 84550 ASSAY OF BLOOD/URIC ACID: CPT | Performed by: INTERNAL MEDICINE

## 2023-03-06 PROCEDURE — 80053 COMPREHEN METABOLIC PANEL: CPT | Performed by: INTERNAL MEDICINE

## 2023-03-06 PROCEDURE — 82947 ASSAY GLUCOSE BLOOD QUANT: CPT | Mod: 91 | Performed by: INTERNAL MEDICINE

## 2023-03-06 PROCEDURE — 85025 COMPLETE CBC W/AUTO DIFF WBC: CPT | Performed by: INTERNAL MEDICINE

## 2023-03-07 LAB — GLUCOSE SERPL-MCNC: 87 MG/DL (ref 70–110)

## 2023-03-27 ENCOUNTER — TELEPHONE (OUTPATIENT)
Dept: CARDIOLOGY | Facility: CLINIC | Age: 78
End: 2023-03-27
Payer: MEDICARE

## 2023-03-27 ENCOUNTER — HOSPITAL ENCOUNTER (EMERGENCY)
Facility: HOSPITAL | Age: 78
Discharge: HOME | End: 2023-03-27
Attending: EMERGENCY MEDICINE
Payer: MEDICARE

## 2023-03-27 VITALS
HEART RATE: 81 BPM | TEMPERATURE: 99 F | OXYGEN SATURATION: 99 % | WEIGHT: 159 LBS | DIASTOLIC BLOOD PRESSURE: 74 MMHG | SYSTOLIC BLOOD PRESSURE: 160 MMHG | BODY MASS INDEX: 26.46 KG/M2 | RESPIRATION RATE: 18 BRPM

## 2023-03-27 DIAGNOSIS — M79.604 RIGHT LEG PAIN: Primary | ICD-10-CM

## 2023-03-27 DIAGNOSIS — M79.606 LEG PAIN: ICD-10-CM

## 2023-03-27 PROCEDURE — 99284 EMERGENCY DEPT VISIT MOD MDM: CPT | Mod: 25

## 2023-03-27 RX ORDER — DICLOFENAC SODIUM 10 MG/G
2 GEL TOPICAL 4 TIMES DAILY PRN
Qty: 100 G | Refills: 0 | Status: SHIPPED | OUTPATIENT
Start: 2023-03-27

## 2023-03-27 NOTE — ED PROVIDER NOTES
Encounter Date: 3/27/2023       History     Chief Complaint   Patient presents with    Leg Pain     Pt reports she had varicose vein surgery several years ago to her right leg and 6 days ago she felt like there was a gush of fluid inside the same leg. Pt has no swelling or skin discoloration. Pt has had intermittent pain to the leg.      Mirna Norton is a 78 y.o. female who  has a past medical history of Abnormal cervical Papanicolaou smear (1980's), Carotid artery stenosis, Carotid stenosis (5/15/2014), GERD (gastroesophageal reflux disease), Gout due to renal impairment, Gout, arthritis (2/20/2013), Gout, unspecified, Herniated lumbar intervertebral disc, Hypertension, Insomnia, Irritable bowel syndrome without diarrhea (9/20/2015), Liver lesion (3/13/2013), Mitral valve prolapse, MVP (mitral valve prolapse), Pancreatic cyst (12/30/2013), PVD (peripheral vascular disease) (12/23/2013), PVD (peripheral vascular disease) (12/23/2013), and Venous insufficiency.    The patient presents to the ED due to R leg pain onset 6 days. She denies any trauma. She states she felt a 'rush of blood' at night time to her calf. She describes the pain as achiness. No relieving factors noted. She reports she had varicose vein surgery about 6 years.  She denies additional symptoms, including dyspnea, chest pain, headache or any other concerns.       The history is provided by the patient.   Review of patient's allergies indicates:   Allergen Reactions    No known allergies      Past Medical History:   Diagnosis Date    Abnormal cervical Papanicolaou smear 1980's    Cryo    Carotid artery stenosis     via kinked carotid artery - followed by Dr. Ye Anderson    Carotid stenosis 5/15/2014    GERD (gastroesophageal reflux disease)     Gout due to renal impairment     Gout, arthritis 2/20/2013    Gout, unspecified     Herniated lumbar intervertebral disc     Hypertension     Insomnia     Irritable bowel syndrome without diarrhea 9/20/2015     Liver lesion 3/13/2013    Mitral valve prolapse     MVP (mitral valve prolapse)     Pancreatic cyst 2013    PVD (peripheral vascular disease) 2013    PVD (peripheral vascular disease) 2013    Venous insufficiency      Past Surgical History:   Procedure Laterality Date    BARTHOLIN GLAND CYST EXCISION       SECTION      x3    COLONOSCOPY N/A 2019    Procedure: COLONOSCOPY;  Surgeon: Haider Luu MD;  Location: [address]);  Service: Endoscopy;  Laterality: N/A;    Diagnostic laproscopic       laser surgery on vein       Family History   Problem Relation Age of Onset    Diabetes Brother     Hypertension Brother     Diabetes Sister     Eclampsia Sister     Hypertension Sister     Colon polyps Sister     Breast cancer Other         niece    Diabetes Sister     Eclampsia Sister     Hypertension Sister     Miscarriages / Stillbirths Sister     Heart disease Sister         mi, cad    Diabetes Sister     Eclampsia Sister     Hypertension Sister     Heart disease Mother     Diabetes Sister     Endometriosis Daughter     No Known Problems Daughter     No Known Problems Daughter     Colon cancer Neg Hx     Ovarian cancer Neg Hx     Esophageal cancer Neg Hx     Liver cancer Neg Hx     Liver disease Neg Hx     Rectal cancer Neg Hx     Stomach cancer Neg Hx      Social History     Tobacco Use    Smoking status: Never    Smokeless tobacco: Never   Substance Use Topics    Alcohol use: No    Drug use: No     Review of Systems   Constitutional:  Negative for fever.   HENT:  Negative for sore throat.    Respiratory:  Negative for shortness of breath.    Cardiovascular:  Negative for chest pain.   Gastrointestinal:  Negative for nausea.   Genitourinary:  Negative for dysuria.   Musculoskeletal:  Positive for myalgias. Negative for back pain.   Skin:  Negative for rash.   Neurological:  Negative for weakness.   Hematological:  Does not bruise/bleed easily.     Physical Exam     Initial  Vitals [03/27/23 1224]   BP Pulse Resp Temp SpO2   (!) 160/74 81 18 98.5 °F (36.9 °C) 99 %      MAP       --         Physical Exam    Constitutional: No distress.   HENT:   Head: Normocephalic and atraumatic.   Eyes: Conjunctivae are normal.   Cardiovascular:  Intact distal pulses.           Pulmonary/Chest: No respiratory distress.   Musculoskeletal:      Comments: RLE:   SILT, DP/PT pulses palpable  FROM to R hip, R knee, ankle  No skin changes, no reproducible ttp      Neurological: She is alert.   Skin: Skin is warm and dry.   Psychiatric: She has a normal mood and affect.       ED Course   Procedures  Labs Reviewed - No data to display       Imaging Results              US Lower Extremity Veins Right (In process)                      Medications - No data to display  Medical Decision Making:   Differential Diagnosis:   Differential Diagnosis includes, but is not limited to:  Fracture, dislocation, compartment syndrome, nerve injury/palsy, vascular injury, rhabdomyolysis, hemarthrosis, septic joint, bursitis, muscle strain, ligament tear/sprain, laceration with foreign body, abrasion, soft tissue contusion, osteoarthritis.    ED Management:  Patient is a 78-year-old woman with right leg pain and history of varicose vein surgery.  Her vital signs are stable.  She is neurovascularly intact.  Physical exam is overall reassuring without signs to suggest infection or an emergent cause of her symptoms.  Will treat for musculoskeletal pain.  Discussed need for close follow-up with patient's PCP and return precautions for worsening symptoms or any other concerns.    After taking into careful account the historical factors and physical exam findings of the patient's presentation today, in conjunction with the empirical and objective data obtained on ED workup, no acute emergent medical condition has been identified. The patient appears to be low risk for an emergent medical condition and I feel it is safe and appropriate  at this time for the patient to be discharged to follow-up as detailed in their discharge instructions for reevaluation and possible continued outpatient workup and management. I have discussed the specifics of the workup with the patient and the patient has verbalized understanding of the details of the workup, the diagnosis, the treatment plan, and the need for outpatient follow-up.  Although the patient has no emergent etiology today this does not preclude the development of an emergent condition so in addition, I have advised the patient that they can return to the ED and/or activate EMS at any time with worsening of their symptoms, change of their symptoms, or with any other medical complaint.  The patient remained comfortable and stable during their visit in the ED.  Discharge and follow-up instructions discussed with the patient who expressed understanding and willingness to comply with my recommendations.              ED Course as of 03/27/23 1611   Mon Mar 27, 2023   1452  Lower Extremity Veins Right [RN]      ED Course User Index  [RN] Herber Lindo Jr., MD                 Clinical Impression:   Final diagnoses:  [M79.606] Leg pain           Portions of this note were dictated using voice recognition software and may contain dictation related errors in spelling/grammar/syntax not found on text review          Herber Lindo Jr., MD  03/27/23 1621

## 2023-03-27 NOTE — DISCHARGE INSTRUCTIONS

## 2023-03-27 NOTE — TELEPHONE ENCOUNTER
3/27/23 placed call to patient, she stated that she feels as though something is amiss with her veins, and she would like to get an ultrasound.  Writer informed patient message sent to provider, understanding verbalized.  SRM

## 2023-03-27 NOTE — FIRST PROVIDER EVALUATION
" Emergency Department TeleTriage Encounter Note      CHIEF COMPLAINT    Chief Complaint   Patient presents with    Leg Pain     Pt reports she had varicose vein surgery several years ago to her right leg and 6 days ago she felt like their was a gush of fluid inside the same leg. Pt has no swelling or skin discoloration. Pt has had intermittent pain to the leg.        VITAL SIGNS   Initial Vitals [03/27/23 1224]   BP Pulse Resp Temp SpO2   (!) 160/74 81 18 98.5 °F (36.9 °C) 99 %      MAP       --            ALLERGIES    Review of patient's allergies indicates:   Allergen Reactions    No known allergies        PROVIDER TRIAGE NOTE  This is a teletriage evaluation of a 78 y.o. female presenting to the ED with c/o right leg aching, felt "gush of blood inside my leg", denies swelling. H/o varicose vein procedure RLE     PE: CANNOT SEE LEG. Non-toxic/well-appearing. No respiratory distress, speaks in full sentences without issue. No active emesis nor cough. Normal eye contact and mentation.     Plan: US RLE. Further/augmented workup at discretion of examining provider.     All ED beds are full at present; patient notified of this status.  Patient seen and medically screened by JAM via teletriage. Orders initiated at triage to expedite care.  Patient is stable and will be placed in an ED bed when available.  Care will be transferred to an alternate provider when patient has been placed in an Exam Room further exam, additional orders, and disposition.         ORDERS  Labs Reviewed - No data to display    ED Orders (720h ago, onward)      None              Virtual Visit Note: The provider triage portion of this emergency department evaluation and documentation was performed via [employer], a HIPAA-compliant telemedicine application, in concert with a tele-presenter in the room. A face to face patient evaluation with one of my colleagues will occur once the patient is placed in an emergency department room.      DISCLAIMER: " This note was prepared with [employer] voice recognition transcription software. Garbled syntax, mangled pronouns, and other bizarre constructions may be attributed to that software system.

## 2023-03-27 NOTE — TELEPHONE ENCOUNTER
3/27/23 called patient and instructed her to come to the ED for evaluation, patient stated that she felt blood gush in the vein in her leg.  Understanding verbalized. SRM

## 2023-03-27 NOTE — TELEPHONE ENCOUNTER
----- Message from Arik Garcia sent at 3/27/2023  9:41 AM CDT -----  Contact: pt  Type: Requesting to speak with nurse        Who Called: PT  Regarding: requesting US on leg. States the vein that was repaired is starting to leak.   Would the patient rather a call back or a response via [facility]? Call back  Best Call Back Number: [phone]  Additional Information:

## 2023-03-27 NOTE — TELEPHONE ENCOUNTER
----- Message from Arik Garcia sent at 3/27/2023  9:41 AM CDT -----  Contact: pt  Type: Requesting to speak with nurse        Who Called: PT  Regarding: requesting US on leg. States the vein that was repaired is starting to leak.   Would the patient rather a call back or a response via [facility]? Call back  Best Call Back Number: [phone]  Additional Information:

## 2023-03-27 NOTE — ED NOTES
"Pt presented to ED from home for right lower leg pain that has presently resolved. Pt reported that she felt a shooting pain in her right calf and it felt like a burst of blood. Pt denies injury. States this occurred while she was getting into bed last night. Pt presently denies pain. States she has been "walking on egg shells' secondary to fear of recurrence. Pt has hx of laser therapy for varicose veins.  No obvious injury noted. No pain on palpation. Distal CMS intact. 1 varicose vein noted to right posterior lower leg. No redness or swelling noted.   "

## 2023-04-10 ENCOUNTER — PATIENT MESSAGE (OUTPATIENT)
Dept: INTERNAL MEDICINE | Facility: CLINIC | Age: 78
End: 2023-04-10
Payer: MEDICARE

## 2023-04-10 DIAGNOSIS — M10.9 GOUT, UNSPECIFIED CAUSE, UNSPECIFIED CHRONICITY, UNSPECIFIED SITE: ICD-10-CM

## 2023-04-10 RX ORDER — ALLOPURINOL 100 MG/1
100 TABLET ORAL DAILY
Qty: 90 TABLET | Refills: 3 | Status: SHIPPED | OUTPATIENT
Start: 2023-04-10

## 2023-04-19 ENCOUNTER — TELEPHONE (OUTPATIENT)
Dept: NEPHROLOGY | Facility: CLINIC | Age: 78
End: 2023-04-19
Payer: MEDICARE

## 2023-04-19 DIAGNOSIS — I10 PRIMARY HYPERTENSION: Primary | ICD-10-CM

## 2023-04-21 ENCOUNTER — PATIENT MESSAGE (OUTPATIENT)
Dept: INTERNAL MEDICINE | Facility: CLINIC | Age: 78
End: 2023-04-21
Payer: MEDICARE

## 2023-04-24 ENCOUNTER — CLINICAL SUPPORT (OUTPATIENT)
Dept: PRIMARY CARE CLINIC | Facility: CLINIC | Age: 78
End: 2023-04-24
Payer: MEDICARE

## 2023-04-24 DIAGNOSIS — I10 PRIMARY HYPERTENSION: ICD-10-CM

## 2023-04-24 LAB
BACTERIA #/AREA URNS AUTO: ABNORMAL /HPF
BILIRUB UR QL STRIP: NEGATIVE
CLARITY UR REFRACT.AUTO: CLEAR
COLOR UR AUTO: YELLOW
CREAT UR-MCNC: 68 MG/DL (ref 15–325)
GLUCOSE UR QL STRIP: NEGATIVE
HGB UR QL STRIP: NEGATIVE
KETONES UR QL STRIP: NEGATIVE
LEUKOCYTE ESTERASE UR QL STRIP: ABNORMAL
MICROSCOPIC COMMENT: ABNORMAL
NITRITE UR QL STRIP: NEGATIVE
NON-SQ EPI CELLS #/AREA URNS AUTO: 0 /HPF
PH UR STRIP: 6 [PH] (ref 5–8)
PROT UR QL STRIP: NEGATIVE
PROT UR-MCNC: <7 MG/DL (ref 0–15)
PROT/CREAT UR: NORMAL MG/G{CREAT} (ref 0–0.2)
RBC #/AREA URNS AUTO: 1 /HPF (ref 0–4)
SP GR UR STRIP: 1.01 (ref 1–1.03)
SQUAMOUS #/AREA URNS AUTO: 8 /HPF
URN SPEC COLLECT METH UR: ABNORMAL
WBC #/AREA URNS AUTO: 23 /HPF (ref 0–5)

## 2023-04-24 PROCEDURE — 84156 ASSAY OF PROTEIN URINE: CPT | Performed by: INTERNAL MEDICINE

## 2023-04-24 PROCEDURE — 81001 URINALYSIS AUTO W/SCOPE: CPT | Performed by: INTERNAL MEDICINE

## 2023-04-26 ENCOUNTER — OFFICE VISIT (OUTPATIENT)
Dept: NEPHROLOGY | Facility: CLINIC | Age: 78
End: 2023-04-26
Payer: MEDICARE

## 2023-04-26 VITALS
HEIGHT: 65 IN | SYSTOLIC BLOOD PRESSURE: 126 MMHG | HEART RATE: 75 BPM | BODY MASS INDEX: 26.52 KG/M2 | OXYGEN SATURATION: 99 % | WEIGHT: 159.19 LBS | DIASTOLIC BLOOD PRESSURE: 77 MMHG

## 2023-04-26 DIAGNOSIS — I10 PRIMARY HYPERTENSION: Primary | ICD-10-CM

## 2023-04-26 DIAGNOSIS — N39.0 URINARY TRACT INFECTION WITHOUT HEMATURIA, SITE UNSPECIFIED: ICD-10-CM

## 2023-04-26 PROCEDURE — 99999 PR PBB SHADOW E&M-EST. PATIENT-LVL IV: ICD-10-PCS | Mod: PBBFAC,,, | Performed by: INTERNAL MEDICINE

## 2023-04-26 PROCEDURE — 99214 OFFICE O/P EST MOD 30 MIN: CPT | Mod: PBBFAC | Performed by: INTERNAL MEDICINE

## 2023-04-26 PROCEDURE — 99214 OFFICE O/P EST MOD 30 MIN: CPT | Mod: S$PBB,,, | Performed by: INTERNAL MEDICINE

## 2023-04-26 PROCEDURE — 99214 PR OFFICE/OUTPT VISIT, EST, LEVL IV, 30-39 MIN: ICD-10-PCS | Mod: S$PBB,,, | Performed by: INTERNAL MEDICINE

## 2023-04-26 PROCEDURE — 99999 PR PBB SHADOW E&M-EST. PATIENT-LVL IV: CPT | Mod: PBBFAC,,, | Performed by: INTERNAL MEDICINE

## 2023-04-26 NOTE — PROGRESS NOTES
CHIEF COMPLAINT/HPI: Mirna is a 78 y.o. female for evaluation of No chief complaint on file.  I have seen Ms. Norton for follow up today. She had pain in the Rt leg and thigh, she communicated to the primary physicians awaiting further plan. Her labs were discussed. She was asked to check the BP at home for couple of weeks. Labs were discussed and all her questions were answered. She was asked to increase Potassium in her food since her K was 3.4meq/l. will recheck.         Past Medical History:   Diagnosis Date    Abnormal cervical Papanicolaou smear 1980's    Cryo    Carotid artery stenosis     via kinked carotid artery - followed by Dr. Ye Anderson    Carotid stenosis 5/15/2014    GERD (gastroesophageal reflux disease)     Gout due to renal impairment     Gout, arthritis 2/20/2013    Gout, unspecified     Herniated lumbar intervertebral disc     Hypertension     Insomnia     Irritable bowel syndrome without diarrhea 9/20/2015    Liver lesion 3/13/2013    Mitral valve prolapse     MVP (mitral valve prolapse)     Pancreatic cyst 12/30/2013    PVD (peripheral vascular disease) 12/23/2013    PVD (peripheral vascular disease) 12/23/2013    Venous insufficiency        Mirna reports that she has never smoked. She has never used smokeless tobacco. She reports that she does not drink alcohol and does not use drugs.    Family History   Problem Relation Age of Onset    Diabetes Brother     Hypertension Brother     Diabetes Sister     Eclampsia Sister     Hypertension Sister     Colon polyps Sister     Breast cancer Other         niece    Diabetes Sister     Eclampsia Sister     Hypertension Sister     Miscarriages / Stillbirths Sister     Heart disease Sister         mi, cad    Diabetes Sister     Eclampsia Sister     Hypertension Sister     Heart disease Mother     Diabetes Sister     Endometriosis Daughter     No Known Problems Daughter     No Known Problems Daughter     Colon cancer Neg Hx     Ovarian cancer Neg Hx      "Esophageal cancer Neg Hx     Liver cancer Neg Hx     Liver disease Neg Hx     Rectal cancer Neg Hx     Stomach cancer Neg Hx        ROS:    General: No fever, chills, change in appetite or weight loss  Skin: No rashes or pruritus  Head: No headaches or recent head trauma  Eyes: No changes in vision or eye pain  Nodes: No swollen glands  Pulmonary: No dyspnea, wheezes, cough or sputum production  Cardiovascular: No chest pain, edema, PND, orthopnea or reduced exercise tolerance  Abdomen: No abdominal pain, nausea, vomiting, constipation or diarrhea  Urinary: No flank pain, dysuria or hematuria  Peripheral Vascular: No claudication or cyanosis  Musculoskeletal: No joint stiffness, swelling or back pain  Neurologic: No history of seizures, tremors, forcal weakness or numbness    PE:    Vitals:    04/26/23 0847   BP: 126/77   BP Location: Left arm   Patient Position: Sitting   BP Method: Medium (Automatic)   Pulse: 75   SpO2: 99%   Weight: 72.2 kg (159 lb 2.8 oz)   Height: 5' 5" (1.651 m)       HEENT: Normocephalic, atraumatic, EOMI, PERRLA, normal conjunctive and lids, supple neck with no thyromeglay or masses, normal oropharynx, hearing intact  Cardiovascular: Regular without murmur, gallop or rub, no edema  Respiratory: Clear bilaterally without rales, rhonchi, wheezes with normal effort  Abdomen: Soft, nontender/nondistended, positive bowel sounds, no hepatospenomegaly  Extremities: No cyanosis, clubbing, normal gait  Skin: No rashes, ulcers, induration  Psych: Oriented x 3 with normal mood and effect      KRISTINE DUVAL.Fariba. MD. LEA. FACP.  , [employer] [facility] [employer] ([place]  Nephrology Consultant. [facility].   [address],  [facility]. [address] [place] [address].    email: [email or web address].  Tel: Office: [phone]                 "

## 2023-04-26 NOTE — PROGRESS NOTES
CHIEF COMPLAINT/HPI: Mirna is a 78 y.o. female for evaluation of No chief complaint on file.      Clinical course:  I have seen Ms. Norton for follow up today. She had pain in the Rt leg and thigh, she communicated to the primary physicians awaiting further plan. Her labs were discussed. She was asked to check the BP at home for couple of weeks. Labs were discussed and all her questions were answered.       Past Medical History:   Diagnosis Date    Abnormal cervical Papanicolaou smear 1980's    Cryo    Carotid artery stenosis     via kinked carotid artery - followed by Dr. Ye Anderson    Carotid stenosis 5/15/2014    GERD (gastroesophageal reflux disease)     Gout due to renal impairment     Gout, arthritis 2/20/2013    Gout, unspecified     Herniated lumbar intervertebral disc     Hypertension     Insomnia     Irritable bowel syndrome without diarrhea 9/20/2015    Liver lesion 3/13/2013    Mitral valve prolapse     MVP (mitral valve prolapse)     Pancreatic cyst 12/30/2013    PVD (peripheral vascular disease) 12/23/2013    PVD (peripheral vascular disease) 12/23/2013    Venous insufficiency        Mirna reports that she has never smoked. She has never used smokeless tobacco. She reports that she does not drink alcohol and does not use drugs.    Family History   Problem Relation Age of Onset    Diabetes Brother     Hypertension Brother     Diabetes Sister     Eclampsia Sister     Hypertension Sister     Colon polyps Sister     Breast cancer Other         niece    Diabetes Sister     Eclampsia Sister     Hypertension Sister     Miscarriages / Stillbirths Sister     Heart disease Sister         mi, cad    Diabetes Sister     Eclampsia Sister     Hypertension Sister     Heart disease Mother     Diabetes Sister     Endometriosis Daughter     No Known Problems Daughter     No Known Problems Daughter     Colon cancer Neg Hx     Ovarian cancer Neg Hx     Esophageal cancer Neg Hx     Liver cancer Neg Hx     Liver disease  "Neg Hx     Rectal cancer Neg Hx     Stomach cancer Neg Hx        ROS:    General: No fever, chills, change in appetite or weight loss  Skin: No rashes or pruritus  Head: No headaches or recent head trauma  Eyes: No changes in vision or eye pain  Nodes: No swollen glands  Pulmonary: No dyspnea, wheezes, cough or sputum production  Cardiovascular: No chest pain, edema, PND, orthopnea or reduced exercise tolerance  Abdomen: No abdominal pain, nausea, vomiting, constipation or diarrhea  Urinary: No flank pain, dysuria or hematuria  Peripheral Vascular: No claudication or cyanosis  Musculoskeletal: No joint stiffness, swelling or back pain  Neurologic: No history of seizures, tremors, forcal weakness or numbness    PE:    Vitals:    04/26/23 0847   BP: 126/77   BP Location: Left arm   Patient Position: Sitting   BP Method: Medium (Automatic)   Pulse: 75   SpO2: 99%   Weight: 72.2 kg (159 lb 2.8 oz)   Height: 5' 5" (1.651 m)       HEENT: Normocephalic, atraumatic, EOMI, PERRLA, normal conjunctive and lids, supple neck with no thyromeglay or masses, normal oropharynx, hearing intact  Cardiovascular: Regular without murmur, gallop or rub, no edema  Respiratory: Clear bilaterally without rales, rhonchi, wheezes with normal effort  Abdomen: Soft, nontender/nondistended, positive bowel sounds, no hepatospenomegaly  Extremities: No cyanosis, clubbing, normal gait  Skin: No rashes, ulcers, induration  Psych: Oriented x 3 with normal mood and effect    Labs:  UA and CX.     CBC  CMP  Uric Acid.    Impression and plan:  HTN will need to check the Bp at home.   Lumbosacral disk disease. This could be causing the leg pain. She is following with the primary physician.   Gout using Allopurinol. Will check uric acid.   Hyperlipidemia taking Statins.   GERD.    KRISTINE DUVAL.Fariba. MD. LEA. FACP.  , [employer] / [employer] ([place]  Nephrology Consultant. [facility]. "   [address],  [facility]. [address]th [place] [address].    email: [email or web address].  Tel: Office: [phone]

## 2023-04-28 ENCOUNTER — PATIENT MESSAGE (OUTPATIENT)
Dept: GASTROENTEROLOGY | Facility: CLINIC | Age: 78
End: 2023-04-28
Payer: MEDICARE

## 2023-05-01 ENCOUNTER — PES CALL (OUTPATIENT)
Dept: ADMINISTRATIVE | Facility: CLINIC | Age: 78
End: 2023-05-01
Payer: MEDICARE

## 2023-05-08 ENCOUNTER — OFFICE VISIT (OUTPATIENT)
Dept: INTERNAL MEDICINE | Facility: CLINIC | Age: 78
End: 2023-05-08
Payer: MEDICARE

## 2023-05-08 VITALS
DIASTOLIC BLOOD PRESSURE: 60 MMHG | SYSTOLIC BLOOD PRESSURE: 130 MMHG | WEIGHT: 159.38 LBS | BODY MASS INDEX: 26.52 KG/M2

## 2023-05-08 DIAGNOSIS — M79.89 PAIN AND SWELLING OF LOWER LEG, UNSPECIFIED LATERALITY: Primary | ICD-10-CM

## 2023-05-08 DIAGNOSIS — I80.03 PHLEBITIS AND THROMBOPHLEBITIS OF SUPERFICIAL VESSELS OF LOWER EXTREMITIES, BILATERAL: ICD-10-CM

## 2023-05-08 DIAGNOSIS — I83.893 VARICOSE VEINS OF LEG WITH SWELLING, BILATERAL: Primary | ICD-10-CM

## 2023-05-08 DIAGNOSIS — M79.669 PAIN AND SWELLING OF LOWER LEG, UNSPECIFIED LATERALITY: Primary | ICD-10-CM

## 2023-05-08 PROCEDURE — 99999 PR PBB SHADOW E&M-EST. PATIENT-LVL III: CPT | Mod: PBBFAC,,, | Performed by: INTERNAL MEDICINE

## 2023-05-08 PROCEDURE — 99213 PR OFFICE/OUTPT VISIT, EST, LEVL III, 20-29 MIN: ICD-10-PCS | Mod: S$PBB,,, | Performed by: INTERNAL MEDICINE

## 2023-05-08 PROCEDURE — 99999 PR PBB SHADOW E&M-EST. PATIENT-LVL III: ICD-10-PCS | Mod: PBBFAC,,, | Performed by: INTERNAL MEDICINE

## 2023-05-08 PROCEDURE — 99213 OFFICE O/P EST LOW 20 MIN: CPT | Mod: S$PBB,,, | Performed by: INTERNAL MEDICINE

## 2023-05-08 PROCEDURE — 99213 OFFICE O/P EST LOW 20 MIN: CPT | Mod: PBBFAC | Performed by: INTERNAL MEDICINE

## 2023-05-08 NOTE — PROGRESS NOTES
Subjective:       Patient ID: Mirna Norton is a 78 y.o. female.    Chief Complaint: No chief complaint on file.    Patient is here for followup for chronic conditions.    R leg -- has a throbbing down the R leg, she thinks symptoms may be from varicose veins.    Since last visit her sister passed.  Review of Systems   Constitutional:  Negative for activity change, appetite change and unexpected weight change.   Eyes:  Negative for visual disturbance.   Respiratory:  Negative for cough, chest tightness and shortness of breath.    Cardiovascular:  Negative for chest pain.   Gastrointestinal:  Negative for abdominal distention and abdominal pain.   Endocrine: Negative for polydipsia and polyuria.   Genitourinary:  Negative for difficulty urinating and urgency.        No breast lumps/masses/pain/nipple d/c in either breast     Musculoskeletal:  Positive for back pain.        R leg pains   Skin:  Negative for rash.   Psychiatric/Behavioral:  Negative for confusion.          Objective:      Physical Exam  Vitals reviewed.   Constitutional:       General: She is not in acute distress.     Appearance: Normal appearance. She is well-developed. She is not ill-appearing, toxic-appearing or diaphoretic.   HENT:      Head: Normocephalic and atraumatic.   Eyes:      General: No scleral icterus.     Pupils: Pupils are equal, round, and reactive to light.   Neck:      Thyroid: No thyromegaly.   Cardiovascular:      Rate and Rhythm: Normal rate and regular rhythm.      Heart sounds: Normal heart sounds. No murmur heard.    No friction rub. No gallop.   Pulmonary:      Effort: Pulmonary effort is normal. No respiratory distress.      Breath sounds: Normal breath sounds. No wheezing or rales.   Abdominal:      General: Bowel sounds are normal. There is no distension.      Palpations: Abdomen is soft. There is no mass.      Tenderness: There is no abdominal tenderness. There is no guarding or rebound.      Comments: No R sided abd  tenderness to deep palpation  nml SLR on the R   Musculoskeletal:         General: No tenderness. Normal range of motion.      Cervical back: Normal range of motion.      Comments: There are varicose veins in LE R>L   Lymphadenopathy:      Cervical: No cervical adenopathy.   Neurological:      General: No focal deficit present.      Mental Status: She is alert and oriented to person, place, and time.   Psychiatric:         Mood and Affect: Mood normal.         Speech: Speech normal.         Behavior: Behavior normal.       Assessment:       1. Varicose veins of leg with swelling, bilateral        Plan:       Diagnoses and all orders for this visit:    Varicose veins of leg with swelling, bilateral  -     Ambulatory referral/consult to Vascular Surgery; Future  If vascular w/u and/or treatment not helpful then consider seeing back spine specialist    HTN controlled    Health Maintenance         Date Due Completion Date    TETANUS VACCINE 01/16/2018 1/16/2008    Hemoglobin A1c (Prediabetes) 02/02/2022 2/2/2021    Shingles Vaccine (2 of 2) 12/16/2022 10/21/2022    Aspirin/Antiplatelet Therapy 05/10/2024 5/10/2023    Colonoscopy 07/29/2024 7/29/2019    Override on 3/6/2006: Done    DEXA Scan 11/21/2027 11/21/2022    Override on 8/2/2008: Done    Lipid Panel 02/06/2028 2/6/2023            Follow up in about 6 months (around 11/8/2023).    Future Appointments   Date Time Provider Department Center   5/26/2023 11:00 AM LAB, [facility]   11/9/2023  8:20 AM Ramsey Tyler MD [facility]

## 2023-05-10 ENCOUNTER — HOSPITAL ENCOUNTER (OUTPATIENT)
Dept: RADIOLOGY | Facility: OTHER | Age: 78
Discharge: HOME | End: 2023-05-10
Attending: SURGERY
Payer: MEDICARE

## 2023-05-10 ENCOUNTER — OFFICE VISIT (OUTPATIENT)
Dept: VASCULAR SURGERY | Facility: CLINIC | Age: 78
End: 2023-05-10
Payer: MEDICARE

## 2023-05-10 VITALS
DIASTOLIC BLOOD PRESSURE: 60 MMHG | HEIGHT: 65 IN | SYSTOLIC BLOOD PRESSURE: 130 MMHG | WEIGHT: 159.38 LBS | HEART RATE: 75 BPM | BODY MASS INDEX: 26.55 KG/M2

## 2023-05-10 DIAGNOSIS — M79.604 RIGHT LEG PAIN: ICD-10-CM

## 2023-05-10 DIAGNOSIS — M79.669 PAIN AND SWELLING OF LOWER LEG, UNSPECIFIED LATERALITY: ICD-10-CM

## 2023-05-10 DIAGNOSIS — M79.89 PAIN AND SWELLING OF LOWER LEG, UNSPECIFIED LATERALITY: ICD-10-CM

## 2023-05-10 DIAGNOSIS — I80.03 PHLEBITIS AND THROMBOPHLEBITIS OF SUPERFICIAL VESSELS OF LOWER EXTREMITIES, BILATERAL: ICD-10-CM

## 2023-05-10 PROCEDURE — 93970 EXTREMITY STUDY: CPT | Mod: 26,,, | Performed by: RADIOLOGY

## 2023-05-10 PROCEDURE — 99202 OFFICE O/P NEW SF 15 MIN: CPT | Mod: S$GLB,,, | Performed by: SURGERY

## 2023-05-10 PROCEDURE — 99202 PR OFFICE/OUTPT VISIT, NEW, LEVL II, 15-29 MIN: ICD-10-PCS | Mod: S$GLB,,, | Performed by: SURGERY

## 2023-05-10 PROCEDURE — 93970 US LOWER EXTREMITY VEINS BILATERAL INSUFFICIENCY: ICD-10-PCS | Mod: 26,,, | Performed by: RADIOLOGY

## 2023-05-10 PROCEDURE — 93970 EXTREMITY STUDY: CPT | Mod: TC

## 2023-05-10 NOTE — PROGRESS NOTES
VASCULAR SURGERY CLINIC NOTE    Patient ID: Mirna Norton is a 78 y.o. female.    I. HISTORY     Chief Complaint: right flank and leg pain    HPI: Mirna Norton is a 78 y.o. female who is here today for evaluation of right flank and leg pain.  She has previous history of right GSV ablation. She has had persistent pain in her right flank for a long time. She says that she woke up with pain in her right leg and felt like her vein opened up in her right leg causing a pulsating feeling in her leg. She does not describe any swelling/edema of the leg. She denies any varicosities. She does note that she has a spider vein on her right leg and assumed this was the most likely source of the pain since she could see it and it was in the region of her pain.    Migraine with aura: No  PFO/ASD/right to left shunt:  no    MI: no  Stroke: No  Seizure Disorder: No      Past Medical History:   Diagnosis Date    Abnormal cervical Papanicolaou smear     Cryo    Carotid artery stenosis     via kinked carotid artery - followed by Dr. Ye Anderson    Carotid stenosis 5/15/2014    GERD (gastroesophageal reflux disease)     Gout due to renal impairment     Gout, arthritis 2013    Gout, unspecified     Herniated lumbar intervertebral disc     Hypertension     Insomnia     Irritable bowel syndrome without diarrhea 2015    Liver lesion 3/13/2013    Mitral valve prolapse     MVP (mitral valve prolapse)     Pancreatic cyst 2013    PVD (peripheral vascular disease) 2013    PVD (peripheral vascular disease) 2013    Venous insufficiency         Past Surgical History:   Procedure Laterality Date    BARTHOLIN GLAND CYST EXCISION       SECTION      x3    COLONOSCOPY N/A 2019    Procedure: COLONOSCOPY;  Surgeon: Haider Luu MD;  Location: [address]);  Service: Endoscopy;  Laterality: N/A;    Diagnostic laproscopic       laser surgery on vein         Social History     Occupational History     Occupation: Retired   Tobacco Use    Smoking status: Never    Smokeless tobacco: Never   Substance and Sexual Activity    Alcohol use: No    Drug use: No    Sexual activity: Not Currently     Birth control/protection: Post-menopausal         Current Outpatient Medications:     allopurinoL (ZYLOPRIM) 100 MG tablet, Take 1 tablet (100 mg total) by mouth once daily., Disp: 90 tablet, Rfl: 3    amLODIPine (NORVASC) 5 MG tablet, Take 1 tablet (5 mg total) by mouth once daily., Disp: 90 tablet, Rfl: 3    aspirin (ECOTRIN) 81 MG EC tablet, Take 1 tablet (81 mg total) by mouth once daily., Disp: , Rfl:     BIFIDOBACTERIUM INFANTIS (ALIGN ORAL), Take by mouth once daily., Disp: , Rfl:     BIOTIN ORAL, Take by mouth., Disp: , Rfl:     ciclopirox (PENLAC) 8 % Soln, Apply topically nightly. Remove once a week with nail polish remover, Disp: 1 each, Rfl: 2    diclofenac sodium (VOLTAREN) 1 % Gel, Apply 2 g topically 4 (four) times daily as needed., Disp: 100 g, Rfl: 0    multivitamin-minerals-lutein Tab, Take 1 tablet by mouth once daily. , Disp: , Rfl:     polyethylene glycol (GLYCOLAX) 17 gram/dose powder, Take 17 g by mouth once daily., Disp: 510 g, Rfl: 11    pravastatin (PRAVACHOL) 40 MG tablet, TAKE 1 TABLET BY MOUTH EVERY DAY IN THE EVENING, Disp: 90 tablet, Rfl: 3    valsartan-hydrochlorothiazide (DIOVAN-HCT) 160-25 mg per tablet, Take 1 tablet by mouth once daily., Disp: , Rfl:     nitroGLYCERIN (NITROSTAT) 0.4 MG SL tablet, Place 1 tablet (0.4 mg total) under the tongue every 5 (five) minutes as needed for Chest pain., Disp: 20 tablet, Rfl: 12    Review of Systems   Constitutional: Negative for weight loss.   HENT:  Negative for ear pain and nosebleeds.    Eyes:  Negative for discharge and pain.   Cardiovascular:  Negative for chest pain and palpitations.   Respiratory:  Negative for cough, shortness of breath and wheezing.    Endocrine: Negative for cold intolerance, heat intolerance and polyphagia.    Hematologic/Lymphatic: Negative for adenopathy. Does not bruise/bleed easily.   Skin:  Negative for itching and rash.   Musculoskeletal:  Negative for joint swelling and muscle cramps.   Gastrointestinal:  Positive for abdominal pain. Negative for diarrhea, nausea and vomiting.   Genitourinary:  Negative for dysuria and flank pain.   Neurological:  Negative for numbness and seizures.       II. PHYSICAL EXAM     Physical Exam  Constitutional:       General: She is not in acute distress.     Appearance: Normal appearance. She is normal weight. She is not ill-appearing or diaphoretic.   HENT:      Head: Normocephalic and atraumatic.   Eyes:      General: No scleral icterus.        Right eye: No discharge.         Left eye: No discharge.      Extraocular Movements: Extraocular movements intact.      Conjunctiva/sclera: Conjunctivae normal.   Cardiovascular:      Rate and Rhythm: Normal rate and regular rhythm.      Pulses: Normal pulses.   Pulmonary:      Effort: Pulmonary effort is normal. No respiratory distress.   Musculoskeletal:         General: Normal range of motion.      Right lower leg: No edema.      Left lower leg: No edema.   Skin:     General: Skin is warm and dry.      Coloration: Skin is not jaundiced or pale.      Findings: No erythema or rash.   Neurological:      General: No focal deficit present.      Mental Status: She is alert and oriented to person, place, and time.   Psychiatric:         Mood and Affect: Mood normal.         Behavior: Behavior normal.       Reticular/Spider veins noted:  RLE: medial calf  LLE: no    Varicose veins noted:  RLE: none  LLE:  none    Imaging Results: (I have personally reviewed the images/studies and provided my interpretation below)  BLE Venous Duplex ultrasound Impression 5/10/23:   Right Leg: Deep Venous system: no DVT, no reflux. GSV: no reflux. LSV: no reflux  Left Leg: Deep Venous system:  no DVT, no reflux. GSV: no reflux. LSV: no reflux    III. ASSESSMENT &  PLAN (MEDICAL DECISION MAKING)     1. Right leg pain        Assessment/Diagnosis and Plan:  78 y.o. female here  for evaluation of right leg pain. She has no edema and no evidence of venous disease on ultrasound. She does also complain of lower back pain. I explained the findings to the patient. Although she does have a spider vein, she has no evidence of reflux and the pain that she describes is not characteristic of venous pain. I think her right leg pain is more likely likely referred from her lower back. Recommend that she follow up with her PCP to discuss possible orthopedics vs sports medicine referral.    -Continue compression with 20-30mmHg Rx stockings, elevation  -Exercise regularly  -RTC SALONI Gomez II, MD, VI  Vascular Surgery  [facility]  [phone]

## 2023-05-26 ENCOUNTER — LAB VISIT (OUTPATIENT)
Dept: PRIMARY CARE CLINIC | Facility: CLINIC | Age: 78
End: 2023-05-26
Payer: MEDICARE

## 2023-05-26 DIAGNOSIS — I10 PRIMARY HYPERTENSION: ICD-10-CM

## 2023-05-26 LAB
ALBUMIN SERPL BCP-MCNC: 3.6 G/DL (ref 3.5–5.2)
ALP SERPL-CCNC: 60 U/L (ref 55–135)
ALT SERPL W/O P-5'-P-CCNC: 17 U/L (ref 10–44)
ANION GAP SERPL CALC-SCNC: 12 MMOL/L (ref 8–16)
AST SERPL-CCNC: 19 U/L (ref 10–40)
BASOPHILS # BLD AUTO: 0.02 K/UL (ref 0–0.2)
BASOPHILS NFR BLD: 0.3 % (ref 0–1.9)
BILIRUB SERPL-MCNC: 0.3 MG/DL (ref 0.1–1)
BUN SERPL-MCNC: 13 MG/DL (ref 8–23)
CALCIUM SERPL-MCNC: 9.8 MG/DL (ref 8.7–10.5)
CHLORIDE SERPL-SCNC: 101 MMOL/L (ref 95–110)
CO2 SERPL-SCNC: 26 MMOL/L (ref 23–29)
CREAT SERPL-MCNC: 0.8 MG/DL (ref 0.5–1.4)
DIFFERENTIAL METHOD: ABNORMAL
EOSINOPHIL # BLD AUTO: 0.3 K/UL (ref 0–0.5)
EOSINOPHIL NFR BLD: 4.3 % (ref 0–8)
ERYTHROCYTE [DISTWIDTH] IN BLOOD BY AUTOMATED COUNT: 13.4 % (ref 11.5–14.5)
EST. GFR  (NO RACE VARIABLE): >60 ML/MIN/1.73 M^2
GLUCOSE SERPL-MCNC: 141 MG/DL (ref 70–110)
HCT VFR BLD AUTO: 40.8 % (ref 37–48.5)
HGB BLD-MCNC: 13 G/DL (ref 12–16)
IMM GRANULOCYTES # BLD AUTO: 0.02 K/UL (ref 0–0.04)
IMM GRANULOCYTES NFR BLD AUTO: 0.3 % (ref 0–0.5)
LYMPHOCYTES # BLD AUTO: 2 K/UL (ref 1–4.8)
LYMPHOCYTES NFR BLD: 30 % (ref 18–48)
MCH RBC QN AUTO: 29 PG (ref 27–31)
MCHC RBC AUTO-ENTMCNC: 31.9 G/DL (ref 32–36)
MCV RBC AUTO: 91 FL (ref 82–98)
MONOCYTES # BLD AUTO: 0.5 K/UL (ref 0.3–1)
MONOCYTES NFR BLD: 7.7 % (ref 4–15)
NEUTROPHILS # BLD AUTO: 3.7 K/UL (ref 1.8–7.7)
NEUTROPHILS NFR BLD: 57.4 % (ref 38–73)
NRBC BLD-RTO: 0 /100 WBC
PLATELET # BLD AUTO: 300 K/UL (ref 150–450)
PMV BLD AUTO: 9.6 FL (ref 9.2–12.9)
POTASSIUM SERPL-SCNC: 3.8 MMOL/L (ref 3.5–5.1)
PROT SERPL-MCNC: 7.3 G/DL (ref 6–8.4)
RBC # BLD AUTO: 4.48 M/UL (ref 4–5.4)
SODIUM SERPL-SCNC: 139 MMOL/L (ref 136–145)
URATE SERPL-MCNC: 6.2 MG/DL (ref 2.4–5.7)
WBC # BLD AUTO: 6.49 K/UL (ref 3.9–12.7)

## 2023-05-26 PROCEDURE — 80053 COMPREHEN METABOLIC PANEL: CPT | Performed by: INTERNAL MEDICINE

## 2023-05-26 PROCEDURE — 85025 COMPLETE CBC W/AUTO DIFF WBC: CPT | Performed by: INTERNAL MEDICINE

## 2023-05-26 PROCEDURE — 84550 ASSAY OF BLOOD/URIC ACID: CPT | Performed by: INTERNAL MEDICINE

## 2023-06-27 ENCOUNTER — PATIENT MESSAGE (OUTPATIENT)
Dept: ADMINISTRATIVE | Facility: OTHER | Age: 78
End: 2023-06-27
Payer: MEDICARE

## 2023-07-17 ENCOUNTER — OFFICE VISIT (OUTPATIENT)
Dept: GASTROENTEROLOGY | Facility: CLINIC | Age: 78
End: 2023-07-17
Payer: MEDICARE

## 2023-07-17 VITALS
DIASTOLIC BLOOD PRESSURE: 71 MMHG | HEART RATE: 73 BPM | SYSTOLIC BLOOD PRESSURE: 121 MMHG | BODY MASS INDEX: 26.26 KG/M2 | HEIGHT: 65 IN | WEIGHT: 157.63 LBS

## 2023-07-17 DIAGNOSIS — R10.31 ABDOMINAL PAIN, CHRONIC, RIGHT LOWER QUADRANT: ICD-10-CM

## 2023-07-17 DIAGNOSIS — K76.0 HEPATIC STEATOSIS: ICD-10-CM

## 2023-07-17 DIAGNOSIS — G89.29 ABDOMINAL PAIN, CHRONIC, RIGHT LOWER QUADRANT: ICD-10-CM

## 2023-07-17 DIAGNOSIS — K58.1 IRRITABLE BOWEL SYNDROME WITH CONSTIPATION: Primary | ICD-10-CM

## 2023-07-17 PROCEDURE — 99213 PR OFFICE/OUTPT VISIT, EST, LEVL III, 20-29 MIN: ICD-10-PCS | Mod: S$PBB,GC,, | Performed by: STUDENT IN AN ORGANIZED HEALTH CARE EDUCATION/TRAINING PROGRAM

## 2023-07-17 PROCEDURE — 99213 OFFICE O/P EST LOW 20 MIN: CPT | Mod: S$PBB,GC,, | Performed by: STUDENT IN AN ORGANIZED HEALTH CARE EDUCATION/TRAINING PROGRAM

## 2023-07-17 PROCEDURE — 99999 PR PBB SHADOW E&M-EST. PATIENT-LVL IV: CPT | Mod: PBBFAC,GC,, | Performed by: STUDENT IN AN ORGANIZED HEALTH CARE EDUCATION/TRAINING PROGRAM

## 2023-07-17 PROCEDURE — 99214 OFFICE O/P EST MOD 30 MIN: CPT | Mod: PBBFAC | Performed by: STUDENT IN AN ORGANIZED HEALTH CARE EDUCATION/TRAINING PROGRAM

## 2023-07-17 PROCEDURE — 99999 PR PBB SHADOW E&M-EST. PATIENT-LVL IV: ICD-10-PCS | Mod: PBBFAC,GC,, | Performed by: STUDENT IN AN ORGANIZED HEALTH CARE EDUCATION/TRAINING PROGRAM

## 2023-07-17 NOTE — PROGRESS NOTES
GENERAL GI PATIENT INTAKE:    COVID symptoms in the last 7 days (runny nose, sore throat, congestion, cough, fever): No  PCP: Ramsey Tyler  If not PCP-  number given to establish [phone]: Yes    ALLERGIES REVIEWED:  Yes    CHIEF COMPLAINT:    Chief Complaint   Patient presents with    Follow-up       VITAL SIGNS:  There were no vitals taken for this visit.     Change in medical, surgical, family or social history: Yes      REVIEWED MEDICATION LIST RECONCILED INCLUDING ABOVE MEDS:  Yes

## 2023-07-17 NOTE — PROGRESS NOTES
[facility] PROGRESS NOTE    Name: Mirna Norton  : 1945  Date of Service: 2023   PCP: Ramsey Tyler MD    Reason for visit:   Chief Complaint   Patient presents with    Follow-up       HPI: Ms. Mirna Norton is a 77 year old female presenting for a follow-up visit. She has a PMH significant for gout, HTN, and IBS-C.     GI Treatment Course:   --2021: Status post evaluation by Dr. Luu, Dr. Johnson and clinic JAM's for history of recurrent intermittent right upper and lower abdominal pain and bloating that occurs approximately 2-3 times per week. On chart review, patient status post work-up including EGD, EUS, abdominal US, HIDA scan, and MRI abdomen without etiology discovered. Symptoms suspected to be secondary to IBS-C versus functional dyspepsia. She is status post treatment with trials of LEVSIN, ALIGN, and METAMUCIL.  -2022: Initial clinic visit with me. Patient reported approximately 5 year history of recurrent intermittent right upper and lower abdominal pain and bloating that occurred approximately 2-3 times per week. She reported symptoms aggravated by PO intake, but not consistently. She reported slight improvement in symptoms with having a bowel movement. She was currently having one small pebble like bowel movement daily with use of METAMUCIL supplement daily and denies association with straining. GE study ordered normal. Breath testing ordered to rule out SIBO, however multiple repeat tests were invalid due to sampling error. Following discussion in 2022, decision made to forgo further breat testing and try to minimize constipation for symptom relief.     Today:   Patient reports continued intermittent right sided abdominal pain that occurs both with movement and PO intake, however not consistently. She continues to report pain improved with having a bowel movement. She reports having up to two non-bloody bowel movements daily that alternate between  formed stool and hard-pellets. She denies straining or rectal pain. She continues to deny symptom association with nausea, vomiting, heartburn, dysphagia, skin or eye yellowing, fevers, chills, pain with urination, or pain with inspiration. She is not currently using MIRALAX daily.     Most Recent Upper Endoscopy:   -EGD in 02/2013 for evaluation of abdominal pain that was entirely normal. Gastric biopsies negative for H.pylori.   -EUS in 01/2014 for evaluation of abdominal pain that showed no pancreatic abnormalities.     Most Recent Colonoscopy:   -Colonoscopy in 07/2019 for surveillance showing diverticulosis and two polyps (largest 8 mm with pathology showing tubular adenoma).     Review of Systems:   Review of Systems   Constitutional:  Negative for chills, fever, malaise/fatigue and weight loss.   HENT:  Negative for congestion and sore throat.    Eyes:  Negative for blurred vision and double vision.   Respiratory:  Negative for cough, sputum production and shortness of breath.    Cardiovascular:  Negative for chest pain, palpitations and leg swelling.   Gastrointestinal:  Positive for abdominal pain. Negative for diarrhea, heartburn, nausea and vomiting.   Genitourinary:  Negative for dysuria and urgency.   Musculoskeletal:  Negative for back pain, myalgias and neck pain.   Neurological:  Negative for dizziness, tingling, sensory change, weakness and headaches.   Endo/Heme/Allergies:  Negative for polydipsia.     Medications:   Current Outpatient Medications:     allopurinoL (ZYLOPRIM) 100 MG tablet, Take 1 tablet (100 mg total) by mouth once daily., Disp: 90 tablet, Rfl: 3    amLODIPine (NORVASC) 5 MG tablet, Take 1 tablet (5 mg total) by mouth once daily., Disp: 90 tablet, Rfl: 3    aspirin (ECOTRIN) 81 MG EC tablet, Take 1 tablet (81 mg total) by mouth once daily., Disp: , Rfl:     BIFIDOBACTERIUM INFANTIS (ALIGN ORAL), Take by mouth once daily., Disp: , Rfl:     BIOTIN ORAL, Take by mouth., Disp: , Rfl:  "    ciclopirox (PENLAC) 8 % Soln, Apply topically nightly. Remove once a week with nail polish remover, Disp: 1 each, Rfl: 2    diclofenac sodium (VOLTAREN) 1 % Gel, Apply 2 g topically 4 (four) times daily as needed., Disp: 100 g, Rfl: 0    multivitamin-minerals-lutein Tab, Take 1 tablet by mouth once daily. , Disp: , Rfl:     polyethylene glycol (GLYCOLAX) 17 gram/dose powder, Take 17 g by mouth once daily., Disp: 510 g, Rfl: 11    pravastatin (PRAVACHOL) 40 MG tablet, TAKE 1 TABLET BY MOUTH EVERY DAY IN THE EVENING, Disp: 90 tablet, Rfl: 3    valsartan-hydrochlorothiazide (DIOVAN-HCT) 160-25 mg per tablet, Take 1 tablet by mouth once daily., Disp: 90 tablet, Rfl: 1    nitroGLYCERIN (NITROSTAT) 0.4 MG SL tablet, Place 1 tablet (0.4 mg total) under the tongue every 5 (five) minutes as needed for Chest pain., Disp: 20 tablet, Rfl: 12     Past medical history, past surgical history, family history, allergies, and social history reviewed and updated in EMR.     Vitals:   Vitals:    07/17/23 1552   BP: 121/71   Pulse: 73   Weight: 71.5 kg (157 lb 10.1 oz)   Height: 5' 5" (1.651 m)       Body mass index is 26.23 kg/m².    Physical Exam:   Physical Exam  Constitutional:       Appearance: Normal appearance.   Eyes:      Conjunctiva/sclera: Conjunctivae normal.      Pupils: Pupils are equal, round, and reactive to light.   Cardiovascular:      Rate and Rhythm: Normal rate and regular rhythm.      Pulses: Normal pulses.      Heart sounds: Normal heart sounds.   Pulmonary:      Effort: Pulmonary effort is normal. No respiratory distress.      Breath sounds: Normal breath sounds.   Abdominal:      General: Bowel sounds are normal. There is no distension.      Palpations: Abdomen is soft.      Tenderness: There is no abdominal tenderness.   Skin:     General: Skin is warm and dry.      Findings: No bruising or rash.   Neurological:      Mental Status: She is alert and oriented to person, place, and time.       Labs:    Latest " Reference Range & Units 06/30/22 10:21   WBC 3.90 - 12.70 K/uL 6.46   RBC 4.00 - 5.40 M/uL 4.54   Hemoglobin 12.0 - 16.0 g/dL 13.1   Hematocrit 37.0 - 48.5 % 41.7   MCV 82 - 98 fL 92   MCH 27.0 - 31.0 pg 28.9   MCHC 32.0 - 36.0 g/dL 31.4 (L)   RDW 11.5 - 14.5 % 13.3   Platelets 150 - 450 K/uL 288   (L): Data is abnormally low     Latest Reference Range & Units 06/30/22 10:21   Sodium 136 - 145 mmol/L 140   Potassium 3.5 - 5.1 mmol/L 4.3   Chloride 95 - 110 mmol/L 103   CO2 23 - 29 mmol/L 29   Anion Gap 8 - 16 mmol/L 8   BUN 8 - 23 mg/dL 15   Creatinine 0.5 - 1.4 mg/dL 0.8   eGFR if non African American >60 mL/min/1.73 m^2 >60.0   eGFR if African American >60 mL/min/1.73 m^2 >60.0   Glucose 70 - 110 mg/dL 156 (H)   Calcium 8.7 - 10.5 mg/dL 9.7   Alkaline Phosphatase 55 - 135 U/L 57   PROTEIN TOTAL 6.0 - 8.4 g/dL 7.4   Albumin 3.5 - 5.2 g/dL 3.7   Uric Acid 2.4 - 5.7 mg/dL 4.9   BILIRUBIN TOTAL 0.1 - 1.0 mg/dL 0.4   AST 10 - 40 U/L 23   ALT 10 - 44 U/L 19   (H): Data is abnormally high    Imaging:   -GE study 07/2022:    Normal gastric emptying time.  -Abdominal US in 05/2022:   Pancreas: The visualized portions of pancreas appear normal. Aorta: No aneurysm.   Liver: 14.9 cm in length, normal in size. Diffusely increased parenchymal echogenicity consistent with steatosis. No focal lesions.   Gallbladder: No calculi, wall thickening, or pericholecystic fluid.  Negative sonographic Galeas's sign.   Biliary system: 2.2 mm common bile duct.  No intrahepatic ductal dilatation. Inferior vena cava: Normal in appearance. Right kidney: 9.7 cm in length.  No evidence for abnormal renal masses or hydronephrosis.  Previously noted lower pole renal cyst is likely obscured by overlying bowel gas.   Left kidney: 9.4 cm in length.  No evidence for abnormal renal masses or hydronephrosis. Spleen: 7.2 cm in length.  Normal in size with homogeneous echotexture. Miscellaneous: No ascites.   -MRI abdomen in 2014:   The liver is normal in  size and demonstrates dropout on out of phase imaging compatible with diffuse steatosis.  No hepatic lesions are seen on T1 or T2-weighted imaging.  Focal fatty infiltration is seen at the ligamentum teres, a normal variant. No enhancing lesions are identified.   No intrahepatic or extrahepatic biliary ductal dilatation.   The portal and splenic veins are patent.    Spleen is unremarkable.    Remaining visualized intra-abdominal structures demonstrate no significant abnormality.   No ascites.   There are bilateral renal cysts.  -HIDA scan in 2014: EF 81%    Assessment:   This is a 77 year old  female with PMH significant for gout, HTN, and IBS-C who is presenting as a follow-up for approximately 10 year history of intermittent right sided abdominal pain and bloating without consistent association with PO intake or change in bowel movement frequency. She is status post work-up including EGD, EUS, abdominal US, HIDA scan, and MRI abdomen without etiology discovered. She is status post treatment with trials of LEVSIN, ALIGN, and METAMUCIL with persistent intermittent symptoms. Etiology of symptoms is suspected to be secondary to IBS-C based on history of intermittent constipation and pain improved with having a bowel movement.     Plan:   -Continue MIRALAX on days when having hard-pellet like stools and abdominal pain.   -Will forgo further breath testing to rule out SIBO given absence of bloating and diarrhea.   -Hand out provided on lifestyle modifications for constipation and IBS-C.   -Surveillance colonoscopy due in 2024 if patient still interested in continuing screening at that point (age >75).     Disposition: Follow-up in 1 year.     Discussed with Dr. Johnson.         Bogdan Vallejo MD, PGY-VI  Gastroenterology Fellow  [facility]

## 2023-07-17 NOTE — PATIENT INSTRUCTIONS
On days when you have hard, pellet stools take 17 g of MIRALAX to help produce a normal bowel movement.     Based on the description of your symptoms, you do not need breath testing for further evaluation of your symptoms.

## 2023-07-28 ENCOUNTER — PATIENT MESSAGE (OUTPATIENT)
Dept: CARDIOLOGY | Facility: CLINIC | Age: 78
End: 2023-07-28
Payer: MEDICARE

## 2023-09-13 DIAGNOSIS — N18.2 CHRONIC KIDNEY DISEASE, STAGE II (MILD): Primary | ICD-10-CM

## 2023-09-14 ENCOUNTER — PATIENT MESSAGE (OUTPATIENT)
Dept: OBSTETRICS AND GYNECOLOGY | Facility: CLINIC | Age: 78
End: 2023-09-14
Payer: MEDICARE

## 2023-09-26 ENCOUNTER — PATIENT MESSAGE (OUTPATIENT)
Dept: INTERNAL MEDICINE | Facility: CLINIC | Age: 78
End: 2023-09-26
Payer: MEDICARE

## 2023-09-26 DIAGNOSIS — I87.2 VENOUS INSUFFICIENCY OF RIGHT LOWER EXTREMITY: ICD-10-CM

## 2023-09-26 DIAGNOSIS — I83.893 VARICOSE VEINS OF LEG WITH SWELLING, BILATERAL: Primary | ICD-10-CM

## 2023-09-26 NOTE — TELEPHONE ENCOUNTER
Hi, please fax this order to [facility] ([facility])    Orders Placed This Encounter    COMPRESSION STOCKINGS     Please let patient know when it has been faxed    Thank you, Ramsey Tyler

## 2023-10-24 ENCOUNTER — OFFICE VISIT (OUTPATIENT)
Dept: OBSTETRICS AND GYNECOLOGY | Facility: CLINIC | Age: 78
End: 2023-10-24
Payer: MEDICARE

## 2023-10-24 VITALS
WEIGHT: 160.69 LBS | DIASTOLIC BLOOD PRESSURE: 76 MMHG | SYSTOLIC BLOOD PRESSURE: 114 MMHG | HEIGHT: 65 IN | BODY MASS INDEX: 26.77 KG/M2

## 2023-10-24 DIAGNOSIS — N90.7 VULVAR CYST: ICD-10-CM

## 2023-10-24 DIAGNOSIS — Z01.419 ENCOUNTER FOR WELL WOMAN EXAM: Primary | ICD-10-CM

## 2023-10-24 PROCEDURE — G0101 CA SCREEN;PELVIC/BREAST EXAM: HCPCS | Mod: S$PBB,GZ,, | Performed by: FAMILY MEDICINE

## 2023-10-24 PROCEDURE — 99999 PR PBB SHADOW E&M-EST. PATIENT-LVL III: ICD-10-PCS | Mod: PBBFAC,,, | Performed by: FAMILY MEDICINE

## 2023-10-24 PROCEDURE — 99213 OFFICE O/P EST LOW 20 MIN: CPT | Mod: PBBFAC | Performed by: FAMILY MEDICINE

## 2023-10-24 PROCEDURE — G0101 PR CA SCREEN;PELVIC/BREAST EXAM: ICD-10-PCS | Mod: S$PBB,GZ,, | Performed by: FAMILY MEDICINE

## 2023-10-24 PROCEDURE — G0101 CA SCREEN;PELVIC/BREAST EXAM: HCPCS | Mod: PBBFAC | Performed by: FAMILY MEDICINE

## 2023-10-24 PROCEDURE — 99999 PR PBB SHADOW E&M-EST. PATIENT-LVL III: CPT | Mod: PBBFAC,,, | Performed by: FAMILY MEDICINE

## 2023-10-24 NOTE — PROGRESS NOTES
HISTORY OF PRESENT ILLNESS:    Mirna Norton is a 78 y.o. female, , No LMP recorded. Patient is postmenopausal.,  presents for a routine annual exam .   Last pap:  na    Last mammogram: 10/22 NL TC 2%- this years scheduled  Last colon ca screenin return in 5 yr   SA: not SA  Menstrual flow:  no VB.  -having some abd bloating seeing GI for, no pelvic pain or bloating. No abnormal vd, uti sx, breast pain/mass  -OAB intermittent, she tries to do kegel exercises. Not overly bothersome to her right now, discussed can consider PFPT if bothersome or worsening  -mentions right labial lesion, present last WWE. She states is has been there for about 3 years now, getting a little larger. Only tender if palpated, no fever/drainage/itching/redness  This is the extent of the patient's complaints at this time.     Past Medical History:   Diagnosis Date    Abnormal cervical Papanicolaou smear     Cryo    Carotid artery stenosis     via kinked carotid artery - followed by Dr. Ye Anderson    Carotid stenosis 5/15/2014    GERD (gastroesophageal reflux disease)     Gout due to renal impairment     Gout, arthritis 2013    Gout, unspecified     Herniated lumbar intervertebral disc     Hypertension     Insomnia     Irritable bowel syndrome without diarrhea 2015    Liver lesion 3/13/2013    Mitral valve prolapse     MVP (mitral valve prolapse)     Pancreatic cyst 2013    PVD (peripheral vascular disease) 2013    PVD (peripheral vascular disease) 2013    Venous insufficiency        Past Surgical History:   Procedure Laterality Date    BARTHOLIN GLAND CYST EXCISION       SECTION      x3    COLONOSCOPY N/A 2019    Procedure: COLONOSCOPY;  Surgeon: Haider Luu MD;  Location: [facility] ([address] [place] [address]);  Service: Endoscopy;  Laterality: N/A;    Diagnostic laproscopic       laser surgery on vein         MEDICATIONS AND ALLERGIES:      Current Outpatient Medications:      allopurinoL (ZYLOPRIM) 100 MG tablet, Take 1 tablet (100 mg total) by mouth once daily., Disp: 90 tablet, Rfl: 3    amLODIPine (NORVASC) 5 MG tablet, Take 1 tablet (5 mg total) by mouth once daily., Disp: 90 tablet, Rfl: 3    aspirin (ECOTRIN) 81 MG EC tablet, Take 1 tablet (81 mg total) by mouth once daily., Disp: , Rfl:     BIFIDOBACTERIUM INFANTIS (ALIGN ORAL), Take by mouth once daily., Disp: , Rfl:     BIOTIN ORAL, Take by mouth., Disp: , Rfl:     ciclopirox (PENLAC) 8 % Soln, Apply topically nightly. Remove once a week with nail polish remover, Disp: 1 each, Rfl: 2    diclofenac sodium (VOLTAREN) 1 % Gel, Apply 2 g topically 4 (four) times daily as needed., Disp: 100 g, Rfl: 0    multivitamin-minerals-lutein Tab, Take 1 tablet by mouth once daily. , Disp: , Rfl:     nitroGLYCERIN (NITROSTAT) 0.4 MG SL tablet, Place 1 tablet (0.4 mg total) under the tongue every 5 (five) minutes as needed for Chest pain., Disp: 20 tablet, Rfl: 12    pravastatin (PRAVACHOL) 40 MG tablet, TAKE 1 TABLET BY MOUTH EVERY DAY IN THE EVENING, Disp: 90 tablet, Rfl: 3    valsartan-hydrochlorothiazide (DIOVAN-HCT) 160-25 mg per tablet, Take 1 tablet by mouth once daily., Disp: 90 tablet, Rfl: 1    Review of patient's allergies indicates:   Allergen Reactions    No known allergies        Family History   Problem Relation Age of Onset    Diabetes Brother     Hypertension Brother     Diabetes Sister     Eclampsia Sister     Hypertension Sister     Colon polyps Sister     Breast cancer Other         niece    Diabetes Sister     Eclampsia Sister     Hypertension Sister     Miscarriages / Stillbirths Sister     Heart disease Sister         mi, cad    Diabetes Sister     Eclampsia Sister     Hypertension Sister     Heart disease Mother     Diabetes Sister     Endometriosis Daughter     No Known Problems Daughter     No Known Problems Daughter     Colon cancer Neg Hx     Ovarian cancer Neg Hx     Esophageal cancer Neg Hx     Liver cancer Neg Hx      Liver disease Neg Hx     Rectal cancer Neg Hx     Stomach cancer Neg Hx        Social History     Socioeconomic History    Marital status:    Occupational History    Occupation: Retired   Tobacco Use    Smoking status: Never    Smokeless tobacco: Never   Substance and Sexual Activity    Alcohol use: No    Drug use: No    Sexual activity: Not Currently     Birth control/protection: Post-menopausal   Social History Narrative    3 kids (healthy), previous . , has eye issues. biw exercise.     Social Determinants of Health     Financial Resource Strain: Low Risk  (7/17/2023)    Overall Financial Resource Strain (CARDIA)     Difficulty of Paying Living Expenses: Not hard at all   Food Insecurity: No Food Insecurity (7/17/2023)    Hunger Vital Sign     Worried About Running Out of Food in the Last Year: Never true     Ran Out of Food in the Last Year: Never true   Transportation Needs: No Transportation Needs (7/17/2023)    PRAPARE - Transportation     Lack of Transportation (Medical): No     Lack of Transportation (Non-Medical): No   Physical Activity: Insufficiently Active (7/17/2023)    Exercise Vital Sign     Days of Exercise per Week: 2 days     Minutes of Exercise per Session: 30 min   Stress: No Stress Concern Present (7/17/2023)    [place] [employer] [place] [employer] - Occupational Stress Questionnaire     Feeling of Stress : Only a little   Social Connections: Unknown (7/17/2023)    Social Connection and Isolation Panel [NHANES]     Frequency of Communication with Friends and Family: More than three times a week     Frequency of Social Gatherings with Friends and Family: Three times a week     Active Member of Clubs or Organizations: Yes     Attends Club or Organization Meetings: More than 4 times per year     Marital Status:    Housing Stability: Low Risk  (7/17/2023)    Housing Stability Vital Sign     Unable to Pay for Housing in the Last Year: No     Number of  "Places Lived in the Last Year: 1     Unstable Housing in the Last Year: No       OB History    Para Term  AB Living   3 3 3     3   SAB IAB Ectopic Multiple Live Births           3      # Outcome Date GA Lbr Lex/2nd Weight Sex Delivery Anes PTL Lv   3 Term      CS-LTranv  N MEGAN   2 Term      CS-LTranv  N MEGAN   1 Term      CS-LTranv  N MEGAN          COMPREHENSIVE GYN HISTORY:  PAP History: + abnormal Paps.  Infection History: Denies STDs. Denies PID.  Benign History: Denies uterine fibroids. Denies ovarian cysts. Denies endometriosis. Denies other conditions.  Cancer History: Denies cervical cancer. Denies uterine cancer or hyperplasia. Denies ovarian cancer. Denies vulvar cancer or pre-cancer. Denies vaginal cancer or pre-cancer. Denies breast cancer. Denies colon cancer.      ROS:  GENERAL: No weight changes. No swelling. No fatigue. No fever.  BREASTS: No pain. No lumps. No discharge.  ABDOMEN: No pain. No nausea. No vomiting. No diarrhea. No constipation.+bloating, seeing GI  REPRODUCTIVE: No abnormal bleeding. No pelvic pain.   VULVA: No pain. + lesions. No itching.  VAGINA: No relaxation. No itching. No odor. No discharge. No lesions.  URINARY: No incontinence. No nocturia. No frequency. No dysuria.    /76   Ht 5' 5" (1.651 m)   Wt 72.9 kg (160 lb 11.5 oz)   BMI 26.74 kg/m²     PE:  Physical Exam:   Constitutional: She is oriented to person, place, and time. She appears well-developed and well-nourished. No distress.      Neck: No thyroid mass and no thyromegaly present.     Pulmonary/Chest: Right breast exhibits no inverted nipple, no mass, no nipple discharge, no skin change, no tenderness and no bleeding. Left breast exhibits no inverted nipple, no mass, no nipple discharge, no skin change, no tenderness and no bleeding.        Abdominal: Soft. There is no abdominal tenderness.     Genitourinary:    Vagina, uterus, right adnexa and left adnexa normal.            Pelvic exam was " performed with patient supine.   The external female genitalia was normal.   Genitalia hair distrobution normal .   There is no rash or lesion on the right labia. There is no rash or lesion on the left labia. Cervix is normal. Right adnexum displays no mass, no tenderness and no fullness. Left adnexum displays no mass, no tenderness and no fullness. No  no vaginal discharge, tenderness, bleeding, rectocele, cystocele or unspecified prolapse of vaginal walls in the vagina.    No foreign body in the vagina.   Vaginal atrophy noted. Cervix exhibits no motion tenderness, no lesion, no discharge, no friability, no lesion, no tenderness and no polyp.    pap smear not completedUterus is not enlarged and not tender. Normal urethral meatus.              Neurological: She is alert and oriented to person, place, and time.          PROCEDURES/ORDERS:  Pap-not indicated any longer        Assessment/Plan:    Encounter for well woman exam    Vulvar cyst    -Will schedule f/u with Dr. Penaloza for right labial lesion given it has been there for several years and increasing some in size, however pt uncertain if she would like removal    COUNSELING:  The patient was counseled today on:  -A.C.S. Pap and pelvic exam guidelines, recomendations for yearly mammogram, monthly self breast exams and to follow up with her PCP for other health maintenance.    FOLLOW-UP  annually for WWE.

## 2023-10-30 ENCOUNTER — HOSPITAL ENCOUNTER (OUTPATIENT)
Dept: RADIOLOGY | Facility: HOSPITAL | Age: 78
Discharge: HOME | End: 2023-10-30
Attending: FAMILY MEDICINE
Payer: MEDICARE

## 2023-10-30 DIAGNOSIS — Z12.31 BREAST CANCER SCREENING BY MAMMOGRAM: ICD-10-CM

## 2023-10-30 PROCEDURE — 77067 MAMMO DIGITAL SCREENING BILAT WITH TOMO: ICD-10-PCS | Mod: 26,,, | Performed by: RADIOLOGY

## 2023-10-30 PROCEDURE — 77063 MAMMO DIGITAL SCREENING BILAT WITH TOMO: ICD-10-PCS | Mod: 26,,, | Performed by: RADIOLOGY

## 2023-10-30 PROCEDURE — 77067 SCR MAMMO BI INCL CAD: CPT | Mod: TC

## 2023-10-30 PROCEDURE — 77063 BREAST TOMOSYNTHESIS BI: CPT | Mod: 26,,, | Performed by: RADIOLOGY

## 2023-10-30 PROCEDURE — 77067 SCR MAMMO BI INCL CAD: CPT | Mod: 26,,, | Performed by: RADIOLOGY

## 2023-10-31 ENCOUNTER — PATIENT MESSAGE (OUTPATIENT)
Dept: NEPHROLOGY | Facility: CLINIC | Age: 78
End: 2023-10-31
Payer: MEDICARE

## 2023-11-06 ENCOUNTER — LAB VISIT (OUTPATIENT)
Dept: PRIMARY CARE CLINIC | Facility: CLINIC | Age: 78
End: 2023-11-06
Payer: MEDICARE

## 2023-11-06 ENCOUNTER — PATIENT MESSAGE (OUTPATIENT)
Dept: OBSTETRICS AND GYNECOLOGY | Facility: CLINIC | Age: 78
End: 2023-11-06
Payer: MEDICARE

## 2023-11-06 DIAGNOSIS — N18.2 CHRONIC KIDNEY DISEASE, STAGE II (MILD): ICD-10-CM

## 2023-11-06 LAB
ALBUMIN SERPL BCP-MCNC: 3.8 G/DL (ref 3.5–5.2)
ALBUMIN SERPL BCP-MCNC: 3.8 G/DL (ref 3.5–5.2)
ALP SERPL-CCNC: 58 U/L (ref 55–135)
ALT SERPL W/O P-5'-P-CCNC: 20 U/L (ref 10–44)
ANION GAP SERPL CALC-SCNC: 9 MMOL/L (ref 8–16)
ANION GAP SERPL CALC-SCNC: 9 MMOL/L (ref 8–16)
AST SERPL-CCNC: 23 U/L (ref 10–40)
BASOPHILS # BLD AUTO: 0.03 K/UL (ref 0–0.2)
BASOPHILS NFR BLD: 0.4 % (ref 0–1.9)
BILIRUB SERPL-MCNC: 0.3 MG/DL (ref 0.1–1)
BILIRUB UR QL STRIP: NEGATIVE
BUN SERPL-MCNC: 15 MG/DL (ref 8–23)
BUN SERPL-MCNC: 15 MG/DL (ref 8–23)
CALCIUM SERPL-MCNC: 10 MG/DL (ref 8.7–10.5)
CALCIUM SERPL-MCNC: 10 MG/DL (ref 8.7–10.5)
CHLORIDE SERPL-SCNC: 102 MMOL/L (ref 95–110)
CHLORIDE SERPL-SCNC: 102 MMOL/L (ref 95–110)
CLARITY UR REFRACT.AUTO: ABNORMAL
CO2 SERPL-SCNC: 28 MMOL/L (ref 23–29)
CO2 SERPL-SCNC: 28 MMOL/L (ref 23–29)
COLOR UR AUTO: YELLOW
CREAT SERPL-MCNC: 0.8 MG/DL (ref 0.5–1.4)
CREAT SERPL-MCNC: 0.8 MG/DL (ref 0.5–1.4)
CREAT UR-MCNC: 107 MG/DL (ref 15–325)
DIFFERENTIAL METHOD: NORMAL
EOSINOPHIL # BLD AUTO: 0.2 K/UL (ref 0–0.5)
EOSINOPHIL NFR BLD: 2.9 % (ref 0–8)
ERYTHROCYTE [DISTWIDTH] IN BLOOD BY AUTOMATED COUNT: 13.8 % (ref 11.5–14.5)
EST. GFR  (NO RACE VARIABLE): >60 ML/MIN/1.73 M^2
EST. GFR  (NO RACE VARIABLE): >60 ML/MIN/1.73 M^2
GLUCOSE SERPL-MCNC: 101 MG/DL (ref 70–110)
GLUCOSE SERPL-MCNC: 101 MG/DL (ref 70–110)
GLUCOSE UR QL STRIP: NEGATIVE
HCT VFR BLD AUTO: 41.3 % (ref 37–48.5)
HGB BLD-MCNC: 13.3 G/DL (ref 12–16)
HGB UR QL STRIP: NEGATIVE
IMM GRANULOCYTES # BLD AUTO: 0.02 K/UL (ref 0–0.04)
IMM GRANULOCYTES NFR BLD AUTO: 0.3 % (ref 0–0.5)
KETONES UR QL STRIP: NEGATIVE
LEUKOCYTE ESTERASE UR QL STRIP: ABNORMAL
LYMPHOCYTES # BLD AUTO: 2.3 K/UL (ref 1–4.8)
LYMPHOCYTES NFR BLD: 28.6 % (ref 18–48)
MCH RBC QN AUTO: 29.3 PG (ref 27–31)
MCHC RBC AUTO-ENTMCNC: 32.2 G/DL (ref 32–36)
MCV RBC AUTO: 91 FL (ref 82–98)
MICROSCOPIC COMMENT: ABNORMAL
MONOCYTES # BLD AUTO: 0.7 K/UL (ref 0.3–1)
MONOCYTES NFR BLD: 8.7 % (ref 4–15)
NEUTROPHILS # BLD AUTO: 4.7 K/UL (ref 1.8–7.7)
NEUTROPHILS NFR BLD: 59.1 % (ref 38–73)
NITRITE UR QL STRIP: NEGATIVE
NRBC BLD-RTO: 0 /100 WBC
PH UR STRIP: 7 [PH] (ref 5–8)
PHOSPHATE SERPL-MCNC: 3.1 MG/DL (ref 2.7–4.5)
PLATELET # BLD AUTO: 314 K/UL (ref 150–450)
PMV BLD AUTO: 9.3 FL (ref 9.2–12.9)
POTASSIUM SERPL-SCNC: 4 MMOL/L (ref 3.5–5.1)
POTASSIUM SERPL-SCNC: 4 MMOL/L (ref 3.5–5.1)
PROT SERPL-MCNC: 7.7 G/DL (ref 6–8.4)
PROT UR QL STRIP: NEGATIVE
PROT UR-MCNC: <7 MG/DL (ref 0–15)
PROT/CREAT UR: NORMAL MG/G{CREAT} (ref 0–0.2)
RBC # BLD AUTO: 4.54 M/UL (ref 4–5.4)
RBC #/AREA URNS AUTO: 1 /HPF (ref 0–4)
SODIUM SERPL-SCNC: 139 MMOL/L (ref 136–145)
SODIUM SERPL-SCNC: 139 MMOL/L (ref 136–145)
SP GR UR STRIP: 1.01 (ref 1–1.03)
SQUAMOUS #/AREA URNS AUTO: 7 /HPF
URN SPEC COLLECT METH UR: ABNORMAL
WBC # BLD AUTO: 7.89 K/UL (ref 3.9–12.7)
WBC #/AREA URNS AUTO: 9 /HPF (ref 0–5)
YEAST UR QL AUTO: ABNORMAL

## 2023-11-06 PROCEDURE — 85025 COMPLETE CBC W/AUTO DIFF WBC: CPT | Performed by: INTERNAL MEDICINE

## 2023-11-06 PROCEDURE — 81001 URINALYSIS AUTO W/SCOPE: CPT | Performed by: INTERNAL MEDICINE

## 2023-11-06 PROCEDURE — 84100 ASSAY OF PHOSPHORUS: CPT | Performed by: INTERNAL MEDICINE

## 2023-11-06 PROCEDURE — 36415 COLL VENOUS BLD VENIPUNCTURE: CPT | Performed by: INTERNAL MEDICINE

## 2023-11-06 PROCEDURE — 80053 COMPREHEN METABOLIC PANEL: CPT | Performed by: INTERNAL MEDICINE

## 2023-11-06 PROCEDURE — 84156 ASSAY OF PROTEIN URINE: CPT | Performed by: INTERNAL MEDICINE

## 2023-11-08 ENCOUNTER — OFFICE VISIT (OUTPATIENT)
Dept: NEPHROLOGY | Facility: CLINIC | Age: 78
End: 2023-11-08
Payer: MEDICARE

## 2023-11-08 VITALS
DIASTOLIC BLOOD PRESSURE: 65 MMHG | BODY MASS INDEX: 26.41 KG/M2 | HEART RATE: 87 BPM | SYSTOLIC BLOOD PRESSURE: 148 MMHG | OXYGEN SATURATION: 97 % | WEIGHT: 158.75 LBS

## 2023-11-08 DIAGNOSIS — N39.41 URGE INCONTINENCE OF URINE: ICD-10-CM

## 2023-11-08 DIAGNOSIS — I10 PRIMARY HYPERTENSION: Primary | ICD-10-CM

## 2023-11-08 PROCEDURE — 99214 PR OFFICE/OUTPT VISIT, EST, LEVL IV, 30-39 MIN: ICD-10-PCS | Mod: S$PBB,,, | Performed by: INTERNAL MEDICINE

## 2023-11-08 PROCEDURE — 99214 OFFICE O/P EST MOD 30 MIN: CPT | Mod: S$PBB,,, | Performed by: INTERNAL MEDICINE

## 2023-11-08 PROCEDURE — 99999 PR PBB SHADOW E&M-EST. PATIENT-LVL III: ICD-10-PCS | Mod: PBBFAC,,, | Performed by: INTERNAL MEDICINE

## 2023-11-08 PROCEDURE — 99213 OFFICE O/P EST LOW 20 MIN: CPT | Mod: PBBFAC | Performed by: INTERNAL MEDICINE

## 2023-11-08 PROCEDURE — 99999 PR PBB SHADOW E&M-EST. PATIENT-LVL III: CPT | Mod: PBBFAC,,, | Performed by: INTERNAL MEDICINE

## 2023-11-08 RX ORDER — VALSARTAN 320 MG/1
320 TABLET ORAL DAILY
Qty: 90 TABLET | Refills: 3 | Status: SHIPPED | OUTPATIENT
Start: 2023-11-08 | End: 2024-11-07

## 2023-11-08 NOTE — PROGRESS NOTES
CHIEF COMPLAINT/HPI: Mirna is a 78 y.o. female for evaluation of her HTN and incontinence.       Clinical Course:  Subjective:      Patient ID: Mirna Norton is a 78 y.o. Black or  female who presents for follow-up evaluation of No chief complaint on file.    HPI  The patient is stable. She had incontinence. She was seen by urologist. Her labs show normal renal function. She was asked to discontinue the HCTZ will adjust her antiHTN medications with Valsartan 320 mg and Amlodipine 5mg. She was asked to check her BP three times daily for a week and send the recordings.    Review of Systems   Constitutional: Negative.    HENT: Negative.     Eyes: Negative.    Respiratory: Negative.     Cardiovascular: Negative.    Gastrointestinal: Negative.    Genitourinary:         Incontinence       Objective:     Vitals:    11/08/23 1312   BP: (!) 148/65   Pulse: 87   SpO2: 97%   Weight: 72 kg (158 lb 11.7 oz)        Physical Exam  Constitutional:       Appearance: Normal appearance.   HENT:      Head: Normocephalic.   Cardiovascular:      Rate and Rhythm: Normal rate.      Pulses: Normal pulses.   Abdominal:      General: Abdomen is flat.   Musculoskeletal:         General: Normal range of motion.      Cervical back: Normal range of motion.       Assessment:     1. Primary hypertension    2. Urge incontinence of urine       Past Medical History:   Diagnosis Date    Abnormal cervical Papanicolaou smear 1980's    Cryo    Carotid artery stenosis     via kinked carotid artery - followed by Dr. Ye Anderson    Carotid stenosis 5/15/2014    GERD (gastroesophageal reflux disease)     Gout due to renal impairment     Gout, arthritis 2/20/2013    Gout, unspecified     Herniated lumbar intervertebral disc     Hypertension     Insomnia     Irritable bowel syndrome without diarrhea 9/20/2015    Liver lesion 3/13/2013    Mitral valve prolapse     MVP (mitral valve prolapse)     Pancreatic cyst 12/30/2013    PVD (peripheral  vascular disease) 12/23/2013    PVD (peripheral vascular disease) 12/23/2013    Venous insufficiency        Mirna reports that she has never smoked. She has never used smokeless tobacco. She reports that she does not drink alcohol and does not use drugs.    Family History   Problem Relation Age of Onset    Diabetes Brother     Hypertension Brother     Diabetes Sister     Eclampsia Sister     Hypertension Sister     Colon polyps Sister     Breast cancer Other         niece    Diabetes Sister     Eclampsia Sister     Hypertension Sister     Miscarriages / Stillbirths Sister     Heart disease Sister         mi, cad    Diabetes Sister     Eclampsia Sister     Hypertension Sister     Heart disease Mother     Diabetes Sister     Endometriosis Daughter     No Known Problems Daughter     No Known Problems Daughter     Colon cancer Neg Hx     Ovarian cancer Neg Hx     Esophageal cancer Neg Hx     Liver cancer Neg Hx     Liver disease Neg Hx     Rectal cancer Neg Hx     Stomach cancer Neg Hx        1. Primary hypertension    2. Urge incontinence of urine    Will discontinue HCTZ and increase Valsartan dose to 320 mg PO daily.   3. Lumber disk disease.     KRISTINE DUVAL.Fariba. MD. LEA. FACP.  , [facility] [employer] / [employer]  Nephrology Consultant. [facility].   [facility]. 5th [place] [address].    email: [email or web address].  Tel: Office: [phone]

## 2023-11-09 ENCOUNTER — OFFICE VISIT (OUTPATIENT)
Dept: INTERNAL MEDICINE | Facility: CLINIC | Age: 78
End: 2023-11-09
Payer: MEDICARE

## 2023-11-09 VITALS
DIASTOLIC BLOOD PRESSURE: 58 MMHG | HEART RATE: 77 BPM | SYSTOLIC BLOOD PRESSURE: 105 MMHG | WEIGHT: 161.19 LBS | BODY MASS INDEX: 25.9 KG/M2 | OXYGEN SATURATION: 98 % | HEIGHT: 66 IN

## 2023-11-09 DIAGNOSIS — R73.03 PRE-DIABETES: ICD-10-CM

## 2023-11-09 DIAGNOSIS — I10 ESSENTIAL HYPERTENSION: ICD-10-CM

## 2023-11-09 DIAGNOSIS — M10.9 GOUT, UNSPECIFIED CAUSE, UNSPECIFIED CHRONICITY, UNSPECIFIED SITE: ICD-10-CM

## 2023-11-09 DIAGNOSIS — I83.893 VARICOSE VEINS OF LEG WITH SWELLING, BILATERAL: Primary | ICD-10-CM

## 2023-11-09 PROCEDURE — 99999 PR PBB SHADOW E&M-EST. PATIENT-LVL IV: ICD-10-PCS | Mod: PBBFAC,,, | Performed by: INTERNAL MEDICINE

## 2023-11-09 PROCEDURE — 99214 PR OFFICE/OUTPT VISIT, EST, LEVL IV, 30-39 MIN: ICD-10-PCS | Mod: S$PBB,,, | Performed by: INTERNAL MEDICINE

## 2023-11-09 PROCEDURE — 99214 OFFICE O/P EST MOD 30 MIN: CPT | Mod: PBBFAC | Performed by: INTERNAL MEDICINE

## 2023-11-09 PROCEDURE — 99999 PR PBB SHADOW E&M-EST. PATIENT-LVL IV: CPT | Mod: PBBFAC,,, | Performed by: INTERNAL MEDICINE

## 2023-11-09 PROCEDURE — 99214 OFFICE O/P EST MOD 30 MIN: CPT | Mod: S$PBB,,, | Performed by: INTERNAL MEDICINE

## 2023-11-09 RX ORDER — AMOXICILLIN 500 MG/1
2000 TABLET, FILM COATED ORAL ONCE
COMMUNITY
Start: 2023-07-26 | End: 2024-03-06 | Stop reason: ALTCHOICE

## 2023-11-09 NOTE — PROGRESS NOTES
Subjective:       Patient ID: Mirna Norton is a 78 y.o. female.    Chief Complaint: Follow-up (6 mt)    Patient is here for followup for chronic conditions.    10/9 coughed up tinge of blood. None since, no SOB, no f/c/ns.    Wonders when last lipids checked -- 2/23 at [facility].    Gets crust in her nose, especially when waking. Denies nose bleeding.    Saw vascular surgeon and R leg pains were thought to be referred from the back.      Review of Systems   Constitutional:  Negative for activity change, appetite change and unexpected weight change.   Eyes:  Negative for visual disturbance.   Respiratory:  Negative for cough, chest tightness and shortness of breath.    Cardiovascular:  Negative for chest pain.   Gastrointestinal:  Negative for abdominal distention and abdominal pain.   Endocrine: Negative for polydipsia and polyuria.   Genitourinary:  Negative for difficulty urinating and urgency.        No breast lumps/masses/pain/nipple d/c in either breast     Musculoskeletal:  Positive for back pain.        R leg pains   Skin:  Negative for rash.   Psychiatric/Behavioral:  Negative for confusion.            Objective:      Physical Exam  Vitals reviewed.   Constitutional:       General: She is not in acute distress.     Appearance: Normal appearance. She is well-developed. She is not ill-appearing, toxic-appearing or diaphoretic.   HENT:      Head: Normocephalic and atraumatic.      Nose: Nose normal. No congestion or rhinorrhea.      Comments: Mild crusting around R nares  Eyes:      General: No scleral icterus.     Pupils: Pupils are equal, round, and reactive to light.   Neck:      Thyroid: No thyromegaly.   Cardiovascular:      Rate and Rhythm: Normal rate and regular rhythm.      Heart sounds: Normal heart sounds. No murmur heard.     No friction rub. No gallop.   Pulmonary:      Effort: Pulmonary effort is normal. No respiratory distress.      Breath sounds: Normal breath sounds. No wheezing or rales.    Abdominal:      General: Bowel sounds are normal. There is no distension.      Palpations: Abdomen is soft. There is no mass.      Tenderness: There is no abdominal tenderness. There is no guarding or rebound.      Comments: No R sided abd tenderness to deep palpation  nml SLR on the R   Musculoskeletal:         General: No tenderness. Normal range of motion.      Cervical back: Normal range of motion.      Comments: There are varicose veins in LE R>L   Lymphadenopathy:      Cervical: No cervical adenopathy.   Neurological:      General: No focal deficit present.      Mental Status: She is alert and oriented to person, place, and time.   Psychiatric:         Mood and Affect: Mood normal.         Speech: Speech normal.         Behavior: Behavior normal.         Assessment:       1. Varicose veins of leg with swelling, bilateral    2. Gout, unspecified cause, unspecified chronicity, unspecified site    3. Pre-diabetes    4. Essential hypertension        Plan:       Mirna was seen today for follow-up.    Diagnoses and all orders for this visit:    Varicose veins of leg with swelling, bilateral  Advised by surgheon to wear comp stockings    Gout, unspecified cause, unspecified chronicity, unspecified site  On Allo, ni recent flare    Pre-diabetes  Check next time    Essential hypertension  controlled      Health Maintenance         Date Due Completion Date    TETANUS VACCINE 01/16/2018 1/16/2008    Hemoglobin A1c (Prediabetes) 02/02/2022 2/2/2021    Shingles Vaccine (2 of 2) 12/16/2022 10/21/2022    COVID-19 Vaccine (4 - 2023-24 season) 09/01/2023 11/12/2021    Colonoscopy 07/29/2024 7/29/2019    Override on 3/6/2006: Done    Aspirin/Antiplatelet Therapy 11/09/2024 11/9/2023    DEXA Scan 11/21/2027 11/21/2022    Override on 8/2/2008: Done    Lipid Panel 02/06/2028 2/6/2023            Follow up in about 6 months (around 5/9/2024).    Future Appointments   Date Time Provider Department Center   11/16/2023 10:30 AM  Benigno Penaloza MD [id number]   5/7/2024  8:40 AM Ramsey Tyler MD [facility]

## 2023-11-10 NOTE — PATIENT INSTRUCTIONS
Heart Disease Education    The heart beats 60 to 100 times per minute, 24 hours a day. This equals almost 1000,000 times a day. It pumps blood with oxygen and nutrients to the tissues and organs of the body. But the heart is a muscle and needs its own supply of blood. Blood flow to the heart is supplied by the coronary arteries. Coronary artery disease (atherosclerosis) is a result of cholesterol, saturated fat, and calcium deposits (plaques) that build up inside the walls. This causes inflammation within the coronary arteries. These plaques narrow the artery and reduce blood flow to the heart muscle. The reduction in blood flow to the heart muscle decreases oxygen supply to the heart. If the narrowing is significant enough, the oxygen supply to one or more regions of the heart can be temporarily or permanently shut down. This can cause chest pain, and possibly death of heart tissue (heart attack).  Types of chest pain  Angina is the name for pain in the heart muscle. Angina is a warning sign of serious heart disease. When untreated it can lead to a heart attack, also known as acute myocardial infarction, or AMI. Angina occurs when there is not enough blood and oxygen flowing to the heart for the amount of work it is doing. This most often happens during physical exertion, when the heart is working hardest. It is usually relieved by rest or nitroglycerin. Angina may also occur after a large meal when extra blood is sent to the digestive organs and less goes to the heart. In the case of advanced or unstable heart disease, angina can occur at rest or awaken you from sleep. Angina usually lasts from a few minutes up to 20 minutes or more. When treated early, the effects of angina can be reversed without permanent damage to the heart. Angina is a serious condition and needs to be evaluated by a medical professional immediately.  There are two types of angina -- stable and unstable:  · Stable angina usually occurs  Patient: Stevie Ashton  YOB: 1967  MRN: [id number]    Date of Procedure: 11/10/2023  PROCEDURE NOTE    DATE OF PROCEDURE: 11/10/2023    SURGEON: Charlotte Lance MD    ASSISTANT: None    PREOPERATIVE DIAGNOSIS: Abdominal pain. Constipation. POSTOPERATIVE DIAGNOSIS: Very redundant tortuous sigmoid colon. Grade 1 internal hemorrhoid. OPERATION: Total colonoscopy to cecum    ANESTHESIA: General    ESTIMATED BLOOD LOSS: None    COMPLICATIONS: None     SPECIMENS:  Was Not Obtained    HISTORY: The patient is a 55y.o. year old male with history of above preop diagnosis. I recommended colonoscopy with possible biopsy or polypectomy and I explained the risk, benefits, expected outcome, and alternatives to the procedure. Risks included but are not limited to bleeding, infection, respiratory distress, hypotension, and perforation of the colon and possibility of missing a lesion. The patient understands and is in agreement. PROCEDURE: The patient was given IV conscious sedation. The patient's SPO2 remained above 90% throughout the procedure. Digital rectal exam was normal.  The colonoscope was inserted through the anus into the rectum and advanced under direct vision to the cecum without difficulty. Terminal ileum was examined for approximately 2 inches. The prep was good. Findings:    Cecum/Ascending colon: normal    Transverse colon: normal    Descending/Sigmoid colon: normal except very redundant tortuous sigmoid colon. Rectum/Anus: examined in normal and retroflexed positions and was abnormal: Grade 1 internal hemorrhoid    Withdrawal Time was (minutes): 18      Next screening colonoscopy: 10 years. The colon was decompressed. While withdrawing the scope the above findings were verified and the scope was removed. The patient tolerated the procedure and conscious sedation without unusual events. In the recovery room patient was examined and remains hemodynamically stable. Discharge home when criteria met. Recommendations/Plan:   F/U In Office as instructed  Discussed with the family  High fiber diet   Precautions to avoid constipation  CT abdomen pelvis with oral and IV contrast on an outpatient basis and then office follow-up to discuss further plans. Discussed with family. She expresses understanding and agrees to get the CT done and then office follow-up.     Electronically signed by Hortensia Latham MD  on 11/10/2023 at 8:31 AM with a predictable level of activity. Being stable, its character, severity, and occurrence do not change much over time. It usually starts with activity, and resolves with rest or taking your medicine as instructed by your doctor. The symptoms usually do not last long.  · Unstable angina changes or gets worse over time. It is different from whatever you are used to. It may feel different or worse, begin without cause, occur with exercise or exertion, wake you up from sleep, and last longer. It may not respond in the same way as it does when you take your usual medicines for an attack. This type of angina can be a warning sign of an impending heart attack.     A heart attack is usually the result of a blood clot that suddenly forms in a coronary artery that has been narrowed with plaque. When this occurs, blood flow may be cut off to a part of the heart muscle, causing the cells to die. This weakens the pumping action of the heart, which affects the delivery of blood to all the other organs in the body including the brain. This damage is not reversible. However, early treatment can limit the amount of damage.  The pain you feel with angina and a heart attack may have a similar quality. However, it is usually different in intensity and duration. Here are some typical descriptions of a heart attack:  · It is most often experienced as a squeezing, crushing, pressure-like sensation in the center of the chest.  · It is sometimes described as something heavy sitting on my chest.  · It may feel more like a bad case of indigestion.  · The pain may spread from the chest to the arm, shoulder, throat or jaw.  · Sometimes the pain is not felt in the chest at all, but only in the arm, shoulder, throat or jaw.  · There may also be nausea, vomiting, dizziness or light-headedness, sweating and trouble breathing.  · Palpitations, or your heart beating rapidly  · A new, irregular heart beat  · Unexplained weakness  You may not be  "able to tell the difference between "bad" angina and a heart attack at home. Seek help if your symptoms are different than usual. Do not be in denial or just try to "tough it out."  Call 911  This is the fastest and safest way to get to the emergency department. The paramedics can also start treatment on the way to the hospital, saving valuable time for your heart.  · If the angina gets worse, if it continues, or if it stops and returns, call 911 immediately. Do not delay. You may be having a heart attack.  · After you call 911, take a second tablet or spray unless instructed otherwise. When repeating doses, sit down if possible, because it can make you feel lightheaded or dizzy. Wait another 5 minutes. If the angina still does not go away, take a third tablet or spray. Do not take more than 3 tablets or sprays within 15 minutes. Stay on the phone with 911 for further instruction.  · Your healthcare provider may give you slightly different instructions than those above. If so, follow them carefully.  Do not wait until symptoms become severe to call 911.  Other reasons to call 911 include:  · Trouble breathing  · Feeling lightheaded, faint, or dizzy  · Rapid heart beat  · Slower than usual heart rate compared to your normal  · Angina with weakness, dizziness, fainting, heavy sweating, nausea, or vomiting  · Extreme drowsiness, confusion  · Weakness of an arm or leg or one side of the face  · Difficulty with speech or vision  When to seek medical care  Remember, the signs and symptoms of a heart attack are not always like they are on TV. Sometimes they are not so obvious. You may only feel weak, or just not right. If it is not clear or if you have any doubt, call for advice.  · Seek help if there is a change in the type of pain, if it feels different, or if your symptoms are mild.  · Do not drive yourself. Have someone else drive you. If no one can drive, call 911.  · Do not delay. Fast diagnosis and treatment can " "prevent or limit the amount of heart damage during a heart attack.  · Do not go to your doctor's office or a clinic as they may not be able to provide all the testing and treatment required for this condition.  · If your doctor has given you medicine to take when symptoms occur, take them but don't delay getting help trying to locate medicines.  What happens in the emergency department  The emergency department is connected to your local emergency medical system (EMS) through 911. That's why during a cardiac emergency, calling 911 is the fastest way to get help. The goal of the emergency department is to rapidly screen, evaluate, and treat people.  Once you are there, an electrocardiogram (ECG or heart tracing) will be done. Blood samples may be taken to look for the presence of heart enzymes that leak from damaged heart cells and show if a heart attack is occurring. You will often be evaluated by a heart specialist (cardiologist) who decides the best course of action. In the case of severe angina or early heart attack, and depending on the circumstances, powerful "clot busting" medicines can be used to dissolve blood clots in the coronary artery. In other cases, you may be taken to a cardiac catheterization lab. Here, a tiny balloon-tipped catheter is advanced through blood vessels to the heart. There the balloon is inflated pushing open the blood vessel restoring blood flow.  Risk factors for heart disease  Risk factors for heart disease are a combination of genetic and lifestyle. Many risk factors work by either directly or indirectly damaging the blood vessels of the heart, or by increasing the risk of forming blood or cholesterol clots, which then clog up and block the arteries.     Examples of physical lifestyle risk factors:  · Cigarette smoking  · High blood pressure  · High blood cholesterol  · Use of stimulant drugs such as cocaine, crack, and amphetamines  · Eating a high-fat, high-cholesterol " meal  · Diabetes   · Obesity which increases risk for diabetes and high blood pressure  · Lack of regular physical activity     Examples of emotional lifestyle factors:  · Chronic high stress levels release stress hormones. These raise blood pressure and cholesterol level and makes blood clot more easily.  · Held-in anger, hostile or cynical attitude  · Social and emotional isolation, lack of intimacy  · Loss of relationship  · Depression  Other factors that increase the risk of heart attack that you cannot control :  · Age. The older you get beyond 40, the greater is your risk of significant coronary artery disease.  · Gender. More men than women get heart disease; but once past menopause, women who are not taking estrogen replacement have the same risk as men for a heart attack.  · Family history. If your mother, father, brother or sister has coronary artery disease, your risk of having it is higher than a person your age without this family history.  What can you do to decrease your risk  To reduce your risk of heart disease:  · Get regular checkups with your doctor.  · Take your medicines for blood pressure, cholesterol or diabetes as directed.  · Watch your diet. Eat a heart healthy diet choosing fresh foods, less salt, cholesterol, and fat  · Stop smoking. Get help if needed.  · Get regular exercise.  · Manage stress.  · Carry a list of medicines and doses in your wallet.  Date Last Reviewed: 12/30/2015  © 6665-1735 [facility]. [address] [place] [address], [place] [address]. All rights reserved. This information is not intended as a substitute for professional medical care. Always follow your healthcare professional's instructions.

## 2023-11-16 ENCOUNTER — PROCEDURE VISIT (OUTPATIENT)
Dept: OBSTETRICS AND GYNECOLOGY | Facility: CLINIC | Age: 78
End: 2023-11-16
Attending: OBSTETRICS & GYNECOLOGY
Payer: MEDICARE

## 2023-11-16 VITALS
HEIGHT: 65 IN | DIASTOLIC BLOOD PRESSURE: 75 MMHG | SYSTOLIC BLOOD PRESSURE: 148 MMHG | BODY MASS INDEX: 26.55 KG/M2 | WEIGHT: 159.38 LBS

## 2023-11-16 DIAGNOSIS — N90.7 SEBACEOUS CYST OF LABIA: Primary | ICD-10-CM

## 2023-11-16 PROCEDURE — 88304 PR  SURG PATH,LEVEL III: ICD-10-PCS | Mod: 26,,, | Performed by: PATHOLOGY

## 2023-11-16 PROCEDURE — 88304 TISSUE EXAM BY PATHOLOGIST: CPT | Mod: 26,,, | Performed by: PATHOLOGY

## 2023-11-16 PROCEDURE — 11400 EXC TR-EXT B9+MARG 0.5 CM<: CPT | Mod: PBBFAC | Performed by: OBSTETRICS & GYNECOLOGY

## 2023-11-16 PROCEDURE — 88304 TISSUE EXAM BY PATHOLOGIST: CPT | Mod: 59 | Performed by: PATHOLOGY

## 2023-11-16 PROCEDURE — 11400 PR EXC SKIN BENIG <0.5 CM TRUNK,ARM,LEG: ICD-10-PCS | Mod: S$PBB,,, | Performed by: OBSTETRICS & GYNECOLOGY

## 2023-11-16 PROCEDURE — 11400 EXC TR-EXT B9+MARG 0.5 CM<: CPT | Mod: S$PBB,,, | Performed by: OBSTETRICS & GYNECOLOGY

## 2023-11-16 NOTE — PROCEDURES
CC:  VULVA LESION    Mirna Norton is a 78 y.o. female  presents for evaluation of a vulva lesion.  For the past several years, she describes noting a small firm lump on her right labia.  Last year, she had seen [facility] for annual exam and the lesion was .25 cm, and this year .50 cm.      PE:  VULVA:  Patient's inferior right labia major with .5 cm firm raised smooth whitish lesion consistent with a sebaceous cyst.    With the enlarging size of this lesion, she requests removal.    PRE EXCISION COUNSELING:  The patient was informed of the risk of bleeding, infection, and pain.  We discussed the option of conservative management without any treatment.  She wants to proceed with excision.  Questions were answered and consents signed / witnessed.    TIME OUT PERFORMED.  Informed written consent was obtained.  Prepped with alcohol  1 cc of 1% Lidocaine without epinephrine injected for anesthetic.  Prepped with Betadine.  Small 2 mm incision was made atop the cyst.  With gentle pressure, the sebaceous maternal was expressed.  Using a hemostat and fine pick-ups, the cyst wall was grasped and removed.  Silver nitrate and pressure were used to obtain complete hemostasis.  The specimen was placed in formalyn and sent to Pathology for histology evaluation.   EBL <1 cc  The patient tolerated the procedure well.  Complications: None    ASSESSMENT and PLAN  1. Sebaceous cyst of labia        POST EXCISION COUNSELING:  Manage post biopsy cramping with NSAIDs or Tylenol.  Signs of infection reviewed.  Report abnormal bleeding, fever > 101.0 F, worsening pain, or a foul smelling discharge.    Instructions / precautions / warning signs reviewed.    FOLLOW-UP:   We will contact her with pathology results

## 2023-11-17 ENCOUNTER — PATIENT MESSAGE (OUTPATIENT)
Dept: OBSTETRICS AND GYNECOLOGY | Facility: CLINIC | Age: 78
End: 2023-11-17
Payer: MEDICARE

## 2023-11-17 ENCOUNTER — TELEPHONE (OUTPATIENT)
Dept: OBSTETRICS AND GYNECOLOGY | Facility: CLINIC | Age: 78
End: 2023-11-17
Payer: MEDICARE

## 2023-11-17 NOTE — TELEPHONE ENCOUNTER
----- Message from Albina Garcia sent at 11/17/2023  8:40 AM CST -----  Regarding: Pt called to speak to the nurse due to experiencing bleeding that she will discuss when the call is returned and pt would like a call back today  Patient Advice:    Pt called to speak to the nurse due to experiencing bleeding that she will discuss when the call is returned and pt would like a call back this morning asap.    Pt can be reached at  [phone]

## 2023-11-22 ENCOUNTER — TELEPHONE (OUTPATIENT)
Dept: OBSTETRICS AND GYNECOLOGY | Facility: CLINIC | Age: 78
End: 2023-11-22
Payer: MEDICARE

## 2023-11-22 LAB
FINAL PATHOLOGIC DIAGNOSIS: NORMAL
GROSS: NORMAL
Lab: NORMAL

## 2023-11-22 NOTE — TELEPHONE ENCOUNTER
Called patient:    Discussed results of cyst that was removed:    1. Specimen labeled cyst contents shows fragments of keratinous debris   2. Vulvar epidermal inclusion cyst     Reports mildly tender, but improving.  No bleeding.

## 2023-12-04 ENCOUNTER — PATIENT MESSAGE (OUTPATIENT)
Dept: INTERNAL MEDICINE | Facility: CLINIC | Age: 78
End: 2023-12-04
Payer: MEDICARE

## 2023-12-04 DIAGNOSIS — I10 ESSENTIAL HYPERTENSION: ICD-10-CM

## 2023-12-04 NOTE — TELEPHONE ENCOUNTER
No care due was identified.  [facility] Embedded Care Due Messages. Reference number: [id number].   12/04/2023 3:07:13 PM CST

## 2023-12-05 RX ORDER — AMLODIPINE BESYLATE 5 MG/1
5 TABLET ORAL DAILY
Qty: 90 TABLET | Refills: 3 | Status: SHIPPED | OUTPATIENT
Start: 2023-12-05

## 2024-03-06 ENCOUNTER — OFFICE VISIT (OUTPATIENT)
Dept: CARDIOLOGY | Facility: CLINIC | Age: 79
End: 2024-03-06
Payer: MEDICARE

## 2024-03-06 VITALS
BODY MASS INDEX: 26.41 KG/M2 | HEART RATE: 78 BPM | SYSTOLIC BLOOD PRESSURE: 127 MMHG | HEIGHT: 65 IN | DIASTOLIC BLOOD PRESSURE: 75 MMHG | WEIGHT: 158.5 LBS

## 2024-03-06 DIAGNOSIS — E78.2 MIXED HYPERLIPIDEMIA: ICD-10-CM

## 2024-03-06 DIAGNOSIS — I10 PRIMARY HYPERTENSION: ICD-10-CM

## 2024-03-06 DIAGNOSIS — I87.2 VENOUS INSUFFICIENCY OF RIGHT LOWER EXTREMITY: Primary | ICD-10-CM

## 2024-03-06 DIAGNOSIS — I65.29 STENOSIS OF CAROTID ARTERY, UNSPECIFIED LATERALITY: ICD-10-CM

## 2024-03-06 DIAGNOSIS — I67.2 CEREBRAL ATHEROSCLEROSIS: ICD-10-CM

## 2024-03-06 PROCEDURE — 99999 PR PBB SHADOW E&M-EST. PATIENT-LVL IV: CPT | Mod: PBBFAC,,, | Performed by: INTERNAL MEDICINE

## 2024-03-06 PROCEDURE — 99214 OFFICE O/P EST MOD 30 MIN: CPT | Mod: S$PBB,,, | Performed by: INTERNAL MEDICINE

## 2024-03-06 PROCEDURE — 99214 OFFICE O/P EST MOD 30 MIN: CPT | Mod: PBBFAC,PO | Performed by: INTERNAL MEDICINE

## 2024-03-06 RX ORDER — PROMETHAZINE HYDROCHLORIDE AND DEXTROMETHORPHAN HYDROBROMIDE 6.25; 15 MG/5ML; MG/5ML
5 SYRUP ORAL EVERY 8 HOURS PRN
COMMUNITY
Start: 2023-12-25

## 2024-03-06 RX ORDER — AZELASTINE 1 MG/ML
2 SPRAY, METERED NASAL
COMMUNITY
Start: 2023-12-25

## 2024-03-06 NOTE — PATIENT INSTRUCTIONS
Assessment/Plan:  Mirna Norton is a 79 y.o. female with venous insufficiency s/p R GSVL EVLT in 4/2013 with subsequent injections in 2014, bilateral carotid disease, overweight, who presents for an initial appointment.    Venous Insufficiency- Stable. Continue graduated compression hose.  Limit sodium intake to 2000 mg daily.  Limit volume intake to 1.5 L daily.  Elevate legs when resting.    Carotid Disease- Check updated carotid ultrasound.  LDL 67 in 1/2024.  Start ASA 81 mg daily.  Continue pravastatin 40 mg daily.    3. Overweight- Encourge diet, exercise, and weight loss.    Follow up in 1 year

## 2024-03-06 NOTE — PROGRESS NOTES
"[facility]      Chief Complaint   Patient presents with    Carotid Artery Disease       Patient ID: Mirna Norton is a 79 y.o. female with venous insufficiency s/p R GSVL EVLT in 4/2013 with subsequent injections in 2014, bilateral carotid disease, overweight, who presents for an initial appointment.  Pertinent history/events are as follows:     -Pt presents to establish cardia care.     -Pt followed by Dr. Gonzalez in the past (per his note on 2/22/2023):  "eloisa Norton 78 y.o. female is here follow up and feeling well without any new complaints. She is s/p R GSVL EVLT in 4/2013 with subsequent injections in 2014 without recurrent issues. She has asymptomatic mild bilateral carotid disease. She takes aspirin and losartan. Last venous ultrasound-no DVT or reflux disease. Carotid ultrasound-PSV < 130 cm/sec bilaterally.  In May 2020 she had atypical chest discomfort.  A stress echocardiogram revealed a normal ejection fraction, no wall motion abnormalities, no EKG changes, or symptoms suggestive of obstructive coronary disease. She has been less active because of the pandemic.    HPI:  Mrs. Norton reports no chest pain, SOB, palpitations, TIA symptoms or syncope.  Reports not taking ASA 81 mg daily.  Wears compression hose daily.      Past Medical History:   Diagnosis Date    Abnormal cervical Papanicolaou smear 1980's    Cryo    Carotid artery stenosis     via kinked carotid artery - followed by Dr. Belcher Justin    Carotid stenosis 5/15/2014    GERD (gastroesophageal reflux disease)     Gout due to renal impairment     Gout, arthritis 2/20/2013    Gout, unspecified     Herniated lumbar intervertebral disc     Hypertension     Insomnia     Irritable bowel syndrome without diarrhea 9/20/2015    Liver lesion 3/13/2013    Mitral valve prolapse     MVP (mitral valve prolapse)     Pancreatic cyst 12/30/2013    PVD (peripheral vascular disease) 12/23/2013    PVD (peripheral vascular disease) 12/23/2013    " Venous insufficiency      Past Surgical History:   Procedure Laterality Date    BARTHOLIN GLAND CYST EXCISION       SECTION      x3    COLONOSCOPY N/A 2019    Procedure: COLONOSCOPY;  Surgeon: Haider Luu MD;  Location: [address];  Service: Endoscopy;  Laterality: N/A;    Diagnostic laproscopic       laser surgery on vein       Social History     Socioeconomic History    Marital status:    Occupational History    Occupation: Retired   Tobacco Use    Smoking status: Never    Smokeless tobacco: Never   Substance and Sexual Activity    Alcohol use: No    Drug use: No    Sexual activity: Not Currently     Birth control/protection: Post-menopausal   Social History Narrative    3 kids (healthy), previous . , has eye issues. biw exercise.     Social Determinants of Health     Financial Resource Strain: Low Risk  (3/5/2024)    Overall Financial Resource Strain (CARDIA)     Difficulty of Paying Living Expenses: Not hard at all   Food Insecurity: No Food Insecurity (3/5/2024)    Hunger Vital Sign     Worried About Running Out of Food in the Last Year: Never true     Ran Out of Food in the Last Year: Never true   Transportation Needs: No Transportation Needs (3/5/2024)    PRAPARE - Transportation     Lack of Transportation (Medical): No     Lack of Transportation (Non-Medical): No   Physical Activity: Insufficiently Active (3/5/2024)    Exercise Vital Sign     Days of Exercise per Week: 2 days     Minutes of Exercise per Session: 30 min   Stress: No Stress Concern Present (3/5/2024)    [employer] [employer] - Occupational Stress Questionnaire     Feeling of Stress : Not at all   Social Connections: Unknown (3/5/2024)    Social Connection and Isolation Panel [NHANES]     Frequency of Communication with Friends and Family: More than three times a week     Frequency of Social Gatherings with Friends and Family: Twice a week     Active Member of Clubs  or Organizations: Yes     Attends Club or Organization Meetings: More than 4 times per year     Marital Status:    Housing Stability: Low Risk  (3/5/2024)    Housing Stability Vital Sign     Unable to Pay for Housing in the Last Year: No     Number of Places Lived in the Last Year: 1     Unstable Housing in the Last Year: No     Family History   Problem Relation Age of Onset    Diabetes Brother     Hypertension Brother     Diabetes Sister     Eclampsia Sister     Hypertension Sister     Colon polyps Sister     Breast cancer Other         niece    Diabetes Sister     Eclampsia Sister     Hypertension Sister     Miscarriages / Stillbirths Sister     Heart disease Sister         mi, cad    Diabetes Sister     Eclampsia Sister     Hypertension Sister     Heart disease Mother     Diabetes Sister     Endometriosis Daughter     No Known Problems Daughter     No Known Problems Daughter     Colon cancer Neg Hx     Ovarian cancer Neg Hx     Esophageal cancer Neg Hx     Liver cancer Neg Hx     Liver disease Neg Hx     Rectal cancer Neg Hx     Stomach cancer Neg Hx        Review of patient's allergies indicates:   Allergen Reactions    No known allergies        Medication List with Changes/Refills   Current Medications    ALLOPURINOL (ZYLOPRIM) 100 MG TABLET    Take 1 tablet (100 mg total) by mouth once daily.    AMLODIPINE (NORVASC) 5 MG TABLET    Take 1 tablet (5 mg total) by mouth once daily.    ASPIRIN (ECOTRIN) 81 MG EC TABLET    Take 1 tablet (81 mg total) by mouth once daily.    AZELASTINE (ASTELIN) 137 MCG (0.1 %) NASAL SPRAY    2 sprays by Nasal route as needed.    BIFIDOBACTERIUM INFANTIS (ALIGN ORAL)    Take by mouth once daily.    BIOTIN ORAL    Take by mouth once daily.    CICLOPIROX (PENLAC) 8 % SOLN    Apply topically nightly. Remove once a week with nail polish remover    DICLOFENAC SODIUM (VOLTAREN) 1 % GEL    Apply 2 g topically 4 (four) times daily as needed.    MULTIVITAMIN-MINERALS-LUTEIN TAB    Take  "1 tablet by mouth once daily.     NITROGLYCERIN (NITROSTAT) 0.4 MG SL TABLET    Place 1 tablet (0.4 mg total) under the tongue every 5 (five) minutes as needed for Chest pain.    PRAVASTATIN (PRAVACHOL) 40 MG TABLET    TAKE 1 TABLET BY MOUTH EVERY DAY IN THE EVENING    PROMETHAZINE-DEXTROMETHORPHAN (PROMETHAZINE-DM) 6.25-15 MG/5 ML SYRP    Take 5 mLs by mouth every 8 (eight) hours as needed.    VALSARTAN (DIOVAN) 320 MG TABLET    Take 1 tablet (320 mg total) by mouth once daily.   Discontinued Medications    AMOXICILLIN (AMOXIL) 500 MG TAB    Take 2,000 mg by mouth once.       Review of Systems  Constitution: Denies chills, fever, and sweats.  HENT: Denies headaches or blurry vision.  Cardiovascular: Denies chest pain or irregular heart beat.  Respiratory: Denies cough or shortness of breath.  Gastrointestinal: Denies abdominal pain, nausea, or vomiting.  Musculoskeletal: Denies muscle cramps.  Neurological: Denies dizziness or focal weakness.  Psychiatric/Behavioral: Normal mental status.  Hematologic/Lymphatic: Denies bleeding problem or easy bruising/bleeding.  Skin: Denies rash or suspicious lesions    Physical Examination  /75   Pulse 78   Ht 5' 5" (1.651 m)   Wt 71.9 kg (158 lb 8.2 oz)   BMI 26.38 kg/m²     Constitutional: No acute distress, conversant  HEENT: Sclera anicteric, Pupils equal, round and reactive to light, extraocular motions intact, Oropharynx clear  Neck: No JVD, no carotid bruits  Cardiovascular: regular rate and rhythm, no murmur, rubs or gallops, normal S1/S2  Pulmonary: Clear to auscultation bilaterally  Abdominal: Abdomen soft, nontender, nondistended, positive bowel sounds  Extremities: No lower extremity edema,   Pulses:  Carotid pulses are 2+ on the right side, and 2+ on the left side.  Radial pulses are 2+ on the right side, and 2+ on the left side.   Femoral pulses are 2+ on the right side, and 2+ on the left side.  Popliteal pulses are 2+ on the right side, and 2+ on the " "left side.   Dorsalis pedis pulses are 2+ on the right side, and 2+ on the left side.   Posterior tibial pulses are 2+ on the right side, and 2+ on the left side.    Skin: No ecchymosis, erythema, or ulcers  Psych: Alert and oriented x 3, appropriate affect  Neuro: CNII-XII intact, no focal deficits    Labs:  Most Recent Data  CBC:   Lab Results   Component Value Date    WBC 7.89 11/06/2023    HGB 13.3 11/06/2023    HCT 41.3 11/06/2023     11/06/2023    MCV 91 11/06/2023    RDW 13.8 11/06/2023     BMP:   Lab Results   Component Value Date     11/06/2023     11/06/2023    K 4.0 11/06/2023    K 4.0 11/06/2023     11/06/2023     11/06/2023    CO2 28 11/06/2023    CO2 28 11/06/2023    BUN 15 11/06/2023    BUN 15 11/06/2023    CREATININE 0.8 11/06/2023    CREATININE 0.8 11/06/2023     11/06/2023     11/06/2023    CALCIUM 10.0 11/06/2023    CALCIUM 10.0 11/06/2023    PHOS 3.1 11/06/2023     LFTS;   Lab Results   Component Value Date    PROT 7.7 11/06/2023    ALBUMIN 3.8 11/06/2023    ALBUMIN 3.8 11/06/2023    BILITOT 0.3 11/06/2023    AST 23 11/06/2023    ALKPHOS 58 11/06/2023    ALT 20 11/06/2023     COAGS:   Lab Results   Component Value Date    INR 1.0 04/08/2011     FLP:   Lab Results   Component Value Date    CHOL 188 01/20/2022    HDL 73 01/20/2022    LDLCALC 88 01/20/2022    TRIG 177 (H) 01/20/2022    CHOLHDL 2.6 01/20/2022     CARDIAC: No results found for: "TROPONINI", "CKTOTAL", "CKMB", "BNP"    Imaging:    EKG 5/13/2020:  Normal sinus rhythm    US 11/2018    R ICA 92 cm/sec   L  cm/sec    US 12/2019   R ICA 72 with ratio <1   L ICA 83 with ratio 108    ECG 1/27/2020   Sinus bradycardia    YOLANDE 5/2020   Non ischemic   Normal EF   Normal ECG   No symptoms   No WMAs    Carotid US 1/2022:   R ICA PSV 91   L ICA     Patent bilateral vertebral    Carotid US 2/2023   R  cm/sec   L ICA 87 cm/sec    Patent bilateral vertebral     Assessment/Plan:  Mirna" STEPHANIE Norton is a 79 y.o. female with venous insufficiency s/p R GSVL EVLT in 4/2013 with subsequent injections in 2014, bilateral carotid disease, overweight, who presents for an initial appointment.    Venous Insufficiency- Stable. Continue graduated compression hose.  Limit sodium intake to 2000 mg daily.  Limit volume intake to 1.5 L daily.  Elevate legs when resting.    Carotid Disease- Check updated carotid ultrasound.  LDL 67 in 1/2024.  Start ASA 81 mg daily.  Continue pravastatin 40 mg daily.    3. Overweight- Encourge diet, exercise, and weight loss.    Follow up in 1 year    Total duration of face to face visit time 30 minutes.  Total time spent counseling greater than fifty percent of total visit time.  Counseling included discussion regarding imaging findings, diagnosis, possibilities, treatment options, risks and benefits.  The patient had many questions regarding the options and long-term effects.    Benigno Swenson MD, PhD  Interventional Cardiology

## 2024-03-13 ENCOUNTER — OFFICE VISIT (OUTPATIENT)
Dept: GASTROENTEROLOGY | Facility: CLINIC | Age: 79
End: 2024-03-13
Payer: MEDICARE

## 2024-03-13 VITALS
WEIGHT: 159.81 LBS | HEIGHT: 65 IN | DIASTOLIC BLOOD PRESSURE: 69 MMHG | BODY MASS INDEX: 26.63 KG/M2 | HEART RATE: 82 BPM | SYSTOLIC BLOOD PRESSURE: 120 MMHG

## 2024-03-13 DIAGNOSIS — R07.0 BURNING SENSATION OF THROAT: ICD-10-CM

## 2024-03-13 DIAGNOSIS — Z86.010 HISTORY OF COLON POLYPS: ICD-10-CM

## 2024-03-13 DIAGNOSIS — K58.1 IRRITABLE BOWEL SYNDROME WITH CONSTIPATION: Primary | ICD-10-CM

## 2024-03-13 PROCEDURE — 99999 PR PBB SHADOW E&M-EST. PATIENT-LVL IV: CPT | Mod: PBBFAC,GC,, | Performed by: STUDENT IN AN ORGANIZED HEALTH CARE EDUCATION/TRAINING PROGRAM

## 2024-03-13 PROCEDURE — 99213 OFFICE O/P EST LOW 20 MIN: CPT | Mod: S$PBB,GC,, | Performed by: STUDENT IN AN ORGANIZED HEALTH CARE EDUCATION/TRAINING PROGRAM

## 2024-03-13 PROCEDURE — 99214 OFFICE O/P EST MOD 30 MIN: CPT | Mod: PBBFAC | Performed by: STUDENT IN AN ORGANIZED HEALTH CARE EDUCATION/TRAINING PROGRAM

## 2024-03-13 RX ORDER — PANTOPRAZOLE SODIUM 40 MG/1
40 TABLET, DELAYED RELEASE ORAL DAILY
Qty: 30 TABLET | Refills: 1 | Status: SHIPPED | OUTPATIENT
Start: 2024-03-13 | End: 2024-04-08

## 2024-03-13 RX ORDER — POLYETHYLENE GLYCOL 3350 17 G/17G
17 POWDER, FOR SOLUTION ORAL DAILY
Qty: 510 G | Refills: 11 | Status: SHIPPED | OUTPATIENT
Start: 2024-03-13 | End: 2025-03-13

## 2024-03-13 NOTE — PROGRESS NOTES
"GENERAL GI PATIENT INTAKE:    COVID symptoms in the last 7 days (runny nose, sore throat, congestion, cough, fever): No  PCP: Ramsey Tyler  If not PCP-  number given to establish [phone]: N/A    ALLERGIES REVIEWED:  Yes    CHIEF COMPLAINT:    Chief Complaint   Patient presents with    GI Problem     Burning in throat       VITAL SIGNS:  /69   Pulse 82   Ht 5' 5" (1.651 m)   Wt 72.5 kg (159 lb 13.3 oz)   BMI 26.60 kg/m²      Change in medical, surgical, family or social history: No      REVIEWED MEDICATION LIST RECONCILED INCLUDING ABOVE MEDS:  Yes     "

## 2024-03-13 NOTE — PROGRESS NOTES
[facility]   PROGRESS NOTE    Name: Mirna Norton  : 1945  Date of Service: 2024   PCP: Ramsey Tyler MD    Reason for visit:   Chief Complaint   Patient presents with    Follow-up       HPI: Ms. Mirna Norton is a 77 year old female presenting for a follow-up visit. She has a PMH significant for gout, HTN, and IBS-C.       GI Treatment Course:   -2021: Status post evaluation by Dr. Luu, Dr. Johnson and clinic JAM's for history of recurrent intermittent right upper and lower abdominal pain and bloating that occurs approximately 2-3 times per week. On chart review, patient status post work-up including EGD, EUS, abdominal US, HIDA scan, and MRI abdomen without etiology discovered. Symptoms suspected to be secondary to IBS-C versus functional dyspepsia. She is status post treatment with trials of LEVSIN, ALIGN, and METAMUCIL.  2022: Initial clinic visit with me. Patient reported approximately 5 year history of recurrent intermittent right upper and lower abdominal pain and bloating that occurred approximately 2-3 times per week. She reported symptoms aggravated by PO intake, but not consistently. She reported slight improvement in symptoms with having a bowel movement. She was currently having one small pebble like bowel movement daily with use of METAMUCIL supplement daily and denies association with straining. GE study ordered normal. Breath testing ordered to rule out SIBO, however multiple repeat tests were invalid due to sampling error. Following discussion in 2022, decision made to forgo further breath testing and try to minimize constipation for symptom relief.   2023: At follow-up visit, patient reported continued intermittent right sided abdominal pain that occurred both with movement and PO intake, however not consistently. She continued to report pain improved with having a bowel movement. She reported having up to two non-bloody bowel movements daily  "that alternate between formed stool and hard-pellets. She denied straining or rectal pain.She was not currently using MIRALAX daily. Patient recommended to use MIRALAX on days when having hard-pellet like stools and abdominal pain.      Today:     -Patient reports onset of new burning sensation in anterior throat since late 12/2023. She reports initial onset was while sleeping and initially "excruciating", however it has decreased in intensity since that time. She reports symptom association with intermittent sour taste in mouth and discomfort being slightly worse after PO intake, however she denies throat worse with exertion or association with shortness of breath, burning pain in chest, dysphagia, melena, hematochezia, and unintentional weight loss. She is not currently taking antacid therapy.     -She reports continued intermittent right sided abdominal pain that occurs both with movement and PO intake, however she feels that the intensity and frequency has been decreasing. She continues to report pain improved with having a bowel movement, but she is also reporting continued sensation of constipation. She has yet to try taking MIRALAX daily.       Most Recent Upper Endoscopy:   -EGD in 02/2013 for evaluation of abdominal pain that was entirely normal. Gastric biopsies negative for H.pylori.   -EUS in 01/2014 for evaluation of abdominal pain that showed no pancreatic abnormalities.     Most Recent Colonoscopy:   -Colonoscopy in 07/2019 for surveillance showing diverticulosis and two polyps (largest 8 mm with pathology showing tubular adenoma).     Review of Systems:   Review of Systems   Constitutional:  Negative for chills, fever, malaise/fatigue and weight loss.   HENT:  Positive for sore throat. Negative for congestion.    Eyes:  Negative for blurred vision and double vision.   Respiratory:  Negative for cough, sputum production and shortness of breath.    Cardiovascular:  Negative for chest pain, palpitations " and leg swelling.   Gastrointestinal:  Positive for abdominal pain and constipation. Negative for diarrhea, heartburn, nausea and vomiting.   Genitourinary:  Negative for dysuria and urgency.   Musculoskeletal:  Negative for back pain, myalgias and neck pain.   Neurological:  Negative for dizziness, tingling, sensory change, weakness and headaches.   Endo/Heme/Allergies:  Negative for polydipsia.       Medications:   Current Outpatient Medications:     allopurinoL (ZYLOPRIM) 100 MG tablet, Take 1 tablet (100 mg total) by mouth once daily., Disp: 90 tablet, Rfl: 3    amLODIPine (NORVASC) 5 MG tablet, Take 1 tablet (5 mg total) by mouth once daily., Disp: 90 tablet, Rfl: 3    aspirin (ECOTRIN) 81 MG EC tablet, Take 1 tablet (81 mg total) by mouth once daily., Disp: , Rfl:     azelastine (ASTELIN) 137 mcg (0.1 %) nasal spray, 2 sprays by Nasal route as needed., Disp: , Rfl:     BIFIDOBACTERIUM INFANTIS (ALIGN ORAL), Take by mouth once daily., Disp: , Rfl:     BIOTIN ORAL, Take by mouth once daily., Disp: , Rfl:     ciclopirox (PENLAC) 8 % Soln, Apply topically nightly. Remove once a week with nail polish remover, Disp: 1 each, Rfl: 2    diclofenac sodium (VOLTAREN) 1 % Gel, Apply 2 g topically 4 (four) times daily as needed., Disp: 100 g, Rfl: 0    multivitamin-minerals-lutein Tab, Take 1 tablet by mouth once daily. , Disp: , Rfl:     pravastatin (PRAVACHOL) 40 MG tablet, TAKE 1 TABLET BY MOUTH EVERY DAY IN THE EVENING, Disp: 90 tablet, Rfl: 3    promethazine-dextromethorphan (PROMETHAZINE-DM) 6.25-15 mg/5 mL Syrp, Take 5 mLs by mouth every 8 (eight) hours as needed., Disp: , Rfl:     valsartan (DIOVAN) 320 MG tablet, Take 1 tablet (320 mg total) by mouth once daily., Disp: 90 tablet, Rfl: 3    nitroGLYCERIN (NITROSTAT) 0.4 MG SL tablet, Place 1 tablet (0.4 mg total) under the tongue every 5 (five) minutes as needed for Chest pain. (Patient not taking: Reported on 3/6/2024), Disp: 20 tablet, Rfl: 12     Past medical  "history, past surgical history, family history, allergies, and social history reviewed and updated in EMR.     Vitals:   Vitals:    03/13/24 1519   BP: 120/69   Pulse: 82   Weight: 72.5 kg (159 lb 13.3 oz)   Height: 5' 5" (1.651 m)         Body mass index is 26.6 kg/m².    Physical Exam:   Physical Exam  Constitutional:       Appearance: Normal appearance.   HENT:      Mouth/Throat:      Mouth: Mucous membranes are moist. No injury.      Dentition: Normal dentition.      Tongue: No lesions.      Palate: No lesions.      Pharynx: Oropharynx is clear. Uvula midline.   Eyes:      Conjunctiva/sclera: Conjunctivae normal.      Pupils: Pupils are equal, round, and reactive to light.   Cardiovascular:      Rate and Rhythm: Normal rate and regular rhythm.      Pulses: Normal pulses.      Heart sounds: Normal heart sounds.   Pulmonary:      Effort: Pulmonary effort is normal. No respiratory distress.      Breath sounds: Normal breath sounds.   Abdominal:      General: Bowel sounds are normal. There is no distension.      Palpations: Abdomen is soft.      Tenderness: There is no abdominal tenderness.   Skin:     General: Skin is warm and dry.      Findings: No bruising or rash.   Neurological:      Mental Status: She is alert and oriented to person, place, and time.         Labs:    Latest Reference Range & Units 06/30/22 10:21 03/06/23 14:11 04/24/23 10:46 05/26/23 10:50 11/06/23 10:46   Hemoglobin 12.0 - 16.0 g/dL 13.1 12.8 12.4 13.0 13.3   Hematocrit 37.0 - 48.5 % 41.7 40.6 40.3 40.8 41.3   MCV 82 - 98 fL 92 91 92 91 91   MCH 27.0 - 31.0 pg 28.9 28.8 28.2 29.0 29.3   MCHC 32.0 - 36.0 g/dL 31.4 (L) 31.5 (L) 30.8 (L) 31.9 (L) 32.2   RDW 11.5 - 14.5 % 13.3 13.4 13.7 13.4 13.8   Platelet Count 150 - 450 K/uL 288 292 293 300 314   (L): Data is abnormally low      Imaging:   -GE study 07/2022:    Normal gastric emptying time.  -Abdominal US in 05/2022:   Pancreas: The visualized portions of pancreas appear normal. Aorta: No " aneurysm.   Liver: 14.9 cm in length, normal in size. Diffusely increased parenchymal echogenicity consistent with steatosis. No focal lesions.   Gallbladder: No calculi, wall thickening, or pericholecystic fluid.  Negative sonographic Galeas's sign.   Biliary system: 2.2 mm common bile duct.  No intrahepatic ductal dilatation. Inferior vena cava: Normal in appearance. Right kidney: 9.7 cm in length.  No evidence for abnormal renal masses or hydronephrosis.  Previously noted lower pole renal cyst is likely obscured by overlying bowel gas.   Left kidney: 9.4 cm in length.  No evidence for abnormal renal masses or hydronephrosis. Spleen: 7.2 cm in length.  Normal in size with homogeneous echotexture. Miscellaneous: No ascites.   -MRI abdomen in 2014:   The liver is normal in size and demonstrates dropout on out of phase imaging compatible with diffuse steatosis.  No hepatic lesions are seen on T1 or T2-weighted imaging.  Focal fatty infiltration is seen at the ligamentum teres, a normal variant. No enhancing lesions are identified.   No intrahepatic or extrahepatic biliary ductal dilatation.   The portal and splenic veins are patent.    Spleen is unremarkable.    Remaining visualized intra-abdominal structures demonstrate no significant abnormality.   No ascites.   There are bilateral renal cysts.  -HIDA scan in 2014: EF 81%      Assessment:   This is a 77 year old  female with PMH significant for gout, HTN, and IBS-C who is presenting as a follow-up for approximately 10 year history of intermittent right sided abdominal pain and bloating without consistent association with PO intake or change in bowel movement frequency. She is status post work-up including EGD, EUS, abdominal US, HIDA scan, and MRI abdomen without etiology discovered. She is status post treatment with trials of LEVSIN, ALIGN, and METAMUCIL PRN with persistent intermittent symptoms. She is noted to have new anterior throat pain with  sour taste in mouth since late 12/2023. Etiology of throat pain possibly secondary to GERD and etiology of lower GI symptoms is suspected to be secondary to IBS-C based on history of intermittent constipation and pain improved with having a bowel movement. She continues to not have alarm features to suggest GI malignancy.     Plan:   - Trial of PROTONIX QD for 8 weeks for possible GERD contributing to throat pain.   - Increase MIRALAX to daily from PRN to help minimize constipation.   - Hand out provided on lifestyle modifications for constipation and IBS-C.   - Surveillance colonoscopy due in 7/2024. Discussed recommendations for colorectal cancer surveillance in patients between the age of 76-85; patient will let us know if she is interested in continuing screening/surveillance.     Disposition: Follow-up in 3 months.     Discussed with Dr. Fowler.         Bogdan Vallejo MD, PGY-VI  Gastroenterology Fellow  [facility]

## 2024-03-13 NOTE — PATIENT INSTRUCTIONS
#1. Begin taking PROTONIX 40 mg once daily for the next 2 months. Medication is best taken with water, 45-60 minutes before breakfast.     #2. Begin taking MIRALAX one to two times daily to help have a more regular bowel movement.     #3. Let us know if you would like to schedule another surveillance colonoscopy.

## 2024-03-14 ENCOUNTER — PATIENT MESSAGE (OUTPATIENT)
Dept: GASTROENTEROLOGY | Facility: CLINIC | Age: 79
End: 2024-03-14
Payer: MEDICARE

## 2024-03-19 ENCOUNTER — HOSPITAL ENCOUNTER (OUTPATIENT)
Dept: CARDIOLOGY | Facility: HOSPITAL | Age: 79
Discharge: HOME | End: 2024-03-19
Attending: INTERNAL MEDICINE
Payer: MEDICARE

## 2024-03-19 DIAGNOSIS — I65.29 STENOSIS OF CAROTID ARTERY, UNSPECIFIED LATERALITY: ICD-10-CM

## 2024-03-19 DIAGNOSIS — I67.2 CEREBRAL ATHEROSCLEROSIS: ICD-10-CM

## 2024-03-19 LAB
LEFT ARM DIASTOLIC BLOOD PRESSURE: 67 MMHG
LEFT ARM SYSTOLIC BLOOD PRESSURE: 147 MMHG
LEFT CBA DIAS: 26 CM/S
LEFT CBA SYS: 112 CM/S
LEFT CCA DIST DIAS: 26 CM/S
LEFT CCA DIST SYS: 111 CM/S
LEFT CCA MID DIAS: 16 CM/S
LEFT CCA MID SYS: 107 CM/S
LEFT CCA PROX DIAS: 23 CM/S
LEFT CCA PROX SYS: 132 CM/S
LEFT ECA DIAS: 12 CM/S
LEFT ECA SYS: 129 CM/S
LEFT ICA DIST DIAS: 33 CM/S
LEFT ICA DIST SYS: 121 CM/S
LEFT ICA MID DIAS: 23 CM/S
LEFT ICA MID SYS: 99 CM/S
LEFT ICA PROX DIAS: 14 CM/S
LEFT ICA PROX SYS: 73 CM/S
LEFT VERTEBRAL DIAS: 12 CM/S
LEFT VERTEBRAL SYS: 61 CM/S
OHS CV CAROTID RIGHT ICA EDV HIGHEST: 49
OHS CV CAROTID ULTRASOUND LEFT ICA/CCA RATIO: 1.09
OHS CV CAROTID ULTRASOUND RIGHT ICA/CCA RATIO: 1.54
OHS CV PV CAROTID LEFT HIGHEST CCA: 132
OHS CV PV CAROTID LEFT HIGHEST ICA: 121
OHS CV PV CAROTID RIGHT HIGHEST CCA: 126
OHS CV PV CAROTID RIGHT HIGHEST ICA: 168
OHS CV US CAROTID LEFT HIGHEST EDV: 33
RIGHT ARM DIASTOLIC BLOOD PRESSURE: 68 MMHG
RIGHT ARM SYSTOLIC BLOOD PRESSURE: 149 MMHG
RIGHT CBA DIAS: 26 CM/S
RIGHT CBA SYS: 99 CM/S
RIGHT CCA DIST DIAS: 24 CM/S
RIGHT CCA DIST SYS: 109 CM/S
RIGHT CCA MID DIAS: 23 CM/S
RIGHT CCA MID SYS: 106 CM/S
RIGHT CCA PROX DIAS: 17 CM/S
RIGHT CCA PROX SYS: 126 CM/S
RIGHT ECA DIAS: 8 CM/S
RIGHT ECA SYS: 110 CM/S
RIGHT ICA DIST DIAS: 49 CM/S
RIGHT ICA DIST SYS: 168 CM/S
RIGHT ICA MID DIAS: 18 CM/S
RIGHT ICA MID SYS: 68 CM/S
RIGHT ICA PROX DIAS: 13 CM/S
RIGHT ICA PROX SYS: 54 CM/S
RIGHT VERTEBRAL DIAS: 12 CM/S
RIGHT VERTEBRAL SYS: 63 CM/S

## 2024-03-19 PROCEDURE — 93880 EXTRACRANIAL BILAT STUDY: CPT | Mod: PO

## 2024-03-19 PROCEDURE — 93880 EXTRACRANIAL BILAT STUDY: CPT | Mod: 26,,, | Performed by: INTERNAL MEDICINE

## 2024-04-06 DIAGNOSIS — R07.0 BURNING SENSATION OF THROAT: ICD-10-CM

## 2024-04-08 RX ORDER — PANTOPRAZOLE SODIUM 40 MG/1
40 TABLET, DELAYED RELEASE ORAL
Qty: 90 TABLET | Refills: 1 | Status: SHIPPED | OUTPATIENT
Start: 2024-04-08

## 2024-06-10 ENCOUNTER — PATIENT MESSAGE (OUTPATIENT)
Dept: INTERNAL MEDICINE | Facility: CLINIC | Age: 79
End: 2024-06-10
Payer: MEDICARE

## 2024-07-25 DIAGNOSIS — M10.9 GOUT, UNSPECIFIED CAUSE, UNSPECIFIED CHRONICITY, UNSPECIFIED SITE: ICD-10-CM

## 2024-07-25 NOTE — TELEPHONE ENCOUNTER
Care Due:                  Date            Visit Type   Department     Provider  --------------------------------------------------------------------------------                                EP -                              PRIMARY      [facility]  Last Visit: 11-      CARE ([facility])   MEDICINE       Ramsey Tyler  Next Visit: None Scheduled  None         None Found                                                            Last  Test          Frequency    Reason                     Performed    Due Date  --------------------------------------------------------------------------------    Uric Acid...  12 months..  allopurinoL..............  05- 05-    [facility] Embedded Care Due Messages. Reference number: [id number].   7/25/2024 2:48:31 PM CDT

## 2024-07-25 NOTE — TELEPHONE ENCOUNTER
Refill Routing Note   Medication(s) are not appropriate for processing by [facility] for the following reason(s):        Required labs outdated    [facility] action(s):  Defer   Requires labs : Yes             Appointments  past 12m or future 3m with PCP    Date Provider   Last Visit   11/9/2023 Ramsey Tyler MD   Next Visit   Visit date not found Ramsey Tyler MD   ED visits in past 90 days: 0        Note composed:6:50 PM 07/25/2024

## 2024-07-26 ENCOUNTER — TELEPHONE (OUTPATIENT)
Dept: VASCULAR SURGERY | Facility: CLINIC | Age: 79
End: 2024-07-26
Payer: MEDICARE

## 2024-07-26 NOTE — TELEPHONE ENCOUNTER
"Spoke with the pt in reference to appt scheduled 7/29/24.Spoke with the pt and informed her that Dr Gomez reviewed her chart. Per Dr Gomez "she doesn't need to come here because she would need to have injections which cannot be done here.She will need to go to [facility] and will have to pay cash for the injections".Pt given contact information for the vein clinic and she verbalizes understanding of information received.  "

## 2024-07-29 RX ORDER — ALLOPURINOL 100 MG/1
100 TABLET ORAL DAILY
Qty: 90 TABLET | Refills: 0 | Status: SHIPPED | OUTPATIENT
Start: 2024-07-29

## 2024-07-31 DIAGNOSIS — M79.669 PAIN AND SWELLING OF LOWER LEG, UNSPECIFIED LATERALITY: Primary | ICD-10-CM

## 2024-07-31 DIAGNOSIS — M79.89 PAIN AND SWELLING OF LOWER LEG, UNSPECIFIED LATERALITY: Primary | ICD-10-CM

## 2024-07-31 DIAGNOSIS — I80.03 PHLEBITIS AND THROMBOPHLEBITIS OF SUPERFICIAL VESSELS OF LOWER EXTREMITIES, BILATERAL: ICD-10-CM

## 2024-07-31 DIAGNOSIS — M79.604 LEG PAIN, RIGHT: Primary | ICD-10-CM

## 2024-08-02 ENCOUNTER — OFFICE VISIT (OUTPATIENT)
Dept: INTERNAL MEDICINE | Facility: CLINIC | Age: 79
End: 2024-08-02
Payer: MEDICARE

## 2024-08-02 ENCOUNTER — TELEPHONE (OUTPATIENT)
Dept: INTERNAL MEDICINE | Facility: CLINIC | Age: 79
End: 2024-08-02

## 2024-08-02 VITALS
HEIGHT: 65 IN | SYSTOLIC BLOOD PRESSURE: 108 MMHG | WEIGHT: 154.56 LBS | DIASTOLIC BLOOD PRESSURE: 70 MMHG | HEART RATE: 86 BPM | BODY MASS INDEX: 25.75 KG/M2 | OXYGEN SATURATION: 95 %

## 2024-08-02 DIAGNOSIS — U07.1 COVID-19 VIRUS INFECTION: Primary | ICD-10-CM

## 2024-08-02 DIAGNOSIS — J06.9 UPPER RESPIRATORY TRACT INFECTION, UNSPECIFIED TYPE: Primary | ICD-10-CM

## 2024-08-02 LAB
CTP QC/QA: YES
SARS-COV-2 RDRP RESP QL NAA+PROBE: POSITIVE

## 2024-08-02 PROCEDURE — 99999 PR PBB SHADOW E&M-EST. PATIENT-LVL IV: CPT | Mod: PBBFAC,,, | Performed by: INTERNAL MEDICINE

## 2024-08-02 PROCEDURE — 99214 OFFICE O/P EST MOD 30 MIN: CPT | Mod: PBBFAC | Performed by: INTERNAL MEDICINE

## 2024-08-02 RX ORDER — NIRMATRELVIR AND RITONAVIR 300-100 MG
KIT ORAL
Qty: 30 TABLET | Refills: 0 | Status: SHIPPED | OUTPATIENT
Start: 2024-08-02

## 2024-08-02 NOTE — TELEPHONE ENCOUNTER
Hi, please call the patient 0-- she has covid infection and I recommend she take the medicine paxlovid:    Paxlovid 3 tab twice per day for 5 days.  Paxlovid (nirmatrelvir/ritonavir) is an oral antiviral medication used to treat COVID-19. It has a few known side effects, but most are mild in nature.  A bitter or metallic taste in the mouth is the most common Paxlovid side effect. Others include diarrhea and muscle aches.  Occasionally covid symptoms worsen for the few days after finishing Paxlovid.    I have sent in:  Orders Placed This Encounter    nirmatrelvir-ritonavir (PAXLOVID) 300 mg (150 mg x 2)-100 mg copackaged tablets (EUA)     Let me know if patient has any questions.  Thank you, Ramsey Tyler

## 2024-08-02 NOTE — PROGRESS NOTES
Subjective     Chief Complaint: Urgent care visit, Sore throat and neck pain     History of Present Illness:  Ms. Mirna Norton is a 79 y.o. female with pmhx of HTN, Gout, and lumbosacral pain. She presents to the clinic complaining of sore throat and neck pain and limited range of motion. She reports pain starting on Tuesday and describe the pain as sharp, 9/10 that is on the sides of the neck and radiate posteriorly. She has been taking 2 Ibuprofen 200 mg BID and that helps with the pain and sore throat. She says flexion makes the neck pain worse but denies any numbness, tingling, radiation of symptoms to the extremities. She reports having a fever episode on Wednesday night but denies checking her temperature and in the office it was check to be 99.0 F. She reports one diarrhea episode yesterday as well, and denies anyone close to her or family having COVID or flu but she did go to a wedding reception recently. Her O2 stats was 91% but was recheck to 95% and she denies any SOB or difficulty breathing.     Review of Systems   Constitutional:  Positive for fever.   HENT:  Positive for congestion and sore throat. Negative for ear discharge and ear pain.    Respiratory:  Positive for cough. Negative for shortness of breath and stridor.    Cardiovascular:  Negative for chest pain and palpitations.   Gastrointestinal:  Negative for abdominal pain and vomiting.   Musculoskeletal:  Positive for neck pain.   Skin:  Negative for rash.   Neurological:  Positive for dizziness and headaches.       PAST HISTORY:     Past Medical History:   Diagnosis Date    Abnormal cervical Papanicolaou smear 1980's    Cryo    Carotid artery stenosis     via kinked carotid artery - followed by Dr. Ye Anderson    Carotid stenosis 5/15/2014    GERD (gastroesophageal reflux disease)     Gout due to renal impairment     Gout, arthritis 2/20/2013    Gout, unspecified     Herniated lumbar intervertebral disc     Hypertension     Insomnia      Irritable bowel syndrome without diarrhea 2015    Liver lesion 3/13/2013    Mitral valve prolapse     MVP (mitral valve prolapse)     Pancreatic cyst 2013    PVD (peripheral vascular disease) 2013    PVD (peripheral vascular disease) 2013    Venous insufficiency        Past Surgical History:   Procedure Laterality Date    BARTHOLIN GLAND CYST EXCISION       SECTION      x3    COLONOSCOPY N/A 2019    Procedure: COLONOSCOPY;  Surgeon: Haider Luu MD;  Location: [facility] ([address] [place] [address]);  Service: Endoscopy;  Laterality: N/A;    Diagnostic laproscopic       laser surgery on vein         Family History   Problem Relation Name Age of Onset    Diabetes Brother      Hypertension Brother      Diabetes Sister      Eclampsia Sister      Hypertension Sister      Colon polyps Sister      Breast cancer Other niece         niece    Diabetes Sister      Eclampsia Sister      Hypertension Sister      Miscarriages / Stillbirths Sister      Heart disease Sister          mi, cad    Diabetes Sister      Eclampsia Sister      Hypertension Sister      Heart disease Mother      Diabetes Sister      Endometriosis Daughter      No Known Problems Daughter      No Known Problems Daughter      Colon cancer Neg Hx      Ovarian cancer Neg Hx      Esophageal cancer Neg Hx      Liver cancer Neg Hx      Liver disease Neg Hx      Rectal cancer Neg Hx      Stomach cancer Neg Hx         Social History     Socioeconomic History    Marital status:    Occupational History    Occupation: Retired   Tobacco Use    Smoking status: Never    Smokeless tobacco: Never   Substance and Sexual Activity    Alcohol use: No    Drug use: No    Sexual activity: Not Currently     Birth control/protection: Post-menopausal   Social History Narrative    3 kids (healthy), previous . , has eye issues. biw exercise.     Social Determinants of Health     Financial Resource Strain: Low Risk  (3/5/2024)     Overall Financial Resource Strain (CARDIA)     Difficulty of Paying Living Expenses: Not hard at all   Food Insecurity: No Food Insecurity (3/5/2024)    Hunger Vital Sign     Worried About Running Out of Food in the Last Year: Never true     Ran Out of Food in the Last Year: Never true   Transportation Needs: No Transportation Needs (3/5/2024)    PRAPARE - Transportation     Lack of Transportation (Medical): No     Lack of Transportation (Non-Medical): No   Physical Activity: Insufficiently Active (3/5/2024)    Exercise Vital Sign     Days of Exercise per Week: 2 days     Minutes of Exercise per Session: 30 min   Stress: No Stress Concern Present (3/5/2024)    [employer] [place] [employer] - Occupational Stress Questionnaire     Feeling of Stress : Not at all   Housing Stability: Low Risk  (3/5/2024)    Housing Stability Vital Sign     Unable to Pay for Housing in the Last Year: No     Number of Places Lived in the Last Year: 1     Unstable Housing in the Last Year: No       MEDICATIONS & ALLERGIES:     Current Outpatient Medications on File Prior to Visit   Medication Sig    allopurinoL (ZYLOPRIM) 100 MG tablet Take 1 tablet (100 mg total) by mouth once daily.    amLODIPine (NORVASC) 5 MG tablet Take 1 tablet (5 mg total) by mouth once daily.    aspirin (ECOTRIN) 81 MG EC tablet Take 1 tablet (81 mg total) by mouth once daily.    azelastine (ASTELIN) 137 mcg (0.1 %) nasal spray 2 sprays by Nasal route as needed.    BIFIDOBACTERIUM INFANTIS (ALIGN ORAL) Take by mouth once daily.    BIOTIN ORAL Take by mouth once daily.    ciclopirox (PENLAC) 8 % Soln Apply topically nightly. Remove once a week with nail polish remover    diclofenac sodium (VOLTAREN) 1 % Gel Apply 2 g topically 4 (four) times daily as needed.    multivitamin-minerals-lutein Tab Take 1 tablet by mouth once daily.     pantoprazole (PROTONIX) 40 MG tablet TAKE 1 TABLET BY MOUTH EVERY DAY    polyethylene glycol (MIRALAX) 17 gram/dose powder  "Take 17 g by mouth once daily.    pravastatin (PRAVACHOL) 40 MG tablet TAKE 1 TABLET BY MOUTH EVERY DAY IN THE EVENING    promethazine-dextromethorphan (PROMETHAZINE-DM) 6.25-15 mg/5 mL Syrp Take 5 mLs by mouth every 8 (eight) hours as needed.    valsartan (DIOVAN) 320 MG tablet Take 1 tablet (320 mg total) by mouth once daily.    nitroGLYCERIN (NITROSTAT) 0.4 MG SL tablet Place 1 tablet (0.4 mg total) under the tongue every 5 (five) minutes as needed for Chest pain. (Patient not taking: Reported on 3/6/2024)     No current facility-administered medications on file prior to visit.       Review of patient's allergies indicates:   Allergen Reactions    No known allergies        OBJECTIVE:     Vital Signs:  Vitals:    08/02/24 1037 08/02/24 1103   BP: (!) 100/54 108/70   BP Location: Right arm    Patient Position: Sitting    BP Method: Large (Manual)    Pulse: 86    SpO2: (!) 91% (!) 91%   Weight: 70.1 kg (154 lb 8.7 oz)    Height: 5' 5" (1.651 m)        Body mass index is 25.72 kg/m².     Physical Exam  Constitutional:       General: She is not in acute distress.     Appearance: She is ill-appearing. She is not toxic-appearing.   HENT:      Right Ear: External ear normal.      Left Ear: External ear normal.      Nose: Nose normal.      Mouth/Throat:      Mouth: Mucous membranes are moist.      Pharynx: Oropharynx is clear. No oropharyngeal exudate or posterior oropharyngeal erythema.   Eyes:      Extraocular Movements: Extraocular movements intact.   Cardiovascular:      Rate and Rhythm: Normal rate and regular rhythm.      Heart sounds: Normal heart sounds. No murmur heard.     No gallop.   Pulmonary:      Effort: Pulmonary effort is normal. No respiratory distress.      Breath sounds: Normal breath sounds. No wheezing or rales.   Abdominal:      General: Bowel sounds are normal. There is no distension.      Palpations: Abdomen is soft.      Tenderness: There is no abdominal tenderness.   Musculoskeletal:      " Cervical back: Tenderness present. Pain with movement present. No spinous process tenderness. Decreased range of motion.      Right lower leg: No edema.      Left lower leg: No edema.   Skin:     General: Skin is warm.   Neurological:      Mental Status: She is alert and oriented to person, place, and time.   Psychiatric:         Behavior: Behavior normal.         Laboratory  Recent Results (from the past 24 hour(s))   POCT COVID-19 Rapid Screening    Collection Time: 08/02/24 11:52 AM   Result Value Ref Range    POC Rapid COVID Positive (A) Negative     Acceptable Yes        Diagnostic Results:      Health Maintenance Due   Topic Date Due    TETANUS VACCINE  01/16/2018    Hemoglobin A1c (Prediabetes)  02/02/2022    Shingles Vaccine (2 of 2) 12/16/2022    COVID-19 Vaccine (7 - 2023-24 season) 03/22/2024    Colonoscopy  07/29/2024         ASSESSMENT & PLAN:   Ms. Mirna Norton is a 79 y.o. female present with sore throat and neck myalgia. Ddx includes Covid infection vs flu.     Upper respiratory tract infection, unspecified type  -     POCT COVID-19 Rapid Screening     - Recommend taking OTC Tylenol as needed for fever and pain.    - Will follow up with Covid rapid screen results       Discussed with Dr. Tyler  - staff attestation to follow  Nae Jones DO PGY1     Answers submitted by the patient for this visit:  Sore Throat Questionnaire (Submitted on 8/2/2024)  Chief Complaint: Sore throat  Chronicity: new  Onset: in the past 7 days  Progression since onset: gradually worsening  Pain worse on: right  Fever: 100 - 100.9 F  Fever duration: 1 to 2 days  Pain - numeric: 9/10  drooling: No  hoarse voice: No  plugged ear sensation: No  swollen glands: Yes  trouble swallowing: No  Treatments tried: NSAIDs, cool liquids  Improvement on treatment: mild  Pain severity: severe    I have reviewed and concur with the resident's history, physical, assessment, and plan.  I have personally interviewed  and examined the patient at bedside.  See below addendum for my evaluation and additional findings.  Oropharyngeal exam shows a bony exostosis on the left lower mouth and a lot of congestion in the posterior oropharynx.  Testing for COVID was positive.  We ask the patient to remain in the clinic until test results came but patient decided to leave in that way for the results.  We have called her to go over the results into prescribe Paxlovid.  We will await a call back.  Explained that if not better in 1-2 weeks, pt should rtc/call PCP    Visit today included increased complexity associated with the care of the episodic problem  addressed and managing the longitudinal care of the patient due to the serious and/or complex managed problem(s) .

## 2024-08-09 ENCOUNTER — PATIENT MESSAGE (OUTPATIENT)
Dept: INTERNAL MEDICINE | Facility: CLINIC | Age: 79
End: 2024-08-09
Payer: MEDICARE

## 2024-08-12 ENCOUNTER — PATIENT MESSAGE (OUTPATIENT)
Dept: INTERNAL MEDICINE | Facility: CLINIC | Age: 79
End: 2024-08-12
Payer: MEDICARE

## 2024-08-12 ENCOUNTER — HOSPITAL ENCOUNTER (OUTPATIENT)
Dept: RADIOLOGY | Facility: OTHER | Age: 79
Discharge: HOME | End: 2024-08-12
Attending: SURGERY
Payer: MEDICARE

## 2024-08-12 DIAGNOSIS — M79.89 PAIN AND SWELLING OF LOWER LEG, UNSPECIFIED LATERALITY: ICD-10-CM

## 2024-08-12 DIAGNOSIS — M79.669 PAIN AND SWELLING OF LOWER LEG, UNSPECIFIED LATERALITY: ICD-10-CM

## 2024-08-12 DIAGNOSIS — I10 PRIMARY HYPERTENSION: ICD-10-CM

## 2024-08-12 DIAGNOSIS — I80.03 PHLEBITIS AND THROMBOPHLEBITIS OF SUPERFICIAL VESSELS OF LOWER EXTREMITIES, BILATERAL: ICD-10-CM

## 2024-08-12 PROCEDURE — 93970 EXTREMITY STUDY: CPT | Mod: TC

## 2024-08-12 PROCEDURE — 93970 EXTREMITY STUDY: CPT | Mod: 26,,, | Performed by: RADIOLOGY

## 2024-08-12 RX ORDER — HYDROCHLOROTHIAZIDE 25 MG/1
25 TABLET ORAL DAILY
COMMUNITY
Start: 2024-03-21

## 2024-08-12 RX ORDER — VALSARTAN 160 MG/1
160 TABLET ORAL DAILY
Qty: 90 TABLET | Refills: 3 | Status: SHIPPED | OUTPATIENT
Start: 2024-08-12

## 2024-08-14 ENCOUNTER — OFFICE VISIT (OUTPATIENT)
Dept: VASCULAR SURGERY | Facility: CLINIC | Age: 79
End: 2024-08-14
Payer: MEDICARE

## 2024-08-14 VITALS — OXYGEN SATURATION: 99 % | SYSTOLIC BLOOD PRESSURE: 130 MMHG | DIASTOLIC BLOOD PRESSURE: 62 MMHG | HEART RATE: 86 BPM

## 2024-08-14 DIAGNOSIS — I83.891 SYMPTOMATIC VARICOSE VEINS OF RIGHT LOWER EXTREMITY: Primary | ICD-10-CM

## 2024-08-14 PROCEDURE — 99213 OFFICE O/P EST LOW 20 MIN: CPT | Mod: S$GLB,,, | Performed by: SURGERY

## 2024-08-14 NOTE — PROGRESS NOTES
VASCULAR SURGERY CLINIC NOTE    Patient ID: Mirna Norton is a 79 y.o. female.    I. HISTORY     Chief Complaint: varicose vein pain    HPI: Mirna Norton is a 79 y.o. female who is here today for evaluation of right lower extremity varicose veins. She complains of pain and tenderness at her varicose veins on her right medial thigh. She has previous history of right GSV ablation. She has had persistent pain in her right flank for a long time. I explained that this is not related to her veins. I explained that muscle spasms in her leg and thigh are also not related to this. She does not have any swelling/edema of the leg. Her primary complaint is the pain at the varicosities. She has been wearing compression and elevating her legs for more than 3 months without relief.    Migraine with aura: No  PFO/ASD/right to left shunt:  no    MI: no  Stroke: No  Seizure Disorder: No      Past Medical History:   Diagnosis Date    Abnormal cervical Papanicolaou smear     Cryo    Carotid artery stenosis     via kinked carotid artery - followed by Dr. Ye Anderson    Carotid stenosis 5/15/2014    GERD (gastroesophageal reflux disease)     Gout due to renal impairment     Gout, arthritis 2013    Gout, unspecified     Herniated lumbar intervertebral disc     Hypertension     Insomnia     Irritable bowel syndrome without diarrhea 2015    Liver lesion 3/13/2013    Mitral valve prolapse     MVP (mitral valve prolapse)     Pancreatic cyst 2013    PVD (peripheral vascular disease) 2013    PVD (peripheral vascular disease) 2013    Venous insufficiency         Past Surgical History:   Procedure Laterality Date    BARTHOLIN GLAND CYST EXCISION       SECTION      x3    COLONOSCOPY N/A 2019    Procedure: COLONOSCOPY;  Surgeon: Haider Luu MD;  Location: [address]);  Service: Endoscopy;  Laterality: N/A;    Diagnostic laproscopic       laser surgery on vein         Social History      Occupational History    Occupation: Retired   Tobacco Use    Smoking status: Never    Smokeless tobacco: Never   Substance and Sexual Activity    Alcohol use: No    Drug use: No    Sexual activity: Not Currently     Birth control/protection: Post-menopausal         Current Outpatient Medications:     allopurinoL (ZYLOPRIM) 100 MG tablet, Take 1 tablet (100 mg total) by mouth once daily., Disp: 90 tablet, Rfl: 0    amLODIPine (NORVASC) 5 MG tablet, Take 1 tablet (5 mg total) by mouth once daily., Disp: 90 tablet, Rfl: 3    aspirin (ECOTRIN) 81 MG EC tablet, Take 1 tablet (81 mg total) by mouth once daily., Disp: , Rfl:     multivitamin-minerals-lutein Tab, Take 1 tablet by mouth once daily. , Disp: , Rfl:     polyethylene glycol (MIRALAX) 17 gram/dose powder, Take 17 g by mouth once daily., Disp: 510 g, Rfl: 11    valsartan (DIOVAN) 160 MG tablet, Take 1 tablet (160 mg total) by mouth once daily., Disp: 90 tablet, Rfl: 3    azelastine (ASTELIN) 137 mcg (0.1 %) nasal spray, 2 sprays by Nasal route as needed. (Patient not taking: Reported on 8/14/2024), Disp: , Rfl:     BIFIDOBACTERIUM INFANTIS (ALIGN ORAL), Take by mouth once daily., Disp: , Rfl:     BIOTIN ORAL, Take by mouth once daily. (Patient not taking: Reported on 8/14/2024), Disp: , Rfl:     ciclopirox (PENLAC) 8 % Soln, Apply topically nightly. Remove once a week with nail polish remover (Patient not taking: Reported on 8/14/2024), Disp: 1 each, Rfl: 2    diclofenac sodium (VOLTAREN) 1 % Gel, Apply 2 g topically 4 (four) times daily as needed. (Patient not taking: Reported on 8/14/2024), Disp: 100 g, Rfl: 0    hydroCHLOROthiazide (HYDRODIURIL) 25 MG tablet, Take 25 mg by mouth once daily. (Patient not taking: Reported on 8/14/2024), Disp: , Rfl:     nirmatrelvir-ritonavir (PAXLOVID) 300 mg (150 mg x 2)-100 mg copackaged tablets (EUA), Take 3 tablets by mouth 2 (two) times daily. Each dose contains 2 nirmatrelvir (pink tablets) and 1 ritonavir (white  tablet). Take all 3 tablets together (Patient not taking: Reported on 8/14/2024), Disp: 30 tablet, Rfl: 0    nitroGLYCERIN (NITROSTAT) 0.4 MG SL tablet, Place 1 tablet (0.4 mg total) under the tongue every 5 (five) minutes as needed for Chest pain. (Patient not taking: Reported on 3/6/2024), Disp: 20 tablet, Rfl: 12    pantoprazole (PROTONIX) 40 MG tablet, TAKE 1 TABLET BY MOUTH EVERY DAY (Patient not taking: Reported on 8/14/2024), Disp: 90 tablet, Rfl: 1    pravastatin (PRAVACHOL) 40 MG tablet, TAKE 1 TABLET BY MOUTH EVERY DAY IN THE EVENING (Patient not taking: Reported on 8/14/2024), Disp: 90 tablet, Rfl: 3    promethazine-dextromethorphan (PROMETHAZINE-DM) 6.25-15 mg/5 mL Syrp, Take 5 mLs by mouth every 8 (eight) hours as needed. (Patient not taking: Reported on 8/14/2024), Disp: , Rfl:     Review of Systems   Constitutional: Negative for weight loss.   HENT:  Negative for ear pain and nosebleeds.    Eyes:  Negative for discharge and pain.   Cardiovascular:  Negative for chest pain and palpitations.   Respiratory:  Negative for cough, shortness of breath and wheezing.    Endocrine: Negative for cold intolerance, heat intolerance and polyphagia.   Hematologic/Lymphatic: Negative for adenopathy. Does not bruise/bleed easily.   Skin:  Negative for itching and rash.   Musculoskeletal:  Negative for joint swelling and muscle cramps.   Gastrointestinal:  Positive for abdominal pain. Negative for diarrhea, nausea and vomiting.   Genitourinary:  Negative for dysuria and flank pain.   Neurological:  Negative for numbness and seizures.         II. PHYSICAL EXAM     Physical Exam  Constitutional:       General: She is not in acute distress.     Appearance: Normal appearance. She is normal weight. She is not ill-appearing or diaphoretic.   HENT:      Head: Normocephalic and atraumatic.   Eyes:      General: No scleral icterus.        Right eye: No discharge.         Left eye: No discharge.      Extraocular Movements:  Extraocular movements intact.      Conjunctiva/sclera: Conjunctivae normal.   Cardiovascular:      Rate and Rhythm: Normal rate and regular rhythm.      Pulses: Normal pulses.      Comments: +2 DP/PT pulses bilaterally  Pulmonary:      Effort: Pulmonary effort is normal. No respiratory distress.   Musculoskeletal:         General: Normal range of motion.      Right lower leg: No edema.      Left lower leg: No edema.   Skin:     General: Skin is warm and dry.      Coloration: Skin is not jaundiced or pale.      Findings: No erythema or rash.   Neurological:      General: No focal deficit present.      Mental Status: She is alert and oriented to person, place, and time.   Psychiatric:         Mood and Affect: Mood normal.         Behavior: Behavior normal.         Reticular/Spider veins noted:  RLE: medial calf  LLE: no    Varicose veins noted:  RLE: medial calf  LLE:  none    Imaging Results: (I have personally reviewed the images/studies and provided my interpretation below)  BLE Venous Duplex ultrasound Impression 8/12/24:   Right Leg: Deep Venous system: no DVT, no reflux. GSV: ablated from distal thigh to groin; + reflux from knee down. LSV: no reflux  Left Leg: Deep Venous system:  no DVT, no reflux. GSV: no reflux. LSV: no reflux    III. ASSESSMENT & PLAN (MEDICAL DECISION MAKING)     1. Symptomatic varicose veins of right lower extremity        Venous Clinical Severity Score  Pain:2=Daily, moderate activity limitation, occasional analgesics  Varicose Veins: 2=Multiple. GS varicose veins confined to calf or thigh  Venous Edema: 0=None  Pigmentation: 0=None or focal, low intensity (tan)  Inflammation: 0=None  Induration: 0=None  Number of Active Ulcers: 0=0  Active Ulceration, Duration: 0=None  Active Ulcer Size: 0=None  Compressive Therapy: 3=Full compliance, stockings + elevation  Total Score: 7        Assessment/Diagnosis and Plan:  79 y.o. female here for evaluation of right leg pain at her varicose veins in  the right calf. CEAP 2. VCSS 7. I have offered stab phlebectomy for treatment of her symptomatic varicose veins  because she has not had adequate relief with compression/elevation and has already had ablation of her right greater saphenous veins. R, B, A explained to the patient who expressed understanding. She will call if she would like to proceed with stab phlebectomy of her right medial calf varicosities.     -Continue compression with 20-30mmHg Rx stockings, elevation  -Exercise regularly  -RTC SALONI Gomez II, MD, VI  Vascular Surgery  [facility]  [phone]

## 2024-08-29 ENCOUNTER — OFFICE VISIT (OUTPATIENT)
Dept: OBSTETRICS AND GYNECOLOGY | Facility: CLINIC | Age: 79
End: 2024-08-29
Attending: OBSTETRICS & GYNECOLOGY
Payer: MEDICARE

## 2024-08-29 VITALS
SYSTOLIC BLOOD PRESSURE: 142 MMHG | BODY MASS INDEX: 26.04 KG/M2 | HEIGHT: 65 IN | WEIGHT: 156.31 LBS | DIASTOLIC BLOOD PRESSURE: 92 MMHG

## 2024-08-29 DIAGNOSIS — Z78.0 POSTMENOPAUSAL STATUS: ICD-10-CM

## 2024-08-29 DIAGNOSIS — N64.4 BREAST PAIN: ICD-10-CM

## 2024-08-29 DIAGNOSIS — R10.2 PELVIC PAIN: Primary | ICD-10-CM

## 2024-08-29 PROCEDURE — 99213 OFFICE O/P EST LOW 20 MIN: CPT | Mod: S$PBB,,, | Performed by: OBSTETRICS & GYNECOLOGY

## 2024-08-29 PROCEDURE — 99214 OFFICE O/P EST MOD 30 MIN: CPT | Mod: PBBFAC | Performed by: OBSTETRICS & GYNECOLOGY

## 2024-08-29 PROCEDURE — 99999 PR PBB SHADOW E&M-EST. PATIENT-LVL IV: CPT | Mod: PBBFAC,,, | Performed by: OBSTETRICS & GYNECOLOGY

## 2024-08-29 NOTE — PROGRESS NOTES
"Chief Complaint   Patient presents with    Vaginal Pain       HPI:  Mirna Norton is a 79 y.o. female patient  who presents today for evaluation of pelvic / groin "pressure" and breast discomfort.  For the past several months, she describes feeling pressure in her left pelvis / groin, especially with certain movements, such as sitting.  Denies nausea, constipation, and diarrhea.  Denies changes in bladder.  No bleeding.  Also, for about 3 months, she reports having an intermittent tingling sensation in the upper aspect of her right breast.  Denies noted a breast lump, nipple discharge, or adenopathy.  She is postmenopausal, not on hormones.    Blood pressure (!) 142/92, height 5' 5" (1.651 m), weight 70.9 kg (156 lb 4.9 oz).    10/30/23 Mammogram: Negative, TC 1.42%    Urine dip today: Negative    Past Medical History:   Diagnosis Date    Abnormal cervical Papanicolaou smear     Cryo    Carotid artery stenosis     via kinked carotid artery - followed by Dr. Ye Anderson    Carotid stenosis 5/15/2014    GERD (gastroesophageal reflux disease)     Gout due to renal impairment     Gout, arthritis 2013    Gout, unspecified     Herniated lumbar intervertebral disc     Hypertension     Insomnia     Irritable bowel syndrome without diarrhea 2015    Liver lesion 3/13/2013    Mitral valve prolapse     MVP (mitral valve prolapse)     Pancreatic cyst 2013    PVD (peripheral vascular disease) 2013    PVD (peripheral vascular disease) 2013    Venous insufficiency        Past Surgical History:   Procedure Laterality Date    BARTHOLIN GLAND CYST EXCISION       SECTION      x3    COLONOSCOPY N/A 2019    Procedure: COLONOSCOPY;  Surgeon: Haider Luu MD;  Location: [address]);  Service: Endoscopy;  Laterality: N/A;    Diagnostic laproscopic       laser surgery on vein           ROS:  GENERAL: Feeling well overall.   SKIN: Denies rash or lesions.   HEAD: Denies head " "injury or headache.   NODES: Denies enlarged lymph nodes.   CHEST: Denies chest pain or shortness of breath.   CARDIOVASCULAR: Denies palpitations or left sided chest pain.   ABDOMEN: Reports "pressure" in her left pelvis / groin.  URINARY: No dysuria or hematuria.  REPRODUCTIVE: See HPI.   BREASTS: Reports tingling in upper right breast.  Denies pain, lumps, or nipple discharge.   HEMATOLOGIC: No easy bruisability or excessive bleeding.   MUSCULOSKELETAL: Reports low back discomfort.   NEUROLOGIC: Denies syncope or weakness.   PSYCHIATRIC: Denies depression.    PE:   (chaperone present during entire exam)  APPEARANCE: Well nourished, well developed, in no acute distress.  BREASTS: No dominant masses, nipple discharge of adenopathy bilaterally.  Area of tingling in her upper left breast was unremarkable on exam.   ABDOMEN: Soft. No tenderness or masses. No guarding.  No rebound.  VULVA: Atrophic.  No lesions. Normal female genitalia.  URETHRAL MEATUS: Normal size and location, no lesions, no prolapse.  URETHRA: No masses, tenderness, prolapse or scarring.  VAGINA: Atrophic, no abnormal discharge, mild cystocele.  CERVIX: No lesions and discharge.  No CMT.  UTERUS: Normal size, regular shape, mobile, non-tender, bladder base nontender.  ADNEXA: No masses, tenderness or CDS nodularity.  ANUS PERINEUM: Normal.    Diagnosis:  1. Pelvic pain    2. Postmenopausal status    3. Breast pain          PLAN:    Orders Placed This Encounter    US Pelvis Comp with Transvag NON-OB (xpd    US Breast Right Limited    Mammo Digital Diagnostic Right with Oliver       Patient was counseled today on her pelvic / groin pressure and the various etiologies.  She will have pelvic ultrasound performed for additional evaluation.  Also discussed her breast symptoms and exam findings for which she will have diagnostic mammogram and breast ultrasound.    Follow-up after imaging.    I spent a total of 21 minutes on the day of the visit.This " includes face to face time and non-face to face time preparing to see the patient (eg, review of tests), Obtaining and/or reviewing separately obtained history, Documenting clinical information in the electronic or other health record, Independently interpreting resultsand communicating results to the patient/family/caregiver, or Care coordination.    This note was created with voice recognition software.  Grammatical, syntax and spelling errors may be inevitable.

## 2024-09-05 ENCOUNTER — HOSPITAL ENCOUNTER (OUTPATIENT)
Dept: RADIOLOGY | Facility: OTHER | Age: 79
Discharge: HOME | End: 2024-09-05
Attending: OBSTETRICS & GYNECOLOGY
Payer: MEDICARE

## 2024-09-05 ENCOUNTER — HOSPITAL ENCOUNTER (OUTPATIENT)
Dept: RADIOLOGY | Facility: OTHER | Age: 79
Discharge: HOME | End: 2024-09-05
Attending: INTERNAL MEDICINE
Payer: MEDICARE

## 2024-09-05 ENCOUNTER — PATIENT MESSAGE (OUTPATIENT)
Dept: OBSTETRICS AND GYNECOLOGY | Facility: CLINIC | Age: 79
End: 2024-09-05
Payer: MEDICARE

## 2024-09-05 DIAGNOSIS — R10.2 PELVIC PAIN: ICD-10-CM

## 2024-09-05 DIAGNOSIS — N39.41 URGE INCONTINENCE OF URINE: ICD-10-CM

## 2024-09-05 DIAGNOSIS — N64.4 BREAST PAIN: ICD-10-CM

## 2024-09-05 PROCEDURE — 76856 US EXAM PELVIC COMPLETE: CPT | Mod: 26,,, | Performed by: RADIOLOGY

## 2024-09-05 PROCEDURE — 76856 US EXAM PELVIC COMPLETE: CPT | Mod: TC

## 2024-09-05 PROCEDURE — 77065 DX MAMMO INCL CAD UNI: CPT | Mod: 26,RT,, | Performed by: RADIOLOGY

## 2024-09-05 PROCEDURE — 77061 BREAST TOMOSYNTHESIS UNI: CPT | Mod: 26,RT,, | Performed by: RADIOLOGY

## 2024-09-05 PROCEDURE — 76770 US EXAM ABDO BACK WALL COMP: CPT | Mod: TC

## 2024-09-05 PROCEDURE — 76770 US EXAM ABDO BACK WALL COMP: CPT | Mod: 26,,, | Performed by: RADIOLOGY

## 2024-09-05 PROCEDURE — 77061 BREAST TOMOSYNTHESIS UNI: CPT | Mod: TC,RT

## 2024-09-05 PROCEDURE — 76830 TRANSVAGINAL US NON-OB: CPT | Mod: 26,,, | Performed by: RADIOLOGY

## 2024-09-06 ENCOUNTER — OFFICE VISIT (OUTPATIENT)
Dept: INTERNAL MEDICINE | Facility: CLINIC | Age: 79
End: 2024-09-06
Payer: MEDICARE

## 2024-09-06 ENCOUNTER — TELEPHONE (OUTPATIENT)
Dept: OBSTETRICS AND GYNECOLOGY | Facility: CLINIC | Age: 79
End: 2024-09-06
Payer: MEDICARE

## 2024-09-06 VITALS
HEIGHT: 65 IN | SYSTOLIC BLOOD PRESSURE: 138 MMHG | OXYGEN SATURATION: 98 % | HEART RATE: 89 BPM | DIASTOLIC BLOOD PRESSURE: 72 MMHG | BODY MASS INDEX: 26.04 KG/M2 | TEMPERATURE: 99 F | WEIGHT: 156.31 LBS

## 2024-09-06 DIAGNOSIS — M54.2 CERVICALGIA: Primary | ICD-10-CM

## 2024-09-06 PROCEDURE — 99213 OFFICE O/P EST LOW 20 MIN: CPT | Mod: PBBFAC | Performed by: FAMILY MEDICINE

## 2024-09-06 PROCEDURE — 99999 PR PBB SHADOW E&M-EST. PATIENT-LVL III: CPT | Mod: PBBFAC,,, | Performed by: FAMILY MEDICINE

## 2024-09-06 RX ORDER — CYCLOBENZAPRINE HCL 5 MG
5 TABLET ORAL 3 TIMES DAILY PRN
Qty: 20 TABLET | Refills: 0 | Status: SHIPPED | OUTPATIENT
Start: 2024-09-06 | End: 2024-09-16

## 2024-09-06 RX ORDER — KETOROLAC TROMETHAMINE 30 MG/ML
30 INJECTION, SOLUTION INTRAMUSCULAR; INTRAVENOUS ONCE
Status: COMPLETED | OUTPATIENT
Start: 2024-09-06 | End: 2024-09-06

## 2024-09-06 RX ADMIN — KETOROLAC TROMETHAMINE 30 MG: 30 INJECTION, SOLUTION INTRAMUSCULAR; INTRAVENOUS at 03:09

## 2024-09-06 NOTE — PROGRESS NOTES
"Subjective:     Patient ID: Mirna Norton is a 79 y.o. female.   Chief Complaint: Neck Pain (Pt describes stiffness, and radiates upwards when it is occurring; tried icing and heating, no relief; pt rates pain a 9 )    HPI:  Patient of Dr. Ramsey Tyler, seen acutely today for neck pain x3 days.    Had COVID 5 weeks ago. Had similar pain at that time which resovled. Pain was on the R side of her neck.    Reports that this episode has had insidious onset. She is not sure if she woke up with the pain or if it crept up on her during the day. Says she knows she slept poorly the night before. Denies fevers/chills. Denies photophobia. Denies numbness/tingling/weakness. Pain radiates towards the crown of the head from the base of the skull. The right side of her neck is more painful and feels stiff compared to the left. Reports this pain limits her ability to turn her head.    Review of Systems   Constitutional:  Negative for activity change, chills, fatigue and fever.   HENT:  Negative for ear pain.    Eyes:  Negative for photophobia and visual disturbance.   Respiratory:  Negative for chest tightness and shortness of breath.    Cardiovascular:  Negative for chest pain and palpitations.   Gastrointestinal:  Negative for nausea and vomiting.   Musculoskeletal:  Positive for neck pain and neck stiffness. Negative for arthralgias, back pain and myalgias.   Neurological:  Negative for weakness, light-headedness, numbness and headaches.          Objective:      Vitals:    09/06/24 1445   BP: 138/72   BP Location: Left arm   Patient Position: Sitting   BP Method: X-Large (Manual)   Pulse: 89   SpO2: 98%   Weight: 70.9 kg (156 lb 4.9 oz)   Height: 5' 5" (1.651 m)      Physical Exam  Vitals reviewed.   Constitutional:       General: She is not in acute distress.     Appearance: Normal appearance. She is not ill-appearing.   HENT:      Head: Normocephalic and atraumatic.      Right Ear: External ear normal.      Left Ear: External " ear normal.      Nose: Nose normal.   Neck:      Thyroid: No thyroid mass, thyromegaly or thyroid tenderness.      Meningeal: Brudzinski's sign and Kernig's sign absent.        Comments: Muscle tightness and spasms in b/l trapezius muscles, R>>L; palpable tightness in the R SCM  Cardiovascular:      Rate and Rhythm: Normal rate and regular rhythm.      Pulses: Normal pulses.      Heart sounds: Normal heart sounds. No murmur heard.     No friction rub. No gallop.   Pulmonary:      Effort: Pulmonary effort is normal. No respiratory distress.      Breath sounds: Normal breath sounds. No stridor. No wheezing, rhonchi or rales.   Abdominal:      General: Abdomen is flat. Bowel sounds are normal.      Palpations: Abdomen is soft.   Musculoskeletal:      Cervical back: Normal range of motion. Rigidity and tenderness present. Muscular tenderness present.   Lymphadenopathy:      Cervical: No cervical adenopathy.      Right cervical: No superficial cervical adenopathy.     Left cervical: No superficial cervical adenopathy.   Neurological:      General: No focal deficit present.      Mental Status: She is alert and oriented to person, place, and time. Mental status is at baseline.      Cranial Nerves: No cranial nerve deficit.      Sensory: No sensory deficit.      Motor: No weakness.      Coordination: Coordination normal.      Gait: Gait normal.   Psychiatric:         Mood and Affect: Mood normal.         Behavior: Behavior normal.         Thought Content: Thought content normal.         Judgment: Judgment normal.           Assessment:       Problem List Items Addressed This Visit    None        Plan:       1. Cervicalgia  Exam most c/w MSK spasm  Less likely meningitis or other infectious process  Provided reassurance today on likely benign etiology  Sx mgmt w/ toradol 30mg IM in clinic  Rx for flexeril to use PRN over the next few days  Advised pt to notify clinic if no improvement by Monday/Tuesday of next week  Advised  to seek care sooner if sx worsen or if she develops concerning meningeal signs  PVUA  - ketorolac injection 30 mg  - cyclobenzaprine (FLEXERIL) 5 MG tablet; Take 1 tablet (5 mg total) by mouth 3 (three) times daily as needed for Muscle spasms.  Dispense: 20 tablet; Refill: 0          No follow-ups on file.     Antonio Gautam MD, [facility]  Family Medicine Physician  [facility]  09/06/2024      This note was produced using voice recognition technology. Some typographical or syntax errors may be present, despite best efforts with proofreading.

## 2024-09-10 ENCOUNTER — TELEPHONE (OUTPATIENT)
Dept: OBSTETRICS AND GYNECOLOGY | Facility: CLINIC | Age: 79
End: 2024-09-10
Payer: MEDICARE

## 2024-09-12 ENCOUNTER — TELEPHONE (OUTPATIENT)
Dept: OBSTETRICS AND GYNECOLOGY | Facility: CLINIC | Age: 79
End: 2024-09-12
Payer: MEDICARE

## 2024-09-12 NOTE — TELEPHONE ENCOUNTER
Called patient:    Discussed results of pelvic sono:    FINDINGS:  Uterus:  Size: 5.6 x 3.3 x 4.3 cm  Posterior subserosal leiomyoma measures 1.5 cm.  Endometrium is diffusely thickened measuring 0.8 cm.  Right ovary:  Not visualized  Left ovary:  Not visualized  Free Fluid:  None.  Impression:  Thickened, heterogeneous endometrium, 0.8 cm in thickness.  Further evaluation with endometrial sampling may be considered in further evaluating.  This report was flagged in Epic as abnormal.    We discussed the thickened endometrium and recommendation for EMBX to exclude endometrial pathology  Will schedule

## 2024-09-19 ENCOUNTER — PATIENT MESSAGE (OUTPATIENT)
Dept: OBSTETRICS AND GYNECOLOGY | Facility: CLINIC | Age: 79
End: 2024-09-19

## 2024-09-19 ENCOUNTER — PROCEDURE VISIT (OUTPATIENT)
Dept: OBSTETRICS AND GYNECOLOGY | Facility: CLINIC | Age: 79
End: 2024-09-19
Attending: OBSTETRICS & GYNECOLOGY
Payer: MEDICARE

## 2024-09-19 VITALS
DIASTOLIC BLOOD PRESSURE: 64 MMHG | BODY MASS INDEX: 25.75 KG/M2 | WEIGHT: 154.56 LBS | HEIGHT: 65 IN | SYSTOLIC BLOOD PRESSURE: 118 MMHG

## 2024-09-19 DIAGNOSIS — Z78.0 POSTMENOPAUSAL STATUS: ICD-10-CM

## 2024-09-19 DIAGNOSIS — R93.89 THICKENED ENDOMETRIUM: Primary | ICD-10-CM

## 2024-09-19 PROCEDURE — 58100 BIOPSY OF UTERUS LINING: CPT | Mod: PBBFAC | Performed by: OBSTETRICS & GYNECOLOGY

## 2024-09-19 PROCEDURE — 58100 BIOPSY OF UTERUS LINING: CPT | Mod: S$PBB,,, | Performed by: OBSTETRICS & GYNECOLOGY

## 2024-09-19 NOTE — PROCEDURES
CC: ENDOMETRIAL BIOPSPY    Mirna Norton is a 79 y.o. female  presents for an endometrial biopsy secondary to a thickened endometrium noted on recent pelvic ultrasound.  Because of left pelvic / groin pain, she ultrasound was obtained which revealed an 8 mm endometrium.  She is postmenopausal and is not experiencing any bleeding.  We discussed EMBX to rule out endometrial pathology.    24 Pelvic sono:  FINDINGS:  Uterus:  Size: 5.6 x 3.3 x 4.3 cm  Posterior subserosal leiomyoma measures 1.5 cm.  Endometrium is diffusely thickened measuring 0.8 cm.  Right ovary:  Not visualized  Left ovary:  Not visualized  Free Fluid:  None.  Impression:  Thickened, heterogeneous endometrium, 0.8 cm in thickness.  Further evaluation with endometrial sampling may be considered in further evaluating.  This report was flagged in Epic as abnormal.    PRE ENDOMETRIAL BIOPSY COUNSELING:  The patient was informed of the risk of bleeding, infection, uterine perforation and pain and that the test will rule-out endometrial cancer with accuracy greater than 95%. She was counseled on the alternatives to endometrial biopsy and agrees to proceed.    TIME OUT PERFORMED.  The cervix was visualized with a speculum and prepped with betadine  A sterile endometrial pipelle was easily passed without difficulty to a depth of  6.5  cm.  Scant endometrial tissue was obtained.  The specimen was placed in formalyn and sent to Pathology for histology evaluation. The patient tolerated the procedure well.    ASSESSMENT and PLAN    1. Thickened endometrium    2. Postmenopausal status          POST ENDOMETRIAL BIOPSY COUNSELING:  Manage post biopsy cramping with NSAIDs or Tylenol.  Expect spotting or light bleeding for a few days.  Report bleeding heavier than a period, fever > 101.0 F, worsening pain or a foul smelling vaginal discharge.    FOLLOW-UP: Pending biopsy results.  We will contact her to discuss biopsy results and management plan.

## 2024-09-27 ENCOUNTER — PATIENT MESSAGE (OUTPATIENT)
Dept: INTERNAL MEDICINE | Facility: CLINIC | Age: 79
End: 2024-09-27
Payer: MEDICARE

## 2024-09-27 DIAGNOSIS — I83.893 VARICOSE VEINS OF LEG WITH SWELLING, BILATERAL: Primary | ICD-10-CM

## 2024-09-30 ENCOUNTER — PATIENT MESSAGE (OUTPATIENT)
Dept: OBSTETRICS AND GYNECOLOGY | Facility: CLINIC | Age: 79
End: 2024-09-30
Payer: MEDICARE

## 2024-09-30 ENCOUNTER — TELEPHONE (OUTPATIENT)
Dept: OBSTETRICS AND GYNECOLOGY | Facility: CLINIC | Age: 79
End: 2024-09-30
Payer: MEDICARE

## 2024-09-30 NOTE — TELEPHONE ENCOUNTER
Please review the message below and the EMBX results:      You  Mirna Adamson now (3:12 PM)     DI  Dr Penaloza is out of the office this afternoon. I will leave the message for him to review tomorrow morning when he returns to the office. Keren    This MyChart message has not been read.     Mirna ALMANZA Staff (supporting Benigno Penaloza MD)2 minutes ago (3:10 PM)       What is the plan of action for my test results.

## 2024-10-01 NOTE — TELEPHONE ENCOUNTER
Called patient:    Discussed results of EMBX:    Endometrium (curettage):  - Benign endometrial polyp     She is 79 and has not had any bleeding since becoming menopausal.  Incidental finding of ES 8 mm on ultrasound for pelvic discomfort.  EMBX with benign polyp.    We discussed the options of observation vs polypectomy.    With her age, presentation, and biopsy results I would recommend continued observation.  She is aware of the need to contact us for ANY bleeding.  I have reviewed this plan with Dr. Zayas with GYN-Onc who agrees.

## 2024-10-07 ENCOUNTER — TELEPHONE (OUTPATIENT)
Dept: OBSTETRICS AND GYNECOLOGY | Facility: CLINIC | Age: 79
End: 2024-10-07
Payer: MEDICARE

## 2024-10-07 ENCOUNTER — PATIENT MESSAGE (OUTPATIENT)
Dept: OBSTETRICS AND GYNECOLOGY | Facility: CLINIC | Age: 79
End: 2024-10-07
Payer: MEDICARE

## 2024-10-07 DIAGNOSIS — Z12.31 SCREENING MAMMOGRAM FOR BREAST CANCER: Primary | ICD-10-CM

## 2024-10-07 NOTE — TELEPHONE ENCOUNTER
October 7, 2024  Mirna Norton to CAMACHO ALMANZA Staff (supporting Benigno Penaloza MD)         10/7/24 11:16 AM  I need a referral for my mammogram, and appointment for wellness check.

## 2024-10-08 ENCOUNTER — OFFICE VISIT (OUTPATIENT)
Dept: DERMATOLOGY | Facility: CLINIC | Age: 79
End: 2024-10-08
Payer: MEDICARE

## 2024-10-08 ENCOUNTER — LAB VISIT (OUTPATIENT)
Dept: LAB | Facility: HOSPITAL | Age: 79
End: 2024-10-08
Attending: DERMATOLOGY
Payer: MEDICARE

## 2024-10-08 DIAGNOSIS — I10 PRIMARY HYPERTENSION: ICD-10-CM

## 2024-10-08 DIAGNOSIS — L65.8 TRACTION ALOPECIA: Primary | ICD-10-CM

## 2024-10-08 DIAGNOSIS — L65.9 ALOPECIA: ICD-10-CM

## 2024-10-08 DIAGNOSIS — L21.9 SEBORRHEIC DERMATITIS: ICD-10-CM

## 2024-10-08 DIAGNOSIS — Z76.89 ENCOUNTER FOR SKIN CARE: ICD-10-CM

## 2024-10-08 LAB
ALBUMIN SERPL BCP-MCNC: 3.8 G/DL (ref 3.5–5.2)
ALP SERPL-CCNC: 70 U/L (ref 55–135)
ALT SERPL W/O P-5'-P-CCNC: 21 U/L (ref 10–44)
ANION GAP SERPL CALC-SCNC: 10 MMOL/L (ref 8–16)
AST SERPL-CCNC: 21 U/L (ref 10–40)
BASOPHILS # BLD AUTO: 0.04 K/UL (ref 0–0.2)
BASOPHILS NFR BLD: 0.5 % (ref 0–1.9)
BILIRUB SERPL-MCNC: 0.4 MG/DL (ref 0.1–1)
BILIRUB UR QL STRIP: NEGATIVE
BUN SERPL-MCNC: 12 MG/DL (ref 8–23)
CALCIUM SERPL-MCNC: 10.4 MG/DL (ref 8.7–10.5)
CHLORIDE SERPL-SCNC: 107 MMOL/L (ref 95–110)
CLARITY UR REFRACT.AUTO: CLEAR
CO2 SERPL-SCNC: 24 MMOL/L (ref 23–29)
COLOR UR AUTO: YELLOW
CREAT SERPL-MCNC: 0.8 MG/DL (ref 0.5–1.4)
DIFFERENTIAL METHOD BLD: ABNORMAL
EOSINOPHIL # BLD AUTO: 0.3 K/UL (ref 0–0.5)
EOSINOPHIL NFR BLD: 3.9 % (ref 0–8)
ERYTHROCYTE [DISTWIDTH] IN BLOOD BY AUTOMATED COUNT: 13.7 % (ref 11.5–14.5)
EST. GFR  (NO RACE VARIABLE): >60 ML/MIN/1.73 M^2
GLUCOSE SERPL-MCNC: 102 MG/DL (ref 70–110)
GLUCOSE UR QL STRIP: NEGATIVE
HCT VFR BLD AUTO: 40.8 % (ref 37–48.5)
HGB BLD-MCNC: 12.8 G/DL (ref 12–16)
HGB UR QL STRIP: NEGATIVE
IMM GRANULOCYTES # BLD AUTO: 0.01 K/UL (ref 0–0.04)
IMM GRANULOCYTES NFR BLD AUTO: 0.1 % (ref 0–0.5)
IRON SERPL-MCNC: 76 UG/DL (ref 30–160)
KETONES UR QL STRIP: NEGATIVE
LEUKOCYTE ESTERASE UR QL STRIP: NEGATIVE
LYMPHOCYTES # BLD AUTO: 2.3 K/UL (ref 1–4.8)
LYMPHOCYTES NFR BLD: 30.6 % (ref 18–48)
MCH RBC QN AUTO: 28.7 PG (ref 27–31)
MCHC RBC AUTO-ENTMCNC: 31.4 G/DL (ref 32–36)
MCV RBC AUTO: 92 FL (ref 82–98)
MONOCYTES # BLD AUTO: 0.6 K/UL (ref 0.3–1)
MONOCYTES NFR BLD: 7.3 % (ref 4–15)
NEUTROPHILS # BLD AUTO: 4.3 K/UL (ref 1.8–7.7)
NEUTROPHILS NFR BLD: 57.6 % (ref 38–73)
NITRITE UR QL STRIP: NEGATIVE
NRBC BLD-RTO: 0 /100 WBC
PH UR STRIP: 5 [PH] (ref 5–8)
PLATELET # BLD AUTO: 307 K/UL (ref 150–450)
PMV BLD AUTO: 9.5 FL (ref 9.2–12.9)
POTASSIUM SERPL-SCNC: 4 MMOL/L (ref 3.5–5.1)
PROT SERPL-MCNC: 7.6 G/DL (ref 6–8.4)
PROT UR QL STRIP: NEGATIVE
RBC # BLD AUTO: 4.46 M/UL (ref 4–5.4)
SATURATED IRON: 23 % (ref 20–50)
SODIUM SERPL-SCNC: 141 MMOL/L (ref 136–145)
SP GR UR STRIP: 1.01 (ref 1–1.03)
TOTAL IRON BINDING CAPACITY: 329 UG/DL (ref 250–450)
TRANSFERRIN SERPL-MCNC: 222 MG/DL (ref 200–375)
URATE SERPL-MCNC: 4.5 MG/DL (ref 2.4–5.7)
URN SPEC COLLECT METH UR: NORMAL
VIT B12 SERPL-MCNC: 669 PG/ML (ref 210–950)
WBC # BLD AUTO: 7.51 K/UL (ref 3.9–12.7)

## 2024-10-08 PROCEDURE — 84550 ASSAY OF BLOOD/URIC ACID: CPT | Performed by: INTERNAL MEDICINE

## 2024-10-08 PROCEDURE — 85025 COMPLETE CBC W/AUTO DIFF WBC: CPT | Performed by: INTERNAL MEDICINE

## 2024-10-08 PROCEDURE — 82652 VIT D 1 25-DIHYDROXY: CPT | Performed by: DERMATOLOGY

## 2024-10-08 PROCEDURE — 84443 ASSAY THYROID STIM HORMONE: CPT | Performed by: DERMATOLOGY

## 2024-10-08 PROCEDURE — G2211 COMPLEX E/M VISIT ADD ON: HCPCS | Mod: S$PBB,,, | Performed by: DERMATOLOGY

## 2024-10-08 PROCEDURE — 84630 ASSAY OF ZINC: CPT | Performed by: DERMATOLOGY

## 2024-10-08 PROCEDURE — 36415 COLL VENOUS BLD VENIPUNCTURE: CPT | Mod: PN | Performed by: DERMATOLOGY

## 2024-10-08 PROCEDURE — 83540 ASSAY OF IRON: CPT | Performed by: DERMATOLOGY

## 2024-10-08 PROCEDURE — 99999 PR PBB SHADOW E&M-EST. PATIENT-LVL III: CPT | Mod: PBBFAC,,, | Performed by: DERMATOLOGY

## 2024-10-08 PROCEDURE — 82728 ASSAY OF FERRITIN: CPT | Performed by: DERMATOLOGY

## 2024-10-08 PROCEDURE — 99213 OFFICE O/P EST LOW 20 MIN: CPT | Mod: PBBFAC,PN | Performed by: DERMATOLOGY

## 2024-10-08 PROCEDURE — 81003 URINALYSIS AUTO W/O SCOPE: CPT | Performed by: INTERNAL MEDICINE

## 2024-10-08 PROCEDURE — 80053 COMPREHEN METABOLIC PANEL: CPT | Performed by: INTERNAL MEDICINE

## 2024-10-08 PROCEDURE — 99204 OFFICE O/P NEW MOD 45 MIN: CPT | Mod: S$PBB,,, | Performed by: DERMATOLOGY

## 2024-10-08 PROCEDURE — 82607 VITAMIN B-12: CPT | Performed by: DERMATOLOGY

## 2024-10-08 RX ORDER — KETOCONAZOLE 20 MG/G
CREAM TOPICAL 2 TIMES DAILY
Status: CANCELLED | OUTPATIENT
Start: 2024-10-08

## 2024-10-08 RX ORDER — KETOCONAZOLE 20 MG/ML
SHAMPOO, SUSPENSION TOPICAL
Qty: 120 ML | Refills: 10 | Status: SHIPPED | OUTPATIENT
Start: 2024-10-08

## 2024-10-08 NOTE — PATIENT INSTRUCTIONS
Reviewed with patient to eat protein daily, get labs done, wash with recommended shampoo or soap for the scalp, avoid hair coloring and chemicals and hot treatments, and potentially consider topical 5% minoxidil.  Avoid hot water.    Avoid harsh chemicals, relaxers, hair coloring, and anything irritating to the scalp.  Less is more.  Less product usage for the scalp is for the best.    No hot water bathing reviewed.    Can try saw palmetto 1000 mg per day.  Prefer organic version.    Patient to consider better supplement brands like Jarrows, Nature's Way, Solaray, NYLA, Pure Encapsulations, and Now.  Top of the line companies are [employer] and Liquid [employer].  Can also go to [employer] and look at different brands there.  For iron and zinc, consider bases of glycinate, acetate, citrate, picolinate.    Can also see what is available at the [facility].      Eat 40 grams of protein a day.

## 2024-10-08 NOTE — PROGRESS NOTES
Subjective:      Patient ID:  Mirna Norton is a 79 y.o. female who presents for   Chief Complaint   Patient presents with    Hair Loss     Hair Loss        Pt c/o hair loss x a few months. Tx with Clobetasol solution for scaly skin on scalp. Tx helped.    Review of Systems   Constitutional:  Negative for fever, chills, weight loss, weight gain, fatigue, night sweats and malaise.   Skin:  Positive for itching (had scalp itch but better with clob sol).       Objective:   Physical Exam   Constitutional: She appears well-developed and well-nourished.   Neurological: She is alert and oriented to person, place, and time.   Psychiatric: She has a normal mood and affect.   Skin:   Areas Examined (abnormalities noted in diagram):   Scalp / Hair Palpated and Inspected       Diagram Legend     Erythematous scaling macule/papule c/w actinic keratosis       Vascular papule c/w angioma      Pigmented verrucoid papule/plaque c/w seborrheic keratosis      Yellow umbilicated papule c/w sebaceous hyperplasia      Irregularly shaped tan macule c/w lentigo     1-2 mm smooth white papules consistent with Milia      Movable subcutaneous cyst with punctum c/w epidermal inclusion cyst      Subcutaneous movable cyst c/w pilar cyst      Firm pink to brown papule c/w dermatofibroma      Pedunculated fleshy papule(s) c/w skin tag(s)      Evenly pigmented macule c/w junctional nevus     Mildly variegated pigmented, slightly irregular-bordered macule c/w mildly atypical nevus      Flesh colored to evenly pigmented papule c/w intradermal nevus       Pink pearly papule/plaque c/w basal cell carcinoma      Erythematous hyperkeratotic cursted plaque c/w SCC      Surgical scar with no sign of skin cancer recurrence      Open and closed comedones      Inflammatory papules and pustules      Verrucoid papule consistent consistent with wart     Erythematous eczematous patches and plaques     Dystrophic onycholytic nail with subungual debris c/w  onychomycosis     Umbilicated papule    Erythematous-base heme-crusted tan verrucoid plaque consistent with inflamed seborrheic keratosis     Erythematous Silvery Scaling Plaque c/w Psoriasis     See annotation    Focal flaking of scalp.  Thin hair on top of scalp and frontal hairline thinning.    Assessment / Plan:        Traction alopecia  Reviewed with patient etiology of traction and the need for looser hairstyles with less tension.    Alopecia  -     Zinc; Future; Expected date: 10/08/2024  -     Vitamin B12; Future; Expected date: 10/08/2024  -     Ferritin; Future; Expected date: 10/08/2024  -     Iron and TIBC; Future; Expected date: 10/08/2024  -     Calcitriol; Future; Expected date: 10/08/2024  -     TSH; Future; Expected date: 10/08/2024  Discussed with patient the need for laboratory work up for further evaluation.  Discussed that such investigation may not be helpful.  Discussed with patient the etiology and pathogenesis of the disease or skin lesion(s) and possible treatments and aggravators.    Brochure given for patient education.  Avoid harsh chemicals, relaxers, hair coloring, and anything irritating to the scalp.  Less is more.  Less product usage for the scalp is for the best.  Reviewed with patient to eat protein daily, get labs done, wash with recommended shampoo or soap for the scalp, avoid hair coloring and chemicals and hot treatments, and potentially consider topical 5% minoxidil.  Avoid hot water.  Discussed with patient the need for laboratory work up for further evaluation.  Discussed that such investigation may not be helpful.  Can try saw palmetto 500 mg to 1000 mg per day.  Prefer organic version.  Patient to consider better supplement brands like Jarrows, Nature's Way, Solaray, NYLA, Pure Encapsulations, and Now.  Top of the line companies are [employer] and [employer].  Can also go to [employer] and look at different brands there.  For iron and zinc, consider bases of glycinate,  acetate, citrate, picolinate.    Can also see what is available at the [facility].    Seborrheic dermatitis  -     ketoconazole (NIZORAL) 2 % shampoo; Apply topically every 7 days. X 30 minute scalp soaks  Dispense: 120 mL; Refill: 10  Reviewed with patient different treatment options and associated risks.  Proper application of medications and or care for affected area(s) and condition(s) reviewed.  Chronic nature of this condition discussed with patient.  Previous [facility] labs and or records and notes reviewed and considered for their impact on our clinical decision making today.    Encounter for skin care  No hot water bathing reviewed.      Telogen effluvium  Should improve post lab rev.             Follow up if symptoms worsen or fail to improve, for Post labs.

## 2024-10-09 LAB
FERRITIN SERPL-MCNC: 205 NG/ML (ref 20–300)
TSH SERPL DL<=0.005 MIU/L-ACNC: 1.26 UIU/ML (ref 0.4–4)

## 2024-10-11 ENCOUNTER — TELEPHONE (OUTPATIENT)
Dept: NEPHROLOGY | Facility: CLINIC | Age: 79
End: 2024-10-11
Payer: MEDICARE

## 2024-10-11 ENCOUNTER — PATIENT MESSAGE (OUTPATIENT)
Dept: DERMATOLOGY | Facility: CLINIC | Age: 79
End: 2024-10-11
Payer: MEDICARE

## 2024-10-11 ENCOUNTER — PATIENT MESSAGE (OUTPATIENT)
Dept: NEPHROLOGY | Facility: CLINIC | Age: 79
End: 2024-10-11
Payer: MEDICARE

## 2024-10-11 DIAGNOSIS — N39.0 URINARY TRACT INFECTION WITHOUT HEMATURIA, SITE UNSPECIFIED: Primary | ICD-10-CM

## 2024-10-14 ENCOUNTER — LAB VISIT (OUTPATIENT)
Dept: LAB | Facility: HOSPITAL | Age: 79
End: 2024-10-14
Attending: INTERNAL MEDICINE
Payer: MEDICARE

## 2024-10-14 DIAGNOSIS — N39.0 URINARY TRACT INFECTION WITHOUT HEMATURIA, SITE UNSPECIFIED: ICD-10-CM

## 2024-10-14 LAB
1,25(OH)2D3 SERPL-MCNC: 44 PG/ML (ref 20–79)
CREAT UR-MCNC: 40 MG/DL (ref 15–325)
PROT UR-MCNC: <7 MG/DL (ref 0–15)
PROT/CREAT UR: NORMAL MG/G{CREAT} (ref 0–0.2)

## 2024-10-14 PROCEDURE — 84156 ASSAY OF PROTEIN URINE: CPT | Performed by: INTERNAL MEDICINE

## 2024-10-15 ENCOUNTER — PATIENT MESSAGE (OUTPATIENT)
Dept: DERMATOLOGY | Facility: CLINIC | Age: 79
End: 2024-10-15
Payer: MEDICARE

## 2024-10-15 LAB — ZINC SERPL-MCNC: 107 UG/DL (ref 60–130)

## 2024-10-16 ENCOUNTER — OFFICE VISIT (OUTPATIENT)
Dept: NEPHROLOGY | Facility: CLINIC | Age: 79
End: 2024-10-16
Payer: MEDICARE

## 2024-10-16 VITALS — BODY MASS INDEX: 26.63 KG/M2 | HEIGHT: 65 IN | WEIGHT: 159.81 LBS | OXYGEN SATURATION: 99 % | HEART RATE: 64 BPM

## 2024-10-16 DIAGNOSIS — I10 PRIMARY HYPERTENSION: Primary | ICD-10-CM

## 2024-10-16 PROCEDURE — 99999 PR PBB SHADOW E&M-EST. PATIENT-LVL III: CPT | Mod: PBBFAC,,, | Performed by: INTERNAL MEDICINE

## 2024-10-16 PROCEDURE — 99213 OFFICE O/P EST LOW 20 MIN: CPT | Mod: PBBFAC | Performed by: INTERNAL MEDICINE

## 2024-10-16 NOTE — PROGRESS NOTES
CHIEF COMPLAINT/HPI: Mirna is a 79 y.o. female for evaluation of her BP and renal function.     Clinical course:    I have seen Ms. Smyth today in the clinic for a regular follow up. She had sever COVID infection in Aug. She is taking the same medications. Her BP iswell controlled at home. She has no specific complain. Renal ultrasound showed a 3c, simple renal cyst in the Rt. kidney. Her renal function is normal. Will repeat the labs in 6 months.       Past Medical History:   Diagnosis Date    Abnormal cervical Papanicolaou smear 1980's    Cryo    Carotid artery stenosis     via kinked carotid artery - followed by Dr. Ye Anderson    Carotid stenosis 5/15/2014    GERD (gastroesophageal reflux disease)     Gout due to renal impairment     Gout, arthritis 2/20/2013    Gout, unspecified     Herniated lumbar intervertebral disc     Hypertension     Insomnia     Irritable bowel syndrome without diarrhea 9/20/2015    Liver lesion 3/13/2013    Mitral valve prolapse     MVP (mitral valve prolapse)     Pancreatic cyst 12/30/2013    PVD (peripheral vascular disease) 12/23/2013    PVD (peripheral vascular disease) 12/23/2013    Venous insufficiency        Mirna reports that she has never smoked. She has never used smokeless tobacco. She reports that she does not drink alcohol and does not use drugs.    Family History   Problem Relation Name Age of Onset    Heart disease Mother      Diabetes Brother      Hypertension Brother      Diabetes Sister      Eclampsia Sister      Hypertension Sister      Colon polyps Sister      Diabetes Sister      Eclampsia Sister      Hypertension Sister      Miscarriages / Stillbirths Sister      Heart disease Sister          mi, cad    Diabetes Sister      Eclampsia Sister      Hypertension Sister      Diabetes Sister      Endometriosis Daughter      No Known Problems Daughter      No Known Problems Daughter      Breast cancer Other niece         niece    Colon cancer Neg Hx      Ovarian  "cancer Neg Hx      Esophageal cancer Neg Hx      Liver cancer Neg Hx      Liver disease Neg Hx      Rectal cancer Neg Hx      Stomach cancer Neg Hx      Uterine cancer Neg Hx      Cervical cancer Neg Hx         ROS:    General: No fever, chills, change in appetite or weight loss  Skin: No rashes or pruritus  Head: No headaches or recent head trauma  Eyes: No changes in vision or eye pain  Nodes: No swollen glands  Pulmonary: No dyspnea, wheezes, cough or sputum production  Cardiovascular: No chest pain, edema, PND, orthopnea or reduced exercise tolerance  Abdomen: No abdominal pain, nausea, vomiting, constipation or diarrhea  Urinary: No flank pain, dysuria or hematuria  Peripheral Vascular: No claudication or cyanosis  Musculoskeletal: No joint stiffness, swelling or back pain  Neurologic: No history of seizures, tremors, forcal weakness or numbness    PE:    Vitals:    10/16/24 1112   Pulse: 64   SpO2: 99%   Weight: 72.5 kg (159 lb 13.3 oz)   Height: 5' 5" (1.651 m)       HEENT: Normocephalic, atraumatic, EOMI, PERRLA, normal conjunctive and lids, supple neck with no thyromeglay or masses, normal oropharynx, hearing intact  Cardiovascular: Regular without murmur, gallop or rub, no edema  Respiratory: Clear bilaterally without rales, rhonchi, wheezes with normal effort  Abdomen: Soft, nontender/nondistended, positive bowel sounds, no hepatospenomegaly  Extremities: No cyanosis, clubbing, normal gait  Skin: No rashes, ulcers, induration  Psych: Oriented x 3 with normal mood and effect      Labs:  CBC  CMP  Uric Acid.   Urinalysis.     Impression and plan:  HTN with well controlled BP.  Normal renal function.   Gout  Venous insufficiency  GERD  IBD  Lumbosacral disease  Hyperlipidemia taking Statin.        Labs:  CBC  CMP  Uric Acid.   Urinalysis.     Impression and plan:  HTN with well controlled BP.  Normal renal function.   Gout  Venous insufficiency  GERD  IBD  Lumbosacral disease  Hyperlipidemia taking Statin.  "

## 2024-10-30 ENCOUNTER — TELEPHONE (OUTPATIENT)
Dept: GASTROENTEROLOGY | Facility: CLINIC | Age: 79
End: 2024-10-30
Payer: MEDICARE

## 2024-10-30 NOTE — TELEPHONE ENCOUNTER
----- Message from Lucy sent at 10/30/2024 10:39 AM CDT -----  Type:  Patient Returning Call    Who Called:Pt   Would the patient rather a call back or a response via [facility]? Call back   Best Call Back Number:[phone]  Additional Information: pt states the provider she was seeing has left... pt says it is 5 yrs since her last colonoscopy and wanted to know if she still have to have one or is there an age limit when you stop taking the m

## 2024-10-31 ENCOUNTER — OFFICE VISIT (OUTPATIENT)
Dept: OBSTETRICS AND GYNECOLOGY | Facility: CLINIC | Age: 79
End: 2024-10-31
Attending: OBSTETRICS & GYNECOLOGY
Payer: MEDICARE

## 2024-10-31 ENCOUNTER — HOSPITAL ENCOUNTER (OUTPATIENT)
Dept: RADIOLOGY | Facility: OTHER | Age: 79
Discharge: HOME | End: 2024-10-31
Attending: OBSTETRICS & GYNECOLOGY
Payer: MEDICARE

## 2024-10-31 VITALS
SYSTOLIC BLOOD PRESSURE: 100 MMHG | BODY MASS INDEX: 26.08 KG/M2 | HEIGHT: 65 IN | DIASTOLIC BLOOD PRESSURE: 70 MMHG | WEIGHT: 156.5 LBS

## 2024-10-31 DIAGNOSIS — Z78.0 POSTMENOPAUSAL STATUS: ICD-10-CM

## 2024-10-31 DIAGNOSIS — Z12.31 SCREENING MAMMOGRAM FOR BREAST CANCER: ICD-10-CM

## 2024-10-31 DIAGNOSIS — Z01.419 WOMEN'S ANNUAL ROUTINE GYNECOLOGICAL EXAMINATION: Primary | ICD-10-CM

## 2024-10-31 PROCEDURE — 99214 OFFICE O/P EST MOD 30 MIN: CPT | Mod: PBBFAC | Performed by: OBSTETRICS & GYNECOLOGY

## 2024-10-31 PROCEDURE — 99999 PR PBB SHADOW E&M-EST. PATIENT-LVL IV: CPT | Mod: PBBFAC,,, | Performed by: OBSTETRICS & GYNECOLOGY

## 2024-10-31 PROCEDURE — 77067 SCR MAMMO BI INCL CAD: CPT | Mod: TC

## 2024-10-31 PROCEDURE — 77063 BREAST TOMOSYNTHESIS BI: CPT | Mod: TC

## 2024-10-31 PROCEDURE — G0101 CA SCREEN;PELVIC/BREAST EXAM: HCPCS | Mod: PBBFAC | Performed by: OBSTETRICS & GYNECOLOGY

## 2024-10-31 RX ORDER — ROSUVASTATIN CALCIUM 10 MG/1
10 TABLET, COATED ORAL
COMMUNITY
Start: 2024-09-22

## 2024-10-31 RX ORDER — METHOCARBAMOL 500 MG/1
500 TABLET, FILM COATED ORAL 3 TIMES DAILY
COMMUNITY
Start: 2024-09-06

## 2024-11-05 NOTE — PROGRESS NOTES
INTERNAL MEDICINE CLINIC - SAME DAY APPOINTMENT  Progress Note    PRESENTING HISTORY     PCP: Ramsey Tyler MD    Chief Complaint/Reason for Visit:   No chief complaint on file.     History of Present Illness & ROS : Ms. Mirna Norton is a 79 y.o. female.    Same day apt.   Est'd with Dr. Tyler.  Pleasant lady.   Reports that has been having some chronic, recurrent discomfort to her left groin.   Discomfort feels like an 'ache'. Sitting and walking 'it is felt'. Denies any injury or trauma.   Unable to identify anything that may may make it better.   States, 'just feels like the muscle of something just flops', (pointing to her left groin). Denies any visible blood to stools and no abdominal pain.   She has been seen by her GYN and underwent TV US and also endometrial biopsy, 'everything was ok'; also seen by Vascular doctor in the past, wearing compression stockings (knee high), but still with some discomforts. Trying to get in with for the follow up Colonoscopy, due at 5 yrs, 'called the dept but still have not gotten a return call to schedule'.   Of note to make, last was in 7/2024, evidence of a polyp, no referral to [facility] noted for her recommended 5 yr repeat, placed today.     Review of Systems:  Eyes: denies visual changes at this time denies floaters   ENT: no nasal congestion or sore throat  Respiratory: no cough or shorness of breath  Cardiovascular: no chest pain or palpitations  Gastrointestinal: no nausea or vomiting, no abdominal pain or change in bowel habits  Genitourinary: no hematuria or dysuria; denies frequency  Hematologic/Lymphatic: no easy bruising or lymphadenopathy  Musculoskeletal: no arthralgias or myalgias  Neurological: no seizures or tremors  Endocrine: no heat or cold intolerance      PAST HISTORY:     Past Medical History:   Diagnosis Date    Abnormal cervical Papanicolaou smear 1980's    Cryo    Carotid artery stenosis     via kinked carotid artery - followed by Dr. Belcher  Justin    Carotid stenosis 5/15/2014    GERD (gastroesophageal reflux disease)     Gout due to renal impairment     Gout, arthritis 2013    Gout, unspecified     Herniated lumbar intervertebral disc     Hypertension     Insomnia     Irritable bowel syndrome without diarrhea 2015    Liver lesion 3/13/2013    Mitral valve prolapse     MVP (mitral valve prolapse)     Pancreatic cyst 2013    PVD (peripheral vascular disease) 2013    PVD (peripheral vascular disease) 2013    Venous insufficiency        Past Surgical History:   Procedure Laterality Date    BARTHOLIN GLAND CYST EXCISION       SECTION      x3    COLONOSCOPY N/A 2019    Procedure: COLONOSCOPY;  Surgeon: Haider Luu MD;  Location: [facility] ([address] [place] [address]);  Service: Endoscopy;  Laterality: N/A;    Diagnostic laproscopic       laser surgery on vein         Family History   Problem Relation Name Age of Onset    Heart disease Mother      Diabetes Brother      Hypertension Brother      Diabetes Sister      Eclampsia Sister      Hypertension Sister      Colon polyps Sister      Diabetes Sister      Eclampsia Sister      Hypertension Sister      Miscarriages / Stillbirths Sister      Heart disease Sister          mi, cad    Diabetes Sister      Eclampsia Sister      Hypertension Sister      Diabetes Sister      Endometriosis Daughter      No Known Problems Daughter      No Known Problems Daughter      Breast cancer Other niece         under age 40. came back at around age 60    Colon cancer Neg Hx      Ovarian cancer Neg Hx      Esophageal cancer Neg Hx      Liver cancer Neg Hx      Liver disease Neg Hx      Rectal cancer Neg Hx      Stomach cancer Neg Hx      Uterine cancer Neg Hx      Cervical cancer Neg Hx         Social History     Socioeconomic History    Marital status:    Occupational History    Occupation: Retired   Tobacco Use    Smoking status: Never    Smokeless tobacco: Never   Substance and Sexual Activity     Alcohol use: No    Drug use: No    Sexual activity: Not Currently     Birth control/protection: Post-menopausal   Social History Narrative    3 kids (healthy), previous . , has eye issues. biw exercise.     Social Drivers of Health     Financial Resource Strain: Low Risk  (3/5/2024)    Overall Financial Resource Strain (CARDIA)     Difficulty of Paying Living Expenses: Not hard at all   Food Insecurity: No Food Insecurity (3/5/2024)    Hunger Vital Sign     Worried About Running Out of Food in the Last Year: Never true     Ran Out of Food in the Last Year: Never true   Transportation Needs: No Transportation Needs (3/5/2024)    PRAPARE - Transportation     Lack of Transportation (Medical): No     Lack of Transportation (Non-Medical): No   Physical Activity: Insufficiently Active (3/5/2024)    Exercise Vital Sign     Days of Exercise per Week: 2 days     Minutes of Exercise per Session: 30 min   Stress: No Stress Concern Present (3/5/2024)    [employer] - Occupational Stress Questionnaire     Feeling of Stress : Not at all   Housing Stability: Low Risk  (3/5/2024)    Housing Stability Vital Sign     Unable to Pay for Housing in the Last Year: No     Number of Places Lived in the Last Year: 1     Unstable Housing in the Last Year: No       MEDICATIONS & ALLERGIES:     Current Outpatient Medications on File Prior to Visit   Medication Sig Dispense Refill    allopurinoL (ZYLOPRIM) 100 MG tablet Take 1 tablet (100 mg total) by mouth once daily. 90 tablet 0    amLODIPine (NORVASC) 5 MG tablet Take 1 tablet (5 mg total) by mouth once daily. 90 tablet 3    aspirin (ECOTRIN) 81 MG EC tablet Take 1 tablet (81 mg total) by mouth once daily.      azelastine (ASTELIN) 137 mcg (0.1 %) nasal spray 2 sprays by Nasal route as needed.      BIFIDOBACTERIUM INFANTIS (ALIGN ORAL) Take by mouth once daily.      BIOTIN ORAL Take by mouth once daily.      ciclopirox (PENLAC) 8 %  Soln Apply topically nightly. Remove once a week with nail polish remover 1 each 2    diclofenac sodium (VOLTAREN) 1 % Gel Apply 2 g topically 4 (four) times daily as needed. 100 g 0    hydroCHLOROthiazide (HYDRODIURIL) 25 MG tablet Take 25 mg by mouth once daily.      ketoconazole (NIZORAL) 2 % shampoo Apply topically every 7 days. X 30 minute scalp soaks 120 mL 10    methocarbamoL (ROBAXIN) 500 MG Tab Take 500 mg by mouth 3 (three) times daily.      multivitamin-minerals-lutein Tab Take 1 tablet by mouth once daily.       nitroGLYCERIN (NITROSTAT) 0.4 MG SL tablet Place 1 tablet (0.4 mg total) under the tongue every 5 (five) minutes as needed for Chest pain. (Patient not taking: Reported on 3/6/2024) 20 tablet 12    polyethylene glycol (MIRALAX) 17 gram/dose powder Take 17 g by mouth once daily. 510 g 11    rosuvastatin (CRESTOR) 10 MG tablet Take 10 mg by mouth.      valsartan (DIOVAN) 160 MG tablet Take 1 tablet (160 mg total) by mouth once daily. 90 tablet 3     No current facility-administered medications on file prior to visit.        Review of patient's allergies indicates:  No Known Allergies    Medications Reconciliation:   I have reconciled the patient's home medications with the patient/family. I have updated all changes.  Refer to After-Visit Medication List.    OBJECTIVE:     Vital Signs:  There were no vitals filed for this visit.  Wt Readings from Last 3 Encounters:   10/31/24 0854 71 kg (156 lb 8.4 oz)   10/16/24 1112 72.5 kg (159 lb 13.3 oz)   09/19/24 1537 70.1 kg (154 lb 8.7 oz)     There is no height or weight on file to calculate BMI.   Wt Readings from Last 3 Encounters:   11/06/24 72.5 kg (159 lb 13.3 oz)   10/31/24 71 kg (156 lb 8.4 oz)   10/16/24 72.5 kg (159 lb 13.3 oz)     Temp Readings from Last 3 Encounters:   09/06/24 98.9 °F (37.2 °C) (Oral)   03/27/23 98.5 °F (36.9 °C) (Oral)   06/14/21 97.8 °F (36.6 °C)     BP Readings from Last 3 Encounters:   11/06/24 126/64   10/31/24 100/70    09/19/24 118/64     Pulse Readings from Last 3 Encounters:   11/06/24 70   10/16/24 64   09/06/24 89       Physical Exam:  General: Well developed, well nourished. No distress.  HEENT: Head is normocephalic, atraumatic  Eyes: Clear conjunctiva.  Neck: Supple, symmetrical neck; trachea midline.  Extremities: No LE edema. Pulses 2+ and symmetric.   Abdomen: Abdomen is soft, non-tender non-distended with normal bowel sounds.  *Unremarkable, benign clinical exam. Unable to reproduce a possible inguinal 'bulge' and not palpable abnormalities or discoloration or changes in palpable skin temp to the concerned area.   Skin: Skin color, texture, turgor normal. No rashes.  Musculoskeletal: Normal gait.   Neurologic:  No focal numbness or weakness.       Laboratory  Lab Results   Component Value Date    WBC 7.79 10/14/2024    HGB 12.6 10/14/2024    HCT 40.7 10/14/2024     10/14/2024    CHOL 188 01/20/2022    TRIG 177 (H) 01/20/2022    HDL 73 01/20/2022    ALT 21 10/08/2024    AST 21 10/08/2024     10/14/2024    K 3.9 10/14/2024     10/14/2024    CREATININE 0.8 10/14/2024    BUN 17 10/14/2024    CO2 26 10/14/2024    TSH 1.263 10/08/2024    INR 1.0 04/08/2011    GLUF 87 03/06/2023    HGBA1C 6.1 (H) 02/02/2021     US Tranvaginal   Impression:     Thickened, heterogeneous endometrium, 0.8 cm in thickness.  Further evaluation with endometrial sampling may be considered in further evaluating.     This report was flagged in Epic as abnormal.        Electronically signed by:Shiela Santos  Date:                                            09/05/2024  Time:                                           10:14      C Scope   Impression:           - Diverticulosis in the proximal descending colon.                         - One 8 mm polyp in the ascending colon, removed                         with a cold snare. Resected and retrieved.                         - One 4 mm polyp in the transverse colon, removed                          with a cold snare. Resected and retrieved.                         - The examination was otherwise normal on direct                         and retroflexion views.        US LE  Impression:  (Referred by Dr. Tyler in 5/2023; US Per Dr. Gomez when seen in 8/2024; no additional follow ups recommended)  1.  No evidence of deep venous thrombosis in either lower extremity.     2.  Hemodynamically significant venous reflux right greater saphenous vein.        Electronically signed by:Shiela Santos  Date:                                            08/12/2024  Time:                                           10:05      ASSESSMENT & PLAN:     Same day apt.     Left groin pain, chronic  ? Underlying LAD vs Hernia or some other underlying pathology to explain her discomfort; will refer back to GI for her rec'd 5 yr surveillance, repeat C- Scope (due in 7/2024)  -     US Soft Tissue, Groin Left; Future; Expected date: 11/06/2024  -     Ambulatory referral/consult to Endo Procedure ; Future; Expected date: 11/07/2024  -     X-Ray Hip 2 or 3 views Left with Pelvis when performed; Future; Expected date: 11/06/2024    History of colon polyp  Diverticulosis  -     Ambulatory referral/consult to Endo Procedure ; Future; Expected date: 11/07/2024  -     X-Ray Hip 2 or 3 views Left with Pelvis when performed; Future; Expected date: 11/06/2024    Future Appointments   Date Time Provider Department Center   11/6/2024  2:45 PM [facility] XRIM1 485 LB LIMIT [facility]   11/7/2024  1:00 PM SBP US3 [facility] ULSND [facility]   11/9/2024  8:40 AM PRE-ADMIT NURSE 1, ENDO -[facility] ENDOPP4 [facility]   1/6/2025  2:20 PM [facility]        Medication List            Accurate as of November 6, 2024  2:27 PM. If you have any questions, ask your nurse or doctor.                CONTINUE taking these medications      ALIGN ORAL     allopurinoL 100 MG tablet  Commonly  known as: ZYLOPRIM  Take 1 tablet (100 mg total) by mouth once daily.     amLODIPine 5 MG tablet  Commonly known as: NORVASC  Take 1 tablet (5 mg total) by mouth once daily.     aspirin 81 MG EC tablet  Commonly known as: ECOTRIN  Take 1 tablet (81 mg total) by mouth once daily.     azelastine 137 mcg (0.1 %) nasal spray  Commonly known as: ASTELIN     BIOTIN ORAL     ciclopirox 8 % Soln  Commonly known as: PENLAC  Apply topically nightly. Remove once a week with nail polish remover     diclofenac sodium 1 % Gel  Commonly known as: VOLTAREN  Apply 2 g topically 4 (four) times daily as needed.     hydroCHLOROthiazide 25 MG tablet  Commonly known as: HYDRODIURIL     ketoconazole 2 % shampoo  Commonly known as: NIZORAL  Apply topically every 7 days. X 30 minute scalp soaks     methocarbamoL 500 MG Tab  Commonly known as: ROBAXIN     multivitamin-minerals-lutein Tab     nitroGLYCERIN 0.4 MG SL tablet  Commonly known as: NITROSTAT  Place 1 tablet (0.4 mg total) under the tongue every 5 (five) minutes as needed for Chest pain.     polyethylene glycol 17 gram/dose powder  Commonly known as: MIRALAX  Take 17 g by mouth once daily.     rosuvastatin 10 MG tablet  Commonly known as: CRESTOR     valsartan 160 MG tablet  Commonly known as: DIOVAN  Take 1 tablet (160 mg total) by mouth once daily.              Signing Physician:  JEFFREY Denney

## 2024-11-06 ENCOUNTER — OFFICE VISIT (OUTPATIENT)
Dept: INTERNAL MEDICINE | Facility: CLINIC | Age: 79
End: 2024-11-06
Payer: MEDICARE

## 2024-11-06 ENCOUNTER — HOSPITAL ENCOUNTER (OUTPATIENT)
Dept: RADIOLOGY | Facility: HOSPITAL | Age: 79
Discharge: HOME | End: 2024-11-06
Attending: NURSE PRACTITIONER
Payer: MEDICARE

## 2024-11-06 VITALS
HEIGHT: 65 IN | SYSTOLIC BLOOD PRESSURE: 126 MMHG | OXYGEN SATURATION: 96 % | HEART RATE: 70 BPM | DIASTOLIC BLOOD PRESSURE: 64 MMHG | BODY MASS INDEX: 26.63 KG/M2 | WEIGHT: 159.81 LBS

## 2024-11-06 DIAGNOSIS — R10.32 LEFT GROIN PAIN: ICD-10-CM

## 2024-11-06 DIAGNOSIS — Z86.0100 HISTORY OF COLON POLYPS: ICD-10-CM

## 2024-11-06 DIAGNOSIS — R10.32 LEFT GROIN PAIN: Primary | ICD-10-CM

## 2024-11-06 DIAGNOSIS — K57.90 DIVERTICULOSIS: ICD-10-CM

## 2024-11-06 PROCEDURE — 99214 OFFICE O/P EST MOD 30 MIN: CPT | Mod: S$PBB,,, | Performed by: NURSE PRACTITIONER

## 2024-11-06 PROCEDURE — 99215 OFFICE O/P EST HI 40 MIN: CPT | Mod: PBBFAC | Performed by: NURSE PRACTITIONER

## 2024-11-06 PROCEDURE — 99999 PR PBB SHADOW E&M-EST. PATIENT-LVL V: CPT | Mod: PBBFAC,,, | Performed by: NURSE PRACTITIONER

## 2024-11-06 PROCEDURE — 73502 X-RAY EXAM HIP UNI 2-3 VIEWS: CPT | Mod: TC,LT

## 2024-11-06 PROCEDURE — 73502 X-RAY EXAM HIP UNI 2-3 VIEWS: CPT | Mod: 26,LT,, | Performed by: RADIOLOGY

## 2024-11-07 ENCOUNTER — TELEPHONE (OUTPATIENT)
Dept: ENDOSCOPY | Facility: HOSPITAL | Age: 79
End: 2024-11-07
Payer: MEDICARE

## 2024-11-07 ENCOUNTER — TELEPHONE (OUTPATIENT)
Dept: INTERNAL MEDICINE | Facility: CLINIC | Age: 79
End: 2024-11-07
Payer: MEDICARE

## 2024-11-07 NOTE — TELEPHONE ENCOUNTER
----- Message from JEFFREY Castellano sent at 11/7/2024  3:32 PM CST -----  Needs to follow up as soon as can be arranged with Dr Tyler for evaluation of left thigh / groin discomforts.     Thanks

## 2024-11-07 NOTE — TELEPHONE ENCOUNTER
Received call from patient to reschedule PAT appointment scheduled on 11/9/24 at 8:40 AM to a later time of day. PAT appointment rescheduled to 3:20 PM on 11/9/24. Patient verbalized understanding.

## 2024-11-08 ENCOUNTER — PATIENT MESSAGE (OUTPATIENT)
Dept: OBSTETRICS AND GYNECOLOGY | Facility: CLINIC | Age: 79
End: 2024-11-08
Payer: MEDICARE

## 2024-11-08 ENCOUNTER — TELEPHONE (OUTPATIENT)
Dept: INTERNAL MEDICINE | Facility: CLINIC | Age: 79
End: 2024-11-08
Payer: MEDICARE

## 2024-11-08 DIAGNOSIS — R93.89 ABNORMAL X-RAY: ICD-10-CM

## 2024-11-08 DIAGNOSIS — M25.559 HIP PAIN, UNSPECIFIED LATERALITY: Primary | ICD-10-CM

## 2024-11-08 DIAGNOSIS — M77.9 BONE SPUR: ICD-10-CM

## 2024-11-08 NOTE — TELEPHONE ENCOUNTER
----- Message from JEFFREY Castellano sent at 11/8/2024 11:04 AM CST -----  Please Call...   Ultrasound of Groin is negative; however, her hip xray does show some 'bone' spurring and this may be attributing to some of her discomforts that she is experiencing... may also need to have Xrays of her lower back at some time... noting some onset of arthritic changes to the base of her lower back on the single film...  ##Recommend getting in with Orthopedic's expert to address the 'hip' issue  / finding.   Referral placed...    Thanks

## 2024-11-09 ENCOUNTER — TELEPHONE (OUTPATIENT)
Dept: ENDOSCOPY | Facility: HOSPITAL | Age: 79
End: 2024-11-09

## 2024-11-09 ENCOUNTER — CLINICAL SUPPORT (OUTPATIENT)
Dept: ENDOSCOPY | Facility: HOSPITAL | Age: 79
End: 2024-11-09
Payer: MEDICARE

## 2024-11-09 DIAGNOSIS — R10.32 LEFT GROIN PAIN: ICD-10-CM

## 2024-11-09 DIAGNOSIS — K57.90 DIVERTICULOSIS: ICD-10-CM

## 2024-11-09 DIAGNOSIS — Z86.0100 HISTORY OF COLON POLYPS: ICD-10-CM

## 2024-11-09 NOTE — PLAN OF CARE
Ma spoke with pt to scheduled procedure , pt declined to schedule at this time , she states she needs to speak with her ride

## 2024-11-09 NOTE — TELEPHONE ENCOUNTER
"----- Message from Lu sent at 10/31/2024  8:38 AM CDT -----  Regarding: FW: Colonoscopy    ----- Message -----  From: Haider Luu MD  Sent: 10/30/2024   4:12 PM CDT  To: [facility]  Subject: Colonoscopy                                      Procedure: Colonoscopy    Diagnosis: Surveillance colonoscopy - Hx of colon polyps    Procedure Timin-12 weeks    #If within 4 weeks selected, please drew as high priority#    #If greater than 12 weeks, please select "5-12 weeks" and delay sending until 3 months prior to requested date#     Location: Any Site    Additional Scheduling Information: No scheduling concerns    Prep Specifications:Standard prep    Is the patient taking a GLP-1 Agonist:no    Have you attached a patient to this message: yes  "

## 2024-11-11 ENCOUNTER — TELEPHONE (OUTPATIENT)
Dept: ENDOSCOPY | Facility: HOSPITAL | Age: 79
End: 2024-11-11
Payer: MEDICARE

## 2024-11-11 VITALS — BODY MASS INDEX: 26.49 KG/M2 | HEIGHT: 65 IN | WEIGHT: 159 LBS

## 2024-11-11 DIAGNOSIS — Z12.11 SPECIAL SCREENING FOR MALIGNANT NEOPLASMS, COLON: ICD-10-CM

## 2024-11-11 DIAGNOSIS — Z86.0100 HISTORY OF COLON POLYPS: Primary | ICD-10-CM

## 2024-11-11 RX ORDER — SODIUM, POTASSIUM,MAG SULFATES 17.5-3.13G
1 SOLUTION, RECONSTITUTED, ORAL ORAL DAILY
Qty: 1 KIT | Refills: 0 | Status: SHIPPED | OUTPATIENT
Start: 2024-11-11 | End: 2024-11-13

## 2024-11-11 NOTE — TELEPHONE ENCOUNTER
Referral for procedure from [facility]      Spoke to patient  to schedule procedure(s) Colonoscopy       Physician to perform procedure(s) Dr. CHAD Luu  Date of Procedure (s) 13/03/24  Arrival Time 11:30 AM  Time of Procedure(s) 12:30 PM   Location of Procedure(s) [place] [facility]  Type of Rx Prep sent to patient: Suprep  Instructions provided to patient via Postal Mail    Patient was informed on the following information and verbalized understanding. Screening questionnaire reviewed with patient and complete. If procedure requires anesthesia, a responsible adult needs to be present to accompany the patient home, patient cannot drive after receiving anesthesia. Appointment details are tentative, especially check-in time. Patient will receive a prep-op call 7 days prior to confirm check-in time for procedure. If applicable the patient should contact their pharmacy to verify Rx for procedure prep is ready for pick-up. Patient was advised to call the scheduling department at [phone] if pharmacy states no Rx is available. Patient was advised to call the endoscopy scheduling department if any questions or concerns arise.       Endoscopy Scheduling Department

## 2024-11-11 NOTE — TELEPHONE ENCOUNTER
"Received pt call to reschedule for a colonoscopy.     ----- Message from Lu sent at 10/31/2024  8:38 AM CDT -----  Regarding: FW: Colonoscopy     ----- Message -----  From: Haider Luu MD  Sent: 10/30/2024   4:12 PM CDT  To: [facility]  Subject: Colonoscopy                                       Procedure: Colonoscopy     Diagnosis: Surveillance colonoscopy - Hx of colon polyps     Procedure Timin-12 weeks     #If within 4 weeks selected, please drew as high priority#     #If greater than 12 weeks, please select "5-12 weeks" and delay sending until 3 months prior to requested date#      Location: Any Site     Additional Scheduling Information: No scheduling concerns     Prep Specifications:Standard prep     Is the patient taking a GLP-1 Agonist:no     Have you attached a patient to this message: yes  "

## 2024-11-12 ENCOUNTER — OFFICE VISIT (OUTPATIENT)
Dept: ORTHOPEDICS | Facility: CLINIC | Age: 79
End: 2024-11-12
Payer: MEDICARE

## 2024-11-12 ENCOUNTER — HOSPITAL ENCOUNTER (OUTPATIENT)
Dept: RADIOLOGY | Facility: HOSPITAL | Age: 79
Discharge: HOME | End: 2024-11-12
Payer: MEDICARE

## 2024-11-12 ENCOUNTER — TELEPHONE (OUTPATIENT)
Dept: ENDOSCOPY | Facility: HOSPITAL | Age: 79
End: 2024-11-12
Payer: MEDICARE

## 2024-11-12 VITALS — BODY MASS INDEX: 26.67 KG/M2 | WEIGHT: 160.06 LBS | HEIGHT: 65 IN

## 2024-11-12 DIAGNOSIS — M25.559 HIP PAIN, UNSPECIFIED LATERALITY: ICD-10-CM

## 2024-11-12 DIAGNOSIS — M54.16 LUMBAR RADICULOPATHY: Primary | ICD-10-CM

## 2024-11-12 PROCEDURE — 72110 X-RAY EXAM L-2 SPINE 4/>VWS: CPT | Mod: TC

## 2024-11-12 PROCEDURE — 99999 PR PBB SHADOW E&M-EST. PATIENT-LVL V: CPT | Mod: PBBFAC,,,

## 2024-11-12 PROCEDURE — 99215 OFFICE O/P EST HI 40 MIN: CPT | Mod: PBBFAC,25

## 2024-11-12 PROCEDURE — 99214 OFFICE O/P EST MOD 30 MIN: CPT | Mod: S$PBB,,,

## 2024-11-12 NOTE — TELEPHONE ENCOUNTER
Confirmed  colonoscopy appt for  with pt. Confirmed receipt of prep and instructions..Confirmed ride home after procedure.Pt verbalized understanding

## 2024-11-13 ENCOUNTER — PATIENT MESSAGE (OUTPATIENT)
Dept: ORTHOPEDICS | Facility: CLINIC | Age: 79
End: 2024-11-13
Payer: MEDICARE

## 2024-11-13 ENCOUNTER — TELEPHONE (OUTPATIENT)
Dept: ORTHOPEDICS | Facility: CLINIC | Age: 79
End: 2024-11-13
Payer: MEDICARE

## 2024-11-13 NOTE — TELEPHONE ENCOUNTER
Returned patient's call and appt rescheduled to earlier date and time. Patient confirmed new appt.    ----- Message from Keily sent at 11/13/2024  9:16 AM CST -----  Regarding: PT'S REQUESTING A CALL BACK REGARDING BEING SCHEDULED FOR REASON BELOW  Contact: pt  Lumbar radiculopathy, pt has really bad thigh pain    Confirmed contact info below:  Contact Name: Mirna Norton  Phone Number: [phone]

## 2024-11-13 NOTE — PROGRESS NOTES
SUBJECTIVE:     Chief Complaint & History of Present Illness:  Mirna Norton is a 79 y.o. female who is seen here today with a complaint of  left hip and lower back pain. The pain has been present for 3-4 weeks. The patient describes the pain as a moderate and progressing located in the mid lower back radiating to her left anterior thigh. The pain is worse with inactivity and improved by nothing. The patient notes weakness and tingling to the left anterior thigh along with urinary frequency but no associated bowel incontinence, constipation, and groin/perineal numbness.  She denies any prior evaluation for symptoms.      Past Medical History:   Diagnosis Date    Abnormal cervical Papanicolaou smear     Cryo    Carotid artery stenosis     via kinked carotid artery - followed by Dr. Ye Anderson    Carotid stenosis 5/15/2014    GERD (gastroesophageal reflux disease)     Gout due to renal impairment     Gout, arthritis 2013    Gout, unspecified     Herniated lumbar intervertebral disc     Hypertension     Insomnia     Irritable bowel syndrome without diarrhea 2015    Liver lesion 3/13/2013    Mitral valve prolapse     MVP (mitral valve prolapse)     Pancreatic cyst 2013    PVD (peripheral vascular disease) 2013    PVD (peripheral vascular disease) 2013    Venous insufficiency        Past Surgical History:   Procedure Laterality Date    BARTHOLIN GLAND CYST EXCISION       SECTION      x3    COLONOSCOPY N/A 2019    Procedure: COLONOSCOPY;  Surgeon: Haider Luu MD;  Location: [facility] ([address] [place] [address]);  Service: Endoscopy;  Laterality: N/A;    Diagnostic laproscopic       laser surgery on vein         Family History   Problem Relation Name Age of Onset    Heart disease Mother      Diabetes Brother      Hypertension Brother      Diabetes Sister      Eclampsia Sister      Hypertension Sister      Colon polyps Sister      Diabetes Sister      Eclampsia Sister      Hypertension  Sister      Miscarriages / Stillbirths Sister      Heart disease Sister          mi, cad    Diabetes Sister      Eclampsia Sister      Hypertension Sister      Diabetes Sister      Endometriosis Daughter      No Known Problems Daughter      No Known Problems Daughter      Breast cancer Other niece         under age 40. came back at around age 60    Colon cancer Neg Hx      Ovarian cancer Neg Hx      Esophageal cancer Neg Hx      Liver cancer Neg Hx      Liver disease Neg Hx      Rectal cancer Neg Hx      Stomach cancer Neg Hx      Uterine cancer Neg Hx      Cervical cancer Neg Hx         Review of patient's allergies indicates:  No Known Allergies        Current Outpatient Medications:     allopurinoL (ZYLOPRIM) 100 MG tablet, Take 1 tablet (100 mg total) by mouth once daily., Disp: 90 tablet, Rfl: 0    amLODIPine (NORVASC) 5 MG tablet, Take 1 tablet (5 mg total) by mouth once daily., Disp: 90 tablet, Rfl: 3    aspirin (ECOTRIN) 81 MG EC tablet, Take 1 tablet (81 mg total) by mouth once daily., Disp: , Rfl:     azelastine (ASTELIN) 137 mcg (0.1 %) nasal spray, 2 sprays by Nasal route as needed., Disp: , Rfl:     BIFIDOBACTERIUM INFANTIS (ALIGN ORAL), Take by mouth once daily., Disp: , Rfl:     BIOTIN ORAL, Take by mouth once daily., Disp: , Rfl:     ciclopirox (PENLAC) 8 % Soln, Apply topically nightly. Remove once a week with nail polish remover, Disp: 1 each, Rfl: 2    diclofenac sodium (VOLTAREN) 1 % Gel, Apply 2 g topically 4 (four) times daily as needed., Disp: 100 g, Rfl: 0    hydroCHLOROthiazide (HYDRODIURIL) 25 MG tablet, Take 25 mg by mouth once daily., Disp: , Rfl:     ketoconazole (NIZORAL) 2 % shampoo, Apply topically every 7 days. X 30 minute scalp soaks, Disp: 120 mL, Rfl: 10    methocarbamoL (ROBAXIN) 500 MG Tab, Take 500 mg by mouth 3 (three) times daily., Disp: , Rfl:     multivitamin-minerals-lutein Tab, Take 1 tablet by mouth once daily. , Disp: , Rfl:     nitroGLYCERIN (NITROSTAT) 0.4 MG SL  "tablet, Place 1 tablet (0.4 mg total) under the tongue every 5 (five) minutes as needed for Chest pain. (Patient not taking: Reported on 3/6/2024), Disp: 20 tablet, Rfl: 12    polyethylene glycol (MIRALAX) 17 gram/dose powder, Take 17 g by mouth once daily., Disp: 510 g, Rfl: 11    rosuvastatin (CRESTOR) 10 MG tablet, Take 10 mg by mouth., Disp: , Rfl:     sodium,potassium,mag sulfates (SUPREP BOWEL PREP KIT) 17.5-3.13-1.6 gram SolR, Take 177 mLs by mouth once daily. for 2 days, Disp: 1 kit, Rfl: 0    valsartan (DIOVAN) 160 MG tablet, Take 1 tablet (160 mg total) by mouth once daily., Disp: 90 tablet, Rfl: 3      Review of Systems:  ROS:  The updated medical history is in the chart and has been reviewed. A review of systems is updated and there is no reported vision changes, ear/nose/mouth/throat complaints, chest pain, shortness of breath, abdominal pain, urological complaints, fevers or chills, psychiatric complaints. Musculoskeletal and neurologcial symptoms are as documented. All other systems are negative.      OBJECTIVE:     PHYSICAL EXAM:  Ht 5' 5" (1.651 m)   Wt 72.6 kg (160 lb 0.9 oz)   BMI 26.63 kg/m²   General: Pleasant, cooperative, NAD.  HEENT: NCAT, sclera nonicteric.  Lungs: Respirations are equal and unlabored.   Abdomen: Soft and non-tender.  CV: 2+ bilateral upper and lower extremity pulses.  Neuro: Sensation intact to light touch.  Skin: Intact throughout with no rashes, erythema, or lesions.  Extremities: No LE edema, NVI lower extremities. normal gait.    Back Exam:  left tenderness: mild, limitation of rotation to the left: mild    Tenderness: Lumbosacral   Swelling:  None       Range of Motion:      Flexion: 50     Extension: 10     Lateral Bend:   Right 10                              Left 10     Rotation:          Right 5                              Left 5       SLR:                  Right Negative                             Left Positive       Muscle Strength      Hip Flexion 5/5     " Hip Extension 5/5     Abductor: 5/5     Adductor: 5/5     Quadriceps: 5/5     Hamstrings: 5/5     Gastrocnemius: 5/5     Anterior tibialis: 5/5       Reflexes     Patellar: Normal    Achilles: Normal       Sensation: Normal       left Hip Exam:  TTP: Greater trochanter  120 degrees flexion  0 degrees extension   30 degrees internal rotation  40 degrees external rotation  30 degrees abduction  30 degrees adduction   0 flexion contracture   negative MP   negative FADIR    RADIOGRAPHS:  X-rays of the lumbar spine taken today personally reviewed. Imaging reveals no acute fractures or dislocations.  There are multilevel degenerative changes present with mild scoliosis.    ASSESSMENT/PLAN:       ICD-10-CM ICD-9-CM   1. Lumbar radiculopathy  M54.16 724.4   2. Hip pain, unspecified laterality  M25.559 719.45       ASSESSMENT/PLAN:     We discussed with the patient at length all the different treatment options available including anti-inflammatories, acetaminophen, rest, ice, physical therapy, strengthening and range of motion exercise, occasional cortisone injections for temporary relief, and finally possible surgical interventions.    She will maintain conservative treatment measures at this time with outpatient physical therapy for focused strengthening and range-of-motion exercises.  I have also recommended referral to back and spine for further evaluation and possible advanced imaging.      DISCLAIMER: This note was prepared with [employer] voice recognition transcription software. Garbled syntax, mangled pronouns, and other bizarre constructions may be attributed to that software system.    Isrrael Marinelli PA-C

## 2024-11-20 DIAGNOSIS — M10.9 GOUT, UNSPECIFIED CAUSE, UNSPECIFIED CHRONICITY, UNSPECIFIED SITE: ICD-10-CM

## 2024-11-20 RX ORDER — ALLOPURINOL 100 MG/1
100 TABLET ORAL DAILY
Qty: 90 TABLET | Refills: 2 | OUTPATIENT
Start: 2024-11-20

## 2024-11-20 RX ORDER — ALLOPURINOL 100 MG/1
100 TABLET ORAL DAILY
Qty: 90 TABLET | Refills: 2 | Status: SHIPPED | OUTPATIENT
Start: 2024-11-20

## 2024-11-20 NOTE — TELEPHONE ENCOUNTER
Refill Decision Note   Mirna Norton  is requesting a refill authorization.  Brief Assessment and Rationale for Refill:  Quick Discontinue     Medication Therapy Plan:         Comments:     Note composed:5:15 PM 11/20/2024

## 2024-11-20 NOTE — TELEPHONE ENCOUNTER
No care due was identified.  Health [facility] Embedded Care Due Messages. Reference number: [id number].   11/20/2024 11:24:24 AM CST

## 2024-11-20 NOTE — TELEPHONE ENCOUNTER
No care due was identified.  Health [facility] Embedded Care Due Messages. Reference number: [id number].   11/20/2024 4:24:42 PM CST

## 2024-11-22 ENCOUNTER — OFFICE VISIT (OUTPATIENT)
Dept: DERMATOLOGY | Facility: CLINIC | Age: 79
End: 2024-11-22
Payer: MEDICARE

## 2024-11-22 DIAGNOSIS — L65.9 ALOPECIA: ICD-10-CM

## 2024-11-22 DIAGNOSIS — L21.9 SEBORRHEIC DERMATITIS: Primary | ICD-10-CM

## 2024-11-22 PROCEDURE — 99999 PR PBB SHADOW E&M-EST. PATIENT-LVL III: CPT | Mod: PBBFAC,,, | Performed by: DERMATOLOGY

## 2024-11-22 PROCEDURE — 99213 OFFICE O/P EST LOW 20 MIN: CPT | Mod: PBBFAC,PN | Performed by: DERMATOLOGY

## 2024-11-22 NOTE — PROGRESS NOTES
Subjective:      Patient ID:  Mirna Norton is a 79 y.o. female who presents for   Chief Complaint   Patient presents with    Hair Loss     Follow-up     Hair Loss - Follow-up  Symptom course: stable  Currently using: ketoconazole shampoo weekly, pt taking saw pametto daily.  Affected locations: scalp      Review of Systems   Constitutional:  Negative for fever.   HENT:  Negative for sore throat.    Respiratory:  Negative for cough.    Skin:  Negative for itching.       Objective:   Physical Exam     Diagram Legend     Erythematous scaling macule/papule c/w actinic keratosis       Vascular papule c/w angioma      Pigmented verrucoid papule/plaque c/w seborrheic keratosis      Yellow umbilicated papule c/w sebaceous hyperplasia      Irregularly shaped tan macule c/w lentigo     1-2 mm smooth white papules consistent with Milia      Movable subcutaneous cyst with punctum c/w epidermal inclusion cyst      Subcutaneous movable cyst c/w pilar cyst      Firm pink to brown papule c/w dermatofibroma      Pedunculated fleshy papule(s) c/w skin tag(s)      Evenly pigmented macule c/w junctional nevus     Mildly variegated pigmented, slightly irregular-bordered macule c/w mildly atypical nevus      Flesh colored to evenly pigmented papule c/w intradermal nevus       Pink pearly papule/plaque c/w basal cell carcinoma      Erythematous hyperkeratotic cursted plaque c/w SCC      Surgical scar with no sign of skin cancer recurrence      Open and closed comedones      Inflammatory papules and pustules      Verrucoid papule consistent consistent with wart     Erythematous eczematous patches and plaques     Dystrophic onycholytic nail with subungual debris c/w onychomycosis     Umbilicated papule    Erythematous-base heme-crusted tan verrucoid plaque consistent with inflamed seborrheic keratosis     Erythematous Silvery Scaling Plaque c/w Psoriasis     See annotation      No scale of scalp.    Assessment / Plan:        Seborrheic  dermatitis  Better and no itch per pt.  Watch also for contact derm.  Pt gets color rinses.  Avoid harsh chemicals, relaxers, hair coloring, and anything irritating to the scalp.  Less is more.  Less product usage for the scalp is for the best.  Cont keto sh on the reg.    Alopecia  Cont saw.  Can try saw palmetto 500 mg to 1000 mg per day.  Prefer organic version.  Pt not ready to commit to rogaine or oral minox.  Reviewed with patient different treatment options and associated risks.  Cont eating pro.  Labs good.  Chronic nature of this condition discussed with patient.             Follow up in about 4 months (around 3/22/2025).

## 2024-11-27 ENCOUNTER — TELEPHONE (OUTPATIENT)
Dept: GASTROENTEROLOGY | Facility: CLINIC | Age: 79
End: 2024-11-27
Payer: MEDICARE

## 2024-11-27 ENCOUNTER — PATIENT MESSAGE (OUTPATIENT)
Dept: DERMATOLOGY | Facility: CLINIC | Age: 79
End: 2024-11-27
Payer: MEDICARE

## 2024-11-27 NOTE — TELEPHONE ENCOUNTER
Patient reports pain middle right abd pain and when she press on it gas is released.   Patient reports can't get anyone on the phone at [facility] pharmacy to  her prep.  I attempted to contact the pharmacy for the patient but no one answer. I encouraged the patient to go to the pharmacy and if she have any trouble to contact our office.     Patient request to be seen in clinic. Informed patient there's no available soon clinic appt.   Informed patient it may be trap gas but she should seek medical attention if symptoms worsen.   Patient verbalize understand and thank me.

## 2024-11-29 ENCOUNTER — PATIENT MESSAGE (OUTPATIENT)
Dept: DERMATOLOGY | Facility: CLINIC | Age: 79
End: 2024-11-29
Payer: MEDICARE

## 2024-12-03 ENCOUNTER — ANESTHESIA EVENT (OUTPATIENT)
Dept: ENDOSCOPY | Facility: HOSPITAL | Age: 79
End: 2024-12-03
Payer: MEDICARE

## 2024-12-03 ENCOUNTER — HOSPITAL ENCOUNTER (OUTPATIENT)
Facility: HOSPITAL | Age: 79
Discharge: HOME | End: 2024-12-03
Attending: INTERNAL MEDICINE | Admitting: INTERNAL MEDICINE
Payer: MEDICARE

## 2024-12-03 ENCOUNTER — ANESTHESIA (OUTPATIENT)
Dept: ENDOSCOPY | Facility: HOSPITAL | Age: 79
End: 2024-12-03
Payer: MEDICARE

## 2024-12-03 VITALS
SYSTOLIC BLOOD PRESSURE: 119 MMHG | BODY MASS INDEX: 25.99 KG/M2 | DIASTOLIC BLOOD PRESSURE: 76 MMHG | TEMPERATURE: 98 F | HEART RATE: 78 BPM | WEIGHT: 156 LBS | OXYGEN SATURATION: 98 % | HEIGHT: 65 IN | RESPIRATION RATE: 15 BRPM

## 2024-12-03 DIAGNOSIS — Z86.0100 HISTORY OF COLON POLYPS: Primary | ICD-10-CM

## 2024-12-03 PROCEDURE — 45385 COLONOSCOPY W/LESION REMOVAL: CPT | Mod: PT | Performed by: INTERNAL MEDICINE

## 2024-12-03 PROCEDURE — 37000009 HC ANESTHESIA EA ADD 15 MINS: Performed by: INTERNAL MEDICINE

## 2024-12-03 PROCEDURE — 27201089 HC SNARE, DISP (ANY): Performed by: INTERNAL MEDICINE

## 2024-12-03 PROCEDURE — 63600175 PHARM REV CODE 636 W HCPCS: Performed by: NURSE ANESTHETIST, CERTIFIED REGISTERED

## 2024-12-03 PROCEDURE — 37000008 HC ANESTHESIA 1ST 15 MINUTES: Performed by: INTERNAL MEDICINE

## 2024-12-03 PROCEDURE — 88305 TISSUE EXAM BY PATHOLOGIST: CPT | Performed by: PATHOLOGY

## 2024-12-03 PROCEDURE — 45385 COLONOSCOPY W/LESION REMOVAL: CPT | Mod: PT,,, | Performed by: INTERNAL MEDICINE

## 2024-12-03 PROCEDURE — 88305 TISSUE EXAM BY PATHOLOGIST: CPT | Mod: 26,,, | Performed by: PATHOLOGY

## 2024-12-03 RX ORDER — LIDOCAINE HYDROCHLORIDE 20 MG/ML
INJECTION INTRAVENOUS
Status: DISCONTINUED | OUTPATIENT
Start: 2024-12-03 | End: 2024-12-03

## 2024-12-03 RX ORDER — PROPOFOL 10 MG/ML
VIAL (ML) INTRAVENOUS
Status: DISCONTINUED | OUTPATIENT
Start: 2024-12-03 | End: 2024-12-03

## 2024-12-03 RX ORDER — SODIUM CHLORIDE 9 MG/ML
INJECTION, SOLUTION INTRAVENOUS CONTINUOUS
Status: DISCONTINUED | OUTPATIENT
Start: 2024-12-03 | End: 2024-12-03 | Stop reason: HOSPADM

## 2024-12-03 RX ORDER — PHENYLEPHRINE HYDROCHLORIDE 10 MG/ML
INJECTION INTRAVENOUS
Status: DISCONTINUED | OUTPATIENT
Start: 2024-12-03 | End: 2024-12-03

## 2024-12-03 RX ORDER — PROPOFOL 10 MG/ML
VIAL (ML) INTRAVENOUS CONTINUOUS PRN
Status: DISCONTINUED | OUTPATIENT
Start: 2024-12-03 | End: 2024-12-03

## 2024-12-03 RX ADMIN — PHENYLEPHRINE HYDROCHLORIDE 100 MCG: 10 INJECTION INTRAVENOUS at 11:12

## 2024-12-03 RX ADMIN — PROPOFOL 40 MG: 10 INJECTION, EMULSION INTRAVENOUS at 11:12

## 2024-12-03 RX ADMIN — LIDOCAINE HYDROCHLORIDE 50 MG: 20 INJECTION INTRAVENOUS at 11:12

## 2024-12-03 RX ADMIN — PHENYLEPHRINE HYDROCHLORIDE 100 MCG: 10 INJECTION INTRAVENOUS at 12:12

## 2024-12-03 RX ADMIN — PROPOFOL 150 MCG/KG/MIN: 10 INJECTION, EMULSION INTRAVENOUS at 11:12

## 2024-12-03 NOTE — ANESTHESIA POSTPROCEDURE EVALUATION
Anesthesia Post Evaluation    Patient: Mirna Norton    Procedure(s) Performed: Procedure(s) (LRB):  COLONOSCOPY, SCREENING, HIGH RISK PATIENT (N/A)    Final Anesthesia Type: general      Patient location during evaluation: GI PACU  Patient participation: Yes- Able to Participate  Level of consciousness: awake and alert  Post-procedure vital signs: reviewed and stable  Pain management: adequate  Airway patency: patent    PONV status at discharge: No PONV  Anesthetic complications: no      Cardiovascular status: blood pressure returned to baseline  Respiratory status: spontaneous ventilation  Hydration status: euvolemic  Follow-up not needed.              Vitals Value Taken Time   /76 12/03/24 1235   Temp 36.5 °C (97.7 °F) 12/03/24 1204   Pulse 78 12/03/24 1235   Resp 15 12/03/24 1235   SpO2 98 % 12/03/24 1235         Event Time   Out of Recovery 12:49:27         Pain/Fiona Score: Fiona Score: 10 (12/3/2024 12:04 PM)

## 2024-12-03 NOTE — TRANSFER OF CARE
"Anesthesia Transfer of Care Note    Patient: Mirna Norton    Procedure(s) Performed: Procedure(s) (LRB):  COLONOSCOPY, SCREENING, HIGH RISK PATIENT (N/A)    Patient location: GI    Anesthesia Type: general    Transport from OR: Transported from OR on room air with adequate spontaneous ventilation    Post pain: adequate analgesia    Post assessment: no apparent anesthetic complications and tolerated procedure well    Post vital signs: stable    Level of consciousness: lethargic    Nausea/Vomiting: no nausea/vomiting    Complications: none    Transfer of care protocol was followed      Last vitals: Visit Vitals  BP (!) 144/68 (BP Location: Left arm, Patient Position: Lying)   Pulse 78   Temp 36.5 °C (97.7 °F)   Resp 17   Ht 5' 5" (1.651 m)   Wt 70.8 kg (156 lb)   SpO2 95%   Breastfeeding No   BMI 25.96 kg/m²     "

## 2024-12-03 NOTE — ANESTHESIA PREPROCEDURE EVALUATION
12/03/2024  Mirna Norton is a 79 y.o., female with GERD, HTN, arthritis here for screening colonoscopy.    Past Medical History:   Diagnosis Date    Abnormal cervical Papanicolaou smear 1980's    Cryo    Carotid artery stenosis     via kinked carotid artery - followed by Dr. Ye Anderson    Carotid stenosis 5/15/2014    GERD (gastroesophageal reflux disease)     Gout due to renal impairment     Gout, arthritis 2/20/2013    Gout, unspecified     Herniated lumbar intervertebral disc     Hypertension     Insomnia     Irritable bowel syndrome without diarrhea 9/20/2015    Liver lesion 3/13/2013    Mitral valve prolapse     MVP (mitral valve prolapse)     Pancreatic cyst 12/30/2013    PVD (peripheral vascular disease) 12/23/2013    PVD (peripheral vascular disease) 12/23/2013    Venous insufficiency      No results found for this or any previous visit.  Lab Results   Component Value Date    WBC 7.79 10/14/2024    HGB 12.6 10/14/2024    HCT 40.7 10/14/2024    MCV 92 10/14/2024     10/14/2024             Pre-op Assessment    I have reviewed the Patient Summary Reports.     I have reviewed the Nursing Notes. I have reviewed the NPO Status.      Review of Systems  Anesthesia Hx:  No problems with previous Anesthesia                Social:  Non-Smoker       Cardiovascular:  Exercise tolerance: good   Hypertension                                          Hepatic/GI:     GERD Liver Disease,               Musculoskeletal:  Arthritis                   Physical Exam  General: Well nourished, Cooperative, Alert and Oriented    Airway:  Mallampati: II   Mouth Opening: Normal  TM Distance: Normal  Neck ROM: Normal ROM    Dental:  Intact        Anesthesia Plan  Type of Anesthesia, risks & benefits discussed:    Anesthesia Type: Gen Natural Airway  Intra-op Monitoring Plan: Standard ASA Monitors  Post Op Pain Control  Plan: multimodal analgesia  Induction:  IV  Informed Consent: Informed consent signed with the Patient and all parties understand the risks and agree with anesthesia plan.  All questions answered.   ASA Score: 2    Ready For Surgery From Anesthesia Perspective.     .

## 2024-12-03 NOTE — [employer]ATION PATIENT INSTRUCTIONS
Discharge Summary/Instructions after an Endoscopic Procedure  Patient Name: Mirna Norton  Patient MRN: [id number]  Patient YOB: 1945  Tuesday, December 3, 2024  Haider Luu MD  Dear patient,  As a result of recent federal legislation (The Federal Cures Act), you may   receive lab or pathology results from your procedure in your [facility]   account before your physician is able to contact you. Your physician or   their representative will relay the results to you with their   recommendations at their soonest availability.  Thank you,  RESTRICTIONS:  During your procedure today, you received medications for sedation.  These   medications may affect your judgment, balance and coordination.  Therefore,   for 24 hours, you have the following restrictions:   - DO NOT drive a car, operate machinery, make legal/financial decisions,   sign important papers or drink alcohol.    ACTIVITY:  Today: no heavy lifting, straining or running due to procedural   sedation/anesthesia.  The following day: return to full activity including work.  DIET:  Eat and drink normally unless instructed otherwise.     TREATMENT FOR COMMON SIDE EFFECTS:  - Mild abdominal pain, nausea, belching, bloating or excessive gas:  rest,   eat lightly and use a heating pad.  - Sore Throat: treat with throat lozenges and/or gargle with warm salt   water.  - Because air was used during the procedure, expelling large amounts of air   from your rectum or belching is normal.  - If a bowel prep was taken, you may not have a bowel movement for 1-3 days.    This is normal.  SYMPTOMS TO WATCH FOR AND REPORT TO YOUR PHYSICIAN:  1. Abdominal pain or bloating, other than gas cramps.  2. Chest pain.  3. Back pain.  4. Signs of infection such as: chills or fever occurring within 24 hours   after the procedure.  5. Rectal bleeding, which would show as bright red, maroon, or black stools.   (A tablespoon of blood from the rectum is not serious, especially  if   hemorrhoids are present.)  6. Vomiting.  7. Weakness or dizziness.  GO DIRECTLY TO THE NEAREST EMERGENCY ROOM IF YOU HAVE ANY OF THE FOLLOWING:      Difficulty breathing              Chills and/or fever over 101 F   Persistent vomiting and/or vomiting blood   Severe abdominal pain   Severe chest pain   Black, tarry stools   Bleeding- more than one tablespoon   Any other symptom or condition that you feel may need urgent attention  Your doctor recommends these additional instructions:  If any biopsies were taken, your doctors clinic will contact you in 1 to 2   weeks with any results.  - Discharge patient to home.   - Patient has a contact number available for emergencies.  The signs and   symptoms of potential delayed complications were discussed with the   patient.  Return to normal activities tomorrow.  Written discharge   instructions were provided to the patient.   - Resume previous diet.   - Continue present medications.   - Await pathology results.   - No repeat colonoscopy due to age.  For questions, problems or results please call your physician - Haider Luu MD at Work:  [phone].  [facility], EMERGENCY ROOM PHONE NUMBER: [phone]  IF A COMPLICATION OR EMERGENCY SITUATION ARISES AND YOU ARE UNABLE TO REACH   YOUR PHYSICIAN - GO DIRECTLY TO THE EMERGENCY ROOM.  Haider Luu MD  12/3/2024 11:57:47 AM  This report has been verified and signed electronically.  Dear patient,  As a result of recent federal legislation (The Federal Cures Act), you may   receive lab or pathology results from your procedure in your [facility]   account before your physician is able to contact you. Your physician or   their representative will relay the results to you with their   recommendations at their soonest availability.  Thank you,  PROVATION

## 2024-12-03 NOTE — H&P
Short Stay Endoscopy History and Physical    PCP - Ramsey Tyler MD    Procedure - Colonoscopy  ASA - per anesthesia  Mallampati - per anesthesia  History of Anesthesia problems - no  Family history Anesthesia problems - no   Plan of anesthesia - General    HPI:  This is a 79 y.o. female here for evaluation of : personal history of colon polyps      ROS:  Constitutional: No fevers, chills, No weight loss  CV: No chest pain  Pulm: No cough, No shortness of breath  GI: see HPI  Derm: No rash    Medical History:  has a past medical history of Abnormal cervical Papanicolaou smear (), Carotid artery stenosis, Carotid stenosis (5/15/2014), GERD (gastroesophageal reflux disease), Gout due to renal impairment, Gout, arthritis (2013), Gout, unspecified, Herniated lumbar intervertebral disc, Hypertension, Insomnia, Irritable bowel syndrome without diarrhea (2015), Liver lesion (3/13/2013), Mitral valve prolapse, MVP (mitral valve prolapse), Pancreatic cyst (2013), PVD (peripheral vascular disease) (2013), PVD (peripheral vascular disease) (2013), and Venous insufficiency.    Surgical History:  has a past surgical history that includes Bartholin gland cyst excision; Diagnostic laproscopic ;  section; laser surgery on vein; and Colonoscopy (N/A, 2019).    Family History: family history includes Breast cancer in an other family member; Colon polyps in her sister; Diabetes in her brother, sister, sister, sister, and sister; Eclampsia in her sister, sister, and sister; Endometriosis in her daughter; Heart disease in her mother and sister; Hypertension in her brother, sister, sister, and sister; Miscarriages / Stillbirths in her sister; No Known Problems in her daughter and daughter.. Otherwise no colon cancer, inflammatory bowel disease, or GI malignancies.    Social History:  reports that she has never smoked. She has never used smokeless tobacco. She reports that she does not  drink alcohol and does not use drugs.    Review of patient's allergies indicates:  No Known Allergies    Medications:   Medications Prior to Admission   Medication Sig Dispense Refill Last Dose/Taking    amLODIPine (NORVASC) 5 MG tablet Take 1 tablet (5 mg total) by mouth once daily. 90 tablet 3 12/2/2024    BIOTIN ORAL Take by mouth once daily.   Past Week    hydroCHLOROthiazide (HYDRODIURIL) 25 MG tablet Take 25 mg by mouth once daily.   Past Week    multivitamin-minerals-lutein Tab Take 1 tablet by mouth once daily.    Past Week    rosuvastatin (CRESTOR) 10 MG tablet Take 10 mg by mouth.   Past Week    valsartan (DIOVAN) 160 MG tablet Take 1 tablet (160 mg total) by mouth once daily. 90 tablet 3 12/3/2024    allopurinoL (ZYLOPRIM) 100 MG tablet Take 1 tablet (100 mg total) by mouth once daily. 90 tablet 2     aspirin (ECOTRIN) 81 MG EC tablet Take 1 tablet (81 mg total) by mouth once daily.       azelastine (ASTELIN) 137 mcg (0.1 %) nasal spray 2 sprays by Nasal route as needed.       BIFIDOBACTERIUM INFANTIS (ALIGN ORAL) Take by mouth once daily.       ciclopirox (PENLAC) 8 % Soln Apply topically nightly. Remove once a week with nail polish remover 1 each 2     diclofenac sodium (VOLTAREN) 1 % Gel Apply 2 g topically 4 (four) times daily as needed. 100 g 0     ketoconazole (NIZORAL) 2 % shampoo Apply topically every 7 days. X 30 minute scalp soaks 120 mL 10     methocarbamoL (ROBAXIN) 500 MG Tab Take 500 mg by mouth 3 (three) times daily.       nitroGLYCERIN (NITROSTAT) 0.4 MG SL tablet Place 1 tablet (0.4 mg total) under the tongue every 5 (five) minutes as needed for Chest pain. (Patient not taking: Reported on 3/6/2024) 20 tablet 12     polyethylene glycol (MIRALAX) 17 gram/dose powder Take 17 g by mouth once daily. 510 g 11          Physical Exam:    Vital Signs:   Vitals:    12/03/24 1055   BP: (!) 144/68   Pulse: 77   Resp: 18   Temp: 97.7 °F (36.5 °C)       General Appearance: Well appearing in no acute  distress  Eyes:    No scleral icterus  ENT: Neck supple, Lips, mucosa, and tongue normal; teeth and gums normal  Abdomen: Soft, non tender, non distended with positive bowel sounds. No hepatosplenomegaly, ascites, or mass.  Extremities: 2+ pulses, no clubbing, cyanosis or edema  Skin: No rash      Labs:  Lab Results   Component Value Date    WBC 7.79 10/14/2024    HGB 12.6 10/14/2024    HCT 40.7 10/14/2024     10/14/2024    CHOL 188 01/20/2022    TRIG 177 (H) 01/20/2022    HDL 73 01/20/2022    ALT 21 10/08/2024    AST 21 10/08/2024     10/14/2024    K 3.9 10/14/2024     10/14/2024    CREATININE 0.8 10/14/2024    BUN 17 10/14/2024    CO2 26 10/14/2024    TSH 1.263 10/08/2024    INR 1.0 04/08/2011    GLUF 87 03/06/2023    HGBA1C 6.1 (H) 02/02/2021       I have explained the risks and benefits of endoscopy procedures to the patient including but not limited to bleeding, perforation, infection, and death.  The patient was asked if they understand and allowed to ask any further questions to their satisfaction.    Jennifer Trotter MD

## 2024-12-04 ENCOUNTER — OFFICE VISIT (OUTPATIENT)
Dept: ORTHOPEDICS | Facility: CLINIC | Age: 79
End: 2024-12-04
Payer: MEDICARE

## 2024-12-04 VITALS — BODY MASS INDEX: 26.19 KG/M2 | WEIGHT: 157.19 LBS | HEIGHT: 65 IN

## 2024-12-04 DIAGNOSIS — M47.816 LUMBAR SPONDYLOSIS: ICD-10-CM

## 2024-12-04 DIAGNOSIS — M41.50 DEGENERATIVE SCOLIOSIS: Primary | ICD-10-CM

## 2024-12-04 DIAGNOSIS — M54.16 LUMBAR RADICULOPATHY: ICD-10-CM

## 2024-12-04 DIAGNOSIS — M51.362 DEGENERATION OF INTERVERTEBRAL DISC OF LUMBAR REGION WITH DISCOGENIC BACK PAIN AND LOWER EXTREMITY PAIN: ICD-10-CM

## 2024-12-04 LAB
FINAL PATHOLOGIC DIAGNOSIS: NORMAL
GROSS: NORMAL
Lab: NORMAL

## 2024-12-04 PROCEDURE — 99214 OFFICE O/P EST MOD 30 MIN: CPT | Mod: PBBFAC | Performed by: ORTHOPAEDIC SURGERY

## 2024-12-04 PROCEDURE — 99999 PR PBB SHADOW E&M-EST. PATIENT-LVL IV: CPT | Mod: PBBFAC,,, | Performed by: ORTHOPAEDIC SURGERY

## 2024-12-04 NOTE — PROGRESS NOTES
DATE: 2024  PATIENT: Mirna Norton    Attending Physician: Reggie Campos M.D.    CHIEF COMPLAINT: low back and left anterior groin pain     HISTORY:  Mirna Norton is a 79 y.o. female w/ a hx of Carotid Stenosis, Gout, HTN, IBS, PVD and MVP presents for initial evaluation of low back and left leg pain (Back - 5, Leg - 5). The pain has been present for 6 months. The patient describes the pain as dull.  The pain is worse with sitting for the thigh pain and bending for the back pain and improved by ibuprofen mildly. There is no associated numbness and tingling. There is no subjective weakness. Prior treatments have included ibuprofen, but no PT, GUEVARA, or Surgery.    The Patient denies myelopathic symptoms such as handwriting changes or difficulty with buttons/coins/keys. Denies perineal paresthesias, bowel/bladder dysfunction.    The patient does not smoke, have DM or endorse IVDU. The patient is not on any blood thinners and does not take chronic narcotics. She is a retired teacher.    PAST MEDICAL/SURGICAL HISTORY:  Past Medical History:   Diagnosis Date    Abnormal cervical Papanicolaou smear     Cryo    Carotid artery stenosis     via kinked carotid artery - followed by Dr. Ye Anderson    Carotid stenosis 5/15/2014    GERD (gastroesophageal reflux disease)     Gout due to renal impairment     Gout, arthritis 2013    Gout, unspecified     Herniated lumbar intervertebral disc     Hypertension     Insomnia     Irritable bowel syndrome without diarrhea 2015    Liver lesion 3/13/2013    Mitral valve prolapse     MVP (mitral valve prolapse)     Pancreatic cyst 2013    PVD (peripheral vascular disease) 2013    PVD (peripheral vascular disease) 2013    Venous insufficiency      Past Surgical History:   Procedure Laterality Date    BARTHOLIN GLAND CYST EXCISION       SECTION      x3    COLONOSCOPY N/A 2019    Procedure: COLONOSCOPY;  Surgeon: Haider Luu MD;   Location: [facility] ([address] [place] [address]);  Service: Endoscopy;  Laterality: N/A;    COLONOSCOPY, SCREENING, HIGH RISK PATIENT N/A 12/3/2024    Procedure: COLONOSCOPY, SCREENING, HIGH RISK PATIENT;  Surgeon: Haider Luu MD;  Location: [facility] ([address] [place] [address]);  Service: Endoscopy;  Laterality: N/A;  Referred by monroe Kern, mail, AE/ML  11/22 precall complete-st    Diagnostic laproscopic       laser surgery on vein         Current Medications:   Current Outpatient Medications:     allopurinoL (ZYLOPRIM) 100 MG tablet, Take 1 tablet (100 mg total) by mouth once daily., Disp: 90 tablet, Rfl: 2    amLODIPine (NORVASC) 5 MG tablet, Take 1 tablet (5 mg total) by mouth once daily., Disp: 90 tablet, Rfl: 3    aspirin (ECOTRIN) 81 MG EC tablet, Take 1 tablet (81 mg total) by mouth once daily., Disp: , Rfl:     azelastine (ASTELIN) 137 mcg (0.1 %) nasal spray, 2 sprays by Nasal route as needed., Disp: , Rfl:     BIFIDOBACTERIUM INFANTIS (ALIGN ORAL), Take by mouth once daily., Disp: , Rfl:     BIOTIN ORAL, Take by mouth once daily., Disp: , Rfl:     ciclopirox (PENLAC) 8 % Soln, Apply topically nightly. Remove once a week with nail polish remover, Disp: 1 each, Rfl: 2    diclofenac sodium (VOLTAREN) 1 % Gel, Apply 2 g topically 4 (four) times daily as needed., Disp: 100 g, Rfl: 0    hydroCHLOROthiazide (HYDRODIURIL) 25 MG tablet, Take 25 mg by mouth once daily., Disp: , Rfl:     ketoconazole (NIZORAL) 2 % shampoo, Apply topically every 7 days. X 30 minute scalp soaks, Disp: 120 mL, Rfl: 10    methocarbamoL (ROBAXIN) 500 MG Tab, Take 500 mg by mouth 3 (three) times daily., Disp: , Rfl:     multivitamin-minerals-lutein Tab, Take 1 tablet by mouth once daily. , Disp: , Rfl:     polyethylene glycol (MIRALAX) 17 gram/dose powder, Take 17 g by mouth once daily., Disp: 510 g, Rfl: 11    rosuvastatin (CRESTOR) 10 MG tablet, Take 10 mg by mouth., Disp: , Rfl:     valsartan (DIOVAN) 160 MG tablet, Take 1 tablet (160 mg total) by mouth once  daily., Disp: 90 tablet, Rfl: 3    nitroGLYCERIN (NITROSTAT) 0.4 MG SL tablet, Place 1 tablet (0.4 mg total) under the tongue every 5 (five) minutes as needed for Chest pain. (Patient not taking: Reported on 3/6/2024), Disp: 20 tablet, Rfl: 12    Social History:   Social History     Socioeconomic History    Marital status:    Occupational History    Occupation: Retired   Tobacco Use    Smoking status: Never    Smokeless tobacco: Never   Substance and Sexual Activity    Alcohol use: No    Drug use: No    Sexual activity: Not Currently     Birth control/protection: Post-menopausal   Social History Narrative    3 kids (healthy), previous . , has eye issues. biw exercise.     Social Drivers of Health     Financial Resource Strain: Low Risk  (3/5/2024)    Overall Financial Resource Strain (CARDIA)     Difficulty of Paying Living Expenses: Not hard at all   Food Insecurity: No Food Insecurity (3/5/2024)    Hunger Vital Sign     Worried About Running Out of Food in the Last Year: Never true     Ran Out of Food in the Last Year: Never true   Transportation Needs: No Transportation Needs (3/5/2024)    PRAPARE - Transportation     Lack of Transportation (Medical): No     Lack of Transportation (Non-Medical): No   Physical Activity: Insufficiently Active (3/5/2024)    Exercise Vital Sign     Days of Exercise per Week: 2 days     Minutes of Exercise per Session: 30 min   Stress: No Stress Concern Present (3/5/2024)    [employer] - Occupational Stress Questionnaire     Feeling of Stress : Not at all   Housing Stability: Low Risk  (3/5/2024)    Housing Stability Vital Sign     Unable to Pay for Housing in the Last Year: No     Number of Places Lived in the Last Year: 1     Unstable Housing in the Last Year: No       REVIEW OF SYSTEMS:  Constitution: Negative. Negative for chills, fever and night sweats.   Cardiovascular: Negative for chest pain and syncope.  "  Respiratory: Negative for cough and shortness of breath.   Gastrointestinal: See HPI. Negative for nausea/vomiting. Negative for abdominal pain.  Genitourinary: See HPI. Negative for discoloration or dysuria.  Hematologic/Lymphatic: No for bleeding/clotting disorders.   Musculoskeletal: Negative for falls and muscle weakness.   Neurological: See HPI. No history of seizures. No history of cranial surgery or shunts.  Neurological: See HPI. No seizures.   Endocrine: Negative for polydipsia, polyphagia and polyuria.   Allergic/Immunologic: Negative for hives and persistent infections.     EXAM:  Ht 5' 5" (1.651 m)   Wt 71.3 kg (157 lb 3 oz)   BMI 26.16 kg/m²     PHYSICAL EXAMINATION:    General: The patient is a WDWN 79 y.o. female in no apparent distress, the patient is orientatied to person, place and time.  Psych: Normal mood and affect  HEENT: Vision grossly intact, hearing intact to the spoken word.  Lungs: Respirations unlabored.  Gait: Normal station and gait, no difficulty with toe or heel walk.   Skin: Dorsal lumbar skin negative for rashes, lesions, hairy patches and surgical scars. There is no lumbar tenderness to palpation.  Range of motion: Lumbar range of motion is acceptable.  Spinal Balance: Global saggital and coronal spinal balance acceptable, no significant for scoliosis and kyphosis.  Musculoskeletal: No pain with the range of motion of the bilateral hips. No trochanteric tenderness to palpation.  Vascular: Bilateral lower extremities warm and well perfused, Dorsalis pedis pulses 2+ bilaterally.  Neurological: Normal strength and tone in all major motor groups in the bilateral lower extremities. Normal sensation to light touch in the L2-S1 dermatomes bilaterally.  Deep tendon reflexes symmetric 2+ in the bilateral lower extremities.  Negative Babinski bilaterally. Straight leg raise negative bilaterally.    IMAGING:   Today I independently reviewed the following images and my interpretations are " as follows:    AP, Lat and Flex/Ex  upright L-spine demonstrate advanced lumbar spondylosis with degenerative scoliosis.     Body mass index is 26.16 kg/m².  Hemoglobin A1C   Date Value Ref Range Status   02/02/2021 6.1 (H) 4.0 - 5.6 % Final     Comment:     ADA Screening Guidelines:  5.7-6.4%  Consistent with prediabetes  >or=6.5%  Consistent with diabetes    High levels of fetal hemoglobin interfere with the HbA1C  assay. Heterozygous hemoglobin variants (HbS, HgC, etc)do  not significantly interfere with this assay.   However, presence of multiple variants may affect accuracy.     04/28/2020 6.2 (H) 4.0 - 5.6 % Final     Comment:     ADA Screening Guidelines:  5.7-6.4%  Consistent with prediabetes  >or=6.5%  Consistent with diabetes  High levels of fetal hemoglobin interfere with the HbA1C  assay. Heterozygous hemoglobin variants (HbS, HgC, etc)do  not significantly interfere with this assay.   However, presence of multiple variants may affect accuracy.     09/04/2019 6.2 (H) <5.7 % of total Hgb Final     Comment:     For someone without known diabetes, a hemoglobin   A1c value between 5.7% and 6.4% is consistent with  prediabetes and should be confirmed with a   follow-up test.     For someone with known diabetes, a value <7%  indicates that their diabetes is well controlled. A1c  targets should be individualized based on duration of  diabetes, age, comorbid conditions, and other  considerations.     This assay result is consistent with an increased risk  of diabetes.     Currently, no consensus exists regarding use of  hemoglobin A1c for diagnosis of diabetes for children.            ASSESSMENT/PLAN:    Mirna was seen today for low-back pain and back pain.    Diagnoses and all orders for this visit:    Degenerative scoliosis    Lumbar radiculopathy  -     [facility] referral[facility]    Lumbar spondylosis    Degeneration of intervertebral disc of lumbar region with discogenic back pain  and lower extremity pain      Follow up if symptoms worsen or fail to improve.    Patient has lumbar degen scoliosis and L groin pain. I discussed the natural history of their diagnoses as well as surgical and nonsurgical treatment options. I educated the patient on the importance of core/back strengthening, correct posture, bending/lifting ergonomics, and low-impact aerobic exercises (walking, elliptical, and aquatherapy). Continue medications. Patient will start PT as scheduled. Patient will follow up PRN. Next step is a lumbar MRI.    I have personally examined the patient and agree with the above plan.    Reggie Campos MD  Orthopaedic Spine Surgeon  Department of Orthopaedic Surgery  [phone]

## 2024-12-09 ENCOUNTER — OFFICE VISIT (OUTPATIENT)
Dept: INTERNAL MEDICINE | Facility: CLINIC | Age: 79
End: 2024-12-09
Payer: MEDICARE

## 2024-12-09 VITALS
SYSTOLIC BLOOD PRESSURE: 122 MMHG | WEIGHT: 157.19 LBS | HEIGHT: 65 IN | DIASTOLIC BLOOD PRESSURE: 60 MMHG | HEART RATE: 72 BPM | BODY MASS INDEX: 26.19 KG/M2 | OXYGEN SATURATION: 97 %

## 2024-12-09 DIAGNOSIS — M79.18 MUSCULAR ABDOMINAL PAIN IN RIGHT LOWER QUADRANT: Primary | ICD-10-CM

## 2024-12-09 PROCEDURE — G2211 COMPLEX E/M VISIT ADD ON: HCPCS | Mod: S$PBB,,, | Performed by: INTERNAL MEDICINE

## 2024-12-09 PROCEDURE — 99214 OFFICE O/P EST MOD 30 MIN: CPT | Mod: S$PBB,,, | Performed by: INTERNAL MEDICINE

## 2024-12-09 PROCEDURE — 99999 PR PBB SHADOW E&M-EST. PATIENT-LVL IV: CPT | Mod: PBBFAC,,, | Performed by: INTERNAL MEDICINE

## 2024-12-09 PROCEDURE — 99214 OFFICE O/P EST MOD 30 MIN: CPT | Mod: PBBFAC | Performed by: INTERNAL MEDICINE

## 2024-12-09 NOTE — TELEPHONE ENCOUNTER
Hi, please contact the patient to assist in scheduling    Orders Placed This Encounter    COMPRESSION STOCKINGS   Please send to [facility] dme and let patient know:          Thank you, Ramsey Tyler   abd pain/vomiting

## 2024-12-09 NOTE — PROGRESS NOTES
Subjective     Patient ID: Mirna Norton is a 79 y.o. female.    Chief Complaint: Follow-up             History of Present Illness    CHIEF COMPLAINT:  Mirna presents today for abdominal pain in the right lower stomach area.    ABDOMINAL PAIN:  She reports abdominal pain in the right lower quadrant, ongoing for three weeks. The pain is exacerbated by body movement, particularly while driving, and is described as an aching sensation. Pressing on the area can evoke belching. She denies charley horse-like cramping. The onset may be related to carrying heavy objects, which initially caused right shoulder pain that has since resolved. She has not taken pain medication as the discomfort is typically brief and movement-induced. She mentions a history of stomach issues that had previously subsided but have recently become active again.    RECENT PROCEDURES:  She recently underwent a colonoscopy which revealed one small, non-concerning polyp. She reports the preparation was challenging. She denies blood in stools.    MEDICAL HISTORY:  She has a history of three C-sections and gout, with no recent flare-ups.    RECENT SPECIALIST VISITS:  She saw an orthopedist for her left hip one month ago and a spine doctor two weeks prior to the current visit.    IMMUNIZATIONS:  She received the COVID vaccine two weeks ago, completed the shingles vaccine series with doses in October and January, and had a tetanus vaccine in August at urgent care. She has not yet received this season's flu vaccine.      ROS:  Constitutional: negative activity change, negative unexpected weight change  HENT: negative trouble swallowing  Eyes: negative discharge, negative visual disturbance  Respiratory: negative chest tightness, negative wheezing, negative shortness of breath  Cardiovascular: negative chest pain, negative palpitations  Gastrointestinal: negative blood in stool, negative constipation, negative diarrhea, negative vomiting, +abdominal  pain  Endocrine: negative polydipsia, negative polyuria  Genitourinary: negative difficulty urinating, negative dysuria, negative hematuria  Musculoskeletal: negative arthralgias, negative joint swelling, negative neck pain, negative muscle cramps  Neurological: negative weakness, negative headaches  Psychiatric/Behavioral: negative confusion, negative dysphoric mood       RLQ pain, now third week of discomfort.    HPI  Review of Systems     Objective     Physical Exam  Vitals reviewed.   Constitutional:       General: She is not in acute distress.     Appearance: Normal appearance. She is well-developed. She is not ill-appearing, toxic-appearing or diaphoretic.   HENT:      Head: Normocephalic and atraumatic.   Eyes:      General: No scleral icterus.     Pupils: Pupils are equal, round, and reactive to light.   Neck:      Thyroid: No thyromegaly.   Cardiovascular:      Rate and Rhythm: Normal rate and regular rhythm.      Heart sounds: Normal heart sounds. No murmur heard.     No friction rub. No gallop.   Pulmonary:      Effort: Pulmonary effort is normal. No respiratory distress.      Breath sounds: Normal breath sounds. No wheezing or rales.   Abdominal:      General: Bowel sounds are normal. There is no distension.      Palpations: Abdomen is soft. There is no mass.      Tenderness: There is no guarding or rebound.      Comments: Negative pain at McBurney's point.  negative psoas sign.  Right lower quadrant pain is brought on by torso range of motion especially moving torso to the left.  No rebound tenderness.   Musculoskeletal:         General: No tenderness. Normal range of motion.      Cervical back: Normal range of motion.   Lymphadenopathy:      Cervical: No cervical adenopathy.   Neurological:      General: No focal deficit present.      Mental Status: She is alert and oriented to person, place, and time.   Psychiatric:         Mood and Affect: Mood normal.         Speech: Speech normal.         Behavior:  Behavior normal.            Assessment and Plan     1. Muscular abdominal pain in right lower quadrant        Assessment & Plan    MYALGIA / MUSCLE SPASM:  - Assessed abdominal pain, likely muscular in nature based on physical exam and patient history.  - Ruled out serious internal organ involvement, and low concern for colonic source given recent normal colonoscopy results.  - Determined no further diagnostic tests needed at this time.  - Mirna to monitor abdominal pain and report if it worsens.  - Contact the office if abdominal pain worsens.    BLOOD PRESSURE MANAGEMENT:  - Explained that lower blood pressure (60 mmHg diastolic) is not concerning given patient's age.  - Discussed that symptoms like wooziness or lightheadedness could indicate problematically low blood pressure.    IMMUNIZATION:  - Reviewed recommended vaccinations and their schedules.  - Explained that patient is up-to-date on recommended vaccinations, including shingles and tetanus.    FOLLOW-UP:  - Follow up on January 6th as scheduled.                    Health Maintenance         Date Due Completion Date    TETANUS VACCINE 01/16/2018 1/16/2008    Shingles Vaccine (2 of 2) 12/16/2022 10/21/2022    Influenza Vaccine (1) 09/01/2024 10/4/2023    Aspirin/Antiplatelet Therapy 12/09/2025 12/9/2024    DEXA Scan 11/21/2027 11/21/2022    Override on 8/2/2008: Done    Lipid Panel 02/06/2028 2/6/2023    Colonoscopy 12/03/2029 12/3/2024    Override on 3/6/2006: Done   Declines vaccines for today       No follow-ups on file.    Visit today included increased complexity associated with the care of the episodic problem  addressed and managing the longitudinal care of the patient due to the serious and/or complex managed problem(s) .    Future Appointments   Date Time Provider Department Center   12/31/2024 12:00 PM Emerson Yoon PT [facility]   1/6/2025  2:20 PM Ramsey Tyler MD [facility]   3/19/2025 10:45 AM Mikaela Graham  MD [facility]         This note was generated with the assistance of ambient listening technology. Verbal consent was obtained by the patient and accompanying visitor(s) for the recording of patient appointment to facilitate this note. I attest to having reviewed and edited the generated note for accuracy, though some syntax or spelling errors may persist. Please contact the author of this note for any clarification.

## 2024-12-31 ENCOUNTER — CLINICAL SUPPORT (OUTPATIENT)
Dept: REHABILITATION | Facility: HOSPITAL | Age: 79
End: 2024-12-31
Payer: MEDICARE

## 2024-12-31 DIAGNOSIS — M53.86 DECREASED RANGE OF MOTION OF LUMBAR SPINE: ICD-10-CM

## 2024-12-31 DIAGNOSIS — M54.50 LUMBAR PAIN: Primary | ICD-10-CM

## 2024-12-31 DIAGNOSIS — M54.16 LUMBAR RADICULOPATHY: ICD-10-CM

## 2024-12-31 PROCEDURE — 97110 THERAPEUTIC EXERCISES: CPT | Mod: PN

## 2024-12-31 PROCEDURE — 97162 PT EVAL MOD COMPLEX 30 MIN: CPT | Mod: PN

## 2024-12-31 NOTE — PLAN OF CARE
"[facility]  Physical Therapy Initial Evaluation - Lumbar    Name: Mirna Norton  Clinic Number: [id number]    Therapy Diagnosis:   Encounter Diagnosis   Name Primary?    Lumbar radiculopathy      Physician: Isrrael Marinelli PA-C    Physician Orders: PT Eval and Treat   Medical Diagnosis from Referral: M54.16 (ICD-10-CM) - Lumbar radiculopathy   Evaluation Date: 12/31/2024  Authorization Period Expiration: 11/12/25  Plan of Care Expiration: 3/25/25 or 24 Visit  Progress Note Due: 1/30/2025  Visit # / Visits authorized: 1/ 1  Visits Remaining - 0  PTA visit #: 0/6  Precautions: Standard "She will maintain conservative treatment measures at this time with outpatient physical therapy for focused strengthening and range-of-motion exercises. "    Eval Visit FOTO-  (Date/Score)   5th Visit FOTO   -  (Date/Score)   10th Visit FOTO  -  (Date/Score)   D/C FOTO          -  (Date/Score)     Time In: 1200  Time Out: 1300  Total Appointment Time (timed & untimed codes): 60 minutes        Subjective     History of current condition - Mirna is a 79 y.o. year old female who presents to the [facility] PT clinic  with complaint of Lumbar radiculopathy. Patient reports pain in the AM espicially with "bending forward" . Patient reports that she "likes to garden and has to sit in a chair when the back begins to hurt. Pt report onset of the symptoms occurred  10 months prior to evaluation. Precipitating event:Insidious Onset . Current symptoms include: lumbar pain esically with bending . Aggravating factors: sit to stand following sleep.   Treatment to date: none. Patients presently rates pain 8/10 on pain scale.    Falls: none    Mechanism of Injury: insidious   Next MD Visit: TBD        Imaging:X-ray   Per radiology report "Mild scoliosis can be seen.  Vertebral bodies are intact.  There is a little narrowing of the L 3-L4 vertebral bodies.  No instability in flexion and extension.  No collapse or destruction " "is noted.   "    Prior Therapy: Land based therapy received for current condition   Social History: Mirna lives in a multiple story home with none to enter and  lives with their spouse  Assistive Devices Owned: none   Occupation: Retired ()   Hobbies/Exercise: gardening  Hand Dominance: right  Prior Level of Function: Independent  Current Level of Function: Modified Independent secondary to lumbar     Pain:  Current 8/10, worst 10/10 (unknown increases pain), best 0/10 (unknown reduces pain)  Location: bilateral lumbar (middle)   Description: Aching  Aggravating Factors: Standing (AM)  Easing Factors: rest and sit and do nothing     Pts goals: improve lumbar pain to garden     Medical History:   Past Medical History:   Diagnosis Date    Abnormal cervical Papanicolaou smear     Cryo    Carotid artery stenosis     via kinked carotid artery - followed by Dr. Ye Anderson    Carotid stenosis 5/15/2014    GERD (gastroesophageal reflux disease)     Gout due to renal impairment     Gout, arthritis 2013    Gout, unspecified     Herniated lumbar intervertebral disc     Hypertension     Insomnia     Irritable bowel syndrome without diarrhea 2015    Liver lesion 3/13/2013    Mitral valve prolapse     MVP (mitral valve prolapse)     Pancreatic cyst 2013    PVD (peripheral vascular disease) 2013    PVD (peripheral vascular disease) 2013    Venous insufficiency        Surgical History:   Mirna Norton  has a past surgical history that includes Bartholin gland cyst excision; Diagnostic laproscopic ;  section; laser surgery on vein; Colonoscopy (N/A, 2019); and colonoscopy, screening, high risk patient (N/A, 12/3/2024).    Medications:   Mirna has a current medication list which includes the following prescription(s): allopurinol, amlodipine, aspirin, azelastine, bifidobacterium infantis, biotin, ciclopirox, diclofenac sodium, hydrochlorothiazide, ketoconazole, " methocarbamol, multivitamin-minerals-lutein, nitroglycerin, polyethylene glycol, rosuvastatin, and valsartan.    Allergies:   Review of patient's allergies indicates:  No Known Allergies     Objective     Repeated flexion/extension test - Flexing increases pain none - reduces pain   Posture: Fair  Palpation: mild generalized muscle tenderness  Sensation: intact to light touch  Pelvic Observation: L/R Up-slip ; L/R anterior or posterior Rotation     Range of Motion/Strength:   Thoracic   Pain/Dysfunction with Movement   AROM     Flexion (80)  100°     Extension (25)  5°      Right side bend (35)  5°  Limited    Left side bend (35)  40°     Right  Rotation (45)  40°     Left Rotation (45)  40°           LLE 5/5 4+/5 4/5 4-/5 3+/5 3/5 3-/5 2+/5 2/5 2-/5 1/5 0 NT   Hip Flexion     x                Hip Extension     x                Hip Abduction     x                Hip Adduction     x                Hip Internal Rotation                  x   Hip External Rotation                  x   Knee Flexion   x                  Knee Extension   x                  Ankle Dorsiflexion   x                  Ankle Plantarflexion   x                    RLE 5/5 4+/5 4/5 4-/5 3+/5 3/5 3-/5 2+/5 2/5 2-/5 1/5 0 NT   Hip Flexion     x                Hip Extension     x                Hip Abduction     x                Hip Adduction     x                Hip Internal Rotation                  x   Hip External Rotation                  x   Knee Flexion    x                 Knee Extension    x                 Ankle Dorsiflexion    x                 Ankle Plantarflexion    x                   Lumbar 5/5 4+/5 4/5 4-/5 3+/5 3/5 3-/5 2+/5 2/5 2-/5 1/5 0 NT   Lumbar Flexion      x               Lumbar Extension      x               Lumbar Rotation Right       x               Lumbar Rotation Left       x               Lumbar Sidebending Right       x               Lumbar Sidebending Left      x                       Lumbar Segment Joint Mobility  Comments   L1/L2 2+    L2/L3 2+    L3/L4 2+    L4/L5 2+    L5/S1 2+        Joint Mobility       Special Tests Left Right Comments   SLR positive positive    Flexibility       Hamstrings  -15 degrees  -15 degrees       Gait Analysis   Mirna Norton ambulated 30 feet with none device with independence assistance. Mirna Norton displays lack of knee and lumbar extension  gait deviation during 1 gait cycle.      Other:     Evaluation   Single Limb Stance R LE 24 sec  (<10 sec = HIGH FALL RISK)   Single Limb Stance L LE 20 sec  (<10 sec = HIGH FALL RISK)      Normative Values for Single Leg Stance, Eyes Open   Age: Time:   60-69 27 sec   70-79 17.2 sec   80-89 8.5 sec       Evaluation   30 second Chair Rise  (adults > 59 y/o) 10  completed with arms      Normative Scores for 30 sec Chair Rise (arms across chest; full stand and full sitting)    60-64 65-69 70-74 75-79 80-84 85-89 90-94   Range for Men 14-19 12-18 12-17 11-17 10-15 8-14 7-12   Range for Women 12-17 11-16 10-16 10-15 9-14 8-13 4-11       Evaluation   Timed Up and Go <14 sec      >14 seconds associated with high fall risk  > 30 seconds predictive of requiring ambulation device & being dependent in ADLs     Table: Population Norms for TUG    Age  Average TUG    60 - 69 years  8.1 seconds    70 - 79 years  9.2 seconds    80 - 99 years  11.3 seconds        Intake Outcome Measure for FOTO lumbar Survey    Therapist reviewed FOTO scores for Mirna Norton on 12/31/2024.   FOTO report -  see Media section or FOTO account episode details.    Limitation Score: 62%           TREATMENT   Treatment Time In: 1250  Treatment Time Out: 1300  Total Treatment time (time-based codes) separate from Evaluation: 10 minutes      Mirna received the treatments listed below:      therapeutic exercises to develop strength, ROM, and flexibility for 10 minutes including:  Warm-up      Supine    SKC B - 30 reps with towel  LTR B- 30 reps   SLR bilateral   LE - 30 reps   Hip  "abduction green TB B - 30 reps   PPT - 30 reps with 3" hold   TA activation  - 30 reps with 3" hold     Seated    HSS B LE - 3X30"  GSS B LE - 3X30"    Standing          Home Exercises and Patient Education Provided    Patient Education and Home Exercises     Education provided:   Patient was provided educational information regarding: role of Physical Therapy, short and long term goals, patient/therapist expectations, scheduling, and attendance policy.    Written Home Exercises Provided: yes. Exercises were reviewed and Mirna was able to demonstrate them prior to the end of the session.  Mirna demonstrated fair  understanding of the education provided. See EMR under Patient Instructions for exercises provided during therapy sessions.    Assessment     Mirna is a 79 y.o. female referred to outpatient Physical Therapy with a medical diagnosis of  Lumbar radiculopathy . Pt presents with displays of weakness, impaired endurance, impaired functional mobility, decreased lower extremity function, pain, and decreased ROM. Patient presents to therapy with reports of lumbar pain especially with sit to stand movement in the AM. MD noted scoliosis with x-ray and noted secondary to limited lumbar extension. Patient presents in increased anterior pelvic tilt moment and will benefit from therapy to address deficits.     Pt prognosis is Good.   Patient will benefit from skilled outpatient Physical Therapy to address the deficits stated above and in the chart below, provide patient /family education, and to maximize patientt's level of independence.     Plan of care discussed with patient: Yes  Patient's spiritual, cultural and educational needs considered and patient is agreeable to the plan of care and goals as stated below:     Anticipated Barriers for therapy: None Identified during initial evaluation     Medical Necessity is demonstrated by the following  History  Co-morbidities and personal factors that may impact the " plan of care [] LOW: no personal factors / co-morbidities  [x] MODERATE: 1-2 personal factors / co-morbidities  [] HIGH: 3+ personal factors / co-morbidities    Moderate / High Support Documentation:   Co-morbidities affecting plan of care:   -------------------------------------    Abnormal cervical Papanicolaou smear    Cryo    Carotid artery stenosis    via kinked carotid artery - followed by Dr. Ye Anderson    Carotid stenosis    GERD (gastroesophageal reflux disease)    Gout due to renal impairment    Gout, arthritis    Gout, unspecified    Herniated lumbar intervertebral disc    Hypertension    Insomnia    Irritable bowel syndrome without diarrhea    Liver lesion    Mitral valve prolapse    MVP (mitral valve prolapse)    Pancreatic cyst    PVD (peripheral vascular disease)    PVD (peripheral vascular disease)    Venous insufficiency       Personal Factors:   no deficits     Examination  Body Structures and Functions, activity limitations and participation restrictions that may impact the plan of care [] LOW: addressing 1-2 elements  [x] MODERATE: 3+ elements  [] HIGH: 4+ elements (please support below)    Moderate / High Support Documentation:   -------------------------------------    Abnormal cervical Papanicolaou smear    Cryo    Carotid artery stenosis    via kinked carotid artery - followed by Dr. Ye Anderson    Carotid stenosis    GERD (gastroesophageal reflux disease)    Gout due to renal impairment    Gout, arthritis    Gout, unspecified    Herniated lumbar intervertebral disc    Hypertension    Insomnia    Irritable bowel syndrome without diarrhea    Liver lesion    Mitral valve prolapse    MVP (mitral valve prolapse)    Pancreatic cyst    PVD (peripheral vascular disease)    PVD (peripheral vascular disease)    Venous insufficiency      Clinical Presentation [] LOW: stable  [x] MODERATE: Evolving  [] HIGH: Unstable     Decision Making/ Complexity Score: moderate         Goals:  Short Term Goals: 3  weeks 1/21/2025 (3)    Goal   Status     Pt. to report decreased lumbar pain </ =  4/10 at worst to increase tolerance for upright unsupported sitting posture.    Pt. to demonstrate proper posture requiring minimum verbal cues from PT for improved posture positioning     Increase L1 - L3 joint mobility to 3/6 to promote greater ease with squat to stand transitioning.    Increase lumbar flexion AROM >= +10 degrees to promote greater ease with self-care.    Increase AROM lumbar rotation bilateral  >= +5 degrees to promote greater ease with driving.    Increase lumbar side bending bilateral >= +5 degrees to improve function reaching beyond base of support.     Pt to be independent with HEP to improve ROM and independence with ADL's        Long Term Goals: 6 weeks 2/11/2025 (6)    Goal   Status     Pt. to report decreased lumbar pain </ =  2/10 at worst to increase tolerance for upright unsupported sitting posture.    Pt. to demonstrate proper posture requiring minimum verbal cues from PT for improved posture positioning     Increase L3 - L5 joint mobility to 3/6 to promote greater ease with squat to stand transitioning.    Increase lumbar flexion AROM >= +15 degrees to promote greater ease with self-care.    Increase AROM lumbar rotation bilateral  >= +10 degrees to promote greater ease with driving.    Increase lumbar side bending bilateral >= +10 degrees to improve function reaching beyond base of support.     Pt. to demonstrate increased MMT for rectus abdominus  >=  4+/5 to improve supine to sitting transfer.    Pt. to demonstrate increased MMT for Lumbar extensor paraspinals t >=  4+/5 to improve tolerance for prolong standing and ambulation     Pt. to demonstrate increased MMT for Lumbar/thoracic rotators   >= 4+/5 to improve tolerance for ADL and work activities.     Pt. to demonstrate increased MMT for Lumbar/thoracic side bending   >= 4+/5 to improve household cleaning.     Pt to be independent with HEP to  improve ROM and independence with ADL's        Plan   Plan of care Certification: 12/31/2024 to  3/25/2025 (12)    Outpatient Physical Therapy 2 times weekly for 12 weeks to include the following interventions: Cervical/Lumbar Traction, Gait Training, Manual Therapy, Moist Heat/ Ice, Neuromuscular Re-ed, Patient Education, Therapeutic Activities, Therapeutic Exercise, Ultrasound, and Integrative Dry Needling .     Emerson Yoon, PT,DPT  12/31/2024

## 2025-01-07 ENCOUNTER — CLINICAL SUPPORT (OUTPATIENT)
Dept: REHABILITATION | Facility: HOSPITAL | Age: 80
End: 2025-01-07
Payer: MEDICARE

## 2025-01-07 DIAGNOSIS — M53.86 DECREASED RANGE OF MOTION OF LUMBAR SPINE: ICD-10-CM

## 2025-01-07 DIAGNOSIS — M54.50 LUMBAR PAIN: Primary | ICD-10-CM

## 2025-01-07 PROCEDURE — 97110 THERAPEUTIC EXERCISES: CPT | Mod: PN,CQ

## 2025-01-07 NOTE — PROGRESS NOTES
"[facility]   Physical Therapy Treatment Note        Name: Mirna Norton  Clinic Number: [id number]    Therapy Diagnosis:   Encounter Diagnoses   Name Primary?    Lumbar pain Yes    Decreased range of motion of lumbar spine      Physician: Isrrael Marinelli PA-C    Visit Date: 1/7/2025  Last Scheduled Visit:        Physician Orders: PT Eval and Treat   Medical Diagnosis from Referral: M54.16 (ICD-10-CM) - Lumbar radiculopathy   Evaluation Date: 12/31/2024  Authorization Period Expiration: 11/12/25  Plan of Care Expiration: 3/25/25 or 24 Visit  Progress Note Due: 1/30/2025  Visit # / Visits authorized: 1/ 16  Visits Remaining - 11  PTA visit #: 1/6  Precautions: Standard "She will maintain conservative treatment measures at this time with outpatient physical therapy for focused strengthening and range-of-motion exercises. "     Eval Visit FOTO-  (Date/Score)   5th Visit FOTO   -  (Date/Score)   10th Visit FOTO  -  (Date/Score)   D/C FOTO          -  (Date/Score)     Time In: 7:06  Time Out: 8:00  Billable Time: 54 minutes      Insurance: Payor: MEDICARE / Plan: MEDICARE PART A & B / Product Type: Government /     Subjective     Pt reports: pain in back this morning, flexion challenging.  She was compliant with home exercise program.  Response to previous treatment: 1st session   Functional change: 1st session     Pain: 7/10  Location: middle wrists  and low back      Objective     Objective Measures Performed  - Evaluation     Treatment      Mirna received the treatments listed below (Bold exercise performed during 1/7/2025 visit):      therapeutic exercises to develop strength, endurance, ROM, and flexibility for 54 minutes including:    Warm-up      Supine    SKC B - 30 reps with towel  LTR B- 30 reps   SLR bilateral   LE - 30 reps   Hip abduction green TB B - 30 reps   PPT - 30 reps with 3" hold   TA activation  - 30 reps with 3" hold   Bridges 3x10     Seated    HSS B LE - 3X30"  GSS " "B LE - 3X30"  Bike - 8'      Standing    Lumbar ext  x15 (to teach exercise)            Home Exercises Provided and Patient Education Provided     Education provided:   - Continue with HEP    Written Home Exercises Provided: yes. Exercises were reviewed and Mirna was able to demonstrate them prior to the end of the session.  Mirna demonstrated fair  understanding of the education provided. See EMR under Patient Instructions for exercises provided during therapy sessions    Assessment     Pt tolerated session well. Gentle mat exercises performed today to reduce low back pain. Pt has pain with lumbar flexion, PTA advised pt to sleep on back to see if it alleviates pain, pt currently sleeps in fetal position. Bridges most challenging, minimal height cleared from mat. Standing  Lumbar ext added to improve lumbar mobility and provide pt w/ motion to reduce pain in morning. Mirna reported reduced pain at end of session. She will continue to benefit from skilled therapy.     Mirna Is progressing well towards her goals.   Pt prognosis is Good.     Pt will continue to benefit from skilled outpatient physical therapy to address the deficits listed in the problem list box on initial evaluation, provide pt/family education and to maximize pt's level of independence in the home and community environment.     Pt's spiritual, cultural and educational needs considered and pt agreeable to plan of care and goals.    Anticipated barriers to physical therapy: None identified at eval    Goals:  Short Term Goals: 3 weeks 1/21/2025 (3)     Goal   Status     Pt. to report decreased lumbar pain </ =  4/10 at worst to increase tolerance for upright unsupported sitting posture.     Pt. to demonstrate proper posture requiring minimum verbal cues from PT for improved posture positioning      Increase L1 - L3 joint mobility to 3/6 to promote greater ease with squat to stand transitioning.     Increase lumbar flexion AROM >= +10 degrees " to promote greater ease with self-care.     Increase AROM lumbar rotation bilateral  >= +5 degrees to promote greater ease with driving.     Increase lumbar side bending bilateral >= +5 degrees to improve function reaching beyond base of support.      Pt to be independent with HEP to improve ROM and independence with ADL's           Long Term Goals: 6 weeks 2/11/2025 (6)     Goal   Status     Pt. to report decreased lumbar pain </ =  2/10 at worst to increase tolerance for upright unsupported sitting posture.     Pt. to demonstrate proper posture requiring minimum verbal cues from PT for improved posture positioning      Increase L3 - L5 joint mobility to 3/6 to promote greater ease with squat to stand transitioning.     Increase lumbar flexion AROM >= +15 degrees to promote greater ease with self-care.     Increase AROM lumbar rotation bilateral  >= +10 degrees to promote greater ease with driving.     Increase lumbar side bending bilateral >= +10 degrees to improve function reaching beyond base of support.      Pt. to demonstrate increased MMT for rectus abdominus  >=  4+/5 to improve supine to sitting transfer.     Pt. to demonstrate increased MMT for Lumbar extensor paraspinals t >=  4+/5 to improve tolerance for prolong standing and ambulation      Pt. to demonstrate increased MMT for Lumbar/thoracic rotators   >= 4+/5 to improve tolerance for ADL and work activities.      Pt. to demonstrate increased MMT for Lumbar/thoracic side bending   >= 4+/5 to improve household cleaning.      Pt to be independent with HEP to improve ROM and independence with ADL's         Plan     Continued with current POC      Hemant Reynolds, PTA   1/7/2025

## 2025-01-09 ENCOUNTER — CLINICAL SUPPORT (OUTPATIENT)
Dept: REHABILITATION | Facility: HOSPITAL | Age: 80
End: 2025-01-09
Payer: MEDICARE

## 2025-01-09 DIAGNOSIS — M54.50 LUMBAR PAIN: Primary | ICD-10-CM

## 2025-01-09 DIAGNOSIS — M53.86 DECREASED RANGE OF MOTION OF LUMBAR SPINE: ICD-10-CM

## 2025-01-09 PROCEDURE — 97110 THERAPEUTIC EXERCISES: CPT | Mod: PN,CQ

## 2025-01-09 NOTE — PROGRESS NOTES
[facility]   Physical Therapy Treatment Note        Name: Mirna Norton  Clinic Number: [id number]    Therapy Diagnosis:   Encounter Diagnoses   Name Primary?    Lumbar pain Yes    Decreased range of motion of lumbar spine      Physician: Isrrael Marinelli PA-C    Visit Date: 1/9/2025  Last Scheduled Visit:        Physician Orders: PT Eval and Treat   Medical Diagnosis from Referral: M54.16 (ICD-10-CM) - Lumbar radiculopathy   Evaluation Date: 12/31/2024  Authorization Period Expiration: 11/12/25  Plan of Care Expiration: 3/25/25 or 24 Visit  Progress Note Due: 1/30/2025  Visit # / Visits authorized: 2/ 16  Visits Remaining - 10  PTA visit #: 2/6  Precautions: Standard "She will maintain conservative treatment measures at this time with outpatient physical therapy for focused strengthening and range-of-motion exercises. "     Eval Visit FOTO-  (Date/Score)   5th Visit FOTO   -  (Date/Score)   10th Visit FOTO  -  (Date/Score)   D/C FOTO          -  (Date/Score)     Time In: 3:00  Time Out: 4:00  Billable Time: 60 minutes      Insurance: Payor: MEDICARE / Plan: MEDICARE PART A & B / Product Type: Government /     Subjective     Pt reports: Less pain since last session   She was compliant with home exercise program.  Response to previous treatment: less pain   Functional change: none stated    Pain: 4/10  Location: middle wrists  and low back      Objective     Objective Measures Performed  - Evaluation     Treatment      Mirna received the treatments listed below (Bold exercise performed during 1/9/2025 visit):      therapeutic exercises to develop strength, endurance, ROM, and flexibility for 60 minutes including:    Warm-up      Supine    SKC B - 30 reps with towel  LTR B- 30 reps   SLR bilateral   LE - 30 reps   Hip abduction green TB B - 30 reps   PPT - 30 reps with 3" hold   TA activation  - 30 reps with 3" hold   Bridges 3x10     Seated    HSS B LE - 3X30"  GSS B LE - 3X30"  Bike - " 10      Standing    Lumbar ext  x15 (to teach exercise)            Home Exercises Provided and Patient Education Provided     Education provided:   - Continue with HEP    Written Home Exercises Provided: yes. Exercises were reviewed and Mirna was able to demonstrate them prior to the end of the session.  Mirna demonstrated fair  understanding of the education provided. See EMR under Patient Instructions for exercises provided during therapy sessions    Assessment     Pt tolerated session well. Straight Leg Raise and bridges added today and 30 reps of lumbar ext performed. Bridges challenging, verbal cues used to improve technique. Pt also reported less pain after sleeping on back opposed to sleeping on side. PTA advised pt to continues adjusting sleep position to minimize pain in the morning. Mirna had no adverse effects w/ exercises and will continue to benefit from skilled therapy.     Mirna Is progressing well towards her goals.   Pt prognosis is Good.     Pt will continue to benefit from skilled outpatient physical therapy to address the deficits listed in the problem list box on initial evaluation, provide pt/family education and to maximize pt's level of independence in the home and community environment.     Pt's spiritual, cultural and educational needs considered and pt agreeable to plan of care and goals.    Anticipated barriers to physical therapy: None identified at eval    Goals:  Short Term Goals: 3 weeks 1/21/2025 (3)     Goal   Status     Pt. to report decreased lumbar pain </ =  4/10 at worst to increase tolerance for upright unsupported sitting posture.     Pt. to demonstrate proper posture requiring minimum verbal cues from PT for improved posture positioning      Increase L1 - L3 joint mobility to 3/6 to promote greater ease with squat to stand transitioning.     Increase lumbar flexion AROM >= +10 degrees to promote greater ease with self-care.     Increase AROM lumbar rotation bilateral   >= +5 degrees to promote greater ease with driving.     Increase lumbar side bending bilateral >= +5 degrees to improve function reaching beyond base of support.      Pt to be independent with HEP to improve ROM and independence with ADL's           Long Term Goals: 6 weeks 2/11/2025 (6)     Goal   Status     Pt. to report decreased lumbar pain </ =  2/10 at worst to increase tolerance for upright unsupported sitting posture.     Pt. to demonstrate proper posture requiring minimum verbal cues from PT for improved posture positioning      Increase L3 - L5 joint mobility to 3/6 to promote greater ease with squat to stand transitioning.     Increase lumbar flexion AROM >= +15 degrees to promote greater ease with self-care.     Increase AROM lumbar rotation bilateral  >= +10 degrees to promote greater ease with driving.     Increase lumbar side bending bilateral >= +10 degrees to improve function reaching beyond base of support.      Pt. to demonstrate increased MMT for rectus abdominus  >=  4+/5 to improve supine to sitting transfer.     Pt. to demonstrate increased MMT for Lumbar extensor paraspinals t >=  4+/5 to improve tolerance for prolong standing and ambulation      Pt. to demonstrate increased MMT for Lumbar/thoracic rotators   >= 4+/5 to improve tolerance for ADL and work activities.      Pt. to demonstrate increased MMT for Lumbar/thoracic side bending   >= 4+/5 to improve household cleaning.      Pt to be independent with HEP to improve ROM and independence with ADL's         Plan     Continued with current POC      Hemant Reynolds, YAMILA   1/9/2025

## 2025-01-14 ENCOUNTER — CLINICAL SUPPORT (OUTPATIENT)
Dept: REHABILITATION | Facility: HOSPITAL | Age: 80
End: 2025-01-14
Payer: MEDICARE

## 2025-01-14 DIAGNOSIS — M53.86 DECREASED RANGE OF MOTION OF LUMBAR SPINE: ICD-10-CM

## 2025-01-14 DIAGNOSIS — M54.50 LUMBAR PAIN: Primary | ICD-10-CM

## 2025-01-14 PROCEDURE — 97110 THERAPEUTIC EXERCISES: CPT | Mod: PN

## 2025-01-14 NOTE — PROGRESS NOTES
[facility]   Physical Therapy Treatment Note        Name: Mirna Norton  Clinic Number: [id number]    Therapy Diagnosis:   Encounter Diagnoses   Name Primary?    Lumbar pain Yes    Decreased range of motion of lumbar spine        Physician: Isrrael Marinelli PA-C    Visit Date: 1/14/2025  Last Scheduled Visit:        Physician Orders: PT Eval and Treat   Medical Diagnosis from Referral: M54.16 (ICD-10-CM) - Lumbar radiculopathy   Evaluation Date: 12/31/2024  Authorization Period Expiration: 11/12/25  Plan of Care Expiration: 3/25/25 or 24 Visit  Progress Note Due: 1/30/2025  Visit # / Visits authorized: 3/ 16  Visits Remaining - 10  PTA visit #: 0/6  Precautions: Standard "She will maintain conservative treatment measures at this time with outpatient physical therapy for focused strengthening and range-of-motion exercises. "     Eval Visit FOTO-  (Date/Score)   5th Visit FOTO   -  (Date/Score)   10th Visit FOTO  -  (Date/Score)   D/C FOTO          -  (Date/Score)     Time In: 0900  Time Out: 1000  Billable Time: 60 minutes      Insurance: Payor: MEDICARE / Plan: MEDICARE PART A & B / Product Type: Government /     Subjective     Pt reports: Less pain since last session   She was compliant with home exercise program.  Response to previous treatment: less pain   Functional change: none stated    Pain: 4/10  Location: middle wrists  and low back      Objective     Objective Measures Performed  - Evaluation     Treatment      Mirna received the treatments listed below (Bold exercise performed during 1/14/2025 visit):      therapeutic exercises to develop strength, endurance, ROM, and flexibility for 60 minutes including:    Warm-up      Supine    SKC B - 30 reps with towel  LTR B- 30 reps   SLR bilateral   LE - 30 reps   Hip abduction green TB B - 30 reps   PPT - 30 reps with 3" hold   TA activation  - 30 reps with 3" hold   Bridges 3x10     Seated    HSS B LE - 3X30"  GSS B LE - " "3X30"  Bike - 10      Standing    Lumbar ext  x15 (to teach exercise)            Home Exercises Provided and Patient Education Provided     Education provided:   - Continue with HEP    Written Home Exercises Provided: yes. Exercises were reviewed and Mirna was able to demonstrate them prior to the end of the session.  Mirna demonstrated fair  understanding of the education provided. See EMR under Patient Instructions for exercises provided during therapy sessions    Assessment     Patient currently presents with an increase in lumbar pain rated 9/10. Physical Therapy performed a decrease in therapeutic exercise with a increase in supine therapeutic exercise due to pain. Patient reported a reduction in lumbar pain following today's treatment session. Physical Therapy will attempt to progress during next treatment session.     Mirna Is progressing well towards her goals.   Pt prognosis is Good.     Pt will continue to benefit from skilled outpatient physical therapy to address the deficits listed in the problem list box on initial evaluation, provide pt/family education and to maximize pt's level of independence in the home and community environment.     Pt's spiritual, cultural and educational needs considered and pt agreeable to plan of care and goals.    Anticipated barriers to physical therapy: None identified at eval    Goals:  Short Term Goals: 3 weeks 1/21/2025 (3)     Goal   Status     Pt. to report decreased lumbar pain </ =  4/10 at worst to increase tolerance for upright unsupported sitting posture.     Pt. to demonstrate proper posture requiring minimum verbal cues from PT for improved posture positioning      Increase L1 - L3 joint mobility to 3/6 to promote greater ease with squat to stand transitioning.     Increase lumbar flexion AROM >= +10 degrees to promote greater ease with self-care.     Increase AROM lumbar rotation bilateral  >= +5 degrees to promote greater ease with driving.     Increase " lumbar side bending bilateral >= +5 degrees to improve function reaching beyond base of support.      Pt to be independent with HEP to improve ROM and independence with ADL's           Long Term Goals: 6 weeks 2/11/2025 (6)     Goal   Status     Pt. to report decreased lumbar pain </ =  2/10 at worst to increase tolerance for upright unsupported sitting posture.     Pt. to demonstrate proper posture requiring minimum verbal cues from PT for improved posture positioning      Increase L3 - L5 joint mobility to 3/6 to promote greater ease with squat to stand transitioning.     Increase lumbar flexion AROM >= +15 degrees to promote greater ease with self-care.     Increase AROM lumbar rotation bilateral  >= +10 degrees to promote greater ease with driving.     Increase lumbar side bending bilateral >= +10 degrees to improve function reaching beyond base of support.      Pt. to demonstrate increased MMT for rectus abdominus  >=  4+/5 to improve supine to sitting transfer.     Pt. to demonstrate increased MMT for Lumbar extensor paraspinals t >=  4+/5 to improve tolerance for prolong standing and ambulation      Pt. to demonstrate increased MMT for Lumbar/thoracic rotators   >= 4+/5 to improve tolerance for ADL and work activities.      Pt. to demonstrate increased MMT for Lumbar/thoracic side bending   >= 4+/5 to improve household cleaning.      Pt to be independent with HEP to improve ROM and independence with ADL's         Plan     Continued with current POC      Emerson Yoon, PT   1/14/2025

## 2025-01-16 ENCOUNTER — CLINICAL SUPPORT (OUTPATIENT)
Dept: REHABILITATION | Facility: HOSPITAL | Age: 80
End: 2025-01-16
Payer: MEDICARE

## 2025-01-16 DIAGNOSIS — M54.50 LUMBAR PAIN: Primary | ICD-10-CM

## 2025-01-16 DIAGNOSIS — M53.86 DECREASED RANGE OF MOTION OF LUMBAR SPINE: ICD-10-CM

## 2025-01-16 PROCEDURE — 97110 THERAPEUTIC EXERCISES: CPT | Mod: PN

## 2025-01-16 NOTE — PROGRESS NOTES
"[facility] OUTPATIENT THERAPY AND WELLNESS   Physical Therapy Treatment Note        Name: Mirna Norton  Clinic Number: [id number]    Therapy Diagnosis:   Encounter Diagnoses   Name Primary?    Lumbar pain Yes    Decreased range of motion of lumbar spine        Physician: Isrrael Marinelli PA-C    Visit Date: 1/16/2025  Last Scheduled Visit:        Physician Orders: PT Eval and Treat   Medical Diagnosis from Referral: M54.16 (ICD-10-CM) - Lumbar radiculopathy   Evaluation Date: 12/31/2024  Authorization Period Expiration: 11/12/25  Plan of Care Expiration: 3/25/25 or 24 Visit  Progress Note Due: 1/30/2025  Visit # / Visits authorized: 4/ 16  Visits Remaining - 10  PTA visit #: 0/6  Precautions: Standard "She will maintain conservative treatment measures at this time with outpatient physical therapy for focused strengthening and range-of-motion exercises. "     Eval Visit FOTO-  (Date/Score)   5th Visit FOTO   -  (Date/Score)   10th Visit FOTO  -  (Date/Score)   D/C FOTO          -  (Date/Score)     Time In: 0800  Time Out: 0900  Billable Time: 30 minutes      Insurance: Payor: MEDICARE / Plan: MEDICARE PART A & B / Product Type: Government /     Subjective     Pt reports: a little more pain today; I tossed and turned all night  She was compliant with home exercise program.  Response to previous treatment: less pain   Functional change: none stated    Pain: 5/10  Location: middle wrists  and low back      Objective     Objective Measures Performed  - Evaluation     Treatment      Mirna received the treatments listed below (Bold exercise performed during 1/16/2025 visit):      therapeutic exercises to develop strength, endurance, ROM, and flexibility for 60 minutes including:    Warm-up      Supine    SKC B - 30 reps with towel  LTR B- 30 reps   SLR bilateral   LE - 30 reps   Hip abduction green TB B - 30 reps   PPT - 30 reps with 3" hold   TA activation  - 30 reps with 3" hold   Bridges 3x10     Seated    HSS B LE - " "3X30"  GSS B LE - 3X30"  Bike - 10      Standing    Lumbar ext  x15 (to teach exercise)            Home Exercises Provided and Patient Education Provided     Education provided:   - Continue with HEP    Written Home Exercises Provided: yes. Exercises were reviewed and Mirna was able to demonstrate them prior to the end of the session.  Mirna demonstrated fair  understanding of the education provided. See EMR under Patient Instructions for exercises provided during therapy sessions    Assessment     Patient currently presents to therapy with reports continued lumbar pain with functional activity. Patient received VCs for correction and was able to experience a minor decrease in pain. Patient will continue to benefit from skilled therapy to address deficits.     Mirna Is progressing well towards her goals.   Pt prognosis is Good.     Pt will continue to benefit from skilled outpatient physical therapy to address the deficits listed in the problem list box on initial evaluation, provide pt/family education and to maximize pt's level of independence in the home and community environment.     Pt's spiritual, cultural and educational needs considered and pt agreeable to plan of care and goals.    Anticipated barriers to physical therapy: None identified at eval    Goals:  Short Term Goals: 3 weeks 1/21/2025 (3)     Goal   Status     Pt. to report decreased lumbar pain </ =  4/10 at worst to increase tolerance for upright unsupported sitting posture.     Pt. to demonstrate proper posture requiring minimum verbal cues from PT for improved posture positioning      Increase L1 - L3 joint mobility to 3/6 to promote greater ease with squat to stand transitioning.     Increase lumbar flexion AROM >= +10 degrees to promote greater ease with self-care.     Increase AROM lumbar rotation bilateral  >= +5 degrees to promote greater ease with driving.     Increase lumbar side bending bilateral >= +5 degrees to improve function " reaching beyond base of support.      Pt to be independent with HEP to improve ROM and independence with ADL's           Long Term Goals: 6 weeks 2/11/2025 (6)     Goal   Status     Pt. to report decreased lumbar pain </ =  2/10 at worst to increase tolerance for upright unsupported sitting posture.     Pt. to demonstrate proper posture requiring minimum verbal cues from PT for improved posture positioning      Increase L3 - L5 joint mobility to 3/6 to promote greater ease with squat to stand transitioning.     Increase lumbar flexion AROM >= +15 degrees to promote greater ease with self-care.     Increase AROM lumbar rotation bilateral  >= +10 degrees to promote greater ease with driving.     Increase lumbar side bending bilateral >= +10 degrees to improve function reaching beyond base of support.      Pt. to demonstrate increased MMT for rectus abdominus  >=  4+/5 to improve supine to sitting transfer.     Pt. to demonstrate increased MMT for Lumbar extensor paraspinals t >=  4+/5 to improve tolerance for prolong standing and ambulation      Pt. to demonstrate increased MMT for Lumbar/thoracic rotators   >= 4+/5 to improve tolerance for ADL and work activities.      Pt. to demonstrate increased MMT for Lumbar/thoracic side bending   >= 4+/5 to improve household cleaning.      Pt to be independent with HEP to improve ROM and independence with ADL's         Plan     Continued with current POC      Emerson Yoon, PT   1/16/2025

## 2025-01-28 ENCOUNTER — CLINICAL SUPPORT (OUTPATIENT)
Dept: REHABILITATION | Facility: HOSPITAL | Age: 80
End: 2025-01-28
Payer: MEDICARE

## 2025-01-28 DIAGNOSIS — M54.50 LUMBAR PAIN: Primary | ICD-10-CM

## 2025-01-28 DIAGNOSIS — M53.86 DECREASED RANGE OF MOTION OF LUMBAR SPINE: ICD-10-CM

## 2025-01-28 PROCEDURE — 97110 THERAPEUTIC EXERCISES: CPT | Mod: PN,CQ

## 2025-01-28 NOTE — PROGRESS NOTES
Outpatient Rehab    Physical Therapy Visit    Patient Name: Mirna Norton  MRN: [id number]  YOB: 1945  Today's Date: 2025    Therapy Diagnosis:   Encounter Diagnoses   Name Primary?    Lumbar pain Yes    Decreased range of motion of lumbar spine      Physician: Isrrael Marinelli PA-C    Physician Orders: PT Eval and Treat   Medical Diagnosis from Referral: M54.16 (ICD-10-CM) - Lumbar radiculopathy   Evaluation Date: 2024  Authorization Period Expiration: 25  Plan of Care Expiration: 3/25/25 or 24 Visit  Progress Note Due: 2025  Visit # / Visits authorized:   Visits Remaining - 5  PTA visit #:       Time In: 9:00    Time Out: 10:00   Total Time: 60 min    Total Billable Time: 30 min         Subjective   Back is feeling better, no pain.  Pain reported as 0.      Past Medical History/Physical Systems Review:   Mirna Norton  has a past medical history of Abnormal cervical Papanicolaou smear, Carotid artery stenosis, Carotid stenosis, GERD (gastroesophageal reflux disease), Gout due to renal impairment, Gout, arthritis, Gout, unspecified, Herniated lumbar intervertebral disc, Hypertension, Insomnia, Irritable bowel syndrome without diarrhea, Liver lesion, Mitral valve prolapse, MVP (mitral valve prolapse), Pancreatic cyst, PVD (peripheral vascular disease), PVD (peripheral vascular disease), and Venous insufficiency.    Mirna Norton  has a past surgical history that includes Bartholin gland cyst excision; Diagnostic laproscopic ;  section; laser surgery on vein; Colonoscopy (N/A, 2019); and colonoscopy, screening, high risk patient (N/A, 12/3/2024).    Mirna has a current medication list which includes the following prescription(s): allopurinol, amlodipine, aspirin, azelastine, bifidobacterium infantis, biotin, ciclopirox, diclofenac sodium, hydrochlorothiazide, ketoconazole, methocarbamol, multivitamin-minerals-lutein, nitroglycerin, polyethylene glycol,  rosuvastatin, and valsartan.    Review of patient's allergies indicates:  No Known Allergies     Objective            Treatment:  Therapeutic Exercise  Therapeutic Exercise Activity 1: LTR 3x10  Therapeutic Exercise Activity 2: Bridges 3x10 (VCs to increase hip height and challenge pt)  Therapeutic Exercise Activity 3: SLR 3x10 B #1.5  Therapeutic Exercise Activity 4: Sidelying hip abduction 2x10 B (Very challenging, Moderate tactile cueing of glute med. KB used as TC to reduce hip flex)    Patient's spiritual, cultural, and educational needs considered and patient agreeable to plan of care and goals.     Assessment & Plan   Assessment: Pt presented w/o pain, strengthenging exercises progressed. Weight added to SLR, static time added to bridge w/ verbal cues to increase hip height. Sidelying hip added to strengthen glute med, pt very challenged due to hip flexor compensation. Moderate tactile cues used. Pt had no adverse effects to progressions and reported feeling better than anticipated following session  Evaluation/Treatment Tolerance: Patient tolerated treatment well        Plan: Continue POC as outlined in Eval    Goals:   Active       LT Goals       Pt. to report decreased lumbar pain </ =  2/10 at worst to increase tolerance for upright unsupported sitting posture. (Progressing)       Start:  01/28/25    Expected End:  03/25/25            Increase L3 - L5 joint mobility to 3/6 to promote greater ease with squat to stand transitioning. (Progressing)       Start:  01/28/25    Expected End:  03/25/25            Increase lumbar flexion AROM >= +15 degrees to promote greater ease with self-care.   (Progressing)       Start:  01/28/25    Expected End:  03/25/25            Increase AROM lumbar rotation bilateral  >= +10 degrees to promote greater ease with driving.   (Progressing)       Start:  01/28/25    Expected End:  03/25/25            Increase lumbar side bending bilateral >= +10 degrees to improve function  reaching beyond base of support.  (Progressing)       Start:  01/28/25    Expected End:  03/25/25               ST Goals       Increase L1 - L3 joint mobility to 3/6 to promote greater ease with squat to stand transitioning. (Progressing)       Start:  01/28/25    Expected End:  03/25/25            Increase lumbar flexion AROM >= +10 degrees to promote greater ease with self-care. (Progressing)       Start:  01/28/25    Expected End:  03/25/25            Increase AROM lumbar rotation bilateral  >= +5 degrees to promote greater ease with driving.  (Progressing)       Start:  01/28/25    Expected End:  03/25/25            Increase lumbar side bending bilateral >= +5 degrees to improve function reaching beyond base of support.   (Progressing)       Start:  01/28/25    Expected End:  03/25/25

## 2025-01-31 ENCOUNTER — CLINICAL SUPPORT (OUTPATIENT)
Dept: REHABILITATION | Facility: HOSPITAL | Age: 80
End: 2025-01-31
Payer: MEDICARE

## 2025-01-31 DIAGNOSIS — M54.50 LUMBAR PAIN: Primary | ICD-10-CM

## 2025-01-31 PROCEDURE — 97110 THERAPEUTIC EXERCISES: CPT | Mod: PN,CQ

## 2025-01-31 NOTE — PROGRESS NOTES
Outpatient Rehab    Physical Therapy Visit    Patient Name: Mirna Norton  MRN: [id number]  YOB: 1945  Today's Date: 1/31/2025    Therapy Diagnosis:   Encounter Diagnosis   Name Primary?    Lumbar pain Yes     Physician: Isrrael Marinelli PA-C    Physician Orders: PT Eval and Treat   Medical Diagnosis from Referral: M54.16 (ICD-10-CM) - Lumbar radiculopathy   Evaluation Date: 12/31/2024  Authorization Period Expiration: 11/12/25  Plan of Care Expiration: 3/25/25 or 24 Visit  Progress Note Due: 1/30/2025  Visit # / Visits authorized: 6/ 16  Visits Remaining - 4  PTA visit #: 2/6      Time In: 8:00    Time Out: 9:00   Total Time: 60 min    Total Billable Time: 60 min         Subjective   No pain today, feeling good.  Pain reported as 0.      Objective            Treatment:  Therapeutic Exercise  Therapeutic Exercise Activity 1: LTR 3x10  Therapeutic Exercise Activity 2: Bridges 3x10  Therapeutic Exercise Activity 3: SLR 3x10 B #1.5  Therapeutic Exercise Activity 4: Sidelying hip abduction 2x10 B    Patient's spiritual, cultural, and educational needs considered and patient agreeable to plan of care and goals.     Assessment & Plan   Assessment: Sidelying hip abduction still very challenging due to hip flexor compensation. Tactile and verbal cues used to improve ther ex technique. No difficulty or increase in pain w/ other exercises. Pt is pleased w/ progress in therapy.  Evaluation/Treatment Tolerance: Patient tolerated treatment well        Plan:      Goals:   Active       LT Goals       Pt. to report decreased lumbar pain </ =  2/10 at worst to increase tolerance for upright unsupported sitting posture. (Progressing)       Start:  01/28/25    Expected End:  03/25/25            Increase L3 - L5 joint mobility to 3/6 to promote greater ease with squat to stand transitioning. (Progressing)       Start:  01/28/25    Expected End:  03/25/25            Increase lumbar flexion AROM >= +15 degrees to promote  greater ease with self-care.   (Progressing)       Start:  01/28/25    Expected End:  03/25/25            Increase AROM lumbar rotation bilateral  >= +10 degrees to promote greater ease with driving.   (Progressing)       Start:  01/28/25    Expected End:  03/25/25            Increase lumbar side bending bilateral >= +10 degrees to improve function reaching beyond base of support.  (Progressing)       Start:  01/28/25    Expected End:  03/25/25               ST Goals       Increase L1 - L3 joint mobility to 3/6 to promote greater ease with squat to stand transitioning. (Progressing)       Start:  01/28/25    Expected End:  03/25/25            Increase lumbar flexion AROM >= +10 degrees to promote greater ease with self-care. (Progressing)       Start:  01/28/25    Expected End:  03/25/25            Increase AROM lumbar rotation bilateral  >= +5 degrees to promote greater ease with driving.  (Progressing)       Start:  01/28/25    Expected End:  03/25/25            Increase lumbar side bending bilateral >= +5 degrees to improve function reaching beyond base of support.   (Progressing)       Start:  01/28/25    Expected End:  03/25/25

## 2025-02-04 ENCOUNTER — CLINICAL SUPPORT (OUTPATIENT)
Dept: REHABILITATION | Facility: HOSPITAL | Age: 80
End: 2025-02-04
Payer: MEDICARE

## 2025-02-04 DIAGNOSIS — M54.50 LUMBAR PAIN: Primary | ICD-10-CM

## 2025-02-04 PROCEDURE — 97110 THERAPEUTIC EXERCISES: CPT | Mod: PN

## 2025-02-04 NOTE — PROGRESS NOTES
Outpatient Rehab    Physical Therapy Progress Note    Patient Name: Mirna Norton  MRN: [id number]  YOB: 1945  Today's Date: 2/4/2025    Therapy Diagnosis: No diagnosis found.  Physician: Isrrael Marinelli PA-C    Physician Orders: PT Eval and Treat   Medical Diagnosis from Referral: M54.16 (ICD-10-CM) - Lumbar radiculopathy   Evaluation Date: 12/31/2024  Authorization Period Expiration: 11/12/25  Plan of Care Expiration: 3/25/25 or 24 Visit  Progress Note Due: 3/4/2025  Visit # / Visits authorized: 5/ 16  Visits Remaining - 5  PTA visit #: 1/6                 Time In: 9:00    Time Out: 10:00   Total Time: 60 min    Total Billable Time: 30 min         Subjective      Pain     Patient reports a current pain level of 1/10.                 Objective      Lumbar Range of Motion   Active (deg) Passive (deg) Pain   Flexion 95       Extension 10       Right Lateral Flexion 30       Right Rotation         Left Lateral Flexion 30       Left Rotation                         Intake Outcome Measure for FOTO Survey    Therapist reviewed FOTO scores for Mirna Norton on 2/4/2025.   FOTO report - see Media section or FOTO account episode details.     Intake Score:  %  Survey Score 1:  %  Survey Score 2:  %    Treatment:  Therapeutic Exercise  Therapeutic Exercise Activity 1: LTR 3x10  Therapeutic Exercise Activity 2: Bridges 3x10  Therapeutic Exercise Activity 3: prone position - 4'  Therapeutic Exercise Activity 4: prone HS stretch - 3'  Therapeutic Exercise Activity 5: prone HS curls - 30 reps    Manual Therapy  Manual Therapy Activity 1: STM to paraspinal muscles    Balance/Neuromuscular Re-Education  Balance/Neuromuscular Re-Education Activity 1: sidelying with towel roll                        Patient's spiritual, cultural, and educational needs considered and patient agreeable to plan of care and goals.     Assessment & Plan   Assessment:             Plan:      Goals:   Active       LT Goals       Pt. to  report decreased lumbar pain </ =  2/10 at worst to increase tolerance for upright unsupported sitting posture. (Progressing)       Start:  01/28/25    Expected End:  03/25/25            Increase L3 - L5 joint mobility to 3/6 to promote greater ease with squat to stand transitioning. (Progressing)       Start:  01/28/25    Expected End:  03/25/25            Increase lumbar flexion AROM >= +15 degrees to promote greater ease with self-care.   (Progressing)       Start:  01/28/25    Expected End:  03/25/25            Increase AROM lumbar rotation bilateral  >= +10 degrees to promote greater ease with driving.   (Progressing)       Start:  01/28/25    Expected End:  03/25/25            Increase lumbar side bending bilateral >= +10 degrees to improve function reaching beyond base of support.  (Progressing)       Start:  01/28/25    Expected End:  03/25/25               ST Goals       Increase L1 - L3 joint mobility to 3/6 to promote greater ease with squat to stand transitioning. (Progressing)       Start:  01/28/25    Expected End:  03/25/25            Increase lumbar flexion AROM >= +10 degrees to promote greater ease with self-care. (Progressing)       Start:  01/28/25    Expected End:  03/25/25            Increase AROM lumbar rotation bilateral  >= +5 degrees to promote greater ease with driving.  (Progressing)       Start:  01/28/25    Expected End:  03/25/25            Increase lumbar side bending bilateral >= +5 degrees to improve function reaching beyond base of support.   (Progressing)       Start:  01/28/25    Expected End:  03/25/25                Emerson Yoon, PT

## 2025-02-06 ENCOUNTER — CLINICAL SUPPORT (OUTPATIENT)
Dept: REHABILITATION | Facility: HOSPITAL | Age: 80
End: 2025-02-06
Payer: MEDICARE

## 2025-02-06 DIAGNOSIS — M54.50 LUMBAR PAIN: Primary | ICD-10-CM

## 2025-02-06 PROCEDURE — 97110 THERAPEUTIC EXERCISES: CPT | Mod: PN,CQ

## 2025-02-06 NOTE — PROGRESS NOTES
Outpatient Rehab    Physical Therapy Visit    Patient Name: Mirna Norton  MRN: [id number]  YOB: 1945  Today's Date: 2/6/2025    Therapy Diagnosis:   Encounter Diagnosis   Name Primary?    Lumbar pain Yes     Physician: Isrrael Marinelli PA-C    Physician Orders: PT Eval and Treat   Medical Diagnosis from Referral: M54.16 (ICD-10-CM) - Lumbar radiculopathy   Evaluation Date: 12/31/2024  Authorization Period Expiration: 11/12/25  Plan of Care Expiration: 3/25/25 or 24 Visit  Progress Note Due: 3/4/2025  Visit # / Visits authorized: 8/ 16  Visits Remaining - 2  PTA visit #: 1/6     Time In: 8:00    Time Out: 9:00   Total Time: 60 min    Total Billable Time: 30 min         Subjective             Objective            Treatment:  Therapeutic Exercise  Therapeutic Exercise Activity 1: LTR 3x10  Therapeutic Exercise Activity 2: Bridges 3x10  Therapeutic Exercise Activity 3: prone position - 4'  Therapeutic Exercise Activity 4: prone HS stretch - 3'  Therapeutic Exercise Activity 5: prone HS curls - 30 reps  Therapeutic Exercise Activity 6: Bike - 10'    Patient's spiritual, cultural, and educational needs considered and patient agreeable to plan of care and goals.     Assessment & Plan   Assessment: No difficulty w/ exercises today. PTA informed pt that she has two more session before D/C. Pt is pleased w/ progress in therapy and verbalized understanding.  Evaluation/Treatment Tolerance: Patient tolerated treatment well        Plan:      Goals:   Active       LT Goals       Pt. to report decreased lumbar pain </ =  2/10 at worst to increase tolerance for upright unsupported sitting posture. (Progressing)       Start:  01/28/25    Expected End:  03/25/25            Increase L3 - L5 joint mobility to 3/6 to promote greater ease with squat to stand transitioning. (Progressing)       Start:  01/28/25    Expected End:  03/25/25            Increase lumbar flexion AROM >= +15 degrees to promote greater ease with  self-care.   (Progressing)       Start:  01/28/25    Expected End:  03/25/25            Increase AROM lumbar rotation bilateral  >= +10 degrees to promote greater ease with driving.   (Progressing)       Start:  01/28/25    Expected End:  03/25/25            Increase lumbar side bending bilateral >= +10 degrees to improve function reaching beyond base of support.  (Progressing)       Start:  01/28/25    Expected End:  03/25/25               ST Goals       Increase L1 - L3 joint mobility to 3/6 to promote greater ease with squat to stand transitioning. (Progressing)       Start:  01/28/25    Expected End:  03/25/25            Increase lumbar flexion AROM >= +10 degrees to promote greater ease with self-care. (Progressing)       Start:  01/28/25    Expected End:  03/25/25            Increase AROM lumbar rotation bilateral  >= +5 degrees to promote greater ease with driving.  (Progressing)       Start:  01/28/25    Expected End:  03/25/25            Increase lumbar side bending bilateral >= +5 degrees to improve function reaching beyond base of support.   (Progressing)       Start:  01/28/25    Expected End:  03/25/25                Hemant Reynolds PTA

## 2025-02-11 ENCOUNTER — CLINICAL SUPPORT (OUTPATIENT)
Dept: REHABILITATION | Facility: HOSPITAL | Age: 80
End: 2025-02-11
Payer: MEDICARE

## 2025-02-11 DIAGNOSIS — M54.50 LUMBAR PAIN: Primary | ICD-10-CM

## 2025-02-11 PROCEDURE — 97110 THERAPEUTIC EXERCISES: CPT | Mod: PN,CQ

## 2025-02-11 NOTE — PATIENT INSTRUCTIONS
RECOMMENDATIONS:  Adequate Calcium 1200 mg/day and Vitamin D 400-800 IU/day    2. Repeat BMD in 5-7 years;  repeat DXA sooner if additional risk factors present.    Shingles, pneumococcal, and tetanus vaccine is due.      Counseling and Referral of Other Preventative  (Italic type indicates deductible and co-insurance are waived)    Patient Name: Mirna Norton  Today's Date: 7/5/2019    Health Maintenance       Date Due Completion Date    Shingles Vaccine (1 of 2) 01/12/1996 ---    Pneumococcal Vaccine (65+ Low/Medium Risk) (2 of 2 - PCV13) 08/12/2014 8/12/2013    TETANUS VACCINE 01/16/2018 1/16/2008    Colonoscopy 07/29/2019 (Originally 11/11/2018) 11/11/2013    Override on 3/6/2006: Done    Influenza Vaccine 08/01/2019 10/8/2018    Aspirin/Antiplatelet Therapy 07/05/2020 7/5/2019    Mammogram 10/11/2020 10/11/2018    DEXA SCAN 12/01/2022 12/1/2017    Override on 8/2/2008: Done    Lipid Panel 10/11/2023 10/11/2018        No orders of the defined types were placed in this encounter.    The following information is provided to all patients.  This information is to help you find resources for any of the problems found today that may be affecting your health:                Living healthy guide: www.[facility].[place].gov      Understanding Diabetes: www.diabetes.org      Eating healthy: www.cdc.gov/healthyweight      CDC home safety checklist: www.cdc.gov/steadi/patient.html      Agency on Aging: www.goea.[place].[facility]      Alcoholics anonymous (AA): www.aa.org      Physical Activity: www.maisha.nih.gov/pu9xrdp      Tobacco use: www.quitwithusla.org     
1 Hour(s) 39 Minute(s)

## 2025-02-11 NOTE — PROGRESS NOTES
Outpatient Rehab    Physical Therapy Visit    Patient Name: Mirna Norton  MRN: [id number]  YOB: 1945  Today's Date: 2/11/2025    Therapy Diagnosis:   Encounter Diagnosis   Name Primary?    Lumbar pain Yes     Physician: Isrrael Marinelli PA-C    Physician Orders: PT Eval and Treat   Medical Diagnosis from Referral: M54.16 (ICD-10-CM) - Lumbar radiculopathy   Evaluation Date: 12/31/2024  Authorization Period Expiration: 11/12/25  Plan of Care Expiration: 3/25/25 or 24 Visit  Progress Note Due: 3/4/2025  Visit # / Visits authorized: 9/ 16  Visits Remaining - 1  PTA visit #: 2/6     Time In: 8:00    Time Out: 9:00   Total Time: 60    Total Billable Time: 60    FOTO:  Intake Score:  %  Survey Score 1:  %  Survey Score 2:  %         Subjective   Patient reports her back pain is not too bad this morning, a little stiff.  Pain reported as 3/10.      Objective            Treatment:  Therapeutic Exercise  Therapeutic Exercise Activity 1: LTR 3x10  Therapeutic Exercise Activity 2: Bridges 3x10  Therapeutic Exercise Activity 3: prone position - 4'  Therapeutic Exercise Activity 4: supine HS stretch - 3'  Therapeutic Exercise Activity 5: prone HS curls - 30 reps  Therapeutic Exercise Activity 6: Bike - 10'    Assessment & Plan   Assessment: Patient completed her therapy as noted above with no increase in symptoms prior to leaving the clinic.  Reminded patient that she has one more visit and she reported that she is pleased with her progress so far and feels that therapy has helped her a lot.       Patient will continue to benefit from skilled outpatient physical therapy to address the deficits listed in the problem list box on initial evaluation, provide pt/family education and to maximize pt's level of independence in the home and community environment.     Patient's spiritual, cultural, and educational needs considered and patient agreeable to plan of care and goals.           Plan:      Goals:   Active        LT Goals       Pt. to report decreased lumbar pain </ =  2/10 at worst to increase tolerance for upright unsupported sitting posture. (Progressing)       Start:  01/28/25    Expected End:  03/25/25            Increase L3 - L5 joint mobility to 3/6 to promote greater ease with squat to stand transitioning. (Progressing)       Start:  01/28/25    Expected End:  03/25/25            Increase lumbar flexion AROM >= +15 degrees to promote greater ease with self-care.   (Progressing)       Start:  01/28/25    Expected End:  03/25/25            Increase AROM lumbar rotation bilateral  >= +10 degrees to promote greater ease with driving.   (Progressing)       Start:  01/28/25    Expected End:  03/25/25            Increase lumbar side bending bilateral >= +10 degrees to improve function reaching beyond base of support.  (Progressing)       Start:  01/28/25    Expected End:  03/25/25               ST Goals       Increase L1 - L3 joint mobility to 3/6 to promote greater ease with squat to stand transitioning. (Progressing)       Start:  01/28/25    Expected End:  03/25/25            Increase lumbar flexion AROM >= +10 degrees to promote greater ease with self-care. (Progressing)       Start:  01/28/25    Expected End:  03/25/25            Increase AROM lumbar rotation bilateral  >= +5 degrees to promote greater ease with driving.  (Progressing)       Start:  01/28/25    Expected End:  03/25/25            Increase lumbar side bending bilateral >= +5 degrees to improve function reaching beyond base of support.   (Progressing)       Start:  01/28/25    Expected End:  03/25/25                Govind House, PTA

## 2025-02-18 ENCOUNTER — CLINICAL SUPPORT (OUTPATIENT)
Dept: REHABILITATION | Facility: HOSPITAL | Age: 80
End: 2025-02-18
Payer: MEDICARE

## 2025-02-18 DIAGNOSIS — M54.50 LUMBAR PAIN: Primary | ICD-10-CM

## 2025-02-18 PROCEDURE — 97110 THERAPEUTIC EXERCISES: CPT | Mod: KX,PN,CQ

## 2025-02-18 NOTE — PROGRESS NOTES
Outpatient Rehab    Physical Therapy Visit    Patient Name: Mirna Norton  MRN: [id number]  YOB: 1945  Today's Date: 2/18/2025    Therapy Diagnosis:   Encounter Diagnosis   Name Primary?    Lumbar pain Yes     Physician: Isrrael Marinelli PA-C    Physician Orders: PT Eval and Treat   Medical Diagnosis from Referral: M54.16 (ICD-10-CM) - Lumbar radiculopathy   Evaluation Date: 12/31/2024  Authorization Period Expiration: 11/12/25  Plan of Care Expiration: 3/25/25 or 24 Visit  Progress Note Due: 3/4/2025  Visit # / Visits authorized: 10/ 16  Visits Remaining - 1  PTA visit #: 3/6     Time In: 8:00    Time Out: 9:00   Total Time: 60 min    Total Billable Time: 30 min           Subjective   Some stiffnes in low back today.  Pain reported as 0/10.      Objective            Treatment:  Therapeutic Exercise  Therapeutic Exercise Activity 1: LTR 3x10  Therapeutic Exercise Activity 2: Bridges 3x10  Therapeutic Exercise Activity 3: lumbar ext 3x10  Therapeutic Exercise Activity 4: Precor hamstring curl 3x10  Therapeutic Exercise Activity 5: Bridges 3x10  Therapeutic Exercise Activity 6: Bike - 10'  Therapeutic Exercise Activity 7: SIt <>stand #10 KB 3x10    Assessment & Plan   Assessment: No difficulty w/ exericses today. Pt is aware next session is last schedueld and id pleased w/ progress in PT. MHP used to w/ supine exercises for comfort and to reduce stiffness in lumbar spine. Weight added to sit <> stand to practice safe lifting that strengthen LE. Pt had no adverse effects today.  Evaluation/Treatment Tolerance: Patient tolerated treatment well    Patient will continue to benefit from skilled outpatient physical therapy to address the deficits listed in the problem list box on initial evaluation, provide pt/family education and to maximize pt's level of independence in the home and community environment.     Patient's spiritual, cultural, and educational needs considered and patient agreeable to plan of  care and goals.           Plan:      Goals:   Active       LT Goals       Pt. to report decreased lumbar pain </ =  2/10 at worst to increase tolerance for upright unsupported sitting posture. (Progressing)       Start:  01/28/25    Expected End:  03/25/25            Increase L3 - L5 joint mobility to 3/6 to promote greater ease with squat to stand transitioning. (Progressing)       Start:  01/28/25    Expected End:  03/25/25            Increase lumbar flexion AROM >= +15 degrees to promote greater ease with self-care.   (Progressing)       Start:  01/28/25    Expected End:  03/25/25            Increase AROM lumbar rotation bilateral  >= +10 degrees to promote greater ease with driving.   (Progressing)       Start:  01/28/25    Expected End:  03/25/25            Increase lumbar side bending bilateral >= +10 degrees to improve function reaching beyond base of support.  (Progressing)       Start:  01/28/25    Expected End:  03/25/25               ST Goals       Increase L1 - L3 joint mobility to 3/6 to promote greater ease with squat to stand transitioning. (Progressing)       Start:  01/28/25    Expected End:  03/25/25            Increase lumbar flexion AROM >= +10 degrees to promote greater ease with self-care. (Progressing)       Start:  01/28/25    Expected End:  03/25/25            Increase AROM lumbar rotation bilateral  >= +5 degrees to promote greater ease with driving.  (Progressing)       Start:  01/28/25    Expected End:  03/25/25            Increase lumbar side bending bilateral >= +5 degrees to improve function reaching beyond base of support.   (Progressing)       Start:  01/28/25    Expected End:  03/25/25                Hemant Reynolds PTA

## 2025-02-20 NOTE — PROGRESS NOTES
INTERNAL MEDICINE CLINIC - SAME DAY APPOINTMENT  Progress Note    PRESENTING HISTORY     PCP: Ramsey Tyler MD    Chief Complaint/Reason for Visit:   No chief complaint on file.   ***  History of Present Illness & ROS : Mrs. Mirna Norton is a 80 y.o. female.      Review of Systems:  {ROS:[id number]}    PAST HISTORY:     Past Medical History:   Diagnosis Date    Abnormal cervical Papanicolaou smear     Cryo    Carotid artery stenosis     via kinked carotid artery - followed by Dr. Ye Anderson    Carotid stenosis 5/15/2014    GERD (gastroesophageal reflux disease)     Gout due to renal impairment     Gout, arthritis 2013    Gout, unspecified     Herniated lumbar intervertebral disc     Hypertension     Insomnia     Irritable bowel syndrome without diarrhea 2015    Liver lesion 3/13/2013    Mitral valve prolapse     MVP (mitral valve prolapse)     Pancreatic cyst 2013    PVD (peripheral vascular disease) 2013    PVD (peripheral vascular disease) 2013    Venous insufficiency        Past Surgical History:   Procedure Laterality Date    BARTHOLIN GLAND CYST EXCISION       SECTION      x3    COLONOSCOPY N/A 2019    Procedure: COLONOSCOPY;  Surgeon: Haider Luu MD;  Location: [facility] ([address] [place] [address]);  Service: Endoscopy;  Laterality: N/A;    COLONOSCOPY, SCREENING, HIGH RISK PATIENT N/A 12/3/2024    Procedure: COLONOSCOPY, SCREENING, HIGH RISK PATIENT;  Surgeon: Haider Luu MD;  [facility]  Service: Endoscopy;  Laterality: N/A;  Referred by monroe Kern, mail, AE/ML   precall complete-st    Diagnostic laproscopic       laser surgery on vein         Family History   Problem Relation Name Age of Onset    Heart disease Mother      Diabetes Brother      Hypertension Brother      Diabetes Sister      Eclampsia Sister      Hypertension Sister      Colon polyps Sister      Diabetes Sister      Eclampsia Sister      Hypertension Sister       Miscarriages / Stillbirths Sister      Heart disease Sister          mi, cad    Diabetes Sister      Eclampsia Sister      Hypertension Sister      Diabetes Sister      Endometriosis Daughter      No Known Problems Daughter      No Known Problems Daughter      Breast cancer Other niece         under age 40. came back at around age 60    Colon cancer Neg Hx      Ovarian cancer Neg Hx      Esophageal cancer Neg Hx      Liver cancer Neg Hx      Liver disease Neg Hx      Rectal cancer Neg Hx      Stomach cancer Neg Hx      Uterine cancer Neg Hx      Cervical cancer Neg Hx         Social History     Socioeconomic History    Marital status:    Occupational History    Occupation: Retired   Tobacco Use    Smoking status: Never    Smokeless tobacco: Never   Substance and Sexual Activity    Alcohol use: No    Drug use: No    Sexual activity: Not Currently     Birth control/protection: Post-menopausal   Social History Narrative    3 kids (healthy), previous . , has eye issues. biw exercise.     Social Drivers of Health     Financial Resource Strain: Low Risk  (3/5/2024)    Overall Financial Resource Strain (CARDIA)     Difficulty of Paying Living Expenses: Not hard at all   Food Insecurity: No Food Insecurity (3/5/2024)    Hunger Vital Sign     Worried About Running Out of Food in the Last Year: Never true     Ran Out of Food in the Last Year: Never true   Transportation Needs: No Transportation Needs (3/5/2024)    PRAPARE - Transportation     Lack of Transportation (Medical): No     Lack of Transportation (Non-Medical): No   Physical Activity: Insufficiently Active (3/5/2024)    Exercise Vital Sign     Days of Exercise per Week: 2 days     Minutes of Exercise per Session: 30 min   Stress: No Stress Concern Present (3/5/2024)    [employer] - Occupational Stress Questionnaire     Feeling of Stress : Not at all   Housing Stability: Low Risk  (3/5/2024)    Housing  Stability Vital Sign     Unable to Pay for Housing in the Last Year: No     Number of Places Lived in the Last Year: 1     Unstable Housing in the Last Year: No       MEDICATIONS & ALLERGIES:     Medications Ordered Prior to Encounter[1]     Review of patient's allergies indicates:  No Known Allergies    Medications Reconciliation:   I have reconciled the patient's home medications with the patient/family. I have updated all changes.  Refer to After-Visit Medication List.    OBJECTIVE:     Vital Signs:  There were no vitals filed for this visit.  Wt Readings from Last 3 Encounters:   12/09/24 1337 71.3 kg (157 lb 3 oz)   12/04/24 1525 71.3 kg (157 lb 3 oz)   12/03/24 1055 70.8 kg (156 lb)     There is no height or weight on file to calculate BMI.     Physical Exam:  {Exam:[id number]}    Laboratory  Lab Results   Component Value Date    WBC 7.79 10/14/2024    HGB 12.6 10/14/2024    HCT 40.7 10/14/2024     10/14/2024    CHOL 188 01/20/2022    TRIG 177 (H) 01/20/2022    HDL 73 01/20/2022    ALT 21 10/08/2024    AST 21 10/08/2024     10/14/2024    K 3.9 10/14/2024     10/14/2024    CREATININE 0.8 10/14/2024    BUN 17 10/14/2024    CO2 26 10/14/2024    TSH 1.263 10/08/2024    INR 1.0 04/08/2011    GLUF 87 03/06/2023    HGBA1C 6.1 (H) 02/02/2021       ASSESSMENT & PLAN:     Same day apt.     Encounter to be checked for elevated blood sugars...   *Noted history of DM in family amongst her siblings.   ` ? Cm  ` ? A1...      Future Appointments   Date Time Provider Department Center   2/21/2025 12:00 PM Emerson Yoon PT [facility]   2/25/2025 10:30 AM Yvonne Barry FNP [facility]   3/19/2025 10:45 AM Mikaela Graham MD [facility]   5/28/2025  1:20 PM Benigno Swenson MD PhD [facility] [place]       After Visit Medication List :     Medication List            Accurate as of February 20, 2025  3:53 PM. If you have any questions, ask your nurse or  doctor.                CONTINUE taking these medications      ALIGN ORAL     allopurinoL 100 MG tablet  Commonly known as: ZYLOPRIM  Take 1 tablet (100 mg total) by mouth once daily.     amLODIPine 5 MG tablet  Commonly known as: NORVASC  Take 1 tablet (5 mg total) by mouth once daily.     aspirin 81 MG EC tablet  Commonly known as: ECOTRIN  Take 1 tablet (81 mg total) by mouth once daily.     azelastine 137 mcg (0.1 %) nasal spray  Commonly known as: ASTELIN     BIOTIN ORAL     ciclopirox 8 % Soln  Commonly known as: PENLAC  Apply topically nightly. Remove once a week with nail polish remover     diclofenac sodium 1 % Gel  Commonly known as: VOLTAREN  Apply 2 g topically 4 (four) times daily as needed.     hydroCHLOROthiazide 25 MG tablet  Commonly known as: HYDRODIURIL     ketoconazole 2 % shampoo  Commonly known as: NIZORAL  Apply topically every 7 days. X 30 minute scalp soaks     methocarbamoL 500 MG Tab  Commonly known as: Robaxin     multivitamin-minerals-lutein Tab     nitroGLYCERIN 0.4 MG SL tablet  Commonly known as: NITROSTAT  Place 1 tablet (0.4 mg total) under the tongue every 5 (five) minutes as needed for Chest pain.     polyethylene glycol 17 gram/dose powder  Commonly known as: MIRALAX  Take 17 g by mouth once daily.     rosuvastatin 10 MG tablet  Commonly known as: CRESTOR     valsartan 160 MG tablet  Commonly known as: DIOVAN  Take 1 tablet (160 mg total) by mouth once daily.              Signing Physician:  JEFFREY Denney         [1]   Current Outpatient Medications on File Prior to Visit   Medication Sig Dispense Refill    allopurinoL (ZYLOPRIM) 100 MG tablet Take 1 tablet (100 mg total) by mouth once daily. 90 tablet 2    amLODIPine (NORVASC) 5 MG tablet Take 1 tablet (5 mg total) by mouth once daily. 90 tablet 3    aspirin (ECOTRIN) 81 MG EC tablet Take 1 tablet (81 mg total) by mouth once daily.      azelastine (ASTELIN) 137 mcg (0.1 %) nasal spray 2 sprays by Nasal route as  needed.      BIFIDOBACTERIUM INFANTIS (ALIGN ORAL) Take by mouth once daily.      BIOTIN ORAL Take by mouth once daily.      ciclopirox (PENLAC) 8 % Soln Apply topically nightly. Remove once a week with nail polish remover 1 each 2    diclofenac sodium (VOLTAREN) 1 % Gel Apply 2 g topically 4 (four) times daily as needed. 100 g 0    hydroCHLOROthiazide (HYDRODIURIL) 25 MG tablet Take 25 mg by mouth once daily.      ketoconazole (NIZORAL) 2 % shampoo Apply topically every 7 days. X 30 minute scalp soaks 120 mL 10    methocarbamoL (ROBAXIN) 500 MG Tab Take 500 mg by mouth 3 (three) times daily.      multivitamin-minerals-lutein Tab Take 1 tablet by mouth once daily.       nitroGLYCERIN (NITROSTAT) 0.4 MG SL tablet Place 1 tablet (0.4 mg total) under the tongue every 5 (five) minutes as needed for Chest pain. (Patient not taking: Reported on 3/6/2024) 20 tablet 12    polyethylene glycol (MIRALAX) 17 gram/dose powder Take 17 g by mouth once daily. 510 g 11    rosuvastatin (CRESTOR) 10 MG tablet Take 10 mg by mouth.      valsartan (DIOVAN) 160 MG tablet Take 1 tablet (160 mg total) by mouth once daily. 90 tablet 3     No current facility-administered medications on file prior to visit.

## 2025-02-21 ENCOUNTER — CLINICAL SUPPORT (OUTPATIENT)
Dept: REHABILITATION | Facility: HOSPITAL | Age: 80
End: 2025-02-21
Payer: MEDICARE

## 2025-02-21 DIAGNOSIS — M54.50 LUMBAR PAIN: Primary | ICD-10-CM

## 2025-02-21 PROBLEM — M53.86 DECREASED RANGE OF MOTION OF LUMBAR SPINE: Status: RESOLVED | Noted: 2024-12-31 | Resolved: 2025-02-21

## 2025-02-21 PROCEDURE — 97110 THERAPEUTIC EXERCISES: CPT | Mod: KX,PN

## 2025-02-21 NOTE — PROGRESS NOTES
Outpatient Rehab    Physical Therapy Discharge    Patient Name: Mirna Norton  MRN: [id number]  YOB: 1945  Today's Date: 2/21/2025    Therapy Diagnosis: No diagnosis found.  Physician: Isrrael Marinelli PA-C    Physician Orders: PT Eval and Treat   Medical Diagnosis from Referral: M54.16 (ICD-10-CM) - Lumbar radiculopathy   Evaluation Date: 12/31/2024  Authorization Period Expiration: 11/12/25  Plan of Care Expiration: 3/25/25 or 24 Visit  Progress Note Due: 3/4/2025  Visit # / Visits authorized: 11/ 16  Visits Remaining - 1  PTA visit #: 0/6      Time In: 1000  Time Out:11:00   Total Time: 60 min    Total Billable Time: 60 min    FOTO:  Intake Score:  %  Survey Score 1:  %  Survey Score 2:  %         Subjective   my back is feeling better today.  Pain reported as 0/10.      Objective            Treatment:  Therapeutic Exercise  Therapeutic Exercise Activity 1: LTR 3x10  Therapeutic Exercise Activity 2: Bridges 3x10  Therapeutic Exercise Activity 6: Bike - 10'  Therapeutic Exercise Activity 7: SIt <>stand #5 KB 3x10  Therapeutic Exercise Activity 8: swiss ball roll 30 reps (3 way)                             Assessment & Plan   Assessment: Pt currently presents to therapy with reports of 0/10 lumbar pain rating in addition to improve lumbar ROM mobility. Pt will be discharged from therapy services.  Evaluation/Treatment Tolerance: Patient tolerated treatment well    Patient's spiritual, cultural, and educational needs considered and patient agreeable to plan of care and goals.           Plan: Pt will be discharged from therapy services    Goals:   Active       LT Goals       Pt. to report decreased lumbar pain </ =  2/10 at worst to increase tolerance for upright unsupported sitting posture. (Progressing)       Start:  01/28/25    Expected End:  03/25/25            Increase L3 - L5 joint mobility to 3/6 to promote greater ease with squat to stand transitioning. (Progressing)       Start:  01/28/25     Expected End:  03/25/25            Increase lumbar flexion AROM >= +15 degrees to promote greater ease with self-care.   (Progressing)       Start:  01/28/25    Expected End:  03/25/25            Increase AROM lumbar rotation bilateral  >= +10 degrees to promote greater ease with driving.   (Progressing)       Start:  01/28/25    Expected End:  03/25/25            Increase lumbar side bending bilateral >= +10 degrees to improve function reaching beyond base of support.  (Progressing)       Start:  01/28/25    Expected End:  03/25/25               ST Goals       Increase L1 - L3 joint mobility to 3/6 to promote greater ease with squat to stand transitioning. (Progressing)       Start:  01/28/25    Expected End:  03/25/25            Increase lumbar flexion AROM >= +10 degrees to promote greater ease with self-care. (Progressing)       Start:  01/28/25    Expected End:  03/25/25            Increase AROM lumbar rotation bilateral  >= +5 degrees to promote greater ease with driving.  (Progressing)       Start:  01/28/25    Expected End:  03/25/25            Increase lumbar side bending bilateral >= +5 degrees to improve function reaching beyond base of support.   (Progressing)       Start:  01/28/25    Expected End:  03/25/25                Emerson Yoon, PT

## 2025-02-22 DIAGNOSIS — Z00.00 ENCOUNTER FOR MEDICARE ANNUAL WELLNESS EXAM: ICD-10-CM

## 2025-02-25 ENCOUNTER — OFFICE VISIT (OUTPATIENT)
Dept: INTERNAL MEDICINE | Facility: CLINIC | Age: 80
End: 2025-02-25
Payer: MEDICARE

## 2025-02-25 ENCOUNTER — LAB VISIT (OUTPATIENT)
Dept: LAB | Facility: HOSPITAL | Age: 80
End: 2025-02-25
Payer: MEDICARE

## 2025-02-25 VITALS
WEIGHT: 160.25 LBS | OXYGEN SATURATION: 99 % | SYSTOLIC BLOOD PRESSURE: 128 MMHG | DIASTOLIC BLOOD PRESSURE: 68 MMHG | BODY MASS INDEX: 26.7 KG/M2 | HEIGHT: 65 IN | HEART RATE: 74 BPM

## 2025-02-25 DIAGNOSIS — Z00.00 ANNUAL PHYSICAL EXAM: ICD-10-CM

## 2025-02-25 DIAGNOSIS — E55.9 VITAMIN D DEFICIENCY, UNSPECIFIED: ICD-10-CM

## 2025-02-25 DIAGNOSIS — K58.1 IRRITABLE BOWEL SYNDROME WITH CONSTIPATION: ICD-10-CM

## 2025-02-25 DIAGNOSIS — I83.893 VARICOSE VEINS OF LEG WITH SWELLING, BILATERAL: ICD-10-CM

## 2025-02-25 DIAGNOSIS — K57.90 DIVERTICULOSIS: ICD-10-CM

## 2025-02-25 DIAGNOSIS — M10.9 GOUT, ARTHRITIS: ICD-10-CM

## 2025-02-25 DIAGNOSIS — R73.03 PRE-DIABETES: ICD-10-CM

## 2025-02-25 DIAGNOSIS — I10 ESSENTIAL HYPERTENSION: ICD-10-CM

## 2025-02-25 DIAGNOSIS — Z86.0100 HISTORY OF COLON POLYPS: ICD-10-CM

## 2025-02-25 DIAGNOSIS — Z86.0100 HISTORY OF COLON POLYPS: Primary | ICD-10-CM

## 2025-02-25 DIAGNOSIS — I10 PRIMARY HYPERTENSION: ICD-10-CM

## 2025-02-25 LAB
25(OH)D3+25(OH)D2 SERPL-MCNC: 42 NG/ML (ref 30–96)
ALBUMIN SERPL BCP-MCNC: 3.9 G/DL (ref 3.5–5.2)
ALP SERPL-CCNC: 63 U/L (ref 40–150)
ALT SERPL W/O P-5'-P-CCNC: 19 U/L (ref 10–44)
ANION GAP SERPL CALC-SCNC: 10 MMOL/L (ref 8–16)
AST SERPL-CCNC: 47 U/L (ref 10–40)
BASOPHILS # BLD AUTO: 0.03 K/UL (ref 0–0.2)
BASOPHILS NFR BLD: 0.4 % (ref 0–1.9)
BILIRUB SERPL-MCNC: 0.4 MG/DL (ref 0.1–1)
BUN SERPL-MCNC: 14 MG/DL (ref 8–23)
CALCIUM SERPL-MCNC: 10 MG/DL (ref 8.7–10.5)
CHLORIDE SERPL-SCNC: 103 MMOL/L (ref 95–110)
CHOLEST SERPL-MCNC: 117 MG/DL (ref 120–199)
CHOLEST/HDLC SERPL: 1.7 {RATIO} (ref 2–5)
CO2 SERPL-SCNC: 28 MMOL/L (ref 23–29)
CREAT SERPL-MCNC: 0.8 MG/DL (ref 0.5–1.4)
DIFFERENTIAL METHOD BLD: ABNORMAL
EOSINOPHIL # BLD AUTO: 0.3 K/UL (ref 0–0.5)
EOSINOPHIL NFR BLD: 3.8 % (ref 0–8)
ERYTHROCYTE [DISTWIDTH] IN BLOOD BY AUTOMATED COUNT: 13.6 % (ref 11.5–14.5)
EST. GFR  (NO RACE VARIABLE): >60 ML/MIN/1.73 M^2
ESTIMATED AVG GLUCOSE: 134 MG/DL (ref 68–131)
GLUCOSE SERPL-MCNC: 100 MG/DL (ref 70–110)
HBA1C MFR BLD: 6.3 % (ref 4–5.6)
HCT VFR BLD AUTO: 43 % (ref 37–48.5)
HDLC SERPL-MCNC: 70 MG/DL (ref 40–75)
HDLC SERPL: 59.8 % (ref 20–50)
HGB BLD-MCNC: 13.1 G/DL (ref 12–16)
IMM GRANULOCYTES # BLD AUTO: 0.01 K/UL (ref 0–0.04)
IMM GRANULOCYTES NFR BLD AUTO: 0.1 % (ref 0–0.5)
LDLC SERPL CALC-MCNC: 37 MG/DL (ref 63–159)
LYMPHOCYTES # BLD AUTO: 1.9 K/UL (ref 1–4.8)
LYMPHOCYTES NFR BLD: 27.9 % (ref 18–48)
MCH RBC QN AUTO: 28.1 PG (ref 27–31)
MCHC RBC AUTO-ENTMCNC: 30.5 G/DL (ref 32–36)
MCV RBC AUTO: 92 FL (ref 82–98)
MONOCYTES # BLD AUTO: 0.6 K/UL (ref 0.3–1)
MONOCYTES NFR BLD: 9.1 % (ref 4–15)
NEUTROPHILS # BLD AUTO: 4.1 K/UL (ref 1.8–7.7)
NEUTROPHILS NFR BLD: 58.7 % (ref 38–73)
NONHDLC SERPL-MCNC: 47 MG/DL
NRBC BLD-RTO: 0 /100 WBC
PLATELET # BLD AUTO: 311 K/UL (ref 150–450)
PMV BLD AUTO: 9.2 FL (ref 9.2–12.9)
POTASSIUM SERPL-SCNC: 4.2 MMOL/L (ref 3.5–5.1)
PROT SERPL-MCNC: 8 G/DL (ref 6–8.4)
RBC # BLD AUTO: 4.66 M/UL (ref 4–5.4)
SODIUM SERPL-SCNC: 141 MMOL/L (ref 136–145)
TRIGL SERPL-MCNC: 50 MG/DL (ref 30–150)
TSH SERPL DL<=0.005 MIU/L-ACNC: 1.68 UIU/ML (ref 0.4–4)
WBC # BLD AUTO: 6.92 K/UL (ref 3.9–12.7)

## 2025-02-25 PROCEDURE — 80053 COMPREHEN METABOLIC PANEL: CPT | Performed by: NURSE PRACTITIONER

## 2025-02-25 PROCEDURE — 84443 ASSAY THYROID STIM HORMONE: CPT | Performed by: NURSE PRACTITIONER

## 2025-02-25 PROCEDURE — 99213 OFFICE O/P EST LOW 20 MIN: CPT | Mod: PBBFAC | Performed by: NURSE PRACTITIONER

## 2025-02-25 PROCEDURE — 82306 VITAMIN D 25 HYDROXY: CPT | Performed by: NURSE PRACTITIONER

## 2025-02-25 PROCEDURE — 85025 COMPLETE CBC W/AUTO DIFF WBC: CPT | Performed by: NURSE PRACTITIONER

## 2025-02-25 PROCEDURE — 83036 HEMOGLOBIN GLYCOSYLATED A1C: CPT | Performed by: NURSE PRACTITIONER

## 2025-02-25 PROCEDURE — 99999 PR PBB SHADOW E&M-EST. PATIENT-LVL III: CPT | Mod: PBBFAC,,, | Performed by: NURSE PRACTITIONER

## 2025-02-25 PROCEDURE — 80061 LIPID PANEL: CPT | Performed by: NURSE PRACTITIONER

## 2025-02-25 RX ORDER — AMLODIPINE BESYLATE 5 MG/1
5 TABLET ORAL DAILY
Qty: 90 TABLET | Refills: 3 | Status: SHIPPED | OUTPATIENT
Start: 2025-02-25

## 2025-02-25 RX ORDER — POLYETHYLENE GLYCOL 3350 17 G/17G
17 POWDER, FOR SOLUTION ORAL DAILY
Qty: 510 G | Refills: 3 | Status: SHIPPED | OUTPATIENT
Start: 2025-02-25 | End: 2026-02-25

## 2025-02-25 RX ORDER — VALSARTAN 320 MG/1
160 TABLET ORAL DAILY
Qty: 45 TABLET | Refills: 3 | Status: SHIPPED | OUTPATIENT
Start: 2025-02-25 | End: 2026-02-25

## 2025-02-25 NOTE — PROGRESS NOTES
ANNUAL VISIT NOTE     PRESENTING HISTORY     Reason for Visit:  Annual visit.    Chief Complaint   Patient presents with    Follow-up     Follow up annual        History of Present Illness & ROS: Mrs. Mirna Norton is a 80 y.o. female.  Annual   Very pleasant lady.   Fasting for labs today.   Recently had a steroid injection and in a study at [facility] and her 'A1C was notably elevated. A steroid injection was received at an Urgent Care on 1/4/2025.   Not having any urinary frequency or increase in thirst or hunger.   Will need a refill on medication, Miralax and several other medications.   She is not fond of taking the HCTZ due to her history of Gout and some concerns that if may insult her underlying gout condition. Will check BP at home and update us in 2 weeks.   No other complaints.     Review of Systems:  Eyes: denies visual changes at this time denies floaters   ENT: no nasal congestion or sore throat  Respiratory: no cough or shorness of breath  Cardiovascular: no chest pain or palpitations  Gastrointestinal: no nausea or vomiting, no abdominal pain or change in bowel habits  Genitourinary: no hematuria or dysuria; denies frequency  Hematologic/Lymphatic: no easy bruising or lymphadenopathy  Musculoskeletal: no arthralgias or myalgias  Neurological: no seizures or tremors  Endocrine: no heat or cold intolerance    PAST HISTORY:     Past Medical History:   Diagnosis Date    Abnormal cervical Papanicolaou smear 1980's    Cryo    Carotid artery stenosis     via kinked carotid artery - followed by Dr. Ye Anderson    Carotid stenosis 5/15/2014    GERD (gastroesophageal reflux disease)     Gout due to renal impairment     Gout, arthritis 2/20/2013    Gout, unspecified     Herniated lumbar intervertebral disc     Hypertension     Insomnia     Irritable bowel syndrome without diarrhea 9/20/2015    Liver lesion 3/13/2013    Mitral valve prolapse     MVP (mitral valve prolapse)     Pancreatic cyst 12/30/2013    PVD  (peripheral vascular disease) 2013    PVD (peripheral vascular disease) 2013    Venous insufficiency        Past Surgical History:   Procedure Laterality Date    BARTHOLIN GLAND CYST EXCISION       SECTION      x3    COLONOSCOPY N/A 2019    Procedure: COLONOSCOPY;  Surgeon: Haider Luu MD;  [facility]  Service: Endoscopy;  Laterality: N/A;    COLONOSCOPY, SCREENING, HIGH RISK PATIENT N/A 12/3/2024    Procedure: COLONOSCOPY, SCREENING, HIGH RISK PATIENT;  Surgeon: Haider Luu MD;  [facility]  Service: Endoscopy;  Laterality: N/A;  Referred by monroe Kern, mail, AE/ML   precall complete-st    Diagnostic laproscopic       laser surgery on vein         Family History   Problem Relation Name Age of Onset    Heart disease Mother      Diabetes Brother      Hypertension Brother      Diabetes Sister      Eclampsia Sister      Hypertension Sister      Colon polyps Sister      Diabetes Sister      Eclampsia Sister      Hypertension Sister      Miscarriages / Stillbirths Sister      Heart disease Sister          mi, cad    Diabetes Sister      Eclampsia Sister      Hypertension Sister      Diabetes Sister      Endometriosis Daughter      No Known Problems Daughter      No Known Problems Daughter      Breast cancer Other niece         under age 40. came back at around age 61    Colon cancer Neg Hx      Ovarian cancer Neg Hx      Esophageal cancer Neg Hx      Liver cancer Neg Hx      Liver disease Neg Hx      Rectal cancer Neg Hx      Stomach cancer Neg Hx      Uterine cancer Neg Hx      Cervical cancer Neg Hx         Social History     Socioeconomic History    Marital status:    Occupational History    Occupation: Retired   Tobacco Use    Smoking status: Never    Smokeless tobacco: Never   Substance and Sexual Activity    Alcohol use: No    Drug use: No    Sexual activity: Not Currently     Birth control/protection: Post-menopausal    Social History Narrative    3 kids (healthy), previous . , has eye issues. biw exercise.     Social Drivers of Health     Financial Resource Strain: Low Risk  (3/5/2024)    Overall Financial Resource Strain (CARDIA)     Difficulty of Paying Living Expenses: Not hard at all   Food Insecurity: No Food Insecurity (3/5/2024)    Hunger Vital Sign     Worried About Running Out of Food in the Last Year: Never true     Ran Out of Food in the Last Year: Never true   Transportation Needs: No Transportation Needs (3/5/2024)    PRAPARE - Transportation     Lack of Transportation (Medical): No     Lack of Transportation (Non-Medical): No   Physical Activity: Insufficiently Active (3/5/2024)    Exercise Vital Sign     Days of Exercise per Week: 2 days     Minutes of Exercise per Session: 30 min   Stress: No Stress Concern Present (3/5/2024)    [employer] [place] - Occupational Stress Questionnaire     Feeling of Stress : Not at all   Housing Stability: Low Risk  (3/5/2024)    Housing Stability Vital Sign     Unable to Pay for Housing in the Last Year: No     Number of Places Lived in the Last Year: 1     Unstable Housing in the Last Year: No       MEDICATIONS & ALLERGIES:     Medications Ordered Prior to Encounter[1]     Review of patient's allergies indicates:  No Known Allergies    Medications Reconciliation:   I have reconciled the patient's home medications and discharge medications with the patient/family. I have updated all changes.  Refer to After-Visit Medication List.    OBJECTIVE:     Vital Signs:  Vitals:    02/25/25 1044   BP: 128/68   Pulse: 74     Wt Readings from Last 3 Encounters:   02/25/25 1044 72.7 kg (160 lb 4.4 oz)   12/09/24 1337 71.3 kg (157 lb 3 oz)   12/04/24 1525 71.3 kg (157 lb 3 oz)     Body mass index is 26.67 kg/m².   Wt Readings from Last 3 Encounters:   02/25/25 72.7 kg (160 lb 4.4 oz)   12/09/24 71.3 kg (157 lb 3 oz)   12/04/24 71.3 kg (157 lb 3  oz)     Temp Readings from Last 3 Encounters:   12/03/24 97.7 °F (36.5 °C)   09/06/24 98.9 °F (37.2 °C) (Oral)   03/27/23 98.5 °F (36.9 °C) (Oral)     BP Readings from Last 3 Encounters:   02/25/25 128/68   12/09/24 122/60   12/03/24 119/76     Pulse Readings from Last 3 Encounters:   02/25/25 74   12/09/24 72   12/03/24 78       Physical Exam:  General: Well developed, well nourished. No distress.  HEENT: Head is normocephalic, atraumatic; ears are normal.   Eyes: Clear conjunctiva.  Neck: Supple, symmetrical neck; trachea midline.  Lungs: Clear to auscultation bilaterally and normal respiratory effort.  Cardiovascular: Heart with regular rate and rhythm. No murmurs, gallops or rubs  Extremities: No LE edema. Pulses 2+ and symmetric.   Abdomen: Abdomen is soft, non-tender non-distended with normal bowel sounds.  Skin: Skin color, texture, turgor normal. No rashes.  Musculoskeletal: Normal gait.   Lymph Nodes: No cervical or supraclavicular adenopathy.  Neurologic: Normal strength and tone. No focal numbness or weakness.   Psychiatric: Not depressed.    Laboratory  Lab Results   Component Value Date    WBC 7.79 10/14/2024    HGB 12.6 10/14/2024    HCT 40.7 10/14/2024     10/14/2024    CHOL 188 01/20/2022    TRIG 177 (H) 01/20/2022    HDL 73 01/20/2022    ALT 21 10/08/2024    AST 21 10/08/2024     10/14/2024    K 3.9 10/14/2024     10/14/2024    CREATININE 0.8 10/14/2024    BUN 17 10/14/2024    CO2 26 10/14/2024    TSH 1.263 10/08/2024    INR 1.0 04/08/2011    GLUF 87 03/06/2023    HGBA1C 6.1 (H) 02/02/2021         ASSESSMENT & PLAN:       Annual physical exam  -     Comprehensive Metabolic Panel; Future; Expected date: 02/25/2025  -     CBC Auto Differential; Future; Expected date: 02/25/2025  -     Lipid Panel; Future; Expected date: 02/25/2025  -     Hemoglobin A1C; Future; Expected date: 02/25/2025  -     TSH; Future; Expected date: 02/25/2025  -     Vitamin D; Future; Expected date:  02/25/2025      History of colon polyps  Diverticulosis\  Irritable bowel syndrome with constipation  -     polyethylene glycol (MIRALAX) 17 gram/dose powder; Take 17 g by mouth once daily.  Dispense: 510 g; Refill: 3  ` Miralax daily     Varicose veins of leg with swelling, bilateral  Stable, but does report still has some discomforts; followed by Dr. Gomez. Last seen in 8/2024 (intervention recommended per Dr. Gomez, notations made to chart)    Primary hypertension  BP Readings from Last 3 Encounters:   02/25/25 128/68   12/09/24 122/60   12/03/24 119/76   ` Norvasc 5 mg daily   ` Diovan 160  -     amLODIPine (NORVASC) 5 MG tablet; Take 1 tablet (5 mg total) by mouth once daily.  Dispense: 90 tablet; Refill: 3  -     valsartan (DIOVAN) 320 MG tablet; Take 0.5 tablets (160 mg total) by mouth once daily.  Dispense: 45 tablet; Refill: 3  *HCTZ 12.5 not taken.....  *Encouraged to check her BPs daily and follow up with Dr. Tyler in 2 weeks in an effort to evaluate her trends on home BPs without HCTZ...    Pre-diabetes  Lab Results   Component Value Date    HGBA1C 6.1 (H) 02/02/2021   -     Hemoglobin A1C; Future; Expected date: 02/25/2025  *Noted history of DM in family amongst her siblings.     Vitamin D deficiency, unspecified  -     Vitamin D; Future; Expected date: 02/25/2025    *Annual today.     Future Appointments   Date Time Provider Department Center   2/25/2025 11:40 AM LAB, APPOINTMENT [facility] [place]   3/19/2025 10:45 AM Mikaela Graham MD [facility]   5/28/2025  1:20 PM Benigno Swenson MD PhD [facility] [place]   8/25/2025 11:00 AM Ramsey Tyler MD [facility] [place]          Medication List            Accurate as of February 25, 2025 11:03 AM. If you have any questions, ask your nurse or doctor.                CONTINUE taking these medications      ALIGN ORAL     allopurinoL 100 MG tablet  Commonly known as: ZYLOPRIM  Take 1 tablet (100 mg total) by  mouth once daily.     amLODIPine 5 MG tablet  Commonly known as: NORVASC  Take 1 tablet (5 mg total) by mouth once daily.     aspirin 81 MG EC tablet  Commonly known as: ECOTRIN  Take 1 tablet (81 mg total) by mouth once daily.     azelastine 137 mcg (0.1 %) nasal spray  Commonly known as: ASTELIN     BIOTIN ORAL     ciclopirox 8 % Soln  Commonly known as: PENLAC  Apply topically nightly. Remove once a week with nail polish remover     diclofenac sodium 1 % Gel  Commonly known as: VOLTAREN  Apply 2 g topically 4 (four) times daily as needed.     hydroCHLOROthiazide 25 MG tablet  Commonly known as: HYDRODIURIL     ketoconazole 2 % shampoo  Commonly known as: NIZORAL  Apply topically every 7 days. X 30 minute scalp soaks     methocarbamoL 500 MG Tab  Commonly known as: Robaxin     multivitamin-minerals-lutein Tab     nitroGLYCERIN 0.4 MG SL tablet  Commonly known as: NITROSTAT  Place 1 tablet (0.4 mg total) under the tongue every 5 (five) minutes as needed for Chest pain.     polyethylene glycol 17 gram/dose powder  Commonly known as: MIRALAX  Take 17 g by mouth once daily.     rosuvastatin 10 MG tablet  Commonly known as: CRESTOR     valsartan 160 MG tablet  Commonly known as: DIOVAN  Take 1 tablet (160 mg total) by mouth once daily.              Signing Physician:  JEFFREY Denney           [1]   Current Outpatient Medications on File Prior to Visit   Medication Sig Dispense Refill    allopurinoL (ZYLOPRIM) 100 MG tablet Take 1 tablet (100 mg total) by mouth once daily. 90 tablet 2    amLODIPine (NORVASC) 5 MG tablet Take 1 tablet (5 mg total) by mouth once daily. 90 tablet 3    aspirin (ECOTRIN) 81 MG EC tablet Take 1 tablet (81 mg total) by mouth once daily.      azelastine (ASTELIN) 137 mcg (0.1 %) nasal spray 2 sprays by Nasal route as needed.      BIFIDOBACTERIUM INFANTIS (ALIGN ORAL) Take by mouth once daily.      BIOTIN ORAL Take by mouth once daily.      ciclopirox (PENLAC) 8 % Soln Apply  topically nightly. Remove once a week with nail polish remover 1 each 2    diclofenac sodium (VOLTAREN) 1 % Gel Apply 2 g topically 4 (four) times daily as needed. 100 g 0    hydroCHLOROthiazide (HYDRODIURIL) 25 MG tablet Take 25 mg by mouth once daily.      ketoconazole (NIZORAL) 2 % shampoo Apply topically every 7 days. X 30 minute scalp soaks 120 mL 10    methocarbamoL (ROBAXIN) 500 MG Tab Take 500 mg by mouth 3 (three) times daily.      multivitamin-minerals-lutein Tab Take 1 tablet by mouth once daily.       polyethylene glycol (MIRALAX) 17 gram/dose powder Take 17 g by mouth once daily. 510 g 11    rosuvastatin (CRESTOR) 10 MG tablet Take 10 mg by mouth.      valsartan (DIOVAN) 160 MG tablet Take 1 tablet (160 mg total) by mouth once daily. 90 tablet 3    nitroGLYCERIN (NITROSTAT) 0.4 MG SL tablet Place 1 tablet (0.4 mg total) under the tongue every 5 (five) minutes as needed for Chest pain. (Patient not taking: Reported on 3/6/2024) 20 tablet 12     No current facility-administered medications on file prior to visit.

## 2025-02-26 ENCOUNTER — RESULTS FOLLOW-UP (OUTPATIENT)
Dept: INTERNAL MEDICINE | Facility: CLINIC | Age: 80
End: 2025-02-26
Payer: MEDICARE

## 2025-03-05 ENCOUNTER — PATIENT MESSAGE (OUTPATIENT)
Dept: INTERNAL MEDICINE | Facility: CLINIC | Age: 80
End: 2025-03-05
Payer: MEDICARE

## 2025-03-19 ENCOUNTER — OFFICE VISIT (OUTPATIENT)
Dept: DERMATOLOGY | Facility: CLINIC | Age: 80
End: 2025-03-19
Payer: MEDICARE

## 2025-03-19 DIAGNOSIS — L64.9 ANDROGENETIC ALOPECIA: ICD-10-CM

## 2025-03-19 DIAGNOSIS — L65.0 TELOGEN EFFLUVIUM: ICD-10-CM

## 2025-03-19 DIAGNOSIS — L65.9 ALOPECIA: Primary | ICD-10-CM

## 2025-03-19 PROCEDURE — 99213 OFFICE O/P EST LOW 20 MIN: CPT | Mod: S$PBB,,, | Performed by: DERMATOLOGY

## 2025-03-19 PROCEDURE — 99999 PR PBB SHADOW E&M-EST. PATIENT-LVL III: CPT | Mod: PBBFAC,,, | Performed by: DERMATOLOGY

## 2025-03-19 PROCEDURE — 99213 OFFICE O/P EST LOW 20 MIN: CPT | Mod: PBBFAC,PN | Performed by: DERMATOLOGY

## 2025-03-19 RX ORDER — FLUOCINONIDE TOPICAL SOLUTION USP, 0.05% 0.5 MG/ML
SOLUTION TOPICAL
Qty: 60 ML | Refills: 2 | Status: SHIPPED | OUTPATIENT
Start: 2025-03-19

## 2025-03-19 NOTE — PROGRESS NOTES
Subjective:      Patient ID:  Mirna Norton is a 80 y.o. female who presents for   Chief Complaint   Patient presents with    Alopecia     Alopecia      Pt c/o alopecia x 1 year. Tx with saw palmetto 100 mg with no improvement.   States this started in Aug of last year after she got Covid.  Was prescribed keto shampoo from Dr. Lam and saw palmetto. Hasn't observed a difference.  Hair not shedding as much as it did, but still not growing back.  Has low BP to begin with (not good candidate for minoxidil).  Occ has itchy scalp.     Review of Systems   Constitutional:  Negative for weight loss, weight gain, fatigue and malaise.   Genitourinary:  Negative for frequency.   Skin:  Positive for activity-related sunscreen use. Negative for itching, rash and daily sunscreen use.       Objective:   Physical Exam   Constitutional: She appears well-developed and well-nourished.   Neurological: She is alert and oriented to person, place, and time.   Psychiatric: She has a normal mood and affect.   Skin:   Areas Examined (abnormalities noted in diagram):   Scalp / Hair Palpated and Inspected  Head / Face Inspection Performed            Diagram Legend     Erythematous scaling macule/papule c/w actinic keratosis       Vascular papule c/w angioma      Pigmented verrucoid papule/plaque c/w seborrheic keratosis      Yellow umbilicated papule c/w sebaceous hyperplasia      Irregularly shaped tan macule c/w lentigo     1-2 mm smooth white papules consistent with Milia      Movable subcutaneous cyst with punctum c/w epidermal inclusion cyst      Subcutaneous movable cyst c/w pilar cyst      Firm pink to brown papule c/w dermatofibroma      Pedunculated fleshy papule(s) c/w skin tag(s)      Evenly pigmented macule c/w junctional nevus     Mildly variegated pigmented, slightly irregular-bordered macule c/w mildly atypical nevus      Flesh colored to evenly pigmented papule c/w intradermal nevus       Pink pearly papule/plaque c/w basal  cell carcinoma      Erythematous hyperkeratotic cursted plaque c/w SCC      Surgical scar with no sign of skin cancer recurrence      Open and closed comedones      Inflammatory papules and pustules      Verrucoid papule consistent consistent with wart     Erythematous eczematous patches and plaques     Dystrophic onycholytic nail with subungual debris c/w onychomycosis     Umbilicated papule    Erythematous-base heme-crusted tan verrucoid plaque consistent with inflamed seborrheic keratosis     Erythematous Silvery Scaling Plaque c/w Psoriasis     See annotation      Assessment / Plan:        Alopecia, Telogen effluvium and Androgenetic alopecia  -     fluocinonide (LIDEX) 0.05 % external solution; Apply to scalp once to twice daily x 1-2 weeks at a time. May use also prn itching.  Dispense: 60 mL; Refill: 2  Suggested Rogaine, but pt not interested currently. Po minoxidil not good option with low BP.  Continue above tx.  Supplement info provided in AVS.       Follow up in about 6 months (around 9/19/2025).

## 2025-03-24 ENCOUNTER — HOSPITAL ENCOUNTER (OUTPATIENT)
Dept: RADIOLOGY | Facility: HOSPITAL | Age: 80
Discharge: HOME | End: 2025-03-24
Attending: STUDENT IN AN ORGANIZED HEALTH CARE EDUCATION/TRAINING PROGRAM
Payer: MEDICARE

## 2025-03-24 ENCOUNTER — OFFICE VISIT (OUTPATIENT)
Dept: INTERNAL MEDICINE | Facility: CLINIC | Age: 80
End: 2025-03-24
Payer: MEDICARE

## 2025-03-24 VITALS
BODY MASS INDEX: 26.05 KG/M2 | HEART RATE: 74 BPM | OXYGEN SATURATION: 96 % | SYSTOLIC BLOOD PRESSURE: 142 MMHG | WEIGHT: 156.5 LBS | DIASTOLIC BLOOD PRESSURE: 66 MMHG

## 2025-03-24 DIAGNOSIS — R10.9 RIGHT SIDED ABDOMINAL PAIN: Primary | ICD-10-CM

## 2025-03-24 DIAGNOSIS — R10.9 RIGHT SIDED ABDOMINAL PAIN: ICD-10-CM

## 2025-03-24 DIAGNOSIS — R07.89 RIGHT-SIDED CHEST WALL PAIN: ICD-10-CM

## 2025-03-24 DIAGNOSIS — R73.03 PREDIABETES: ICD-10-CM

## 2025-03-24 DIAGNOSIS — K21.9 GASTROESOPHAGEAL REFLUX DISEASE WITHOUT ESOPHAGITIS: ICD-10-CM

## 2025-03-24 DIAGNOSIS — K58.9 IRRITABLE BOWEL SYNDROME WITHOUT DIARRHEA: ICD-10-CM

## 2025-03-24 PROCEDURE — 99214 OFFICE O/P EST MOD 30 MIN: CPT | Mod: PBBFAC,25 | Performed by: STUDENT IN AN ORGANIZED HEALTH CARE EDUCATION/TRAINING PROGRAM

## 2025-03-24 PROCEDURE — 74018 RADEX ABDOMEN 1 VIEW: CPT | Mod: 26,,, | Performed by: RADIOLOGY

## 2025-03-24 PROCEDURE — 71100 X-RAY EXAM RIBS UNI 2 VIEWS: CPT | Mod: 26,RT,, | Performed by: RADIOLOGY

## 2025-03-24 PROCEDURE — 74018 RADEX ABDOMEN 1 VIEW: CPT | Mod: TC

## 2025-03-24 PROCEDURE — 99999 PR PBB SHADOW E&M-EST. PATIENT-LVL IV: CPT | Mod: PBBFAC,,, | Performed by: STUDENT IN AN ORGANIZED HEALTH CARE EDUCATION/TRAINING PROGRAM

## 2025-03-24 PROCEDURE — 71100 X-RAY EXAM RIBS UNI 2 VIEWS: CPT | Mod: TC,RT

## 2025-03-24 PROCEDURE — 99214 OFFICE O/P EST MOD 30 MIN: CPT | Mod: S$PBB,,, | Performed by: STUDENT IN AN ORGANIZED HEALTH CARE EDUCATION/TRAINING PROGRAM

## 2025-03-24 RX ORDER — METHYLPREDNISOLONE 4 MG/1
TABLET ORAL
Qty: 21 TABLET | Refills: 0 | Status: SHIPPED | OUTPATIENT
Start: 2025-03-24

## 2025-03-24 RX ORDER — TIZANIDINE 4 MG/1
4 TABLET ORAL EVERY 6 HOURS PRN
Qty: 40 TABLET | Refills: 0 | Status: SHIPPED | OUTPATIENT
Start: 2025-03-24 | End: 2025-04-03

## 2025-03-24 NOTE — PROGRESS NOTES
[facility] FOLLOW-UP VISIT      CHIEF COMPLAINT:   Chief Complaint   Patient presents with    Flank Pain     Right side; Ongoing for 3 weeks; pt states she had similar issue in January of this year; pt rates pain a 9 on pain scale       HISTORY OF PRESENT ILLNESS:       History of Present Illness    CHIEF COMPLAINT:  Ms. Norton presents today for right-sided pain under ribs extending to back behind shoulder and down towards abdomen.    HISTORY OF PRESENT ILLNESS:  She reports right-sided pain extending from under the ribs through the back and to the shoulder and towards abdomen. The pain began with tightness 3-4 weeks ago and escalated in severity over the weekend. Pain is exacerbated by movement, coughing, and laughing. Similar episodes occurred in December and January, with current episode being the most severe. She denies any preceding injury. During Urgent Care visit in January (no records available), she received a steroid injection which provided relief. She notes burping when pressing certain areas of the abdomen. She took Pepcid which provided minimal relief of symptoms.     She has a history of IBD. She has daily bowel movements at 10am and uses Miralax irregularly. Stools are normal in consistency with a vegetable-rich diet but become harder and pellet-like when deviating from this diet. Recently stools have been normal consistency and frequency. She denies blood in her stool. She has a history of GERD with infrequent symptoms. Currently she has had no reflex or heartburn, only some burping. She has chronic stable urinary urgency. She denies pain with urination or blood in urine.    MEDICAL HISTORY:  She is pre-diabetic with improving glucose levels, decreasing from 6.7 to 6.3.         REVIEW OF SYSTEMS:    ROS as in HPI.      MEDICAL HISTORY:      Past Medical History:   Diagnosis Date    Abnormal cervical Papanicolaou smear 1980's    Cryo    Carotid artery stenosis     via kinked carotid artery  - followed by Dr. Ye Anderson    Carotid stenosis 5/15/2014    GERD (gastroesophageal reflux disease)     Gout due to renal impairment     Gout, arthritis 2/20/2013    Gout, unspecified     Herniated lumbar intervertebral disc     Hypertension     Insomnia     Irritable bowel syndrome without diarrhea 9/20/2015    Liver lesion 3/13/2013    Mitral valve prolapse     MVP (mitral valve prolapse)     Pancreatic cyst 12/30/2013    PVD (peripheral vascular disease) 12/23/2013    PVD (peripheral vascular disease) 12/23/2013    Venous insufficiency          MEDICATIONS:      Medications Ordered Prior to Encounter[1]      PHYSICAL EXAM:    BP (!) 142/66 (BP Location: Right arm, Patient Position: Sitting)   Pulse 74   Wt 71 kg (156 lb 8.4 oz)   SpO2 96%   BMI 26.05 kg/m²     Physical Exam  Vitals and nursing note reviewed.   Constitutional:       General: She is not in acute distress.     Appearance: Normal appearance. She is not ill-appearing, toxic-appearing or diaphoretic.   HENT:      Head: Normocephalic and atraumatic.      Nose: Nose normal.   Eyes:      Extraocular Movements: Extraocular movements intact.      Conjunctiva/sclera: Conjunctivae normal.      Pupils: Pupils are equal, round, and reactive to light.   Cardiovascular:      Rate and Rhythm: Normal rate and regular rhythm.      Heart sounds: Normal heart sounds. No murmur heard.  Pulmonary:      Effort: Pulmonary effort is normal. No respiratory distress.      Breath sounds: Normal breath sounds. No wheezing.   Chest:      Chest wall: Tenderness present. No mass, deformity or swelling.      Comments: Tenderness right lateral ribs / chest wall. Some tenderness extending towards underarm and behind shoulder, and down towards RLQ.   Musculoskeletal:         General: No deformity. Normal range of motion.   Skin:     Findings: No lesion or rash.   Neurological:      General: No focal deficit present.      Mental Status: She is alert.      Motor: No weakness.       Gait: Gait normal.   Psychiatric:         Mood and Affect: Mood normal.         Behavior: Behavior normal.         Thought Content: Thought content normal.         Judgment: Judgment normal.                 ASSESSMENT & PLAN:    1. Right sided abdominal pain  -     methylPREDNISolone (MEDROL DOSEPACK) 4 mg tablet; use as directed  Dispense: 21 tablet; Refill: 0  -     tiZANidine (ZANAFLEX) 4 MG tablet; Take 1 tablet (4 mg total) by mouth every 6 (six) hours as needed (pain).  Dispense: 40 tablet; Refill: 0  -     X-Ray Abdomen AP 1 View; Future; Expected date: 03/24/2025    2. Right-sided chest wall pain  -     methylPREDNISolone (MEDROL DOSEPACK) 4 mg tablet; use as directed  Dispense: 21 tablet; Refill: 0  -     tiZANidine (ZANAFLEX) 4 MG tablet; Take 1 tablet (4 mg total) by mouth every 6 (six) hours as needed (pain).  Dispense: 40 tablet; Refill: 0  -     X-Ray Ribs 2 View Right; Future; Expected date: 03/24/2025    3. Gastroesophageal reflux disease without esophagitis    4. Irritable bowel syndrome without diarrhea    5. Prediabetes        Assessment & Plan    Assessed right-sided pain under ribs, wrapping around to back and shoulder.  Considered muscular etiology due to pain exacerbation with movement and lack of GI symptoms.  Determined steroid treatment beneficial based on positive response to previous ER steroid injection.  Considered physical therapy as potential next step if steroid course ineffective.  Assessed minimal risk of short-term oral steroid use on prediabetes.      RIGHT SIDED PAIN:   Monitored the patient's report of pain on right side under ribs, wrapping around to back and shoulder and down towards right lower quadrant, worsening with movement.   Pain started 3-4 weeks ago and escalated over the weekend.   Noted similar episodes in Dec (saw PCP - suspected muscle etiology) and report of urgent care visit in Jan (no records available) for which she received a steroid injection with relief.     Performed physical exam and noted Tenderness right lateral ribs / chest wall. Some tenderness extending towards underarm and behind shoulder, and down towards RLQ.    Assessed that muscular pain is the most likely cause of symptoms based on exam and lack of other abdominal symptoms.   Ordered XR Ribs to rule out fracture or dislocation, though not expecting significant findings.   Ordered XR Abdomen to rule out constipation, though not expecting significant findings.   Prescribed a 5-day tapering course of oral steroids.   Prescribed muscle relaxer to be taken as needed, with caution due to drowsiness side effect.   Suggested considering physical therapy if symptoms don't improve.   Instructed the patient to contact the office if symptoms persist after completing steroid course, or if new symptoms of concern develop.    GASTROESOPHAGEAL REFLUX DISEASE (GERD):   Noted that the patient reports having GERD but not recently.   Evaluated that the patient took omeprazole (Prilosec) with minimal relief of current symptoms.   Advised the patient to report if reflux symptoms develops.    IBS:   Monitored the patient's report of hard stools when not taking Miralax or eating a vegetable-rich diet.   Noted that the patient reports normal bowel movement frequency and consistency.   Recommend resuming regular Miralax use.   Instructed the patient to contact the office if new intestinal symptoms develop.     PREDIABETES:   Noted patient's hemoglobin A1c 6.3.   Evaluated the patient's prediabetic condition and found it to be well-controlled.   Acknowledged the patient's concern about steroids affecting blood sugar.   Clarified that short-term steroid use may slightly increase blood sugar temporarily but should not have lasting effects.   Reassured the patient that short-term steroid use should not significantly affect blood sugar.          Attestation:  This note was generated with the assistance of ambient listening technology.  Verbal consent was obtained by the patient and accompanying visitor(s) for the recording of patient appointment to facilitate this note. I attest to having reviewed and edited the generated note for accuracy, though some syntax or spelling errors may persist. Please contact the author of this note for any clarification.           Alysia Doe MD  [facility]         [1]   Current Outpatient Medications on File Prior to Visit   Medication Sig Dispense Refill    allopurinoL (ZYLOPRIM) 100 MG tablet Take 1 tablet (100 mg total) by mouth once daily. 90 tablet 2    amLODIPine (NORVASC) 5 MG tablet Take 1 tablet (5 mg total) by mouth once daily. 90 tablet 3    aspirin (ECOTRIN) 81 MG EC tablet Take 1 tablet (81 mg total) by mouth once daily.      BIFIDOBACTERIUM INFANTIS (ALIGN ORAL) Take by mouth once daily.      BIOTIN ORAL Take by mouth once daily.      fluocinonide (LIDEX) 0.05 % external solution Apply to scalp once to twice daily x 1-2 weeks at a time. May use also prn itching. 60 mL 2    ketoconazole (NIZORAL) 2 % shampoo Apply topically every 7 days. X 30 minute scalp soaks 120 mL 10    multivitamin-minerals-lutein Tab Take 1 tablet by mouth once daily.       polyethylene glycol (MIRALAX) 17 gram/dose powder Take 17 g by mouth once daily. 510 g 3    rosuvastatin (CRESTOR) 10 MG tablet Take 10 mg by mouth.      valsartan (DIOVAN) 320 MG tablet Take 0.5 tablets (160 mg total) by mouth once daily. 45 tablet 3    nitroGLYCERIN (NITROSTAT) 0.4 MG SL tablet Place 1 tablet (0.4 mg total) under the tongue every 5 (five) minutes as needed for Chest pain. (Patient not taking: Reported on 3/6/2024) 20 tablet 12     No current facility-administered medications on file prior to visit.

## 2025-03-25 ENCOUNTER — RESULTS FOLLOW-UP (OUTPATIENT)
Dept: INTERNAL MEDICINE | Facility: CLINIC | Age: 80
End: 2025-03-25

## 2025-04-11 ENCOUNTER — PATIENT MESSAGE (OUTPATIENT)
Dept: DERMATOLOGY | Facility: CLINIC | Age: 80
End: 2025-04-11
Payer: MEDICARE

## 2025-04-11 DIAGNOSIS — L65.9 ALOPECIA: Primary | ICD-10-CM

## 2025-04-14 ENCOUNTER — PATIENT MESSAGE (OUTPATIENT)
Dept: INTERNAL MEDICINE | Facility: CLINIC | Age: 80
End: 2025-04-14
Payer: MEDICARE

## 2025-04-14 ENCOUNTER — PATIENT MESSAGE (OUTPATIENT)
Dept: DERMATOLOGY | Facility: CLINIC | Age: 80
End: 2025-04-14
Payer: MEDICARE

## 2025-04-14 RX ORDER — FLUOCINOLONE ACETONIDE 0.11 MG/ML
OIL TOPICAL
Qty: 118.28 ML | Refills: 3 | Status: SHIPPED | OUTPATIENT
Start: 2025-04-14

## 2025-04-23 ENCOUNTER — OFFICE VISIT (OUTPATIENT)
Dept: CARDIOLOGY | Facility: CLINIC | Age: 80
End: 2025-04-23
Payer: MEDICARE

## 2025-04-23 VITALS
HEART RATE: 68 BPM | BODY MASS INDEX: 26.26 KG/M2 | WEIGHT: 157.63 LBS | HEIGHT: 65 IN | SYSTOLIC BLOOD PRESSURE: 134 MMHG | DIASTOLIC BLOOD PRESSURE: 72 MMHG

## 2025-04-23 DIAGNOSIS — I67.2 CEREBRAL ATHEROSCLEROSIS: ICD-10-CM

## 2025-04-23 DIAGNOSIS — I10 PRIMARY HYPERTENSION: ICD-10-CM

## 2025-04-23 DIAGNOSIS — I77.9 DISORDER OF ARTERIES AND ARTERIOLES, UNSPECIFIED: ICD-10-CM

## 2025-04-23 DIAGNOSIS — R07.9 CHEST PAIN, UNSPECIFIED TYPE: ICD-10-CM

## 2025-04-23 DIAGNOSIS — I87.2 VENOUS INSUFFICIENCY OF RIGHT LOWER EXTREMITY: ICD-10-CM

## 2025-04-23 DIAGNOSIS — I65.29 STENOSIS OF CAROTID ARTERY, UNSPECIFIED LATERALITY: Primary | ICD-10-CM

## 2025-04-23 DIAGNOSIS — R09.89 BILATERAL CAROTID BRUITS: ICD-10-CM

## 2025-04-23 DIAGNOSIS — E78.2 MIXED HYPERLIPIDEMIA: ICD-10-CM

## 2025-04-23 PROCEDURE — 99214 OFFICE O/P EST MOD 30 MIN: CPT | Mod: S$PBB,,, | Performed by: INTERNAL MEDICINE

## 2025-04-23 PROCEDURE — 99213 OFFICE O/P EST LOW 20 MIN: CPT | Mod: PBBFAC,PO | Performed by: INTERNAL MEDICINE

## 2025-04-23 PROCEDURE — 99999 PR PBB SHADOW E&M-EST. PATIENT-LVL III: CPT | Mod: PBBFAC,,, | Performed by: INTERNAL MEDICINE

## 2025-04-23 RX ORDER — NITROGLYCERIN 0.4 MG/1
0.4 TABLET SUBLINGUAL EVERY 5 MIN PRN
Qty: 25 TABLET | Refills: 3 | Status: SHIPPED | OUTPATIENT
Start: 2025-04-23 | End: 2025-05-23

## 2025-04-23 NOTE — PATIENT INSTRUCTIONS
Assessment/Plan:  Mirna Norton is a 80 y.o. female with venous insufficiency s/p R GSVL EVLT in 4/2013 with subsequent injections in 2014, bilateral carotid disease, overweight, who presents for a follow up appointment.    Venous Insufficiency- Stable. Continue graduated compression hose.  Limit sodium intake to 2000 mg daily.  Limit volume intake to 1.5 L daily.  Elevate legs when resting. Pt has upcoming evaluation with Dr. Gomez.     Carotid Disease- Carotid Ultrasound on 3/24/2024 revealed 40-49% right Internal Carotid Stenosis and 20-39% left Internal Carotid Stenosis.  LDL 37 on 2/25/2025. Continue ASA 81 mg daily and pravastatin 40 mg daily.    3. Overweight- Encourge diet, exercise, and weight loss.    Follow up in 1 year

## 2025-04-23 NOTE — PROGRESS NOTES
"[facility]      Chief Complaint   Patient presents with    Venous Insufficiency       Patient ID: Mirna Norton is a 80 y.o. female with venous insufficiency s/p R GSVL EVLT in 4/2013 with subsequent injections in 2014, bilateral carotid disease, overweight, who presents for a follow up appointment.  Pertinent history/events are as follows:     -Pt presents to establish cardia care.     -Pt followed by Dr. Gonzalez in the past (per his note on 2/22/2023):  "Mirna Norton 78 y.o. female is here follow up and feeling well without any new complaints. She is s/p R GSVL EVLT in 4/2013 with subsequent injections in 2014 without recurrent issues. She has asymptomatic mild bilateral carotid disease. She takes aspirin and losartan. Last venous ultrasound-no DVT or reflux disease. Carotid ultrasound-PSV < 130 cm/sec bilaterally.  In May 2020 she had atypical chest discomfort.  A stress echocardiogram revealed a normal ejection fraction, no wall motion abnormalities, no EKG changes, or symptoms suggestive of obstructive coronary disease. She has been less active because of the pandemic.    -At our initial clinic visit on 3/6/2024, Mrs. Norton reports no chest pain, SOB, palpitations, TIA symptoms or syncope.  Reports not taking ASA 81 mg daily.  Wears compression hose daily.    Plan:  Venous Insufficiency- Stable. Continue graduated compression hose.  Limit sodium intake to 2000 mg daily.  Limit volume intake to 1.5 L daily.  Elevate legs when resting.  Carotid Disease- Check updated carotid ultrasound.  LDL 67 in 1/2024.  Start ASA 81 mg daily.  Continue pravastatin 40 mg daily.  Overweight- Encourge diet, exercise, and weight loss.    HPI:  Mrs. Norton reports no chest pain, SOB, palpitations, TIA symptoms or syncope.  States she has "some pain in my right leg, so I made an appointment with Dr. Gomez". She is currently taking ASA 81 mg daily and rosuvastatin 10 mg daily. LDL 37 on 2/25/2025. Wears " compression hose daily.  Carotid Ultrasound on 3/24/2024 revealed 40-49% right Internal Carotid Stenosis and 20-39% left Internal Carotid Stenosis.     Past Medical History:   Diagnosis Date    Abnormal cervical Papanicolaou smear     Cryo    Carotid artery stenosis     via kinked carotid artery - followed by Dr. Ye Anderson    Carotid stenosis 5/15/2014    GERD (gastroesophageal reflux disease)     Gout due to renal impairment     Gout, arthritis 2013    Gout, unspecified     Herniated lumbar intervertebral disc     Hypertension     Insomnia     Irritable bowel syndrome without diarrhea 2015    Liver lesion 3/13/2013    Mitral valve prolapse     MVP (mitral valve prolapse)     Pancreatic cyst 2013    PVD (peripheral vascular disease) 2013    PVD (peripheral vascular disease) 2013    Venous insufficiency      Past Surgical History:   Procedure Laterality Date    BARTHOLIN GLAND CYST EXCISION       SECTION      x3    COLONOSCOPY N/A 2019    Procedure: COLONOSCOPY;  Surgeon: Haider Luu MD;  [facility]  Service: Endoscopy;  Laterality: N/A;    COLONOSCOPY, SCREENING, HIGH RISK PATIENT N/A 12/3/2024    Procedure: COLONOSCOPY, SCREENING, HIGH RISK PATIENT;  Surgeon: Haider Luu MD;  [facility]  Service: Endoscopy;  Laterality: N/A;  Referred by DR. Luu suprep, mail, AE/ML   precall complete-st    Diagnostic laproscopic       laser surgery on vein       Social History     Socioeconomic History    Marital status:    Occupational History    Occupation: Retired   Tobacco Use    Smoking status: Never    Smokeless tobacco: Never   Substance and Sexual Activity    Alcohol use: No    Drug use: No    Sexual activity: Not Currently     Birth control/protection: Post-menopausal   Social History Narrative    3 kids (healthy), previous . , has eye issues. biw exercise.     Social Drivers of Health      Financial Resource Strain: Low Risk  (3/5/2024)    Overall Financial Resource Strain (CARDIA)     Difficulty of Paying Living Expenses: Not hard at all   Food Insecurity: No Food Insecurity (3/5/2024)    Hunger Vital Sign     Worried About Running Out of Food in the Last Year: Never true     Ran Out of Food in the Last Year: Never true   Transportation Needs: No Transportation Needs (3/5/2024)    PRAPARE - Transportation     Lack of Transportation (Medical): No     Lack of Transportation (Non-Medical): No   Physical Activity: Insufficiently Active (3/5/2024)    Exercise Vital Sign     Days of Exercise per Week: 2 days     Minutes of Exercise per Session: 30 min   Stress: No Stress Concern Present (3/5/2024)    [employer] - Occupational Stress Questionnaire     Feeling of Stress : Not at all   Housing Stability: Low Risk  (3/5/2024)    Housing Stability Vital Sign     Unable to Pay for Housing in the Last Year: No     Number of Places Lived in the Last Year: 1     Unstable Housing in the Last Year: No     Family History   Problem Relation Name Age of Onset    Heart disease Mother      Diabetes Brother      Hypertension Brother      Diabetes Sister      Eclampsia Sister      Hypertension Sister      Colon polyps Sister      Diabetes Sister      Eclampsia Sister      Hypertension Sister      Miscarriages / Stillbirths Sister      Heart disease Sister          mi, cad    Diabetes Sister      Eclampsia Sister      Hypertension Sister      Diabetes Sister      Endometriosis Daughter      No Known Problems Daughter      No Known Problems Daughter      Breast cancer Other niece         under age 40. came back at around age 60    Colon cancer Neg Hx      Ovarian cancer Neg Hx      Esophageal cancer Neg Hx      Liver cancer Neg Hx      Liver disease Neg Hx      Rectal cancer Neg Hx      Stomach cancer Neg Hx      Uterine cancer Neg Hx      Cervical cancer Neg Hx         Review of patient's  allergies indicates:  No Known Allergies      Medication List with Changes/Refills   Current Medications    ALLOPURINOL (ZYLOPRIM) 100 MG TABLET    Take 1 tablet (100 mg total) by mouth once daily.    AMLODIPINE (NORVASC) 5 MG TABLET    Take 1 tablet (5 mg total) by mouth once daily.    ASPIRIN (ECOTRIN) 81 MG EC TABLET    Take 1 tablet (81 mg total) by mouth once daily.    BIFIDOBACTERIUM INFANTIS (ALIGN ORAL)    Take 1 capsule by mouth as needed.    BIOTIN ORAL    Take 1 tablet by mouth once daily.    FLUOCINOLONE AND SHOWER CAP (DERMA-SMOOTHE/FS SCALP OIL) 0.01 % OIL    Apply oil to damp scalp nightly and cover with shower cap.    FLUOCINONIDE (LIDEX) 0.05 % EXTERNAL SOLUTION    Apply to scalp once to twice daily x 1-2 weeks at a time. May use also prn itching.    KETOCONAZOLE (NIZORAL) 2 % SHAMPOO    Apply topically every 7 days. X 30 minute scalp soaks    MULTIVITAMIN-MINERALS-LUTEIN TAB    Take 1 tablet by mouth once daily.     NITROGLYCERIN (NITROSTAT) 0.4 MG SL TABLET    Place 1 tablet (0.4 mg total) under the tongue every 5 (five) minutes as needed for Chest pain.    POLYETHYLENE GLYCOL (MIRALAX) 17 GRAM/DOSE POWDER    Take 17 g by mouth once daily.    ROSUVASTATIN (CRESTOR) 10 MG TABLET    Take 10 mg by mouth once daily.    VALSARTAN (DIOVAN) 320 MG TABLET    Take 0.5 tablets (160 mg total) by mouth once daily.   Discontinued Medications    METHYLPREDNISOLONE (MEDROL DOSEPACK) 4 MG TABLET    use as directed       Review of Systems  Constitution: Denies chills, fever, and sweats.  HENT: Denies headaches or blurry vision.  Cardiovascular: Denies chest pain or irregular heart beat.  Respiratory: Denies cough or shortness of breath.  Gastrointestinal: Denies abdominal pain, nausea, or vomiting.  Musculoskeletal: Denies muscle cramps.  Neurological: Denies dizziness or focal weakness.  Psychiatric/Behavioral: Normal mental status.  Hematologic/Lymphatic: Denies bleeding problem or easy bruising/bleeding.  Skin:  "Denies rash or suspicious lesions    Physical Examination  /72   Pulse 68   Ht 5' 5" (1.651 m)   Wt 71.5 kg (157 lb 10.1 oz)   BMI 26.23 kg/m²     Constitutional: No acute distress, conversant  HEENT: Sclera anicteric, Pupils equal, round and reactive to light, extraocular motions intact, Oropharynx clear  Neck: No JVD, no carotid bruits  Cardiovascular: regular rate and rhythm, no murmur, rubs or gallops, normal S1/S2  Pulmonary: Clear to auscultation bilaterally  Abdominal: Abdomen soft, nontender, nondistended, positive bowel sounds  Extremities: No lower extremity edema,   Pulses:  Carotid pulses are 2+ on the right side, and 2+ on the left side.  Radial pulses are 2+ on the right side, and 2+ on the left side.   Femoral pulses are 2+ on the right side, and 2+ on the left side.  Popliteal pulses are 2+ on the right side, and 2+ on the left side.   Dorsalis pedis pulses are 2+ on the right side, and 2+ on the left side.   Posterior tibial pulses are 2+ on the right side, and 2+ on the left side.    Skin: No ecchymosis, erythema, or ulcers  Psych: Alert and oriented x 3, appropriate affect  Neuro: CNII-XII intact, no focal deficits    Labs:  Most Recent Data  CBC:   Lab Results   Component Value Date    WBC 6.92 02/25/2025    HGB 13.1 02/25/2025    HCT 43.0 02/25/2025     02/25/2025    MCV 92 02/25/2025    RDW 13.6 02/25/2025     BMP:   Lab Results   Component Value Date     02/25/2025    K 4.2 02/25/2025     02/25/2025    CO2 28 02/25/2025    BUN 14 02/25/2025    CREATININE 0.8 02/25/2025     02/25/2025    CALCIUM 10.0 02/25/2025    PHOS 2.8 10/14/2024     LFTS;   Lab Results   Component Value Date    PROT 8.0 02/25/2025    ALBUMIN 3.9 02/25/2025    BILITOT 0.4 02/25/2025    AST 47 (H) 02/25/2025    ALKPHOS 63 02/25/2025    ALT 19 02/25/2025     COAGS:   Lab Results   Component Value Date    INR 1.0 04/08/2011     FLP:   Lab Results   Component Value Date    CHOL 117 (L) " "02/25/2025    HDL 70 02/25/2025    LDLCALC 37.0 (L) 02/25/2025    TRIG 50 02/25/2025    CHOLHDL 59.8 (H) 02/25/2025     CARDIAC: No results found for: "TROPONINI", "CKTOTAL", "CKMB", "BNP"    Imaging:    EKG 5/13/2020:  Normal sinus rhythm    Carotid Ultrasound 3/24/2024:    There is 40-49% right Internal Carotid Stenosis.    There is 20-39% left Internal Carotid Stenosis.     US 11/2018    R ICA 92 cm/sec   L  cm/sec    US 12/2019   R ICA 72 with ratio <1   L ICA 83 with ratio 108    ECG 1/27/2020   Sinus bradycardia    YOLANDE 5/2020   Non ischemic   Normal EF   Normal ECG   No symptoms   No WMAs    Carotid US 1/2022:   R ICA PSV 91   L ICA     Patent bilateral vertebral    Carotid US 2/2023   R  cm/sec   L ICA 87 cm/sec    Patent bilateral vertebral     Assessment/Plan:  Mirna Norton is a 80 y.o. female with venous insufficiency s/p R GSVL EVLT in 4/2013 with subsequent injections in 2014, bilateral carotid disease, overweight, who presents for a follow up appointment.    Venous Insufficiency- Stable. Continue graduated compression hose.  Limit sodium intake to 2000 mg daily.  Limit volume intake to 1.5 L daily.  Elevate legs when resting. Pt has upcoming evaluation with Dr. Gomez.     Carotid Disease- Carotid Ultrasound on 3/24/2024 revealed 40-49% right Internal Carotid Stenosis and 20-39% left Internal Carotid Stenosis.  LDL 37 on 2/25/2025. Continue ASA 81 mg daily and pravastatin 40 mg daily.    3. Overweight- Encourge diet, exercise, and weight loss.    Follow up in 1 year    Total duration of face to face visit time 20 minutes.  Total time spent counseling greater than fifty percent of total visit time.  Counseling included discussion regarding imaging findings, diagnosis, possibilities, treatment options, risks and benefits.  The patient had many questions regarding the options and long-term effects.    Benigno Swenson MD, PhD  Interventional Cardiology      "

## 2025-04-24 ENCOUNTER — PATIENT MESSAGE (OUTPATIENT)
Dept: ORTHOPEDICS | Facility: CLINIC | Age: 80
End: 2025-04-24
Payer: MEDICARE

## 2025-04-25 DIAGNOSIS — M54.9 DORSALGIA, UNSPECIFIED: Primary | ICD-10-CM

## 2025-04-28 ENCOUNTER — HOSPITAL ENCOUNTER (OUTPATIENT)
Dept: RADIOLOGY | Facility: HOSPITAL | Age: 80
Discharge: HOME | End: 2025-04-28
Attending: ORTHOPAEDIC SURGERY
Payer: MEDICARE

## 2025-04-28 DIAGNOSIS — M54.9 DORSALGIA, UNSPECIFIED: ICD-10-CM

## 2025-04-28 DIAGNOSIS — M54.9 DORSALGIA, UNSPECIFIED: Primary | ICD-10-CM

## 2025-04-28 PROCEDURE — 72148 MRI LUMBAR SPINE W/O DYE: CPT | Mod: TC

## 2025-04-28 PROCEDURE — 72148 MRI LUMBAR SPINE W/O DYE: CPT | Mod: 26,,, | Performed by: RADIOLOGY

## 2025-04-28 RX ORDER — DIAZEPAM 2 MG/1
2 TABLET ORAL
Qty: 2 TABLET | Refills: 0 | Status: SHIPPED | OUTPATIENT
Start: 2025-04-28 | End: 2025-05-28

## 2025-04-30 ENCOUNTER — OFFICE VISIT (OUTPATIENT)
Dept: ORTHOPEDICS | Facility: CLINIC | Age: 80
End: 2025-04-30
Attending: ORTHOPAEDIC SURGERY
Payer: MEDICARE

## 2025-04-30 ENCOUNTER — HOSPITAL ENCOUNTER (OUTPATIENT)
Dept: CARDIOLOGY | Facility: HOSPITAL | Age: 80
Discharge: HOME | End: 2025-04-30
Attending: INTERNAL MEDICINE
Payer: MEDICARE

## 2025-04-30 VITALS — HEIGHT: 65 IN | WEIGHT: 157.63 LBS | BODY MASS INDEX: 26.26 KG/M2

## 2025-04-30 DIAGNOSIS — M51.362 DEGENERATION OF INTERVERTEBRAL DISC OF LUMBAR REGION WITH DISCOGENIC BACK PAIN AND LOWER EXTREMITY PAIN: Primary | ICD-10-CM

## 2025-04-30 DIAGNOSIS — I65.29 STENOSIS OF CAROTID ARTERY, UNSPECIFIED LATERALITY: ICD-10-CM

## 2025-04-30 DIAGNOSIS — I67.2 CEREBRAL ATHEROSCLEROSIS: ICD-10-CM

## 2025-04-30 LAB
LEFT ARM DIASTOLIC BLOOD PRESSURE: 74 MMHG
LEFT ARM SYSTOLIC BLOOD PRESSURE: 130 MMHG
LEFT CBA DIAS: 23 CM/S
LEFT CBA SYS: 104 CM/S
LEFT CCA DIST DIAS: 25 CM/S
LEFT CCA DIST SYS: 125 CM/S
LEFT CCA MID DIAS: 23 CM/S
LEFT CCA MID SYS: 142 CM/S
LEFT CCA PROX DIAS: 19 CM/S
LEFT CCA PROX SYS: 117 CM/S
LEFT ECA DIAS: 15 CM/S
LEFT ECA SYS: 125 CM/S
LEFT ICA DIST DIAS: 23 CM/S
LEFT ICA DIST SYS: 89 CM/S
LEFT ICA MID DIAS: 23 CM/S
LEFT ICA MID SYS: 108 CM/S
LEFT ICA PROX DIAS: 17 CM/S
LEFT ICA PROX SYS: 71 CM/S
LEFT VERTEBRAL DIAS: 16 CM/S
LEFT VERTEBRAL SYS: 82 CM/S
OHS CV CAROTID RIGHT ICA EDV HIGHEST: 31
OHS CV CAROTID ULTRASOUND LEFT ICA/CCA RATIO: 0.86
OHS CV CAROTID ULTRASOUND RIGHT ICA/CCA RATIO: 1.25
OHS CV PV CAROTID LEFT HIGHEST CCA: 142
OHS CV PV CAROTID LEFT HIGHEST ICA: 108
OHS CV PV CAROTID RIGHT HIGHEST CCA: 101
OHS CV PV CAROTID RIGHT HIGHEST ICA: 120
OHS CV US CAROTID LEFT HIGHEST EDV: 23
RIGHT ARM DIASTOLIC BLOOD PRESSURE: 61 MMHG
RIGHT ARM SYSTOLIC BLOOD PRESSURE: 126 MMHG
RIGHT CBA DIAS: 18 CM/S
RIGHT CBA SYS: 87 CM/S
RIGHT CCA DIST DIAS: 21 CM/S
RIGHT CCA DIST SYS: 96 CM/S
RIGHT CCA MID DIAS: 16 CM/S
RIGHT CCA MID SYS: 99 CM/S
RIGHT CCA PROX DIAS: 17 CM/S
RIGHT CCA PROX SYS: 101 CM/S
RIGHT ECA DIAS: 15 CM/S
RIGHT ECA SYS: 129 CM/S
RIGHT ICA DIST DIAS: 31 CM/S
RIGHT ICA DIST SYS: 120 CM/S
RIGHT ICA MID DIAS: 16 CM/S
RIGHT ICA MID SYS: 67 CM/S
RIGHT ICA PROX DIAS: 15 CM/S
RIGHT ICA PROX SYS: 72 CM/S
RIGHT VERTEBRAL DIAS: 17 CM/S
RIGHT VERTEBRAL SYS: 66 CM/S

## 2025-04-30 PROCEDURE — 99999 PR PBB SHADOW E&M-EST. PATIENT-LVL III: CPT | Mod: PBBFAC,,, | Performed by: ORTHOPAEDIC SURGERY

## 2025-04-30 PROCEDURE — 93880 EXTRACRANIAL BILAT STUDY: CPT

## 2025-04-30 PROCEDURE — 99213 OFFICE O/P EST LOW 20 MIN: CPT | Mod: PBBFAC,25 | Performed by: ORTHOPAEDIC SURGERY

## 2025-04-30 NOTE — PROGRESS NOTES
DATE: 4/30/2025  PATIENT: Mirna Norton    Attending Physician: Reggie Campos M.D.    HISTORY:  Mirna Norton is a 80 y.o. female who returns to me today for MRI results.  She was last seen by Dr. Campos on 12/4/24.  Today she is doing well but notes she continues  to have intermittent low back and right leg pain. She takes ibuprofen as needed.    The Patient denies myelopathic symptoms such as handwriting changes or difficulty with buttons/coins/keys. Denies perineal paresthesias, bowel/bladder dysfunction.      EXAM:  There were no vitals taken for this visit.    My physical examination was notable for the following findings:     Musculoskeletal and neuro exam stable      IMAGING:    Today I personally re- reviewed AP, Lat and Flex/Ex  upright L-spine that demonstrate advanced lumbar spondylosis with degenerative scoliosis.     MRI lumbar demonstrates moderate stenosis at L3-4 and L4-5    There is no height or weight on file to calculate BMI.    Hemoglobin A1C   Date Value Ref Range Status   02/25/2025 6.3 (H) 4.0 - 5.6 % Final     Comment:     ADA Screening Guidelines:  5.7-6.4%  Consistent with prediabetes  >or=6.5%  Consistent with diabetes    High levels of fetal hemoglobin interfere with the HbA1C  assay. Heterozygous hemoglobin variants (HbS, HgC, etc)do  not significantly interfere with this assay.   However, presence of multiple variants may affect accuracy.     02/02/2021 6.1 (H) 4.0 - 5.6 % Final     Comment:     ADA Screening Guidelines:  5.7-6.4%  Consistent with prediabetes  >or=6.5%  Consistent with diabetes    High levels of fetal hemoglobin interfere with the HbA1C  assay. Heterozygous hemoglobin variants (HbS, HgC, etc)do  not significantly interfere with this assay.   However, presence of multiple variants may affect accuracy.     04/28/2020 6.2 (H) 4.0 - 5.6 % Final     Comment:     ADA Screening Guidelines:  5.7-6.4%  Consistent with prediabetes  >or=6.5%  Consistent with diabetes  High levels  of fetal hemoglobin interfere with the HbA1C  assay. Heterozygous hemoglobin variants (HbS, HgC, etc)do  not significantly interfere with this assay.   However, presence of multiple variants may affect accuracy.           ASSESSMENT/PLAN:    There are no diagnoses linked to this encounter.    Today we discussed at length all of the different treatment options including anti-inflammatories, acetaminophen, rest, ice, heat, physical therapy including strengthening and stretching exercises, home exercises, ROM, aerobic conditioning, aqua therapy, other modalities including ultrasound, massage, and dry needling, epidural steroid injections and finally surgical intervention.      Pt presents with chronic low back pain and radiculopathy. Offered pain mgmt consult, she is not interested at this time. I provided the patient with a home exercise program. It is the [employer] spine conditioning program. Exercises include head rolls, kneeling back extension, sitting rotation stretch, modified seated side straddle, knee to chest, bird dog, plank, modified seated plank, hip bridges, abdominal bracing, and abdominal crunch. Pt will complete each exercise 5 times daily for 6-8 weeks. Pt will fu if pain persists.

## 2025-05-06 ENCOUNTER — TELEPHONE (OUTPATIENT)
Dept: VASCULAR SURGERY | Facility: CLINIC | Age: 80
End: 2025-05-06
Payer: MEDICARE

## 2025-05-07 ENCOUNTER — TELEPHONE (OUTPATIENT)
Dept: VASCULAR SURGERY | Facility: CLINIC | Age: 80
End: 2025-05-07

## 2025-06-05 ENCOUNTER — PATIENT MESSAGE (OUTPATIENT)
Dept: ADMINISTRATIVE | Facility: OTHER | Age: 80
End: 2025-06-05
Payer: MEDICARE

## 2025-06-12 ENCOUNTER — PATIENT MESSAGE (OUTPATIENT)
Dept: INTERNAL MEDICINE | Facility: CLINIC | Age: 80
End: 2025-06-12
Payer: MEDICARE

## 2025-06-12 DIAGNOSIS — I83.893 VARICOSE VEINS OF LEG WITH SWELLING, BILATERAL: ICD-10-CM

## 2025-06-13 NOTE — TELEPHONE ENCOUNTER
LOV with Ramsey Tyler MD , 12/9/2024  Nx appt 8/25/25  Pt is requesting a new order of compression stockings. Last ordered in Sept 2024  New order pended for your review.

## 2025-06-20 ENCOUNTER — PATIENT MESSAGE (OUTPATIENT)
Dept: INTERNAL MEDICINE | Facility: CLINIC | Age: 80
End: 2025-06-20
Payer: MEDICARE

## 2025-06-20 DIAGNOSIS — I83.893 VARICOSE VEINS OF LEG WITH SWELLING, BILATERAL: Primary | ICD-10-CM

## 2025-06-21 NOTE — TELEPHONE ENCOUNTER
Pt requesting compression stocking order be resent, only one stocking was ordered. Re-pended for 2 stockings.     LOV with Ramsey Tyler MD , 12/9/2024

## 2025-06-23 ENCOUNTER — PATIENT MESSAGE (OUTPATIENT)
Dept: INTERNAL MEDICINE | Facility: CLINIC | Age: 80
End: 2025-06-23
Payer: MEDICARE

## 2025-06-23 NOTE — TELEPHONE ENCOUNTER
LOV with Yvonne Barry FNP 2/25/2025  Pt requesting letter of authorization for automobile ramp.  Please let the pt know if an appt is needed.

## 2025-06-24 NOTE — TELEPHONE ENCOUNTER
Called pt and left detailed VM to informed that she is schedule to see dr. Tyler  07/07/25 in order to sign paper work for her Ramp.    Contact no was provided to requested call back if pt have any question regarding apt .

## 2025-06-25 ENCOUNTER — PATIENT MESSAGE (OUTPATIENT)
Dept: ORTHOPEDICS | Facility: CLINIC | Age: 80
End: 2025-06-25
Payer: MEDICARE

## 2025-06-25 NOTE — TELEPHONE ENCOUNTER
Hi, please offer her an appointment with me on 07/02.  Please offer the appointment to be in person or on video whichever she prefers.  Thank you, Ramsey Tyler

## 2025-07-01 NOTE — TELEPHONE ENCOUNTER
"Hi, please call the patient and confirmed that she is able to come to see me tomorrow and we can address the letter that she needs tomorrow.    I sent her this e-mail message but I do not think she seen it yet:  "Hi,  I was away from the office when you emailed and I just saw your message.  We can discuss this more tomorrow at your appointment.  Ramsey Tyler"  "

## 2025-07-02 ENCOUNTER — OFFICE VISIT (OUTPATIENT)
Dept: INTERNAL MEDICINE | Facility: CLINIC | Age: 80
End: 2025-07-02
Payer: MEDICARE

## 2025-07-02 VITALS
HEIGHT: 65 IN | BODY MASS INDEX: 25.45 KG/M2 | SYSTOLIC BLOOD PRESSURE: 110 MMHG | DIASTOLIC BLOOD PRESSURE: 60 MMHG | OXYGEN SATURATION: 99 % | WEIGHT: 152.75 LBS | HEART RATE: 77 BPM

## 2025-07-02 DIAGNOSIS — I10 PRIMARY HYPERTENSION: ICD-10-CM

## 2025-07-02 DIAGNOSIS — R73.03 PREDIABETES: Primary | ICD-10-CM

## 2025-07-02 DIAGNOSIS — E78.2 MIXED HYPERLIPIDEMIA: ICD-10-CM

## 2025-07-02 DIAGNOSIS — M47.817 SPONDYLOSIS OF LUMBOSACRAL REGION WITHOUT MYELOPATHY OR RADICULOPATHY: ICD-10-CM

## 2025-07-02 PROCEDURE — 99214 OFFICE O/P EST MOD 30 MIN: CPT | Mod: S$PBB,,, | Performed by: INTERNAL MEDICINE

## 2025-07-02 PROCEDURE — 99214 OFFICE O/P EST MOD 30 MIN: CPT | Mod: PBBFAC | Performed by: INTERNAL MEDICINE

## 2025-07-02 PROCEDURE — G2211 COMPLEX E/M VISIT ADD ON: HCPCS | Mod: ,,, | Performed by: INTERNAL MEDICINE

## 2025-07-02 PROCEDURE — 99999 PR PBB SHADOW E&M-EST. PATIENT-LVL IV: CPT | Mod: PBBFAC,,, | Performed by: INTERNAL MEDICINE

## 2025-07-02 NOTE — PROGRESS NOTES
"Subjective     Patient ID: Mirna Norton is a 80 y.o. female.    Chief Complaint: Annual Exam             History of Present Illness    CHIEF COMPLAINT:  Mirna presents today for documentation needs related to home modifications due to chronic back condition.    BACK PAIN:  She reports chronic back pain with severe episodes. In April, she experienced excruciating lower back pain that prevented her from tying shoes. She describes pain as intermittently getting "out of control" at certain intervals. She notes difficulty with mobility, particularly with climbing steps, which impacts daily activities. She previously practiced yoga but discontinued due to back issues. She is currently seeking accommodations including an automobile ramp to reduce physical strain.    CARDIOVASCULAR:  She experienced mild chest pain three weeks ago during her daughter's eye exam, which she attributes to emotional stress regarding her daughter's bilateral cataracts diagnosis. Her home blood pressure readings are 125/69.    GASTROINTESTINAL:  She reports improvement in bowel function since starting MiraLAX for constipation.    MEDICAL HISTORY:  She has a history of gout with last episode in April 2012. She has been on preventive gout medication with no recent flares.    LABS:  February labs showed normal Vitamin D levels. Pre-diabetes was noted with elevated sugar levels not meeting full diabetes diagnostic criteria.      ROS:  Constitutional: negative activity change, negative unexpected weight change  HENT: negative trouble swallowing  Eyes: negative discharge, negative visual disturbance  Respiratory: negative chest tightness, negative wheezing, negative shortness of breath  Cardiovascular: negative chest pain, negative palpitations, +chest pain at rest  Gastrointestinal: negative blood in stool, negative constipation, negative diarrhea, negative vomiting  Endocrine: negative polydipsia, negative polyuria  Genitourinary: negative " difficulty urinating, negative dysuria, negative hematuria  Musculoskeletal: negative arthralgias, negative joint swelling, negative neck pain, +back pain  Neurological: negative weakness, negative headaches  Psychiatric/Behavioral: negative confusion, negative dysphoric mood   Will be having home elevation, needs authorization for auto ramp.              HPI  Review of Systems     Objective     Physical Exam  Vitals reviewed.   Constitutional:       General: She is not in acute distress.     Appearance: Normal appearance. She is well-developed. She is not ill-appearing, toxic-appearing or diaphoretic.   HENT:      Head: Normocephalic and atraumatic.   Eyes:      General: No scleral icterus.     Pupils: Pupils are equal, round, and reactive to light.   Neck:      Thyroid: No thyromegaly.   Cardiovascular:      Rate and Rhythm: Normal rate and regular rhythm.      Heart sounds: Normal heart sounds. No murmur heard.     No friction rub. No gallop.   Pulmonary:      Effort: Pulmonary effort is normal. No respiratory distress.      Breath sounds: Normal breath sounds. No wheezing or rales.   Abdominal:      General: Bowel sounds are normal. There is no distension.      Palpations: Abdomen is soft. There is no mass.      Tenderness: There is no guarding or rebound.   Musculoskeletal:         General: No tenderness. Normal range of motion.      Cervical back: Normal range of motion.      Comments: No midline spine tenderness to palpation.  Neg SLR bilat   Lymphadenopathy:      Cervical: No cervical adenopathy.   Neurological:      General: No focal deficit present.      Mental Status: She is alert and oriented to person, place, and time.   Psychiatric:         Mood and Affect: Mood normal.         Speech: Speech normal.         Behavior: Behavior normal.            Assessment and Plan     1. Prediabetes    2. Primary hypertension    3. Mixed hyperlipidemia    4. Spondylosis of lumbosacral region without myelopathy or  radiculopathy        Assessment & Plan    LOW BACK PAIN:  - Determined auto ramp installation is medically necessary due to chronic back and spine condition.  - Mirna experiences excruciating lower back pain at certain intervals, making it difficult to tie shoes.  - Examined the patient's back and noted no pain with palpation.    PREDIABETES:  - February labs showed prediabetes with slightly elevated sugar levels.  - Explained prediabetes and its implications to the patient.  - Discussed impact of carbohydrates and sugars on glucose levels, providing information on sugar content in various foods and beverages.  - Advised the patient to reduce carbohydrate and sugar intake in diet, particularly from sources like raisin bran, pasta, and sugary beverages.  - Recommend increasing physical activity to help manage this condition.    HYPERLIPIDEMIA:  - Continued Rosuvastatin (Crestor) for cholesterol management.    CONSTIPATION:  - Mirna started taking MiraLAX for constipation, which improved bowel movements.    CHEST PAIN:  - Mirna experienced chest pain assessed as likely stress-related due to daughter's eye condition.    DEPENDENCE ON ENABLING DEVICES:  - Discussed the need for an automobile ramp due to chronic back and spine condition.    OTHER MEDICAL ISSUES:  - BP well-controlled at home and during visit.  - Continued gout medication due to effectiveness in preventing recurrence since April 2012.    FOLLOW-UP:  - Follow up in 6 months with Yvonne and in 1 year for annual check-up.                    Health Maintenance         Date Due Completion Date    TETANUS VACCINE 01/16/2018 1/16/2008    Shingles Vaccine (2 of 2) 12/16/2022 10/21/2022    Influenza Vaccine (1) 09/01/2025 12/18/2024    Hemoglobin A1c (Prediabetes) 02/25/2026 2/25/2025    Aspirin/Antiplatelet Therapy 07/02/2026 7/2/2025    DEXA Scan 11/21/2027 11/21/2022    Override on 8/2/2008: Done    Colonoscopy 12/03/2029 12/3/2024    Override on 3/6/2006:  Done    Lipid Panel 02/25/2030 2/25/2025          Follow up in about 1 year (around 7/2/2026)    Ms Russell Asha in 6 months, Jayson in 12 months.    Visit today included increased complexity associated with the care of the episodic problem  addressed and managing the longitudinal care of the patient due to the serious and/or complex managed problem(s) .    No future appointments.      This note was generated with the assistance of ambient listening technology. Verbal consent was obtained by the patient and accompanying visitor(s) for the recording of patient appointment to facilitate this note. I attest to having reviewed and edited the generated note for accuracy, though some syntax or spelling errors may persist. Please contact the author of this note for any clarification.

## 2025-08-28 DIAGNOSIS — M10.9 GOUT, UNSPECIFIED CAUSE, UNSPECIFIED CHRONICITY, UNSPECIFIED SITE: ICD-10-CM

## 2025-08-28 RX ORDER — ALLOPURINOL 100 MG/1
100 TABLET ORAL DAILY
Qty: 90 TABLET | Refills: 1 | Status: SHIPPED | OUTPATIENT
Start: 2025-08-28